# Patient Record
Sex: FEMALE | Race: WHITE | NOT HISPANIC OR LATINO
[De-identification: names, ages, dates, MRNs, and addresses within clinical notes are randomized per-mention and may not be internally consistent; named-entity substitution may affect disease eponyms.]

---

## 2017-03-13 ENCOUNTER — MEDICATION RENEWAL (OUTPATIENT)
Age: 72
End: 2017-03-13

## 2017-04-24 ENCOUNTER — APPOINTMENT (OUTPATIENT)
Dept: INTERNAL MEDICINE | Facility: CLINIC | Age: 72
End: 2017-04-24

## 2017-04-24 ENCOUNTER — MEDICATION RENEWAL (OUTPATIENT)
Age: 72
End: 2017-04-24

## 2017-04-24 VITALS
SYSTOLIC BLOOD PRESSURE: 188 MMHG | WEIGHT: 154 LBS | TEMPERATURE: 98 F | RESPIRATION RATE: 14 BRPM | DIASTOLIC BLOOD PRESSURE: 106 MMHG | OXYGEN SATURATION: 98 % | BODY MASS INDEX: 25.66 KG/M2 | HEIGHT: 65 IN | HEART RATE: 69 BPM

## 2017-04-24 DIAGNOSIS — R92.2 INCONCLUSIVE MAMMOGRAM: ICD-10-CM

## 2017-04-24 RX ORDER — CYANOCOBALAMIN 1000 UG/ML
1000 INJECTION INTRAMUSCULAR; SUBCUTANEOUS
Qty: 0 | Refills: 0 | Status: COMPLETED | OUTPATIENT
Start: 2017-04-24

## 2017-04-24 RX ADMIN — Medication 0 MCG/ML: at 00:00

## 2017-04-25 LAB
25(OH)D3 SERPL-MCNC: 55.3 NG/ML
ALBUMIN SERPL ELPH-MCNC: 4.1 G/DL
ALP BLD-CCNC: 71 U/L
ALT SERPL-CCNC: 22 U/L
ANION GAP SERPL CALC-SCNC: 15 MMOL/L
APPEARANCE: CLEAR
AST SERPL-CCNC: 28 U/L
BASOPHILS # BLD AUTO: 0.04 K/UL
BASOPHILS NFR BLD AUTO: 0.7 %
BILIRUB SERPL-MCNC: 0.5 MG/DL
BILIRUBIN URINE: NEGATIVE
BLOOD URINE: NEGATIVE
BUN SERPL-MCNC: 17 MG/DL
CALCIUM SERPL-MCNC: 9.7 MG/DL
CHLORIDE SERPL-SCNC: 104 MMOL/L
CHOLEST SERPL-MCNC: 153 MG/DL
CHOLEST/HDLC SERPL: 2.4 RATIO
CO2 SERPL-SCNC: 25 MMOL/L
COLOR: YELLOW
CREAT SERPL-MCNC: 0.92 MG/DL
EOSINOPHIL # BLD AUTO: 0.23 K/UL
EOSINOPHIL NFR BLD AUTO: 4.2 %
FOLATE SERPL-MCNC: 11.8 NG/ML
GLUCOSE QUALITATIVE U: NORMAL MG/DL
GLUCOSE SERPL-MCNC: 92 MG/DL
HBA1C MFR BLD HPLC: 5.5 %
HCT VFR BLD CALC: 41 %
HCV AB SER QL: NONREACTIVE
HCV S/CO RATIO: 0.1 S/CO
HDLC SERPL-MCNC: 64 MG/DL
HGB BLD-MCNC: 13.7 G/DL
IMM GRANULOCYTES NFR BLD AUTO: 0.2 %
KETONES URINE: NEGATIVE
LDLC SERPL CALC-MCNC: 71 MG/DL
LEUKOCYTE ESTERASE URINE: NEGATIVE
LYMPHOCYTES # BLD AUTO: 0.85 K/UL
LYMPHOCYTES NFR BLD AUTO: 15.5 %
MAN DIFF?: NORMAL
MCHC RBC-ENTMCNC: 30.4 PG
MCHC RBC-ENTMCNC: 33.4 GM/DL
MCV RBC AUTO: 91.1 FL
MONOCYTES # BLD AUTO: 0.36 K/UL
MONOCYTES NFR BLD AUTO: 6.5 %
NEUTROPHILS # BLD AUTO: 4.01 K/UL
NEUTROPHILS NFR BLD AUTO: 72.9 %
NITRITE URINE: NEGATIVE
PH URINE: 6.5
PLATELET # BLD AUTO: 252 K/UL
POTASSIUM SERPL-SCNC: 4.6 MMOL/L
PROT SERPL-MCNC: 6.6 G/DL
PROTEIN URINE: NEGATIVE MG/DL
RBC # BLD: 4.5 M/UL
RBC # FLD: 13.3 %
SODIUM SERPL-SCNC: 144 MMOL/L
SPECIFIC GRAVITY URINE: 1.02
TRIGL SERPL-MCNC: 90 MG/DL
TSH SERPL-ACNC: 1.15 UIU/ML
URATE SERPL-MCNC: 4.2 MG/DL
UROBILINOGEN URINE: NORMAL MG/DL
VIT B12 SERPL-MCNC: 438 PG/ML
WBC # FLD AUTO: 5.5 K/UL

## 2017-04-26 ENCOUNTER — MEDICATION RENEWAL (OUTPATIENT)
Age: 72
End: 2017-04-26

## 2017-05-07 LAB — HEMOCCULT STL QL IA: NEGATIVE

## 2017-06-22 ENCOUNTER — NON-APPOINTMENT (OUTPATIENT)
Age: 72
End: 2017-06-22

## 2017-06-22 ENCOUNTER — APPOINTMENT (OUTPATIENT)
Dept: CARDIOLOGY | Facility: CLINIC | Age: 72
End: 2017-06-22

## 2017-06-22 VITALS
HEART RATE: 59 BPM | DIASTOLIC BLOOD PRESSURE: 96 MMHG | OXYGEN SATURATION: 98 % | HEIGHT: 65 IN | SYSTOLIC BLOOD PRESSURE: 192 MMHG | RESPIRATION RATE: 14 BRPM | WEIGHT: 154 LBS | BODY MASS INDEX: 25.66 KG/M2

## 2017-06-22 VITALS — OXYGEN SATURATION: 98 % | DIASTOLIC BLOOD PRESSURE: 96 MMHG | HEART RATE: 58 BPM | SYSTOLIC BLOOD PRESSURE: 172 MMHG

## 2017-07-20 ENCOUNTER — APPOINTMENT (OUTPATIENT)
Dept: RADIOLOGY | Facility: CLINIC | Age: 72
End: 2017-07-20

## 2017-07-20 ENCOUNTER — APPOINTMENT (OUTPATIENT)
Dept: MAMMOGRAPHY | Facility: CLINIC | Age: 72
End: 2017-07-20

## 2017-07-20 ENCOUNTER — APPOINTMENT (OUTPATIENT)
Dept: ULTRASOUND IMAGING | Facility: CLINIC | Age: 72
End: 2017-07-20

## 2017-09-07 ENCOUNTER — TRANSCRIPTION ENCOUNTER (OUTPATIENT)
Age: 72
End: 2017-09-07

## 2017-09-14 ENCOUNTER — APPOINTMENT (OUTPATIENT)
Dept: INTERNAL MEDICINE | Facility: CLINIC | Age: 72
End: 2017-09-14
Payer: MEDICARE

## 2017-09-14 VITALS
WEIGHT: 158 LBS | DIASTOLIC BLOOD PRESSURE: 90 MMHG | HEART RATE: 64 BPM | HEIGHT: 65 IN | RESPIRATION RATE: 14 BRPM | BODY MASS INDEX: 26.33 KG/M2 | TEMPERATURE: 98.5 F | SYSTOLIC BLOOD PRESSURE: 170 MMHG | OXYGEN SATURATION: 96 %

## 2017-09-14 VITALS — SYSTOLIC BLOOD PRESSURE: 160 MMHG | DIASTOLIC BLOOD PRESSURE: 84 MMHG

## 2017-09-14 PROCEDURE — 96372 THER/PROPH/DIAG INJ SC/IM: CPT

## 2017-09-14 PROCEDURE — 99214 OFFICE O/P EST MOD 30 MIN: CPT | Mod: 25

## 2017-09-14 RX ORDER — CYANOCOBALAMIN 1000 UG/ML
1000 INJECTION INTRAMUSCULAR; SUBCUTANEOUS
Qty: 0 | Refills: 0 | Status: COMPLETED | OUTPATIENT
Start: 2017-09-14

## 2017-09-14 RX ADMIN — Medication 0 MCG/ML: at 00:00

## 2017-10-03 ENCOUNTER — APPOINTMENT (OUTPATIENT)
Dept: ORTHOPEDIC SURGERY | Facility: CLINIC | Age: 72
End: 2017-10-03
Payer: MEDICARE

## 2017-10-03 VITALS — BODY MASS INDEX: 26.33 KG/M2 | HEIGHT: 65 IN | WEIGHT: 158 LBS

## 2017-10-03 VITALS — SYSTOLIC BLOOD PRESSURE: 182 MMHG | HEART RATE: 71 BPM | DIASTOLIC BLOOD PRESSURE: 100 MMHG

## 2017-10-03 PROCEDURE — 73562 X-RAY EXAM OF KNEE 3: CPT | Mod: RT

## 2017-10-03 PROCEDURE — 99204 OFFICE O/P NEW MOD 45 MIN: CPT

## 2017-10-03 PROCEDURE — 73502 X-RAY EXAM HIP UNI 2-3 VIEWS: CPT

## 2017-12-14 ENCOUNTER — APPOINTMENT (OUTPATIENT)
Dept: INTERNAL MEDICINE | Facility: CLINIC | Age: 72
End: 2017-12-14
Payer: MEDICARE

## 2017-12-14 ENCOUNTER — NON-APPOINTMENT (OUTPATIENT)
Age: 72
End: 2017-12-14

## 2017-12-14 ENCOUNTER — APPOINTMENT (OUTPATIENT)
Dept: CARDIOLOGY | Facility: CLINIC | Age: 72
End: 2017-12-14
Payer: MEDICARE

## 2017-12-14 VITALS
TEMPERATURE: 98 F | SYSTOLIC BLOOD PRESSURE: 140 MMHG | OXYGEN SATURATION: 98 % | WEIGHT: 153 LBS | RESPIRATION RATE: 14 BRPM | HEART RATE: 59 BPM | DIASTOLIC BLOOD PRESSURE: 80 MMHG | HEIGHT: 65 IN | BODY MASS INDEX: 25.49 KG/M2

## 2017-12-14 VITALS
HEART RATE: 66 BPM | BODY MASS INDEX: 25.49 KG/M2 | OXYGEN SATURATION: 98 % | WEIGHT: 153 LBS | HEIGHT: 65 IN | SYSTOLIC BLOOD PRESSURE: 152 MMHG | DIASTOLIC BLOOD PRESSURE: 83 MMHG

## 2017-12-14 DIAGNOSIS — Z79.2 LONG TERM (CURRENT) USE OF ANTIBIOTICS: ICD-10-CM

## 2017-12-14 PROCEDURE — 93000 ELECTROCARDIOGRAM COMPLETE: CPT

## 2017-12-14 PROCEDURE — 96372 THER/PROPH/DIAG INJ SC/IM: CPT

## 2017-12-14 PROCEDURE — 99214 OFFICE O/P EST MOD 30 MIN: CPT

## 2017-12-14 PROCEDURE — 99214 OFFICE O/P EST MOD 30 MIN: CPT | Mod: 25

## 2017-12-14 RX ORDER — CYANOCOBALAMIN 1000 UG/ML
1000 INJECTION INTRAMUSCULAR; SUBCUTANEOUS
Qty: 0 | Refills: 0 | Status: COMPLETED | OUTPATIENT
Start: 2017-12-14

## 2017-12-14 RX ADMIN — Medication 0 MCG/ML: at 00:00

## 2017-12-19 PROBLEM — Z79.2 NEED FOR PROPHYLACTIC ANTIBIOTIC: Status: ACTIVE | Noted: 2017-12-19

## 2017-12-22 ENCOUNTER — OUTPATIENT (OUTPATIENT)
Dept: OUTPATIENT SERVICES | Facility: HOSPITAL | Age: 72
LOS: 1 days | End: 2017-12-22
Payer: MEDICARE

## 2017-12-22 VITALS
HEART RATE: 60 BPM | SYSTOLIC BLOOD PRESSURE: 150 MMHG | TEMPERATURE: 98 F | DIASTOLIC BLOOD PRESSURE: 80 MMHG | RESPIRATION RATE: 14 BRPM | HEIGHT: 64 IN | WEIGHT: 154.32 LBS

## 2017-12-22 DIAGNOSIS — Z95.2 PRESENCE OF PROSTHETIC HEART VALVE: Chronic | ICD-10-CM

## 2017-12-22 DIAGNOSIS — M16.11 UNILATERAL PRIMARY OSTEOARTHRITIS, RIGHT HIP: ICD-10-CM

## 2017-12-22 DIAGNOSIS — M19.90 UNSPECIFIED OSTEOARTHRITIS, UNSPECIFIED SITE: ICD-10-CM

## 2017-12-22 DIAGNOSIS — Z01.818 ENCOUNTER FOR OTHER PREPROCEDURAL EXAMINATION: ICD-10-CM

## 2017-12-22 DIAGNOSIS — Z96.7 PRESENCE OF OTHER BONE AND TENDON IMPLANTS: Chronic | ICD-10-CM

## 2017-12-22 DIAGNOSIS — Z95.4 PRESENCE OF OTHER HEART-VALVE REPLACEMENT: Chronic | ICD-10-CM

## 2017-12-22 DIAGNOSIS — Z96.659 PRESENCE OF UNSPECIFIED ARTIFICIAL KNEE JOINT: Chronic | ICD-10-CM

## 2017-12-22 DIAGNOSIS — Z98.89 OTHER SPECIFIED POSTPROCEDURAL STATES: Chronic | ICD-10-CM

## 2017-12-22 LAB
ALBUMIN SERPL ELPH-MCNC: 3.6 G/DL — SIGNIFICANT CHANGE UP (ref 3.3–5)
ALP SERPL-CCNC: 64 U/L — SIGNIFICANT CHANGE UP (ref 30–120)
ALT FLD-CCNC: 26 U/L DA — SIGNIFICANT CHANGE UP (ref 10–60)
ANION GAP SERPL CALC-SCNC: 6 MMOL/L — SIGNIFICANT CHANGE UP (ref 5–17)
APTT BLD: 30.9 SEC — SIGNIFICANT CHANGE UP (ref 27.5–37.4)
AST SERPL-CCNC: 25 U/L — SIGNIFICANT CHANGE UP (ref 10–40)
BILIRUB SERPL-MCNC: 0.5 MG/DL — SIGNIFICANT CHANGE UP (ref 0.2–1.2)
BLD GP AB SCN SERPL QL: SIGNIFICANT CHANGE UP
BUN SERPL-MCNC: 18 MG/DL — SIGNIFICANT CHANGE UP (ref 7–23)
CALCIUM SERPL-MCNC: 9.2 MG/DL — SIGNIFICANT CHANGE UP (ref 8.4–10.5)
CHLORIDE SERPL-SCNC: 108 MMOL/L — SIGNIFICANT CHANGE UP (ref 96–108)
CO2 SERPL-SCNC: 28 MMOL/L — SIGNIFICANT CHANGE UP (ref 22–31)
CREAT SERPL-MCNC: 1.02 MG/DL — SIGNIFICANT CHANGE UP (ref 0.5–1.3)
GLUCOSE SERPL-MCNC: 97 MG/DL — SIGNIFICANT CHANGE UP (ref 70–99)
HCT VFR BLD CALC: 44.2 % — SIGNIFICANT CHANGE UP (ref 34.5–45)
HGB BLD-MCNC: 13.8 G/DL — SIGNIFICANT CHANGE UP (ref 11.5–15.5)
INR BLD: 0.96 RATIO — SIGNIFICANT CHANGE UP (ref 0.88–1.16)
MCHC RBC-ENTMCNC: 29.1 PG — SIGNIFICANT CHANGE UP (ref 27–34)
MCHC RBC-ENTMCNC: 31.2 GM/DL — LOW (ref 32–36)
MCV RBC AUTO: 93.3 FL — SIGNIFICANT CHANGE UP (ref 80–100)
MRSA PCR RESULT.: SIGNIFICANT CHANGE UP
PLATELET # BLD AUTO: 266 K/UL — SIGNIFICANT CHANGE UP (ref 150–400)
POTASSIUM SERPL-MCNC: 4.2 MMOL/L — SIGNIFICANT CHANGE UP (ref 3.5–5.3)
POTASSIUM SERPL-SCNC: 4.2 MMOL/L — SIGNIFICANT CHANGE UP (ref 3.5–5.3)
PROT SERPL-MCNC: 6.8 G/DL — SIGNIFICANT CHANGE UP (ref 6–8.3)
PROTHROM AB SERPL-ACNC: 10.5 SEC — SIGNIFICANT CHANGE UP (ref 9.8–12.7)
RBC # BLD: 4.74 M/UL — SIGNIFICANT CHANGE UP (ref 3.8–5.2)
RBC # FLD: 12.3 % — SIGNIFICANT CHANGE UP (ref 10.3–14.5)
S AUREUS DNA NOSE QL NAA+PROBE: SIGNIFICANT CHANGE UP
SODIUM SERPL-SCNC: 142 MMOL/L — SIGNIFICANT CHANGE UP (ref 135–145)
WBC # BLD: 5.1 K/UL — SIGNIFICANT CHANGE UP (ref 3.8–10.5)
WBC # FLD AUTO: 5.1 K/UL — SIGNIFICANT CHANGE UP (ref 3.8–10.5)

## 2017-12-22 PROCEDURE — 86901 BLOOD TYPING SEROLOGIC RH(D): CPT

## 2017-12-22 PROCEDURE — 85610 PROTHROMBIN TIME: CPT

## 2017-12-22 PROCEDURE — 87641 MR-STAPH DNA AMP PROBE: CPT

## 2017-12-22 PROCEDURE — 80053 COMPREHEN METABOLIC PANEL: CPT

## 2017-12-22 PROCEDURE — G0463: CPT

## 2017-12-22 PROCEDURE — 36415 COLL VENOUS BLD VENIPUNCTURE: CPT

## 2017-12-22 PROCEDURE — 86850 RBC ANTIBODY SCREEN: CPT

## 2017-12-22 PROCEDURE — 86900 BLOOD TYPING SEROLOGIC ABO: CPT

## 2017-12-22 PROCEDURE — 87640 STAPH A DNA AMP PROBE: CPT

## 2017-12-22 PROCEDURE — 85730 THROMBOPLASTIN TIME PARTIAL: CPT

## 2017-12-22 PROCEDURE — 85027 COMPLETE CBC AUTOMATED: CPT

## 2017-12-22 NOTE — H&P PST ADULT - PMH
HLD (hyperlipidemia)    Hypertension    Hypothyroidism    Osteoarthrosis    Seasonal allergies    Vitamin B12 deficiency

## 2017-12-22 NOTE — H&P PST ADULT - NSANTHOSAYNRD_GEN_A_CORE
No. RANCHO screening performed.  STOP BANG Legend: 0-2 = LOW Risk; 3-4 = INTERMEDIATE Risk; 5-8 = HIGH Risk

## 2017-12-22 NOTE — H&P PST ADULT - FAMILY HISTORY
Father  Still living? No  Family history of cancer, Age at diagnosis: Age Unknown     Sibling  Still living? Yes, Estimated age: 71-80  Family history of stroke, Age at diagnosis: Age Unknown

## 2017-12-22 NOTE — H&P PST ADULT - HISTORY OF PRESENT ILLNESS
This is a 71 y/o female who presents with complaints of right hip pain for the past 2 years . She has been experiencing intermittent stabbing pain in the right groin and difficult walking . scheduled for right hip replacement
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
## 2017-12-22 NOTE — H&P PST ADULT - PROBLEM SELECTOR PLAN 1
right hip replacement   Medical clearance and cardiac clearance   Pre op instructions   anesthesia consult h/o difficult intubation in the past

## 2017-12-28 ENCOUNTER — APPOINTMENT (OUTPATIENT)
Dept: INTERNAL MEDICINE | Facility: CLINIC | Age: 72
End: 2017-12-28
Payer: MEDICARE

## 2017-12-28 VITALS
DIASTOLIC BLOOD PRESSURE: 80 MMHG | HEART RATE: 77 BPM | SYSTOLIC BLOOD PRESSURE: 140 MMHG | OXYGEN SATURATION: 96 % | HEIGHT: 64.5 IN | BODY MASS INDEX: 26.14 KG/M2 | WEIGHT: 155 LBS | TEMPERATURE: 98.7 F

## 2017-12-28 PROCEDURE — 99214 OFFICE O/P EST MOD 30 MIN: CPT

## 2018-01-02 RX ORDER — TRANEXAMIC ACID 100 MG/ML
1000 INJECTION, SOLUTION INTRAVENOUS ONCE
Qty: 0 | Refills: 0 | Status: DISCONTINUED | OUTPATIENT
Start: 2018-01-10 | End: 2018-01-10

## 2018-01-02 RX ORDER — APREPITANT 80 MG/1
40 CAPSULE ORAL ONCE
Qty: 0 | Refills: 0 | Status: COMPLETED | OUTPATIENT
Start: 2018-01-10 | End: 2018-01-10

## 2018-01-02 RX ORDER — ACETAMINOPHEN 500 MG
1000 TABLET ORAL ONCE
Qty: 0 | Refills: 0 | Status: COMPLETED | OUTPATIENT
Start: 2018-01-10 | End: 2018-01-10

## 2018-01-02 RX ORDER — SODIUM CHLORIDE 9 MG/ML
1000 INJECTION, SOLUTION INTRAVENOUS
Qty: 0 | Refills: 0 | Status: DISCONTINUED | OUTPATIENT
Start: 2018-01-10 | End: 2018-01-12

## 2018-01-02 RX ORDER — CEFAZOLIN SODIUM 1 G
2000 VIAL (EA) INJECTION ONCE
Qty: 0 | Refills: 0 | Status: COMPLETED | OUTPATIENT
Start: 2018-01-10 | End: 2018-01-10

## 2018-01-09 RX ORDER — PANTOPRAZOLE SODIUM 20 MG/1
40 TABLET, DELAYED RELEASE ORAL DAILY
Qty: 0 | Refills: 0 | Status: DISCONTINUED | OUTPATIENT
Start: 2018-01-10 | End: 2018-01-12

## 2018-01-09 RX ORDER — ONDANSETRON 8 MG/1
4 TABLET, FILM COATED ORAL EVERY 6 HOURS
Qty: 0 | Refills: 0 | Status: DISCONTINUED | OUTPATIENT
Start: 2018-01-10 | End: 2018-01-12

## 2018-01-09 RX ORDER — SENNA PLUS 8.6 MG/1
2 TABLET ORAL AT BEDTIME
Qty: 0 | Refills: 0 | Status: DISCONTINUED | OUTPATIENT
Start: 2018-01-10 | End: 2018-01-12

## 2018-01-09 RX ORDER — MAGNESIUM HYDROXIDE 400 MG/1
30 TABLET, CHEWABLE ORAL DAILY
Qty: 0 | Refills: 0 | Status: DISCONTINUED | OUTPATIENT
Start: 2018-01-10 | End: 2018-01-12

## 2018-01-09 RX ORDER — POLYETHYLENE GLYCOL 3350 17 G/17G
17 POWDER, FOR SOLUTION ORAL DAILY
Qty: 0 | Refills: 0 | Status: DISCONTINUED | OUTPATIENT
Start: 2018-01-10 | End: 2018-01-12

## 2018-01-09 RX ORDER — SODIUM CHLORIDE 9 MG/ML
1000 INJECTION, SOLUTION INTRAVENOUS
Qty: 0 | Refills: 0 | Status: DISCONTINUED | OUTPATIENT
Start: 2018-01-10 | End: 2018-01-12

## 2018-01-09 RX ORDER — DOCUSATE SODIUM 100 MG
100 CAPSULE ORAL THREE TIMES A DAY
Qty: 0 | Refills: 0 | Status: DISCONTINUED | OUTPATIENT
Start: 2018-01-10 | End: 2018-01-12
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
## 2018-01-10 ENCOUNTER — APPOINTMENT (OUTPATIENT)
Dept: ORTHOPEDIC SURGERY | Facility: HOSPITAL | Age: 73
End: 2018-01-10

## 2018-01-10 ENCOUNTER — INPATIENT (INPATIENT)
Facility: HOSPITAL | Age: 73
LOS: 1 days | Discharge: HOME | DRG: 470 | End: 2018-01-12
Attending: ORTHOPAEDIC SURGERY | Admitting: ORTHOPAEDIC SURGERY
Payer: MEDICARE

## 2018-01-10 ENCOUNTER — RESULT REVIEW (OUTPATIENT)
Age: 73
End: 2018-01-10

## 2018-01-10 VITALS
TEMPERATURE: 98 F | HEIGHT: 64.5 IN | RESPIRATION RATE: 17 BRPM | WEIGHT: 154.98 LBS | DIASTOLIC BLOOD PRESSURE: 84 MMHG | SYSTOLIC BLOOD PRESSURE: 171 MMHG | HEART RATE: 67 BPM | OXYGEN SATURATION: 98 %

## 2018-01-10 DIAGNOSIS — M16.11 UNILATERAL PRIMARY OSTEOARTHRITIS, RIGHT HIP: ICD-10-CM

## 2018-01-10 DIAGNOSIS — Z95.2 PRESENCE OF PROSTHETIC HEART VALVE: Chronic | ICD-10-CM

## 2018-01-10 DIAGNOSIS — Z96.7 PRESENCE OF OTHER BONE AND TENDON IMPLANTS: Chronic | ICD-10-CM

## 2018-01-10 DIAGNOSIS — Z96.659 PRESENCE OF UNSPECIFIED ARTIFICIAL KNEE JOINT: Chronic | ICD-10-CM

## 2018-01-10 DIAGNOSIS — Z98.89 OTHER SPECIFIED POSTPROCEDURAL STATES: Chronic | ICD-10-CM

## 2018-01-10 LAB
ANION GAP SERPL CALC-SCNC: 8 MMOL/L — SIGNIFICANT CHANGE UP (ref 5–17)
BUN SERPL-MCNC: 17 MG/DL — SIGNIFICANT CHANGE UP (ref 7–23)
CALCIUM SERPL-MCNC: 8.5 MG/DL — SIGNIFICANT CHANGE UP (ref 8.4–10.5)
CHLORIDE SERPL-SCNC: 104 MMOL/L — SIGNIFICANT CHANGE UP (ref 96–108)
CO2 SERPL-SCNC: 25 MMOL/L — SIGNIFICANT CHANGE UP (ref 22–31)
CREAT SERPL-MCNC: 0.95 MG/DL — SIGNIFICANT CHANGE UP (ref 0.5–1.3)
GLUCOSE SERPL-MCNC: 200 MG/DL — HIGH (ref 70–99)
HCT VFR BLD CALC: 34.4 % — LOW (ref 34.5–45)
HGB BLD-MCNC: 11.5 G/DL — SIGNIFICANT CHANGE UP (ref 11.5–15.5)
POTASSIUM SERPL-MCNC: 4.3 MMOL/L — SIGNIFICANT CHANGE UP (ref 3.5–5.3)
POTASSIUM SERPL-SCNC: 4.3 MMOL/L — SIGNIFICANT CHANGE UP (ref 3.5–5.3)
SODIUM SERPL-SCNC: 137 MMOL/L — SIGNIFICANT CHANGE UP (ref 135–145)

## 2018-01-10 PROCEDURE — 27130 TOTAL HIP ARTHROPLASTY: CPT | Mod: 82,RT

## 2018-01-10 PROCEDURE — 73501 X-RAY EXAM HIP UNI 1 VIEW: CPT | Mod: 26,RT

## 2018-01-10 PROCEDURE — 88305 TISSUE EXAM BY PATHOLOGIST: CPT | Mod: 26

## 2018-01-10 PROCEDURE — 27130 TOTAL HIP ARTHROPLASTY: CPT | Mod: RT

## 2018-01-10 PROCEDURE — 88311 DECALCIFY TISSUE: CPT | Mod: 26

## 2018-01-10 PROCEDURE — 99223 1ST HOSP IP/OBS HIGH 75: CPT

## 2018-01-10 RX ORDER — CHOLECALCIFEROL (VITAMIN D3) 125 MCG
1 CAPSULE ORAL
Qty: 0 | Refills: 0 | COMMUNITY

## 2018-01-10 RX ORDER — SODIUM CHLORIDE 9 MG/ML
1000 INJECTION, SOLUTION INTRAVENOUS
Qty: 0 | Refills: 0 | Status: DISCONTINUED | OUTPATIENT
Start: 2018-01-10 | End: 2018-01-10

## 2018-01-10 RX ORDER — CEFAZOLIN SODIUM 1 G
2000 VIAL (EA) INJECTION EVERY 8 HOURS
Qty: 0 | Refills: 0 | Status: COMPLETED | OUTPATIENT
Start: 2018-01-10 | End: 2018-01-11

## 2018-01-10 RX ORDER — PREGABALIN 225 MG/1
0 CAPSULE ORAL
Qty: 0 | Refills: 0 | COMMUNITY

## 2018-01-10 RX ORDER — ATORVASTATIN CALCIUM 80 MG/1
20 TABLET, FILM COATED ORAL AT BEDTIME
Qty: 0 | Refills: 0 | Status: DISCONTINUED | OUTPATIENT
Start: 2018-01-10 | End: 2018-01-12

## 2018-01-10 RX ORDER — ACETAMINOPHEN 500 MG
1000 TABLET ORAL EVERY 8 HOURS
Qty: 0 | Refills: 0 | Status: DISCONTINUED | OUTPATIENT
Start: 2018-01-11 | End: 2018-01-12

## 2018-01-10 RX ORDER — ASPIRIN/CALCIUM CARB/MAGNESIUM 324 MG
162 TABLET ORAL EVERY 12 HOURS
Qty: 0 | Refills: 0 | Status: DISCONTINUED | OUTPATIENT
Start: 2018-01-11 | End: 2018-01-12

## 2018-01-10 RX ORDER — METOPROLOL TARTRATE 50 MG
50 TABLET ORAL DAILY
Qty: 0 | Refills: 0 | Status: DISCONTINUED | OUTPATIENT
Start: 2018-01-10 | End: 2018-01-12

## 2018-01-10 RX ORDER — ONDANSETRON 8 MG/1
4 TABLET, FILM COATED ORAL ONCE
Qty: 0 | Refills: 0 | Status: DISCONTINUED | OUTPATIENT
Start: 2018-01-10 | End: 2018-01-10

## 2018-01-10 RX ORDER — ACETAMINOPHEN 500 MG
1000 TABLET ORAL EVERY 6 HOURS
Qty: 0 | Refills: 0 | Status: COMPLETED | OUTPATIENT
Start: 2018-01-10 | End: 2018-01-11

## 2018-01-10 RX ORDER — HYDROMORPHONE HYDROCHLORIDE 2 MG/ML
0.5 INJECTION INTRAMUSCULAR; INTRAVENOUS; SUBCUTANEOUS
Qty: 0 | Refills: 0 | Status: DISCONTINUED | OUTPATIENT
Start: 2018-01-10 | End: 2018-01-12

## 2018-01-10 RX ORDER — OXYCODONE HYDROCHLORIDE 5 MG/1
10 TABLET ORAL
Qty: 0 | Refills: 0 | Status: DISCONTINUED | OUTPATIENT
Start: 2018-01-10 | End: 2018-01-12

## 2018-01-10 RX ORDER — LEVOTHYROXINE SODIUM 125 MCG
50 TABLET ORAL DAILY
Qty: 0 | Refills: 0 | Status: DISCONTINUED | OUTPATIENT
Start: 2018-01-10 | End: 2018-01-12

## 2018-01-10 RX ORDER — OXYCODONE HYDROCHLORIDE 5 MG/1
5 TABLET ORAL
Qty: 0 | Refills: 0 | Status: DISCONTINUED | OUTPATIENT
Start: 2018-01-10 | End: 2018-01-12

## 2018-01-10 RX ORDER — HYDROMORPHONE HYDROCHLORIDE 2 MG/ML
0.5 INJECTION INTRAMUSCULAR; INTRAVENOUS; SUBCUTANEOUS
Qty: 0 | Refills: 0 | Status: DISCONTINUED | OUTPATIENT
Start: 2018-01-10 | End: 2018-01-10

## 2018-01-10 RX ADMIN — SENNA PLUS 2 TABLET(S): 8.6 TABLET ORAL at 21:42

## 2018-01-10 RX ADMIN — Medication 400 MILLIGRAM(S): at 22:31

## 2018-01-10 RX ADMIN — ATORVASTATIN CALCIUM 20 MILLIGRAM(S): 80 TABLET, FILM COATED ORAL at 21:42

## 2018-01-10 RX ADMIN — Medication 100 MILLIGRAM(S): at 21:42

## 2018-01-10 RX ADMIN — Medication 250 MILLIGRAM(S): at 22:32

## 2018-01-10 RX ADMIN — Medication 1000 MILLIGRAM(S): at 22:32

## 2018-01-10 RX ADMIN — SODIUM CHLORIDE 100 MILLILITER(S): 9 INJECTION, SOLUTION INTRAVENOUS at 16:54

## 2018-01-10 RX ADMIN — Medication 100 MILLIGRAM(S): at 17:59

## 2018-01-10 RX ADMIN — APREPITANT 40 MILLIGRAM(S): 80 CAPSULE ORAL at 08:44

## 2018-01-10 RX ADMIN — Medication 1000 MILLIGRAM(S): at 17:59

## 2018-01-10 RX ADMIN — Medication 400 MILLIGRAM(S): at 16:54

## 2018-01-10 RX ADMIN — Medication 250 MILLIGRAM(S): at 21:42

## 2018-01-10 NOTE — PROGRESS NOTE ADULT - SUBJECTIVE AND OBJECTIVE BOX
POST OPERATIVE NOTE    s/p  Right THR    Complains of  2  /10 pain at surgical site.   No headache, chest pain, shortness of breath or nausea.    Vital Signs Last 24 Hrs  T(C): 36.3 (10 Lisandro 2018 12:00), Max: 36.8 (10 Lisandro 2018 08:29)  T(F): 97.4 (10 Lisandro 2018 12:00), Max: 98.2 (10 Lisandro 2018 08:29)  HR: 64 (10 Lisandro 2018 15:30) (60 - 71)  BP: 131/65 (10 Lisandro 2018 15:30) (116/67 - 171/84)  BP(mean): --  RR: 21 (10 Lisandro 2018 15:30) (11 - 21)  SpO2: 100% (10 Lisandro 2018 15:30) (98% - 100%)    Labs: to be drawn at 1600      Physical: Surgical site dressing clean and dry.                   Foot sensate to light touch, +EHL, plantarflexion, dorsiflexion, +pedal pulse                   Calves soft, nontender   Allergies  sulfa drugs (Rash)      Orthopedically stable    Heterotopic bone prevention- Naprosyn (sulfa allergy)    Perioperative antibiotic- ancef 24 hours     VTE prophylaxis - ecotrin 162 twice daily    Physical and Occupational therapy- WBAT    Pain management- multimodal       Medical care per hospitalist service     Discharge plan to be determined  ([facility] total knee replacements)
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
## 2018-01-10 NOTE — PHYSICAL THERAPY INITIAL EVALUATION ADULT - RANGE OF MOTION EXAMINATION, REHAB EVAL
deficits as listed below/bilateral upper extremity ROM was WFL (within functional limits)/right knee ext 1/2 range due to numbness

## 2018-01-10 NOTE — BRIEF OPERATIVE NOTE - PROCEDURE
<<-----Click on this checkbox to enter Procedure Total hip arthroplasty  01/10/2018  right total hip replacement  Active  NINA

## 2018-01-10 NOTE — PHYSICAL THERAPY INITIAL EVALUATION ADULT - ADDITIONAL COMMENTS
Lives in house with spouse.  3 stairs to enter without railing.  6 stairs inside with railing.  Pt. owns cane and walker due to old TKR.  Pt. has tub with curtain.

## 2018-01-10 NOTE — PHYSICAL THERAPY INITIAL EVALUATION ADULT - BED MOBILITY TRAINING, PT EVAL
Goals (3-5 sessions): Sup<->sit independent        Stairs: Up/down 3 stairs with railing independently

## 2018-01-11 ENCOUNTER — TRANSCRIPTION ENCOUNTER (OUTPATIENT)
Age: 73
End: 2018-01-11

## 2018-01-11 LAB
ANION GAP SERPL CALC-SCNC: 7 MMOL/L — SIGNIFICANT CHANGE UP (ref 5–17)
BUN SERPL-MCNC: 15 MG/DL — SIGNIFICANT CHANGE UP (ref 7–23)
CALCIUM SERPL-MCNC: 8.5 MG/DL — SIGNIFICANT CHANGE UP (ref 8.4–10.5)
CHLORIDE SERPL-SCNC: 107 MMOL/L — SIGNIFICANT CHANGE UP (ref 96–108)
CO2 SERPL-SCNC: 26 MMOL/L — SIGNIFICANT CHANGE UP (ref 22–31)
CREAT SERPL-MCNC: 0.85 MG/DL — SIGNIFICANT CHANGE UP (ref 0.5–1.3)
GLUCOSE SERPL-MCNC: 112 MG/DL — HIGH (ref 70–99)
HCT VFR BLD CALC: 30.4 % — LOW (ref 34.5–45)
HGB BLD-MCNC: 10 G/DL — LOW (ref 11.5–15.5)
MCHC RBC-ENTMCNC: 29.9 PG — SIGNIFICANT CHANGE UP (ref 27–34)
MCHC RBC-ENTMCNC: 32.8 GM/DL — SIGNIFICANT CHANGE UP (ref 32–36)
MCV RBC AUTO: 91.3 FL — SIGNIFICANT CHANGE UP (ref 80–100)
PLATELET # BLD AUTO: 194 K/UL — SIGNIFICANT CHANGE UP (ref 150–400)
POTASSIUM SERPL-MCNC: 4.1 MMOL/L — SIGNIFICANT CHANGE UP (ref 3.5–5.3)
POTASSIUM SERPL-SCNC: 4.1 MMOL/L — SIGNIFICANT CHANGE UP (ref 3.5–5.3)
RBC # BLD: 3.33 M/UL — LOW (ref 3.8–5.2)
RBC # FLD: 12.2 % — SIGNIFICANT CHANGE UP (ref 10.3–14.5)
SODIUM SERPL-SCNC: 140 MMOL/L — SIGNIFICANT CHANGE UP (ref 135–145)
WBC # BLD: 11 K/UL — HIGH (ref 3.8–10.5)
WBC # FLD AUTO: 11 K/UL — HIGH (ref 3.8–10.5)

## 2018-01-11 PROCEDURE — 99233 SBSQ HOSP IP/OBS HIGH 50: CPT

## 2018-01-11 RX ORDER — ASPIRIN/CALCIUM CARB/MAGNESIUM 324 MG
2 TABLET ORAL
Qty: 160 | Refills: 0
Start: 2018-01-11 | End: 2018-02-19

## 2018-01-11 RX ORDER — ACETAMINOPHEN 500 MG
2 TABLET ORAL
Qty: 84 | Refills: 0
Start: 2018-01-11 | End: 2018-01-24

## 2018-01-11 RX ORDER — PANTOPRAZOLE SODIUM 20 MG/1
1 TABLET, DELAYED RELEASE ORAL
Qty: 40 | Refills: 0 | OUTPATIENT
Start: 2018-01-11 | End: 2018-02-19

## 2018-01-11 RX ORDER — OXYCODONE HYDROCHLORIDE 5 MG/1
1 TABLET ORAL
Qty: 80 | Refills: 0
Start: 2018-01-11

## 2018-01-11 RX ADMIN — Medication 250 MILLIGRAM(S): at 06:01

## 2018-01-11 RX ADMIN — ATORVASTATIN CALCIUM 20 MILLIGRAM(S): 80 TABLET, FILM COATED ORAL at 21:45

## 2018-01-11 RX ADMIN — Medication 100 MILLIGRAM(S): at 01:53

## 2018-01-11 RX ADMIN — OXYCODONE HYDROCHLORIDE 10 MILLIGRAM(S): 5 TABLET ORAL at 21:45

## 2018-01-11 RX ADMIN — OXYCODONE HYDROCHLORIDE 10 MILLIGRAM(S): 5 TABLET ORAL at 22:30

## 2018-01-11 RX ADMIN — Medication 50 MICROGRAM(S): at 06:02

## 2018-01-11 RX ADMIN — OXYCODONE HYDROCHLORIDE 10 MILLIGRAM(S): 5 TABLET ORAL at 16:35

## 2018-01-11 RX ADMIN — Medication 100 MILLIGRAM(S): at 14:35

## 2018-01-11 RX ADMIN — Medication 250 MILLIGRAM(S): at 06:03

## 2018-01-11 RX ADMIN — Medication 250 MILLIGRAM(S): at 14:35

## 2018-01-11 RX ADMIN — Medication 1000 MILLIGRAM(S): at 17:37

## 2018-01-11 RX ADMIN — Medication 162 MILLIGRAM(S): at 21:45

## 2018-01-11 RX ADMIN — SENNA PLUS 2 TABLET(S): 8.6 TABLET ORAL at 21:45

## 2018-01-11 RX ADMIN — PANTOPRAZOLE SODIUM 40 MILLIGRAM(S): 20 TABLET, DELAYED RELEASE ORAL at 13:12

## 2018-01-11 RX ADMIN — Medication 400 MILLIGRAM(S): at 05:00

## 2018-01-11 RX ADMIN — Medication 1000 MILLIGRAM(S): at 13:11

## 2018-01-11 RX ADMIN — Medication 100 MILLIGRAM(S): at 21:45

## 2018-01-11 RX ADMIN — Medication 1000 MILLIGRAM(S): at 05:09

## 2018-01-11 RX ADMIN — OXYCODONE HYDROCHLORIDE 10 MILLIGRAM(S): 5 TABLET ORAL at 17:05

## 2018-01-11 RX ADMIN — Medication 1000 MILLIGRAM(S): at 13:13

## 2018-01-11 RX ADMIN — Medication 162 MILLIGRAM(S): at 09:15

## 2018-01-11 RX ADMIN — Medication 250 MILLIGRAM(S): at 21:48

## 2018-01-11 RX ADMIN — Medication 1000 MILLIGRAM(S): at 17:38

## 2018-01-11 RX ADMIN — Medication 100 MILLIGRAM(S): at 06:02

## 2018-01-11 RX ADMIN — Medication 250 MILLIGRAM(S): at 21:45

## 2018-01-11 RX ADMIN — Medication 50 MILLIGRAM(S): at 06:02

## 2018-01-11 NOTE — DISCHARGE NOTE ADULT - PATIENT PORTAL LINK FT
“You can access the Follow[employer] Patient Portal, offered by [facility], by registering with the following website: [email or web address]”

## 2018-01-11 NOTE — OCCUPATIONAL THERAPY INITIAL EVALUATION ADULT - ORIENTATION, REHAB EVAL
Patient educated verbally regarding role of OT, LE weight bearing status & pt. provided with education folder including functional exercises, THR education/precautions & caregiver guide pamphlet./oriented to person, place, time and situation

## 2018-01-11 NOTE — DISCHARGE NOTE ADULT - PLAN OF CARE
to improve pain and quality of life Physical Therapy /Occupational Therapy for: Ambulation, Transfers , Stairs, ADLs (activities of daily living), isometrics.  TOTAL HIP PRECAUTIONS  *Remember to continue all of the precautions for total hip replacement. Your surgeon will tell you when and if you can move beyond these limitations.  • Do not bend your hip more than 90 degrees   • Do not cross your legs or ankles when laying sitting or standing.  • DO NOT bend over at your waist.  • Avoid sitting in low, soft chairs such as sofas and car seats. You should sit on a chair using firm pillows to raise the height of the seat.  • Make sure your bed level is high, so that you maintain proper leg positioning when sitting on the side, or getting in or out.  • When entering and traveling by car, sit in the front passenger seat. Make sure that the car seat is all the way back and semi-reclined before entering.  • Do not allow your knees to come together when sitting or lying in bed. Use abduction pillow.  • Do not take a tub bath yet.   • Do not resume driving until you have your surgeon’s permission. HO prophylaxis Naproxen 250mg three times a day for total of 21 days

## 2018-01-11 NOTE — DISCHARGE NOTE ADULT - HOME CARE AGENCY
[facility] - [phone]  or [phone] [facility] - [phone]  or [phone]: [facility] - [phone]  Nurse to visit the day after hospital discharge; physical therapist to follow. Please contact the home care agency at the above phone number if you have not heard from them by 12 noon on the day after your hospital discharge.

## 2018-01-11 NOTE — OCCUPATIONAL THERAPY INITIAL EVALUATION ADULT - ADDITIONAL COMMENTS
Lives in house with spouse.  3 stairs to enter without railing.  6 stairs inside with railing. + SAC, RW Pt. has tub with curtain. Lives in house with spouse.  3 stairs to enter without railing.  6 stairs inside with railing. + SAC, RW Pt. has tub with curtain & stall shower (pt prefers standing in  tub) + commode

## 2018-01-11 NOTE — DISCHARGE NOTE ADULT - CARE PROVIDER_API CALL
Manfred Latham), Orthopaedic Surgery  [address] [place] [address]  Phone: [phone]  Fax: [phone]

## 2018-01-11 NOTE — PROGRESS NOTE ADULT - ASSESSMENT
72 male    1. primary severe osteoarthritis of joint s/p right thr day 2  no acute complaints,   Physical Therapy evaluation.  bowel regimen  incentive spirometer    2. HTN: Blood pressure meds with hold parameters     3. HLD: statin    4. Hypothyroid: synthroid    5. dvt prophylaxis per orthopedic protocol     6. dispo: home in 2-3 days.  d/w RN plan of care.

## 2018-01-11 NOTE — DISCHARGE NOTE ADULT - HOSPITAL COURSE
This patient was admitted to [facility] with a history of severe degenerative joint disease of the right hip.  Patient went to Pre-Surgical Testing at [facility] and was medically cleared to undergo elective procedure.  The patient underwent a right total hip replacement by Dr. Latham on 1/10/2018. No operative or yesenia-operative complications arose during patients hospital course.  Patient received antibiotic according to SCIP guidelines for infection prevention. Aspirin was given for DVT prophylaxis.  Anesthesia, Medical Hospitalist, Physical Therapy and Occupational Therapy were consulted. Patient is stable for discharge with a good prognosis.  Appropriate discharge instructions and medications are provided in this document.

## 2018-01-11 NOTE — DISCHARGE NOTE ADULT - MEDICATION SUMMARY - MEDICATIONS TO TAKE
I will START or STAY ON the medications listed below when I get home from the hospital:    Hip Kit  -- Dx: s/p Right THR  -- Indication: For equipment    aspirin 81 mg oral delayed release tablet  -- 2 tab(s) by mouth every 12 hours  -- Indication: For DVT prophylaxis    oxyCODONE 5 mg oral tablet  -- 1-2 tab(s) by mouth every 4 hours, As Needed for moderate pain MDD:8  -- Indication: For Pain    acetaminophen 500 mg oral tablet  -- 2 tab(s) by mouth every 8 hours  -- Indication: For Pain    naproxen 250 mg oral tablet  -- 1 tab(s) by mouth 3 times a day MDD:3  -- Indication: For HO prophylaxis    atorvastatin 20 mg oral tablet  -- 1 tab(s) by mouth once a day  -- Indication: For HLD    metoprolol succinate 50 mg oral tablet, extended release  -- 1 tab(s) by mouth once a day  -- Indication: For HTN    docusate sodium 100 mg oral capsule  -- 1 cap(s) by mouth 3 times a day  -- Indication: For constipation    senna oral tablet  -- 2 tab(s) by mouth once a day (at bedtime)  -- Indication: For constipation    CoQ10  -- orally once a day  -- Indication: For supplement    pantoprazole 40 mg oral delayed release tablet  -- 1 tab(s) by mouth once a day  -- Indication: For acid reflux    levothyroxine 50 mcg (0.05 mg)/mL oral solution  -- orally once a day  -- Indication: For hypothyrodism    Biotin Forte oral tablet  -- 1 tab(s) by mouth once a day  -- Indication: For supplement    Vitamin B12 1000 mcg/mL injectable solution  -- injectable every 2 months  -- Indication: For vitamin    Vitamin D3 5000 intl units oral capsule  -- 1 cap(s) by mouth once a day  -- Indication: For vitamin

## 2018-01-11 NOTE — DISCHARGE NOTE ADULT - NS AS ACTIVITY OBS
Do not make important decisions/Do not drive or operate machinery/Stairs allowed/no soaking for 6 weeks/Showering allowed/No Heavy lifting/straining

## 2018-01-11 NOTE — OCCUPATIONAL THERAPY INITIAL EVALUATION ADULT - PLANNED THERAPY INTERVENTIONS, OT EVAL
ADL retraining/Patient will recall/adhere to  3/3 Total Hip Precautions 100% of the time within 3-5 sessions/transfer training/IADL retraining

## 2018-01-11 NOTE — DISCHARGE NOTE ADULT - DURABLE MEDICAL EQUIPMENT AGENCY
[facility] [phone]: Hip Kit- Patient to call [email or web address] [phone] for Hip Kit; has 2 rolling walkers, 2 canes, commode @ home.

## 2018-01-11 NOTE — DISCHARGE NOTE ADULT - CARE PLAN
Principal Discharge DX:	Primary osteoarthritis of right hip  Goal:	to improve pain and quality of life  Instructions for follow-up, activity and diet:	Physical Therapy /Occupational Therapy for: Ambulation, Transfers , Stairs, ADLs (activities of daily living), isometrics.  TOTAL HIP PRECAUTIONS  *Remember to continue all of the precautions for total hip replacement. Your surgeon will tell you when and if you can move beyond these limitations.  • Do not bend your hip more than 90 degrees   • Do not cross your legs or ankles when laying sitting or standing.  • DO NOT bend over at your waist.  • Avoid sitting in low, soft chairs such as sofas and car seats. You should sit on a chair using firm pillows to raise the height of the seat.  • Make sure your bed level is high, so that you maintain proper leg positioning when sitting on the side, or getting in or out.  • When entering and traveling by car, sit in the front passenger seat. Make sure that the car seat is all the way back and semi-reclined before entering.  • Do not allow your knees to come together when sitting or lying in bed. Use abduction pillow.  • Do not take a tub bath yet.   • Do not resume driving until you have your surgeon’s permission. Principal Discharge DX:	Primary osteoarthritis of right hip  Goal:	to improve pain and quality of life  Instructions for follow-up, activity and diet:	Physical Therapy /Occupational Therapy for: Ambulation, Transfers , Stairs, ADLs (activities of daily living), isometrics.  TOTAL HIP PRECAUTIONS  *Remember to continue all of the precautions for total hip replacement. Your surgeon will tell you when and if you can move beyond these limitations.  • Do not bend your hip more than 90 degrees   • Do not cross your legs or ankles when laying sitting or standing.  • DO NOT bend over at your waist.  • Avoid sitting in low, soft chairs such as sofas and car seats. You should sit on a chair using firm pillows to raise the height of the seat.  • Make sure your bed level is high, so that you maintain proper leg positioning when sitting on the side, or getting in or out.  • When entering and traveling by car, sit in the front passenger seat. Make sure that the car seat is all the way back and semi-reclined before entering.  • Do not allow your knees to come together when sitting or lying in bed. Use abduction pillow.  • Do not take a tub bath yet.   • Do not resume driving until you have your surgeon’s permission.  Goal:	HO prophylaxis  Instructions for follow-up, activity and diet:	Naproxen 250mg three times a day for total of 21 days

## 2018-01-11 NOTE — PROGRESS NOTE ADULT - SUBJECTIVE AND OBJECTIVE BOX
SYEDA LOPEZ [id number]    Pt is a 72y year old Female s/p right THR. pain is 2/10. Tolerating regular diet, (+) voids.  Denies chest pain/shortness of breath/nausea/vomitting.     Vital Signs Last 24 Hrs  T(C): 36.4 (11 Jan 2018 04:02), Max: 36.8 (10 Lisandro 2018 08:29)  T(F): 97.5 (11 Jan 2018 04:02), Max: 98.2 (10 Lisandro 2018 08:29)  HR: 67 (11 Jan 2018 06:00) (60 - 72)  BP: 124/68 (11 Jan 2018 06:00) (110/61 - 171/84)  BP(mean): --  RR: 18 (11 Jan 2018 04:02) (11 - 21)  SpO2: 100% (11 Jan 2018 04:02) (98% - 100%)    I&O's Detail    10 Lisandro 2018 07:01  -  11 Jan 2018 07:00  --------------------------------------------------------  IN:    IV PiggyBack: 150 mL    lactated ringers.: 2000 mL    lactated ringers.: 600 mL    Oral Fluid: 480 mL  Total IN: 3230 mL    OUT:    Estimated Blood Loss: 200 mL    Voided: 900 mL  Total OUT: 1100 mL    Total NET: 2130 mL                                11.5   x     )-----------( x        ( 10 Lisandro 2018 18:24 )             34.4     01-10    137  |  104  |  17  ----------------------------<  200<H>  4.3   |  25  |  0.95    Ca    8.5      10 Lisandro 2018 18:24          PE:   RLE: Dressing CDI, Sensation intact to light touch distally, (+2) DP/PT pulses, EHL/FHL/TA intact, Capillary refill < 2 seconds. negative calf tenderness. PAS on. abduction pillow in place    A: 72y year old Female s/p right THR POD#1    Plan:   -DVT ppx = PAS +  aspirin enteric coated 162 milliGRAM(s) Oral every 12 hours    -PT/OT = OOB  -Hip dislocation precautions  -Pain control   -Medicine to follow   -Continue to Follow Labs  - Dressing change POD#2  -Incentive spirometry  -dispo: home planning

## 2018-01-11 NOTE — PROGRESS NOTE ADULT - SUBJECTIVE AND OBJECTIVE BOX
Patient is a 72y old  Female who presents with a chief complaint of " Right hip pain" (09 Jan 2018 17:27)        HPI:      SUBJECTIVE & OBJECTIVE: Pt seen and examined at bedside.     PHYSICAL EXAM:  T(C): 36.6 (01-11-18 @ 07:30), Max: 36.6 (01-11-18 @ 07:30)  HR: 57 (01-11-18 @ 07:30) (57 - 72)  BP: 152/71 (01-11-18 @ 07:30) (110/61 - 152/71)  RR: 14 (01-11-18 @ 07:30) (11 - 21)  SpO2: 97% (01-11-18 @ 07:30) (97% - 100%)  Wt(kg): --   GENERAL: NAD, well-groomed, well-developed  HEAD:  Atraumatic, Normocephalic  EYES: EOMI, PERRLA, conjunctiva and sclera clear  ENMT: Moist mucous membranes  NECK: Supple, No JVD  NERVOUS SYSTEM:  Alert & Oriented X3, Motor Strength 5/5 B/L upper and lower extremities; DTRs 2+ intact and symmetric  CHEST/LUNG: Clear to auscultation bilaterally; No rales, rhonchi, wheezing, or rubs  HEART: Regular rate and rhythm; No murmurs, rubs, or gallops  ABDOMEN: Soft, Nontender, Nondistended; Bowel sounds present  EXTREMITIES:  2+ Peripheral Pulses, No clubbing, cyanosis, or edema        MEDICATIONS  (STANDING):  acetaminophen   Tablet. 1000 milliGRAM(s) Oral every 8 hours  aspirin enteric coated 162 milliGRAM(s) Oral every 12 hours  atorvastatin 20 milliGRAM(s) Oral at bedtime  docusate sodium 100 milliGRAM(s) Oral three times a day  lactated ringers. 1000 milliLiter(s) (75 mL/Hr) IV Continuous <Continuous>  lactated ringers. 1000 milliLiter(s) (100 mL/Hr) IV Continuous <Continuous>  levothyroxine 50 MICROGram(s) Oral daily  metoprolol succinate ER 50 milliGRAM(s) Oral daily  naproxen 250 milliGRAM(s) Oral three times a day  pantoprazole    Tablet 40 milliGRAM(s) Oral daily  senna 2 Tablet(s) Oral at bedtime    MEDICATIONS  (PRN):  aluminum hydroxide/magnesium hydroxide/simethicone Suspension 30 milliLiter(s) Oral four times a day PRN Indigestion  HYDROmorphone  Injectable 0.5 milliGRAM(s) IV Push every 3 hours PRN Severe Pain (7 - 10)  magnesium hydroxide Suspension 30 milliLiter(s) Oral daily PRN Constipation  ondansetron Injectable 4 milliGRAM(s) IV Push every 6 hours PRN Nausea and/or Vomiting  oxyCODONE    IR 5 milliGRAM(s) Oral every 3 hours PRN Mild Pain (1 - 3)  oxyCODONE    IR 10 milliGRAM(s) Oral every 3 hours PRN Moderate Pain (4 - 6)  polyethylene glycol 3350 17 Gram(s) Oral daily PRN Constipation      LABS:                        10.0   11.0  )-----------( 194      ( 11 Jan 2018 08:00 )             30.4     01-11    140  |  107  |  15  ----------------------------<  112<H>  4.1   |  26  |  0.85    Ca    8.5      11 Jan 2018 08:00            CAPILLARY BLOOD GLUCOSE          CAPILLARY BLOOD GLUCOSE        CAPILLARY BLOOD GLUCOSE                RECENT CULTURES:      RADIOLOGY & ADDITIONAL TESTS:                        DVT/GI ppx  Discussed with pt @ bedside

## 2018-01-12 VITALS
OXYGEN SATURATION: 100 % | TEMPERATURE: 98 F | DIASTOLIC BLOOD PRESSURE: 54 MMHG | SYSTOLIC BLOOD PRESSURE: 98 MMHG | RESPIRATION RATE: 14 BRPM | HEART RATE: 66 BPM

## 2018-01-12 LAB
ANION GAP SERPL CALC-SCNC: 6 MMOL/L — SIGNIFICANT CHANGE UP (ref 5–17)
BUN SERPL-MCNC: 19 MG/DL — SIGNIFICANT CHANGE UP (ref 7–23)
CALCIUM SERPL-MCNC: 8.6 MG/DL — SIGNIFICANT CHANGE UP (ref 8.4–10.5)
CHLORIDE SERPL-SCNC: 106 MMOL/L — SIGNIFICANT CHANGE UP (ref 96–108)
CO2 SERPL-SCNC: 28 MMOL/L — SIGNIFICANT CHANGE UP (ref 22–31)
CREAT SERPL-MCNC: 0.97 MG/DL — SIGNIFICANT CHANGE UP (ref 0.5–1.3)
GLUCOSE SERPL-MCNC: 89 MG/DL — SIGNIFICANT CHANGE UP (ref 70–99)
HCT VFR BLD CALC: 31.8 % — LOW (ref 34.5–45)
HGB BLD-MCNC: 10.3 G/DL — LOW (ref 11.5–15.5)
MCHC RBC-ENTMCNC: 29.8 PG — SIGNIFICANT CHANGE UP (ref 27–34)
MCHC RBC-ENTMCNC: 32.5 GM/DL — SIGNIFICANT CHANGE UP (ref 32–36)
MCV RBC AUTO: 91.8 FL — SIGNIFICANT CHANGE UP (ref 80–100)
PLATELET # BLD AUTO: 201 K/UL — SIGNIFICANT CHANGE UP (ref 150–400)
POTASSIUM SERPL-MCNC: 3.8 MMOL/L — SIGNIFICANT CHANGE UP (ref 3.5–5.3)
POTASSIUM SERPL-SCNC: 3.8 MMOL/L — SIGNIFICANT CHANGE UP (ref 3.5–5.3)
RBC # BLD: 3.46 M/UL — LOW (ref 3.8–5.2)
RBC # FLD: 12.7 % — SIGNIFICANT CHANGE UP (ref 10.3–14.5)
SODIUM SERPL-SCNC: 140 MMOL/L — SIGNIFICANT CHANGE UP (ref 135–145)
WBC # BLD: 10 K/UL — SIGNIFICANT CHANGE UP (ref 3.8–10.5)
WBC # FLD AUTO: 10 K/UL — SIGNIFICANT CHANGE UP (ref 3.8–10.5)

## 2018-01-12 PROCEDURE — 80048 BASIC METABOLIC PNL TOTAL CA: CPT

## 2018-01-12 PROCEDURE — 94664 DEMO&/EVAL PT USE INHALER: CPT

## 2018-01-12 PROCEDURE — 88311 DECALCIFY TISSUE: CPT

## 2018-01-12 PROCEDURE — 97535 SELF CARE MNGMENT TRAINING: CPT

## 2018-01-12 PROCEDURE — 73501 X-RAY EXAM HIP UNI 1 VIEW: CPT

## 2018-01-12 PROCEDURE — 99239 HOSP IP/OBS DSCHRG MGMT >30: CPT

## 2018-01-12 PROCEDURE — 85027 COMPLETE CBC AUTOMATED: CPT

## 2018-01-12 PROCEDURE — 97161 PT EVAL LOW COMPLEX 20 MIN: CPT

## 2018-01-12 PROCEDURE — 97116 GAIT TRAINING THERAPY: CPT

## 2018-01-12 PROCEDURE — 36415 COLL VENOUS BLD VENIPUNCTURE: CPT

## 2018-01-12 PROCEDURE — C1713: CPT

## 2018-01-12 PROCEDURE — C1776: CPT

## 2018-01-12 PROCEDURE — 85018 HEMOGLOBIN: CPT

## 2018-01-12 PROCEDURE — 88305 TISSUE EXAM BY PATHOLOGIST: CPT

## 2018-01-12 PROCEDURE — 97165 OT EVAL LOW COMPLEX 30 MIN: CPT

## 2018-01-12 PROCEDURE — 97530 THERAPEUTIC ACTIVITIES: CPT

## 2018-01-12 RX ORDER — SENNA PLUS 8.6 MG/1
2 TABLET ORAL
Qty: 0 | Refills: 0 | DISCHARGE
Start: 2018-01-12

## 2018-01-12 RX ORDER — DOCUSATE SODIUM 100 MG
1 CAPSULE ORAL
Qty: 0 | Refills: 0 | DISCHARGE
Start: 2018-01-12

## 2018-01-12 RX ADMIN — Medication 250 MILLIGRAM(S): at 05:46

## 2018-01-12 RX ADMIN — Medication 50 MILLIGRAM(S): at 05:46

## 2018-01-12 RX ADMIN — Medication 50 MICROGRAM(S): at 05:46

## 2018-01-12 RX ADMIN — Medication 250 MILLIGRAM(S): at 12:32

## 2018-01-12 RX ADMIN — Medication 162 MILLIGRAM(S): at 08:14

## 2018-01-12 RX ADMIN — Medication 100 MILLIGRAM(S): at 12:31

## 2018-01-12 RX ADMIN — OXYCODONE HYDROCHLORIDE 5 MILLIGRAM(S): 5 TABLET ORAL at 09:00

## 2018-01-12 RX ADMIN — Medication 250 MILLIGRAM(S): at 12:31

## 2018-01-12 RX ADMIN — Medication 100 MILLIGRAM(S): at 05:46

## 2018-01-12 RX ADMIN — OXYCODONE HYDROCHLORIDE 5 MILLIGRAM(S): 5 TABLET ORAL at 12:31

## 2018-01-12 RX ADMIN — OXYCODONE HYDROCHLORIDE 5 MILLIGRAM(S): 5 TABLET ORAL at 08:20

## 2018-01-12 RX ADMIN — Medication 250 MILLIGRAM(S): at 05:51

## 2018-01-12 RX ADMIN — OXYCODONE HYDROCHLORIDE 10 MILLIGRAM(S): 5 TABLET ORAL at 08:15

## 2018-01-12 RX ADMIN — Medication 1000 MILLIGRAM(S): at 08:15

## 2018-01-12 RX ADMIN — Medication 1000 MILLIGRAM(S): at 08:14

## 2018-01-12 RX ADMIN — PANTOPRAZOLE SODIUM 40 MILLIGRAM(S): 20 TABLET, DELAYED RELEASE ORAL at 08:14

## 2018-01-12 NOTE — PROGRESS NOTE ADULT - SUBJECTIVE AND OBJECTIVE BOX
Discharge medication calendar:  (ASA 81mg Qday preop)  ASA EC 162mg q12h x 6 weeks then resume ASA 81mg Qday   APAP 1000mg q8h x 2-3 weeks  Naproxen 250mg q8h x 21 days  Pantoprazole 40mg QAM x 6 weeks  Narcotic PRN  Docusate 100mg TID while taking narcotic  Senna, bisacodyl, or Miralax PRN for treatment of constipation

## 2018-01-12 NOTE — PROGRESS NOTE ADULT - SUBJECTIVE AND OBJECTIVE BOX
Patient is a 72y old  Female who presents with a chief complaint of " Right hip pain"  Right Total hip replacement (11 Jan 2018 09:42)      INTERVAL HPI/OVERNIGHT EVENTS:    Pain Location & Control:     MEDICATIONS  (STANDING):  acetaminophen   Tablet. 1000 milliGRAM(s) Oral every 8 hours  aspirin enteric coated 162 milliGRAM(s) Oral every 12 hours  atorvastatin 20 milliGRAM(s) Oral at bedtime  docusate sodium 100 milliGRAM(s) Oral three times a day  lactated ringers. 1000 milliLiter(s) (75 mL/Hr) IV Continuous <Continuous>  lactated ringers. 1000 milliLiter(s) (100 mL/Hr) IV Continuous <Continuous>  levothyroxine 50 MICROGram(s) Oral daily  metoprolol succinate ER 50 milliGRAM(s) Oral daily  naproxen 250 milliGRAM(s) Oral three times a day  pantoprazole    Tablet 40 milliGRAM(s) Oral daily  senna 2 Tablet(s) Oral at bedtime    MEDICATIONS  (PRN):  aluminum hydroxide/magnesium hydroxide/simethicone Suspension 30 milliLiter(s) Oral four times a day PRN Indigestion  bisacodyl Suppository 10 milliGRAM(s) Rectal daily PRN If no bowel movement by POD#2  HYDROmorphone  Injectable 0.5 milliGRAM(s) IV Push every 3 hours PRN Severe Pain (7 - 10)  magnesium hydroxide Suspension 30 milliLiter(s) Oral daily PRN Constipation  ondansetron Injectable 4 milliGRAM(s) IV Push every 6 hours PRN Nausea and/or Vomiting  oxyCODONE    IR 5 milliGRAM(s) Oral every 3 hours PRN Mild Pain (1 - 3)  oxyCODONE    IR 10 milliGRAM(s) Oral every 3 hours PRN Moderate Pain (4 - 6)  polyethylene glycol 3350 17 Gram(s) Oral daily PRN Constipation      Allergies    sulfa drugs (Rash)    Intolerances        REVIEW OF SYSTEMS:  CONSTITUTIONAL: No fever, weight loss, or fatigue  EYES: No eye pain, visual disturbances, or discharge  ENMT:  No difficulty hearing, tinnitus, vertigo; No sinus or throat pain  NECK: No pain or stiffness  BREASTS: No pain, masses, or nipple discharge  RESPIRATORY: No cough, wheezing, chills or hemoptysis; No shortness of breath  CARDIOVASCULAR: No chest pain, palpitations, or lightheadedness  GASTROINTESTINAL: No abdominal or epigastric pain. No nausea, vomiting, or hematemesis; No diarrhea or constipation. No melena or hematochezia.  GENITOURINARY: No dysuria, frequency, hematuria, or incontinence  NEUROLOGICAL: No headaches, vertigo, memory loss, loss of strength, numbness, or tremors  SKIN: No itching, burning, rashes, or lesions   LYMPH NODES: No enlarged glands  ENDOCRINE: No heat or cold intolerance; No hair loss; No polydipsia or polyuria  MUSCULOSKELETAL: No back pain  PSYCHIATRIC: No depression, anxiety, or mood swings  HEME/LYMPH: No easy bruising, or bleeding gums  ALLERGY AND IMMUNOLOGIC: No hives or eczema    Vital Signs Last 24 Hrs  T(C): 36.5 (12 Jan 2018 07:30), Max: 36.8 (11 Jan 2018 23:07)  T(F): 97.7 (12 Jan 2018 07:30), Max: 98.2 (11 Jan 2018 23:07)  HR: 56 (12 Jan 2018 07:30) (56 - 98)  BP: 148/72 (12 Jan 2018 07:30) (96/52 - 150/72)  BP(mean): --  RR: 16 (12 Jan 2018 07:30) (14 - 16)  SpO2: 99% (12 Jan 2018 07:30) (96% - 99%)    PHYSICAL EXAM:  GENERAL: NAD, well-groomed, well-developed  HEAD:  Atraumatic, Normocephalic  EYES: EOMI, PERRLA, conjunctiva and sclera clear  ENMT: Moist mucous membranes, Good dentition, No lesions; No tonsillar erythema, exudates, or enlargement   NECK: Supple, No JVD, Normal thyroid  NERVOUS SYSTEM:  Alert & Oriented X3, Good concentration; Bilateral LE mobile, sensation to light touch intact  CHEST/LUNG: Clear to auscultation bilaterally; No rales, rhonchi, wheezing, or rubs  HEART: Regular rate and rhythm; No murmurs, rubs, or gallops  ABDOMEN: Soft, Nontender, Nondistended; Bowel sounds present  EXTREMITIES:  2+ Peripheral Pulses, No clubbing or cyanosis  LYMPH: No lymphadenopathy noted  SKIN: No rashes or lesions  INCISION:  Dressing dry and intact    LABS:                        10.3   10.0  )-----------( 201      ( 12 Jan 2018 08:17 )             31.8     12 Jan 2018 08:17    140    |  106    |  19     ----------------------------<  89     3.8     |  28     |  0.97     Ca    8.6        12 Jan 2018 08:17          CAPILLARY BLOOD GLUCOSE          RADIOLOGY & ADDITIONAL TESTS:    Imaging Personally Reviewed:      [ ] Consultant(s) Notes Reviewed  [x] Care Discussed with Consultants/Other Providers:  Ortho PA- plan of care Patient is a 72y old  Female who presents with a chief complaint of " Right hip pain"  Right Total hip replacement (11 Jan 2018 09:42)      INTERVAL HPI/OVERNIGHT EVENTS: feeling well, no complaints. pain controlled.  +BM this morning.     MEDICATIONS  (STANDING):  acetaminophen   Tablet. 1000 milliGRAM(s) Oral every 8 hours  aspirin enteric coated 162 milliGRAM(s) Oral every 12 hours  atorvastatin 20 milliGRAM(s) Oral at bedtime  docusate sodium 100 milliGRAM(s) Oral three times a day  lactated ringers. 1000 milliLiter(s) (75 mL/Hr) IV Continuous <Continuous>  lactated ringers. 1000 milliLiter(s) (100 mL/Hr) IV Continuous <Continuous>  levothyroxine 50 MICROGram(s) Oral daily  metoprolol succinate ER 50 milliGRAM(s) Oral daily  naproxen 250 milliGRAM(s) Oral three times a day  pantoprazole    Tablet 40 milliGRAM(s) Oral daily  senna 2 Tablet(s) Oral at bedtime    MEDICATIONS  (PRN):  aluminum hydroxide/magnesium hydroxide/simethicone Suspension 30 milliLiter(s) Oral four times a day PRN Indigestion  bisacodyl Suppository 10 milliGRAM(s) Rectal daily PRN If no bowel movement by POD#2  HYDROmorphone  Injectable 0.5 milliGRAM(s) IV Push every 3 hours PRN Severe Pain (7 - 10)  magnesium hydroxide Suspension 30 milliLiter(s) Oral daily PRN Constipation  ondansetron Injectable 4 milliGRAM(s) IV Push every 6 hours PRN Nausea and/or Vomiting  oxyCODONE    IR 5 milliGRAM(s) Oral every 3 hours PRN Mild Pain (1 - 3)  oxyCODONE    IR 10 milliGRAM(s) Oral every 3 hours PRN Moderate Pain (4 - 6)  polyethylene glycol 3350 17 Gram(s) Oral daily PRN Constipation      Allergies  sulfa drugs (Rash)      REVIEW OF SYSTEMS:  CONSTITUTIONAL: No fever, weight loss, or fatigue  EYES: No eye pain, visual disturbances, or discharge  ENMT:  No difficulty hearing, tinnitus, vertigo; No sinus or throat pain  NECK: No pain or stiffness  BREASTS: No pain, masses, or nipple discharge  RESPIRATORY: No cough, wheezing, chills or hemoptysis; No shortness of breath  CARDIOVASCULAR: No chest pain, palpitations, or lightheadedness  GASTROINTESTINAL: No abdominal or epigastric pain. No nausea, vomiting, or hematemesis; No diarrhea or constipation. No melena or hematochezia.  GENITOURINARY: No dysuria, frequency, hematuria, or incontinence  NEUROLOGICAL: No headaches, vertigo, memory loss, loss of strength, numbness, or tremors  SKIN: No itching, burning, rashes, or lesions   LYMPH NODES: No enlarged glands  ENDOCRINE: No heat or cold intolerance; No hair loss; No polydipsia or polyuria  MUSCULOSKELETAL: No back pain  PSYCHIATRIC: No depression, anxiety, or mood swings  HEME/LYMPH: No easy bruising, or bleeding gums  ALLERGY AND IMMUNOLOGIC: No hives or eczema    Vital Signs Last 24 Hrs  T(C): 36.5 (12 Jan 2018 07:30), Max: 36.8 (11 Jan 2018 23:07)  T(F): 97.7 (12 Jan 2018 07:30), Max: 98.2 (11 Jan 2018 23:07)  HR: 56 (12 Jan 2018 07:30) (56 - 98)  BP: 148/72 (12 Jan 2018 07:30) (96/52 - 150/72)  BP(mean): --  RR: 16 (12 Jan 2018 07:30) (14 - 16)  SpO2: 99% (12 Jan 2018 07:30) (96% - 99%)    PHYSICAL EXAM:  GENERAL: NAD, well-groomed, well-developed  HEAD:  Atraumatic, Normocephalic  EYES: EOMI, PERRLA, conjunctiva and sclera clear  ENMT: Moist mucous membranes, Good dentition, No lesions; No tonsillar erythema, exudates, or enlargement   NECK: Supple, No JVD, Normal thyroid  NERVOUS SYSTEM:  Alert & Oriented X3, Good concentration; Bilateral LE mobile, sensation to light touch intact  CHEST/LUNG: Clear to auscultation bilaterally; No rales, rhonchi, wheezing, or rubs  HEART: Regular rate and rhythm; No murmurs, rubs, or gallops  ABDOMEN: Soft, Nontender, Nondistended; Bowel sounds present  EXTREMITIES:  2+ Peripheral Pulses, No clubbing or cyanosis; no calf tenderness  LYMPH: No lymphadenopathy noted  SKIN: No rashes or lesions  INCISION:  Dressing dry and intact    LABS:                        10.3   10.0  )-----------( 201      ( 12 Jan 2018 08:17 )             31.8     12 Jan 2018 08:17    140    |  106    |  19     ----------------------------<  89     3.8     |  28     |  0.97     Ca    8.6        12 Jan 2018 08:17      RADIOLOGY & ADDITIONAL TESTS:    Imaging Personally Reviewed:      [ ] Consultant(s) Notes Reviewed  [x] Care Discussed with Consultants/Other Providers:  Ortho PA- plan of care

## 2018-01-12 NOTE — PROGRESS NOTE ADULT - SUBJECTIVE AND OBJECTIVE BOX
Orthopedic P.A.- POD# * - s/p *THR    Patient alert and comfortable in * with * for pain control.  Denies hip pain or nausea.    Vital Signs Last 24 Hrs  T(C): 36.6 (12 Jan 2018 11:25), Max: 36.8 (11 Jan 2018 23:07)  T(F): 97.9 (12 Jan 2018 11:25), Max: 98.2 (11 Jan 2018 23:07)  HR: 56 (12 Jan 2018 11:25) (56 - 98)  BP: 107/55 (12 Jan 2018 11:25) (96/52 - 150/72)  BP(mean): --  RR: 16 (12 Jan 2018 11:25) (14 - 16)  SpO2: 100% (12 Jan 2018 11:25) (96% - 100%)         I&O's Detail         CAPILLARY BLOOD GLUCOSE      Labs:                          10.3<L>  10.0  )-----------( 201      ( 12 Jan 2018 08:17 )             31.8<L>  12 Jan 2018 08:17                 12 Jan 2018 08:17    140    |  106    |  19     ----------------------------<  89     3.8     |  28     |  0.97     Ca    8.6        12 Jan 2018 08:17                    MEDICATIONS:acetaminophen   Tablet. 1000 milliGRAM(s) Oral every 8 hours  aluminum hydroxide/magnesium hydroxide/simethicone Suspension 30 milliLiter(s) Oral four times a day PRN  aspirin enteric coated 162 milliGRAM(s) Oral every 12 hours  atorvastatin 20 milliGRAM(s) Oral at bedtime  bisacodyl Suppository 10 milliGRAM(s) Rectal daily PRN  docusate sodium 100 milliGRAM(s) Oral three times a day  HYDROmorphone  Injectable 0.5 milliGRAM(s) IV Push every 3 hours PRN  lactated ringers. 1000 milliLiter(s) IV Continuous <Continuous>  lactated ringers. 1000 milliLiter(s) IV Continuous <Continuous>  levothyroxine 50 MICROGram(s) Oral daily  magnesium hydroxide Suspension 30 milliLiter(s) Oral daily PRN  metoprolol succinate ER 50 milliGRAM(s) Oral daily  naproxen 250 milliGRAM(s) Oral three times a day  ondansetron Injectable 4 milliGRAM(s) IV Push every 6 hours PRN  oxyCODONE    IR 5 milliGRAM(s) Oral every 3 hours PRN  oxyCODONE    IR 10 milliGRAM(s) Oral every 3 hours PRN  pantoprazole    Tablet 40 milliGRAM(s) Oral daily  polyethylene glycol 3350 17 Gram(s) Oral daily PRN  senna 2 Tablet(s) Oral at bedtime    Anticoagulation:  aspirin enteric coated 162 milliGRAM(s) Oral every 12 hours        Pain medications:   acetaminophen   Tablet. 1000 milliGRAM(s) Oral every 8 hours  HYDROmorphone  Injectable 0.5 milliGRAM(s) IV Push every 3 hours PRN  naproxen 250 milliGRAM(s) Oral three times a day  ondansetron Injectable 4 milliGRAM(s) IV Push every 6 hours PRN  oxyCODONE    IR 5 milliGRAM(s) Oral every 3 hours PRN  oxyCODONE    IR 10 milliGRAM(s) Oral every 3 hours PRN                                       Physical Exam:  Righ hip- Abduction pillow in place.  Primary surgical bandage removed and sterile bandage placed over dry and intact sutured wound.  Neurovascular grossly intact LE's.  EHL/ant.tibs 5/5.  PAS on LE's.  Calves soft and non-tender.                                                                                                                                                        A/P:  Orthopedically stable.  -Continue pain management with above plan.  -DVT prophylaxis with 6 weeks of Ecotrin; Naprosyn for 21 days for H.O. prevention.  -PT/OT to increase ambulation and review posterior dislocation precautions  -Dr. Manley medically cleared for discharge to Rehab today.

## 2018-01-12 NOTE — PROGRESS NOTE ADULT - ASSESSMENT
72 male    1. primary severe osteoarthritis of joint s/p right thr day 2  no acute complaints,   Physical Therapy evaluation.  bowel regimen  incentive spirometer    2. HTN: Blood pressure meds with hold parameters     3. HLD: statin    4. Hypothyroid: synthroid    5. dvt prophylaxis per orthopedic protocol     6. dispo: home in 2-3 days.  d/w RN plan of care. 72 male    1. primary severe osteoarthritis of joint s/p right thr   Pain Management: acceptable- continue current care Tylenol ATC/Celebrex ATC/ Oxycodone PRN  Continue PT/OT  DVT proph: [ x ] low risk - Aspirin   DC plan:  [x  ] Home with HC  today      2. HTN:  continue Toprol with hold parameters     3. HLD: statin    4. Hypothyroid: synthroid 72 male    1. primary severe osteoarthritis of joint s/p right thr   Pain Management: acceptable- continue current care Tylenol ATC/Naproxen ATC/ Oxycodone PRN  Continue PT/OT  DVT proph: [ x ] low risk - Aspirin   DC plan:  [x  ] Home with HC  today      2. HTN:  continue Toprol with hold parameters     3. HLD: statin    4. Hypothyroid: synthroid 72 male    1. primary severe osteoarthritis of joint s/p right thr   Pain Management: acceptable- continue current care Tylenol ATC/Naproxen ATC/ Oxycodone PRN  Continue PT/OT  DVT proph: [ x ] low risk - Aspirin   DC plan:  [x  ] Home with HC  today      2. HTN:  continue Toprol with hold parameters     3. HLD: statin    4. Hypothyroid: synthroid    d/w PMD Dr Goodman

## 2018-01-29 ENCOUNTER — APPOINTMENT (OUTPATIENT)
Dept: ORTHOPEDIC SURGERY | Facility: CLINIC | Age: 73
End: 2018-01-29
Payer: MEDICARE

## 2018-01-29 VITALS
HEIGHT: 64 IN | HEART RATE: 71 BPM | BODY MASS INDEX: 26.46 KG/M2 | SYSTOLIC BLOOD PRESSURE: 171 MMHG | DIASTOLIC BLOOD PRESSURE: 82 MMHG | WEIGHT: 155 LBS

## 2018-01-29 PROCEDURE — 99024 POSTOP FOLLOW-UP VISIT: CPT

## 2018-01-29 PROCEDURE — 73502 X-RAY EXAM HIP UNI 2-3 VIEWS: CPT

## 2018-02-20 ENCOUNTER — APPOINTMENT (OUTPATIENT)
Dept: ORTHOPEDIC SURGERY | Facility: CLINIC | Age: 73
End: 2018-02-20
Payer: MEDICARE

## 2018-02-20 VITALS
WEIGHT: 154 LBS | HEIGHT: 64 IN | SYSTOLIC BLOOD PRESSURE: 164 MMHG | DIASTOLIC BLOOD PRESSURE: 83 MMHG | HEART RATE: 73 BPM | BODY MASS INDEX: 26.29 KG/M2

## 2018-02-20 PROCEDURE — 73502 X-RAY EXAM HIP UNI 2-3 VIEWS: CPT

## 2018-02-20 PROCEDURE — 99024 POSTOP FOLLOW-UP VISIT: CPT

## 2018-03-20 ENCOUNTER — RX RENEWAL (OUTPATIENT)
Age: 73
End: 2018-03-20

## 2018-04-02 ENCOUNTER — LABORATORY RESULT (OUTPATIENT)
Age: 73
End: 2018-04-02

## 2018-04-02 ENCOUNTER — APPOINTMENT (OUTPATIENT)
Dept: INTERNAL MEDICINE | Facility: CLINIC | Age: 73
End: 2018-04-02
Payer: MEDICARE

## 2018-04-02 VITALS
DIASTOLIC BLOOD PRESSURE: 80 MMHG | WEIGHT: 155 LBS | HEART RATE: 76 BPM | SYSTOLIC BLOOD PRESSURE: 130 MMHG | BODY MASS INDEX: 26.46 KG/M2 | RESPIRATION RATE: 14 BRPM | OXYGEN SATURATION: 97 % | TEMPERATURE: 98.2 F | HEIGHT: 64 IN

## 2018-04-02 PROCEDURE — 99213 OFFICE O/P EST LOW 20 MIN: CPT | Mod: 25

## 2018-04-02 PROCEDURE — 96372 THER/PROPH/DIAG INJ SC/IM: CPT

## 2018-04-02 RX ORDER — CYANOCOBALAMIN 1000 UG/ML
1000 INJECTION INTRAMUSCULAR; SUBCUTANEOUS
Qty: 0 | Refills: 0 | Status: COMPLETED | OUTPATIENT
Start: 2018-04-02

## 2018-04-02 RX ADMIN — Medication 0 MCG/ML: at 00:00

## 2018-04-03 LAB
APPEARANCE: ABNORMAL
BILIRUBIN URINE: NEGATIVE
BLOOD URINE: ABNORMAL
COLOR: YELLOW
GLUCOSE QUALITATIVE U: NEGATIVE MG/DL
KETONES URINE: NEGATIVE
LEUKOCYTE ESTERASE URINE: ABNORMAL
NITRITE URINE: POSITIVE
PH URINE: 5
PROTEIN URINE: NEGATIVE MG/DL
SPECIFIC GRAVITY URINE: 1.02
UROBILINOGEN URINE: NEGATIVE MG/DL

## 2018-04-04 LAB
C DIFF TOX GENS STL QL NAA+PROBE: NORMAL
CDIFF BY PCR: NOT DETECTED
G LAMBLIA AG STL QL: NORMAL

## 2018-04-05 ENCOUNTER — LABORATORY RESULT (OUTPATIENT)
Age: 73
End: 2018-04-05

## 2018-04-05 ENCOUNTER — MEDICATION RENEWAL (OUTPATIENT)
Age: 73
End: 2018-04-05

## 2018-04-05 LAB
APPEARANCE: ABNORMAL
BACTERIA UR CULT: ABNORMAL
BILIRUBIN URINE: NEGATIVE
BLOOD URINE: ABNORMAL
COLOR: YELLOW
GLUCOSE QUALITATIVE U: NEGATIVE MG/DL
KETONES URINE: NEGATIVE
LEUKOCYTE ESTERASE URINE: ABNORMAL
NITRITE URINE: POSITIVE
PH URINE: 5
PROTEIN URINE: NEGATIVE MG/DL
SPECIFIC GRAVITY URINE: 1.02
UROBILINOGEN URINE: NEGATIVE MG/DL

## 2018-04-06 LAB
ALBUMIN SERPL ELPH-MCNC: 3.9 G/DL
ALP BLD-CCNC: 65 U/L
ALT SERPL-CCNC: 19 U/L
ANION GAP SERPL CALC-SCNC: 14 MMOL/L
AST SERPL-CCNC: 27 U/L
BASOPHILS # BLD AUTO: 0.05 K/UL
BASOPHILS NFR BLD AUTO: 0.9 %
BILIRUB SERPL-MCNC: 0.3 MG/DL
BUN SERPL-MCNC: 10 MG/DL
CALCIUM SERPL-MCNC: 9.6 MG/DL
CHLORIDE SERPL-SCNC: 104 MMOL/L
CHOLEST SERPL-MCNC: 133 MG/DL
CHOLEST/HDLC SERPL: 2.3 RATIO
CO2 SERPL-SCNC: 27 MMOL/L
CREAT SERPL-MCNC: 0.89 MG/DL
EOSINOPHIL # BLD AUTO: 0.15 K/UL
EOSINOPHIL NFR BLD AUTO: 2.8 %
FOLATE SERPL-MCNC: 14.3 NG/ML
GLIADIN IGA SER QL: 6.2 UNITS
GLIADIN IGG SER QL: <5 UNITS
GLIADIN PEPTIDE IGA SER-ACNC: NEGATIVE
GLIADIN PEPTIDE IGG SER-ACNC: NEGATIVE
GLUCOSE SERPL-MCNC: 100 MG/DL
HBA1C MFR BLD HPLC: 5.2 %
HCT VFR BLD CALC: 42.2 %
HDLC SERPL-MCNC: 58 MG/DL
HGB BLD-MCNC: 13.1 G/DL
IGA SER QL IEP: 208 MG/DL
IMM GRANULOCYTES NFR BLD AUTO: 0.4 %
LDLC SERPL CALC-MCNC: 58 MG/DL
LYMPHOCYTES # BLD AUTO: 0.74 K/UL
LYMPHOCYTES NFR BLD AUTO: 13.9 %
MAN DIFF?: NORMAL
MCHC RBC-ENTMCNC: 29.2 PG
MCHC RBC-ENTMCNC: 31 GM/DL
MCV RBC AUTO: 94.2 FL
MONOCYTES # BLD AUTO: 0.31 K/UL
MONOCYTES NFR BLD AUTO: 5.8 %
NEUTROPHILS # BLD AUTO: 4.05 K/UL
NEUTROPHILS NFR BLD AUTO: 76.2 %
PLATELET # BLD AUTO: 273 K/UL
POTASSIUM SERPL-SCNC: 4.6 MMOL/L
PROT SERPL-MCNC: 6.9 G/DL
RBC # BLD: 4.48 M/UL
RBC # FLD: 12.9 %
SODIUM SERPL-SCNC: 145 MMOL/L
TRIGL SERPL-MCNC: 83 MG/DL
TSH SERPL-ACNC: 1.26 UIU/ML
TTG IGA SER IA-ACNC: <5 UNITS
TTG IGA SER-ACNC: NEGATIVE
TTG IGG SER IA-ACNC: <5 UNITS
TTG IGG SER IA-ACNC: NEGATIVE
VIT B12 SERPL-MCNC: 1378 PG/ML
WBC # FLD AUTO: 5.32 K/UL

## 2018-04-07 LAB
ENDOMYSIUM IGA SER QL: NEGATIVE
ENDOMYSIUM IGA TITR SER: NORMAL

## 2018-04-09 LAB — BACTERIA UR CULT: ABNORMAL

## 2018-04-11 LAB — BACTERIA STL CULT: ABNORMAL

## 2018-04-17 ENCOUNTER — LABORATORY RESULT (OUTPATIENT)
Age: 73
End: 2018-04-17

## 2018-04-18 LAB
APPEARANCE: ABNORMAL
BILIRUBIN URINE: NEGATIVE
BLOOD URINE: NEGATIVE
COLOR: YELLOW
GLUCOSE QUALITATIVE U: NEGATIVE MG/DL
KETONES URINE: NEGATIVE
LEUKOCYTE ESTERASE URINE: NEGATIVE
NITRITE URINE: NEGATIVE
PH URINE: 5
PROTEIN URINE: NEGATIVE MG/DL
SPECIFIC GRAVITY URINE: 1.02
UROBILINOGEN URINE: NEGATIVE MG/DL

## 2018-04-19 LAB
BACTERIA STL CULT: NORMAL
BACTERIA UR CULT: NORMAL

## 2018-05-24 ENCOUNTER — APPOINTMENT (OUTPATIENT)
Dept: CARDIOLOGY | Facility: CLINIC | Age: 73
End: 2018-05-24
Payer: MEDICARE

## 2018-05-24 ENCOUNTER — NON-APPOINTMENT (OUTPATIENT)
Age: 73
End: 2018-05-24

## 2018-05-24 VITALS
OXYGEN SATURATION: 98 % | SYSTOLIC BLOOD PRESSURE: 167 MMHG | RESPIRATION RATE: 14 BRPM | BODY MASS INDEX: 26.12 KG/M2 | HEART RATE: 65 BPM | HEIGHT: 64 IN | TEMPERATURE: 98.1 F | WEIGHT: 153 LBS | DIASTOLIC BLOOD PRESSURE: 84 MMHG

## 2018-05-24 PROCEDURE — 99214 OFFICE O/P EST MOD 30 MIN: CPT

## 2018-05-24 PROCEDURE — 93000 ELECTROCARDIOGRAM COMPLETE: CPT

## 2018-05-24 RX ORDER — AMOXICILLIN 500 MG/1
500 CAPSULE ORAL
Qty: 4 | Refills: 2 | Status: DISCONTINUED | COMMUNITY
Start: 2017-12-19 | End: 2018-05-24

## 2018-05-24 RX ORDER — CIPROFLOXACIN HYDROCHLORIDE 250 MG/1
250 TABLET, FILM COATED ORAL
Qty: 14 | Refills: 0 | Status: DISCONTINUED | COMMUNITY
Start: 2018-04-05 | End: 2018-05-24

## 2018-06-27 ENCOUNTER — APPOINTMENT (OUTPATIENT)
Dept: INTERNAL MEDICINE | Facility: CLINIC | Age: 73
End: 2018-06-27
Payer: MEDICARE

## 2018-06-27 VITALS
BODY MASS INDEX: 26.12 KG/M2 | WEIGHT: 153 LBS | HEART RATE: 72 BPM | TEMPERATURE: 98 F | RESPIRATION RATE: 14 BRPM | OXYGEN SATURATION: 98 % | HEIGHT: 64 IN | SYSTOLIC BLOOD PRESSURE: 136 MMHG | DIASTOLIC BLOOD PRESSURE: 82 MMHG

## 2018-06-27 PROCEDURE — 99214 OFFICE O/P EST MOD 30 MIN: CPT | Mod: 25

## 2018-06-27 PROCEDURE — 96372 THER/PROPH/DIAG INJ SC/IM: CPT

## 2018-06-27 RX ORDER — CYANOCOBALAMIN 1000 UG/ML
1000 INJECTION INTRAMUSCULAR; SUBCUTANEOUS
Qty: 0 | Refills: 0 | Status: COMPLETED | OUTPATIENT
Start: 2018-06-27

## 2018-06-27 RX ADMIN — Medication 0 MCG/ML: at 00:00

## 2018-06-27 NOTE — PHYSICAL EXAM
[No Acute Distress] : no acute distress [Well Nourished] : well nourished [Well Developed] : well developed [Well-Appearing] : well-appearing [No Respiratory Distress] : no respiratory distress  [Clear to Auscultation] : lungs were clear to auscultation bilaterally [Normal Rate] : normal rate  [Regular Rhythm] : with a regular rhythm [Soft] : abdomen soft [Non Tender] : non-tender [Normal Affect] : the affect was normal [Normal Insight/Judgement] : insight and judgment were intact [de-identified] : trace edema of feet.

## 2018-06-27 NOTE — HISTORY OF PRESENT ILLNESS
[FreeTextEntry1] : B12 shot [de-identified] : c/o feet swelling for the last month. Happens when she sits too long and also has been eating out. \par \par Her diarrhea has resolved.\par \par Did not see urologist for microhematuria.

## 2018-06-28 LAB
ALBUMIN SERPL ELPH-MCNC: 4 G/DL
ALP BLD-CCNC: 68 U/L
ALT SERPL-CCNC: 13 U/L
ANION GAP SERPL CALC-SCNC: 13 MMOL/L
APPEARANCE: CLEAR
AST SERPL-CCNC: 26 U/L
BACTERIA: NEGATIVE
BASOPHILS # BLD AUTO: 0.04 K/UL
BASOPHILS NFR BLD AUTO: 0.8 %
BILIRUB SERPL-MCNC: 0.9 MG/DL
BILIRUBIN URINE: NEGATIVE
BLOOD URINE: NEGATIVE
BUN SERPL-MCNC: 17 MG/DL
CALCIUM OXALATE CRYSTALS: ABNORMAL
CALCIUM SERPL-MCNC: 9.3 MG/DL
CHLORIDE SERPL-SCNC: 103 MMOL/L
CHOLEST SERPL-MCNC: 148 MG/DL
CHOLEST/HDLC SERPL: 2.1 RATIO
CO2 SERPL-SCNC: 25 MMOL/L
COLOR: YELLOW
CREAT SERPL-MCNC: 0.92 MG/DL
EOSINOPHIL # BLD AUTO: 0.17 K/UL
EOSINOPHIL NFR BLD AUTO: 3.3 %
GLUCOSE QUALITATIVE U: NEGATIVE MG/DL
GLUCOSE SERPL-MCNC: 107 MG/DL
HBA1C MFR BLD HPLC: 5.2 %
HCT VFR BLD CALC: 40.9 %
HDLC SERPL-MCNC: 72 MG/DL
HGB BLD-MCNC: 13.2 G/DL
HYALINE CASTS: 2 /LPF
IMM GRANULOCYTES NFR BLD AUTO: 0.2 %
KETONES URINE: NEGATIVE
LDLC SERPL CALC-MCNC: 56 MG/DL
LEUKOCYTE ESTERASE URINE: NEGATIVE
LYMPHOCYTES # BLD AUTO: 0.74 K/UL
LYMPHOCYTES NFR BLD AUTO: 14.5 %
MAN DIFF?: NORMAL
MCHC RBC-ENTMCNC: 29.1 PG
MCHC RBC-ENTMCNC: 32.3 GM/DL
MCV RBC AUTO: 90.3 FL
MICROSCOPIC-UA: NORMAL
MONOCYTES # BLD AUTO: 0.37 K/UL
MONOCYTES NFR BLD AUTO: 7.3 %
NEUTROPHILS # BLD AUTO: 3.76 K/UL
NEUTROPHILS NFR BLD AUTO: 73.9 %
NITRITE URINE: NEGATIVE
PH URINE: 5.5
PLATELET # BLD AUTO: 212 K/UL
POTASSIUM SERPL-SCNC: 4.3 MMOL/L
PROT SERPL-MCNC: 6.7 G/DL
PROTEIN URINE: NEGATIVE MG/DL
RBC # BLD: 4.53 M/UL
RBC # FLD: 14.6 %
RED BLOOD CELLS URINE: 8 /HPF
SODIUM SERPL-SCNC: 141 MMOL/L
SPECIFIC GRAVITY URINE: 1.02
SQUAMOUS EPITHELIAL CELLS: 1 /HPF
TRIGL SERPL-MCNC: 99 MG/DL
TSH SERPL-ACNC: 1.13 UIU/ML
UROBILINOGEN URINE: NEGATIVE MG/DL
WBC # FLD AUTO: 5.09 K/UL
WHITE BLOOD CELLS URINE: 3 /HPF

## 2018-07-16 ENCOUNTER — APPOINTMENT (OUTPATIENT)
Dept: UROLOGY | Facility: CLINIC | Age: 73
End: 2018-07-16
Payer: MEDICARE

## 2018-07-16 VITALS
TEMPERATURE: 98.6 F | BODY MASS INDEX: 26.12 KG/M2 | DIASTOLIC BLOOD PRESSURE: 95 MMHG | HEIGHT: 64 IN | HEART RATE: 73 BPM | SYSTOLIC BLOOD PRESSURE: 196 MMHG | WEIGHT: 153 LBS | RESPIRATION RATE: 17 BRPM

## 2018-07-16 PROBLEM — E78.5 HYPERLIPIDEMIA, UNSPECIFIED: Chronic | Status: ACTIVE | Noted: 2017-12-22

## 2018-07-16 PROCEDURE — 99204 OFFICE O/P NEW MOD 45 MIN: CPT

## 2018-07-17 ENCOUNTER — OUTPATIENT (OUTPATIENT)
Dept: OUTPATIENT SERVICES | Facility: HOSPITAL | Age: 73
LOS: 1 days | End: 2018-07-17

## 2018-07-17 ENCOUNTER — APPOINTMENT (OUTPATIENT)
Dept: CV DIAGNOSITCS | Facility: HOSPITAL | Age: 73
End: 2018-07-17
Payer: MEDICARE

## 2018-07-17 DIAGNOSIS — Z96.7 PRESENCE OF OTHER BONE AND TENDON IMPLANTS: Chronic | ICD-10-CM

## 2018-07-17 DIAGNOSIS — I10 ESSENTIAL (PRIMARY) HYPERTENSION: ICD-10-CM

## 2018-07-17 DIAGNOSIS — Z96.659 PRESENCE OF UNSPECIFIED ARTIFICIAL KNEE JOINT: Chronic | ICD-10-CM

## 2018-07-17 DIAGNOSIS — Z95.2 PRESENCE OF PROSTHETIC HEART VALVE: Chronic | ICD-10-CM

## 2018-07-17 DIAGNOSIS — Z98.89 OTHER SPECIFIED POSTPROCEDURAL STATES: Chronic | ICD-10-CM

## 2018-07-17 PROCEDURE — 93306 TTE W/DOPPLER COMPLETE: CPT | Mod: 26

## 2018-07-29 ENCOUNTER — FORM ENCOUNTER (OUTPATIENT)
Age: 73
End: 2018-07-29

## 2018-07-30 ENCOUNTER — APPOINTMENT (OUTPATIENT)
Dept: CT IMAGING | Facility: IMAGING CENTER | Age: 73
End: 2018-07-30
Payer: MEDICARE

## 2018-07-30 ENCOUNTER — OUTPATIENT (OUTPATIENT)
Dept: OUTPATIENT SERVICES | Facility: HOSPITAL | Age: 73
LOS: 1 days | End: 2018-07-30

## 2018-07-30 ENCOUNTER — APPOINTMENT (OUTPATIENT)
Dept: UROLOGY | Facility: CLINIC | Age: 73
End: 2018-07-30
Payer: MEDICARE

## 2018-07-30 ENCOUNTER — OUTPATIENT (OUTPATIENT)
Dept: OUTPATIENT SERVICES | Facility: HOSPITAL | Age: 73
LOS: 1 days | End: 2018-07-30
Payer: MEDICARE

## 2018-07-30 VITALS
SYSTOLIC BLOOD PRESSURE: 186 MMHG | DIASTOLIC BLOOD PRESSURE: 79 MMHG | BODY MASS INDEX: 26.12 KG/M2 | RESPIRATION RATE: 15 BRPM | HEIGHT: 64 IN | WEIGHT: 153 LBS | HEART RATE: 77 BPM

## 2018-07-30 DIAGNOSIS — Z96.7 PRESENCE OF OTHER BONE AND TENDON IMPLANTS: Chronic | ICD-10-CM

## 2018-07-30 DIAGNOSIS — Z96.659 PRESENCE OF UNSPECIFIED ARTIFICIAL KNEE JOINT: Chronic | ICD-10-CM

## 2018-07-30 DIAGNOSIS — R35.0 FREQUENCY OF MICTURITION: ICD-10-CM

## 2018-07-30 DIAGNOSIS — R31.29 OTHER MICROSCOPIC HEMATURIA: ICD-10-CM

## 2018-07-30 DIAGNOSIS — Z98.89 OTHER SPECIFIED POSTPROCEDURAL STATES: Chronic | ICD-10-CM

## 2018-07-30 DIAGNOSIS — Z95.2 PRESENCE OF PROSTHETIC HEART VALVE: Chronic | ICD-10-CM

## 2018-07-30 PROCEDURE — 52000 CYSTOURETHROSCOPY: CPT

## 2018-07-30 PROCEDURE — 99213 OFFICE O/P EST LOW 20 MIN: CPT | Mod: 25

## 2018-07-30 PROCEDURE — 74178 CT ABD&PLV WO CNTR FLWD CNTR: CPT

## 2018-07-30 PROCEDURE — 82565 ASSAY OF CREATININE: CPT

## 2018-07-30 PROCEDURE — 74178 CT ABD&PLV WO CNTR FLWD CNTR: CPT | Mod: 26

## 2018-07-31 DIAGNOSIS — R31.29 OTHER MICROSCOPIC HEMATURIA: ICD-10-CM

## 2018-07-31 DIAGNOSIS — N20.0 CALCULUS OF KIDNEY: ICD-10-CM

## 2018-09-07 ENCOUNTER — LABORATORY RESULT (OUTPATIENT)
Age: 73
End: 2018-09-07

## 2018-09-07 ENCOUNTER — APPOINTMENT (OUTPATIENT)
Dept: INTERNAL MEDICINE | Facility: CLINIC | Age: 73
End: 2018-09-07
Payer: MEDICARE

## 2018-09-07 VITALS
HEART RATE: 72 BPM | RESPIRATION RATE: 14 BRPM | SYSTOLIC BLOOD PRESSURE: 130 MMHG | WEIGHT: 153 LBS | DIASTOLIC BLOOD PRESSURE: 80 MMHG | BODY MASS INDEX: 26.12 KG/M2 | OXYGEN SATURATION: 98 % | HEIGHT: 64 IN | TEMPERATURE: 97.9 F

## 2018-09-07 DIAGNOSIS — R82.90 UNSPECIFIED ABNORMAL FINDINGS IN URINE: ICD-10-CM

## 2018-09-07 PROCEDURE — 99214 OFFICE O/P EST MOD 30 MIN: CPT | Mod: 25

## 2018-09-07 PROCEDURE — 96372 THER/PROPH/DIAG INJ SC/IM: CPT

## 2018-09-07 RX ORDER — CYANOCOBALAMIN 1000 UG/ML
1000 INJECTION INTRAMUSCULAR; SUBCUTANEOUS
Qty: 0 | Refills: 0 | Status: COMPLETED | OUTPATIENT
Start: 2018-09-07

## 2018-09-07 RX ADMIN — CYANOCOBALAMIN 0 MCG/ML: 1000 INJECTION INTRAMUSCULAR; SUBCUTANEOUS at 00:00

## 2018-09-07 NOTE — PHYSICAL EXAM
[No Acute Distress] : no acute distress [Well Nourished] : well nourished [Well Developed] : well developed [Well-Appearing] : well-appearing [No Respiratory Distress] : no respiratory distress  [Clear to Auscultation] : lungs were clear to auscultation bilaterally [Normal Rate] : normal rate  [Regular Rhythm] : with a regular rhythm [No Edema] : there was no peripheral edema [Soft] : abdomen soft [Non Tender] : non-tender [Normal Insight/Judgement] : insight and judgment were intact

## 2018-09-08 ENCOUNTER — APPOINTMENT (OUTPATIENT)
Dept: INTERNAL MEDICINE | Facility: CLINIC | Age: 73
End: 2018-09-08

## 2018-09-08 NOTE — HISTORY OF PRESENT ILLNESS
[FreeTextEntry8] : cc: uti \par \par c/o uti symptoms. She slight burning. Not having normal BM's and stool is soft since early August and had uti last time. Gets up every 2 hrs to urinate. \par \par She found info that said she was allergic to macrodantin.

## 2018-09-09 ENCOUNTER — FORM ENCOUNTER (OUTPATIENT)
Age: 73
End: 2018-09-09

## 2018-09-10 ENCOUNTER — MEDICATION RENEWAL (OUTPATIENT)
Age: 73
End: 2018-09-10

## 2018-09-10 ENCOUNTER — APPOINTMENT (OUTPATIENT)
Dept: ULTRASOUND IMAGING | Facility: CLINIC | Age: 73
End: 2018-09-10

## 2018-09-10 ENCOUNTER — OUTPATIENT (OUTPATIENT)
Dept: OUTPATIENT SERVICES | Facility: HOSPITAL | Age: 73
LOS: 1 days | End: 2018-09-10
Payer: MEDICARE

## 2018-09-10 ENCOUNTER — APPOINTMENT (OUTPATIENT)
Dept: RADIOLOGY | Facility: CLINIC | Age: 73
End: 2018-09-10

## 2018-09-10 ENCOUNTER — APPOINTMENT (OUTPATIENT)
Dept: MAMMOGRAPHY | Facility: CLINIC | Age: 73
End: 2018-09-10

## 2018-09-10 DIAGNOSIS — Z00.8 ENCOUNTER FOR OTHER GENERAL EXAMINATION: ICD-10-CM

## 2018-09-10 DIAGNOSIS — Z96.7 PRESENCE OF OTHER BONE AND TENDON IMPLANTS: Chronic | ICD-10-CM

## 2018-09-10 DIAGNOSIS — Z96.659 PRESENCE OF UNSPECIFIED ARTIFICIAL KNEE JOINT: Chronic | ICD-10-CM

## 2018-09-10 DIAGNOSIS — Z95.2 PRESENCE OF PROSTHETIC HEART VALVE: Chronic | ICD-10-CM

## 2018-09-10 DIAGNOSIS — R31.29 OTHER MICROSCOPIC HEMATURIA: ICD-10-CM

## 2018-09-10 DIAGNOSIS — Z98.89 OTHER SPECIFIED POSTPROCEDURAL STATES: Chronic | ICD-10-CM

## 2018-09-10 LAB
APPEARANCE: CLEAR
BACTERIA UR CULT: ABNORMAL
BILIRUBIN URINE: NEGATIVE
BLOOD URINE: ABNORMAL
COLOR: YELLOW
GLUCOSE QUALITATIVE U: NEGATIVE MG/DL
KETONES URINE: NEGATIVE
LEUKOCYTE ESTERASE URINE: ABNORMAL
NITRITE URINE: POSITIVE
PH URINE: 6
PROTEIN URINE: NEGATIVE MG/DL
SPECIFIC GRAVITY URINE: 1.01
UROBILINOGEN URINE: NEGATIVE MG/DL

## 2018-09-10 PROCEDURE — 77067 SCR MAMMO BI INCL CAD: CPT | Mod: 26

## 2018-09-10 PROCEDURE — 76641 ULTRASOUND BREAST COMPLETE: CPT

## 2018-09-10 PROCEDURE — 77063 BREAST TOMOSYNTHESIS BI: CPT | Mod: 26

## 2018-09-10 PROCEDURE — 77080 DXA BONE DENSITY AXIAL: CPT

## 2018-09-10 PROCEDURE — 77067 SCR MAMMO BI INCL CAD: CPT

## 2018-09-10 PROCEDURE — 77080 DXA BONE DENSITY AXIAL: CPT | Mod: 26

## 2018-09-10 PROCEDURE — 77063 BREAST TOMOSYNTHESIS BI: CPT

## 2018-09-10 PROCEDURE — 76641 ULTRASOUND BREAST COMPLETE: CPT | Mod: 26,50

## 2018-09-25 LAB
APPEARANCE: CLEAR
BILIRUBIN URINE: NEGATIVE
BLOOD URINE: NEGATIVE
COLOR: YELLOW
GLUCOSE QUALITATIVE U: NEGATIVE MG/DL
KETONES URINE: ABNORMAL
LEUKOCYTE ESTERASE URINE: NEGATIVE
NITRITE URINE: NEGATIVE
PH URINE: 5.5
PROTEIN URINE: NEGATIVE MG/DL
SPECIFIC GRAVITY URINE: 1.02
UROBILINOGEN URINE: NEGATIVE MG/DL

## 2018-09-28 LAB — BACTERIA UR CULT: NORMAL

## 2018-10-31 ENCOUNTER — OTHER (OUTPATIENT)
Age: 73
End: 2018-10-31

## 2018-10-31 ENCOUNTER — APPOINTMENT (OUTPATIENT)
Dept: GASTROENTEROLOGY | Facility: CLINIC | Age: 73
End: 2018-10-31
Payer: MEDICARE

## 2018-10-31 VITALS
HEART RATE: 72 BPM | OXYGEN SATURATION: 97 % | RESPIRATION RATE: 14 BRPM | WEIGHT: 151 LBS | HEIGHT: 64.5 IN | BODY MASS INDEX: 25.46 KG/M2 | SYSTOLIC BLOOD PRESSURE: 178 MMHG | DIASTOLIC BLOOD PRESSURE: 84 MMHG

## 2018-10-31 DIAGNOSIS — R13.10 DYSPHAGIA, UNSPECIFIED: ICD-10-CM

## 2018-10-31 DIAGNOSIS — Z83.79 FAMILY HISTORY OF OTHER DISEASES OF THE DIGESTIVE SYSTEM: ICD-10-CM

## 2018-10-31 DIAGNOSIS — Z12.11 ENCOUNTER FOR SCREENING FOR MALIGNANT NEOPLASM OF COLON: ICD-10-CM

## 2018-10-31 PROCEDURE — 99204 OFFICE O/P NEW MOD 45 MIN: CPT

## 2018-11-05 ENCOUNTER — OTHER (OUTPATIENT)
Age: 73
End: 2018-11-05

## 2018-11-29 ENCOUNTER — APPOINTMENT (OUTPATIENT)
Dept: CARDIOLOGY | Facility: CLINIC | Age: 73
End: 2018-11-29
Payer: MEDICARE

## 2018-11-29 ENCOUNTER — NON-APPOINTMENT (OUTPATIENT)
Age: 73
End: 2018-11-29

## 2018-11-29 VITALS
WEIGHT: 150 LBS | DIASTOLIC BLOOD PRESSURE: 89 MMHG | BODY MASS INDEX: 25.3 KG/M2 | HEIGHT: 64.5 IN | SYSTOLIC BLOOD PRESSURE: 168 MMHG | HEART RATE: 60 BPM

## 2018-11-29 VITALS — OXYGEN SATURATION: 99 %

## 2018-11-29 DIAGNOSIS — N39.0 URINARY TRACT INFECTION, SITE NOT SPECIFIED: ICD-10-CM

## 2018-11-29 DIAGNOSIS — Z87.898 PERSONAL HISTORY OF OTHER SPECIFIED CONDITIONS: ICD-10-CM

## 2018-11-29 PROCEDURE — 99213 OFFICE O/P EST LOW 20 MIN: CPT

## 2018-11-29 PROCEDURE — 93000 ELECTROCARDIOGRAM COMPLETE: CPT

## 2018-11-29 RX ORDER — CEFUROXIME AXETIL 250 MG/1
250 TABLET ORAL
Qty: 14 | Refills: 0 | Status: DISCONTINUED | COMMUNITY
Start: 2018-09-10 | End: 2018-11-29

## 2018-11-29 NOTE — DISCUSSION/SUMMARY
[Hypertension] : hypertension [FreeTextEntry1] : \par Currently stable from a cardiovascular standpoint. Hypertensive today (had coffee earlier). Euvolemic. History of bioprosthetic mitral valve replacement. No ischemic or CHF symptoms. Continue current medications. ECG completed today and reviewed (findings as noted above). Follow up in 6 months. At this time, patient is considered an acceptable risk from a cardiac standpoint for anticipated endoscopy procedures.

## 2018-11-29 NOTE — PHYSICAL EXAM
[General Appearance - Well Developed] : well developed [Normal Appearance] : normal appearance [Well Groomed] : well groomed [General Appearance - Well Nourished] : well nourished [No Deformities] : no deformities [General Appearance - In No Acute Distress] : no acute distress [Normal Conjunctiva] : the conjunctiva exhibited no abnormalities [Eyelids - No Xanthelasma] : the eyelids demonstrated no xanthelasmas [Normal Oral Mucosa] : normal oral mucosa [No Oral Pallor] : no oral pallor [No Oral Cyanosis] : no oral cyanosis [Respiration, Rhythm And Depth] : normal respiratory rhythm and effort [Exaggerated Use Of Accessory Muscles For Inspiration] : no accessory muscle use [Auscultation Breath Sounds / Voice Sounds] : lungs were clear to auscultation bilaterally [Heart Rate And Rhythm] : heart rate and rhythm were normal [Heart Sounds] : normal S1 and S2 [Murmurs] : no murmurs present [Edema] : no peripheral edema present [Abdomen Soft] : soft [Abdomen Tenderness] : non-tender [Abnormal Walk] : normal gait [Gait - Sufficient For Exercise Testing] : the gait was sufficient for exercise testing [Nail Clubbing] : no clubbing of the fingernails [Cyanosis, Localized] : no localized cyanosis [Petechial Hemorrhages (___cm)] : no petechial hemorrhages [Skin Color & Pigmentation] : normal skin color and pigmentation [] : no rash [No Venous Stasis] : no venous stasis [Skin Lesions] : no skin lesions [No Skin Ulcers] : no skin ulcer [No Xanthoma] : no  xanthoma was observed [Oriented To Time, Place, And Person] : oriented to person, place, and time [Affect] : the affect was normal [Mood] : the mood was normal [No Anxiety] : not feeling anxious [FreeTextEntry1] : no carotid bruits or JVD

## 2018-11-29 NOTE — HISTORY OF PRESENT ILLNESS
[FreeTextEntry1] : Doing okay. Denies chest pain, shortness of breath or palpitations. Has right hip pains. Anticipating endoscopy procedures in December.

## 2018-12-13 ENCOUNTER — APPOINTMENT (OUTPATIENT)
Dept: INTERNAL MEDICINE | Facility: CLINIC | Age: 73
End: 2018-12-13
Payer: MEDICARE

## 2018-12-13 VITALS
RESPIRATION RATE: 14 BRPM | OXYGEN SATURATION: 99 % | HEIGHT: 64.5 IN | TEMPERATURE: 98.3 F | DIASTOLIC BLOOD PRESSURE: 90 MMHG | BODY MASS INDEX: 25.3 KG/M2 | WEIGHT: 150 LBS | SYSTOLIC BLOOD PRESSURE: 150 MMHG | HEART RATE: 73 BPM

## 2018-12-13 PROCEDURE — 99214 OFFICE O/P EST MOD 30 MIN: CPT | Mod: 25

## 2018-12-13 PROCEDURE — 96372 THER/PROPH/DIAG INJ SC/IM: CPT

## 2018-12-13 RX ORDER — CYANOCOBALAMIN 1000 UG/ML
1000 INJECTION INTRAMUSCULAR; SUBCUTANEOUS
Qty: 0 | Refills: 0 | Status: COMPLETED | OUTPATIENT
Start: 2018-12-13

## 2018-12-13 RX ADMIN — CYANOCOBALAMIN 1 MCG/ML: 1000 INJECTION INTRAMUSCULAR; SUBCUTANEOUS at 00:00

## 2018-12-13 NOTE — HISTORY OF PRESENT ILLNESS
[FreeTextEntry1] : BP check [de-identified] : Diarrhea has resolved since she stopped tumeric. \par \par c/o right groin pain which is worse than before her hip replacement. Surgery done by Dr Latham. Pain is getting worse. \par \par Her BP at home has been high.

## 2018-12-20 ENCOUNTER — TRANSCRIPTION ENCOUNTER (OUTPATIENT)
Age: 73
End: 2018-12-20

## 2018-12-21 ENCOUNTER — RESULT REVIEW (OUTPATIENT)
Age: 73
End: 2018-12-21

## 2018-12-21 ENCOUNTER — OUTPATIENT (OUTPATIENT)
Dept: OUTPATIENT SERVICES | Facility: HOSPITAL | Age: 73
LOS: 1 days | End: 2018-12-21
Payer: MEDICARE

## 2018-12-21 ENCOUNTER — APPOINTMENT (OUTPATIENT)
Dept: GASTROENTEROLOGY | Facility: HOSPITAL | Age: 73
End: 2018-12-21
Payer: MEDICARE

## 2018-12-21 DIAGNOSIS — Z98.89 OTHER SPECIFIED POSTPROCEDURAL STATES: Chronic | ICD-10-CM

## 2018-12-21 DIAGNOSIS — Z12.11 ENCOUNTER FOR SCREENING FOR MALIGNANT NEOPLASM OF COLON: ICD-10-CM

## 2018-12-21 DIAGNOSIS — Z96.7 PRESENCE OF OTHER BONE AND TENDON IMPLANTS: Chronic | ICD-10-CM

## 2018-12-21 DIAGNOSIS — Z95.2 PRESENCE OF PROSTHETIC HEART VALVE: Chronic | ICD-10-CM

## 2018-12-21 DIAGNOSIS — Z96.659 PRESENCE OF UNSPECIFIED ARTIFICIAL KNEE JOINT: Chronic | ICD-10-CM

## 2018-12-21 PROCEDURE — 88305 TISSUE EXAM BY PATHOLOGIST: CPT | Mod: 26

## 2018-12-21 PROCEDURE — 43239 EGD BIOPSY SINGLE/MULTIPLE: CPT

## 2018-12-21 PROCEDURE — 88313 SPECIAL STAINS GROUP 2: CPT | Mod: 26

## 2018-12-21 PROCEDURE — 88313 SPECIAL STAINS GROUP 2: CPT

## 2018-12-21 PROCEDURE — 88312 SPECIAL STAINS GROUP 1: CPT | Mod: 26

## 2018-12-21 PROCEDURE — 45378 DIAGNOSTIC COLONOSCOPY: CPT

## 2018-12-21 PROCEDURE — G0121: CPT

## 2018-12-21 PROCEDURE — 88305 TISSUE EXAM BY PATHOLOGIST: CPT

## 2018-12-21 PROCEDURE — 88312 SPECIAL STAINS GROUP 1: CPT

## 2018-12-26 ENCOUNTER — OTHER (OUTPATIENT)
Age: 73
End: 2018-12-26

## 2018-12-26 LAB — SURGICAL PATHOLOGY STUDY: SIGNIFICANT CHANGE UP

## 2019-01-11 LAB
ALBUMIN SERPL ELPH-MCNC: 3.9 G/DL
ALP BLD-CCNC: 69 U/L
ALT SERPL-CCNC: 18 U/L
ANION GAP SERPL CALC-SCNC: 9 MMOL/L
AST SERPL-CCNC: 25 U/L
BASOPHILS # BLD AUTO: 0.05 K/UL
BASOPHILS NFR BLD AUTO: 1.1 %
BILIRUB SERPL-MCNC: 0.4 MG/DL
BUN SERPL-MCNC: 19 MG/DL
CALCIUM SERPL-MCNC: 9.7 MG/DL
CHLORIDE SERPL-SCNC: 107 MMOL/L
CHOLEST SERPL-MCNC: 154 MG/DL
CHOLEST/HDLC SERPL: 2.6 RATIO
CO2 SERPL-SCNC: 27 MMOL/L
CREAT SERPL-MCNC: 0.96 MG/DL
CRP SERPL-MCNC: 0.14 MG/DL
EOSINOPHIL # BLD AUTO: 0.25 K/UL
EOSINOPHIL NFR BLD AUTO: 5.6 %
ERYTHROCYTE [SEDIMENTATION RATE] IN BLOOD BY WESTERGREN METHOD: 8 MM/HR
FOLATE SERPL-MCNC: 12.1 NG/ML
GLUCOSE SERPL-MCNC: 85 MG/DL
HBA1C MFR BLD HPLC: 5.3 %
HCT VFR BLD CALC: 41.5 %
HDLC SERPL-MCNC: 59 MG/DL
HGB BLD-MCNC: 13.3 G/DL
IMM GRANULOCYTES NFR BLD AUTO: 0.2 %
LDLC SERPL CALC-MCNC: 82 MG/DL
LYMPHOCYTES # BLD AUTO: 0.92 K/UL
LYMPHOCYTES NFR BLD AUTO: 20.5 %
MAN DIFF?: NORMAL
MCHC RBC-ENTMCNC: 29.8 PG
MCHC RBC-ENTMCNC: 32 GM/DL
MCV RBC AUTO: 93 FL
MONOCYTES # BLD AUTO: 0.44 K/UL
MONOCYTES NFR BLD AUTO: 9.8 %
NEUTROPHILS # BLD AUTO: 2.82 K/UL
NEUTROPHILS NFR BLD AUTO: 62.8 %
PLATELET # BLD AUTO: 258 K/UL
POTASSIUM SERPL-SCNC: 4.3 MMOL/L
PROT SERPL-MCNC: 6.5 G/DL
RBC # BLD: 4.46 M/UL
RBC # FLD: 13.2 %
SODIUM SERPL-SCNC: 143 MMOL/L
TRIGL SERPL-MCNC: 67 MG/DL
TSH SERPL-ACNC: 2.73 UIU/ML
VIT B12 SERPL-MCNC: 430 PG/ML
WBC # FLD AUTO: 4.49 K/UL

## 2019-02-12 ENCOUNTER — APPOINTMENT (OUTPATIENT)
Dept: ORTHOPEDIC SURGERY | Facility: CLINIC | Age: 74
End: 2019-02-12
Payer: MEDICARE

## 2019-02-12 VITALS
HEIGHT: 64.5 IN | DIASTOLIC BLOOD PRESSURE: 80 MMHG | WEIGHT: 150 LBS | HEART RATE: 68 BPM | SYSTOLIC BLOOD PRESSURE: 190 MMHG | BODY MASS INDEX: 25.3 KG/M2

## 2019-02-12 DIAGNOSIS — M17.11 UNILATERAL PRIMARY OSTEOARTHRITIS, RIGHT KNEE: ICD-10-CM

## 2019-02-12 DIAGNOSIS — R29.898 OTHER SYMPTOMS AND SIGNS INVOLVING THE MUSCULOSKELETAL SYSTEM: ICD-10-CM

## 2019-02-12 PROCEDURE — 73502 X-RAY EXAM HIP UNI 2-3 VIEWS: CPT

## 2019-02-12 PROCEDURE — 99213 OFFICE O/P EST LOW 20 MIN: CPT

## 2019-02-12 NOTE — HISTORY OF PRESENT ILLNESS
[Stable] : stable [5] : an average pain level of 5/10 [4] : a minimum pain level of 4/10 [6] : a maximum pain level of 6/10 [Walking] : walking [Intermit.] : ~He/She~ states the symptoms seem to be intermittent [Exercise Regimen] : relieved by exercise regimen [de-identified] : One year followup of a right total hip replacement. Patient complaining of some intermittent groin pain when first getting up from a chair. Patient also has significant arthritis of the right knee. That has gone untreated because of the intermittent groin pain

## 2019-02-12 NOTE — DISCUSSION/SUMMARY
[de-identified] : Patient was seen and evaluated today with Dr. Latham the patient was advised th she must continue doing the stretching exercises that she was torn during physical therapy and that it is imperative that she continue doing them at least once or twice a day, so that the hip that was quite shortened, prior to  allows. The musculature to stretch out to its normal length and become used to being at at lengthtient is discomfort occurs sporadically when she gets up from a chair or out of  car and quickly diminishes.\par The patient was offered physical therapy, but she ys that she has any instructions and stretching exercises that were given. Her after her hip surgery.\par

## 2019-02-12 NOTE — REASON FOR VISIT
[Follow-Up Visit] : a follow-up visit for [Artificial Hip Joint] : artificial hip joint [Spouse] : spouse [Hip Pain] : hip pain

## 2019-02-12 NOTE — PHYSICAL EXAM
[LE] : Sensory: Intact in bilateral lower extremities [DP] : dorsalis pedis 2+ and symmetric bilaterally [PT] : posterior tibial 2+ and symmetric bilaterally [Normal RLE] : Right Lower Extremity: No scars, rashes, lesions, ulcers, skin intact [Normal LLE] : Left Lower Extremity: No scars, rashes, lesions, ulcers, skin intact [Normal Touch] : sensation intact for touch [Normal] : no peripheral adenopathy appreciated [de-identified] : Nonobese 73-year-old woman complaining of intermittent right groin pain. Approximately a year after a total hip replacement on examination, the patient has no difficulty ascending examining catch, however, she does go up a step with her left knee. The patient's left knee was done by another surgeon. During Dr. Latham is illness.\par Examination of both hips shows both hips, will flex at least 100°, 30°, external 10°, internal rotation, 30°, abduction the right hip. We will achieve greater than 70° of [place] external rotation as well. The left hip.\par The right knee will open slightly to valgus strain. It comes from full extension and flexes beyond 110° with a small Baker's cyst. Neurovascular status of both lower extremities is within normal limits [de-identified] : AP pelvis and lateral of the right hip shows well-positioned well fit. Right total hip replacement in excellent position and alignment with early equal leg lengths. He has full components appear well fixed and anatomically aligned

## 2019-02-27 ENCOUNTER — RX RENEWAL (OUTPATIENT)
Age: 74
End: 2019-02-27

## 2019-04-03 ENCOUNTER — TRANSCRIPTION ENCOUNTER (OUTPATIENT)
Age: 74
End: 2019-04-03

## 2019-04-04 ENCOUNTER — APPOINTMENT (OUTPATIENT)
Dept: INTERNAL MEDICINE | Facility: CLINIC | Age: 74
End: 2019-04-04
Payer: MEDICARE

## 2019-04-04 VITALS
TEMPERATURE: 97.9 F | OXYGEN SATURATION: 98 % | HEART RATE: 71 BPM | DIASTOLIC BLOOD PRESSURE: 100 MMHG | RESPIRATION RATE: 14 BRPM | SYSTOLIC BLOOD PRESSURE: 170 MMHG | HEIGHT: 64.5 IN

## 2019-04-04 VITALS — SYSTOLIC BLOOD PRESSURE: 170 MMHG | DIASTOLIC BLOOD PRESSURE: 100 MMHG

## 2019-04-04 PROCEDURE — 96372 THER/PROPH/DIAG INJ SC/IM: CPT

## 2019-04-04 PROCEDURE — 99213 OFFICE O/P EST LOW 20 MIN: CPT | Mod: 25

## 2019-04-04 RX ORDER — CYANOCOBALAMIN 1000 UG/ML
1000 INJECTION INTRAMUSCULAR; SUBCUTANEOUS
Qty: 0 | Refills: 0 | Status: COMPLETED | OUTPATIENT
Start: 2019-04-04

## 2019-04-04 RX ADMIN — CYANOCOBALAMIN 0 MCG/ML: 1000 INJECTION INTRAMUSCULAR; SUBCUTANEOUS at 00:00

## 2019-04-04 NOTE — PHYSICAL EXAM
[No Acute Distress] : no acute distress [Well Nourished] : well nourished [Well Developed] : well developed [Well-Appearing] : well-appearing [No Respiratory Distress] : no respiratory distress  [Clear to Auscultation] : lungs were clear to auscultation bilaterally [Normal Rate] : normal rate  [Regular Rhythm] : with a regular rhythm [Normal S1, S2] : normal S1 and S2 [No Edema] : there was no peripheral edema [Soft] : abdomen soft [Non Tender] : non-tender [Normal Affect] : the affect was normal [Normal Insight/Judgement] : insight and judgment were intact

## 2019-04-04 NOTE — HISTORY OF PRESENT ILLNESS
[FreeTextEntry1] : BP check [de-identified] : BP at home today was 140/80 which is a little high for her at home. Did not eat or drink today. BP is always high in the office and good at home.

## 2019-04-05 LAB
ALBUMIN SERPL ELPH-MCNC: 4.2 G/DL
ALP BLD-CCNC: 74 U/L
ALT SERPL-CCNC: 19 U/L
ANION GAP SERPL CALC-SCNC: 11 MMOL/L
AST SERPL-CCNC: 27 U/L
BASOPHILS # BLD AUTO: 0.07 K/UL
BASOPHILS NFR BLD AUTO: 1.1 %
BILIRUB SERPL-MCNC: 0.6 MG/DL
BUN SERPL-MCNC: 14 MG/DL
CALCIUM SERPL-MCNC: 9.6 MG/DL
CHLORIDE SERPL-SCNC: 105 MMOL/L
CHOLEST SERPL-MCNC: 144 MG/DL
CHOLEST/HDLC SERPL: 2.1 RATIO
CO2 SERPL-SCNC: 27 MMOL/L
CREAT SERPL-MCNC: 0.97 MG/DL
EOSINOPHIL # BLD AUTO: 0.26 K/UL
EOSINOPHIL NFR BLD AUTO: 4.2 %
ESTIMATED AVERAGE GLUCOSE: 108 MG/DL
FOLATE SERPL-MCNC: 9.4 NG/ML
GLUCOSE SERPL-MCNC: 89 MG/DL
HBA1C MFR BLD HPLC: 5.4 %
HCT VFR BLD CALC: 43.9 %
HDLC SERPL-MCNC: 68 MG/DL
HGB BLD-MCNC: 14 G/DL
IMM GRANULOCYTES NFR BLD AUTO: 0.2 %
LDLC SERPL CALC-MCNC: 63 MG/DL
LYMPHOCYTES # BLD AUTO: 0.89 K/UL
LYMPHOCYTES NFR BLD AUTO: 14.5 %
MAN DIFF?: NORMAL
MCHC RBC-ENTMCNC: 29.9 PG
MCHC RBC-ENTMCNC: 31.9 GM/DL
MCV RBC AUTO: 93.8 FL
MONOCYTES # BLD AUTO: 0.31 K/UL
MONOCYTES NFR BLD AUTO: 5.1 %
NEUTROPHILS # BLD AUTO: 4.58 K/UL
NEUTROPHILS NFR BLD AUTO: 74.9 %
PLATELET # BLD AUTO: 260 K/UL
POTASSIUM SERPL-SCNC: 4.4 MMOL/L
PROT SERPL-MCNC: 6.4 G/DL
RBC # BLD: 4.68 M/UL
RBC # FLD: 12.8 %
SODIUM SERPL-SCNC: 143 MMOL/L
TRIGL SERPL-MCNC: 64 MG/DL
TSH SERPL-ACNC: 1.06 UIU/ML
VIT B12 SERPL-MCNC: 357 PG/ML
WBC # FLD AUTO: 6.12 K/UL

## 2019-04-11 ENCOUNTER — TRANSCRIPTION ENCOUNTER (OUTPATIENT)
Age: 74
End: 2019-04-11

## 2019-05-21 ENCOUNTER — RX RENEWAL (OUTPATIENT)
Age: 74
End: 2019-05-21

## 2019-06-06 ENCOUNTER — NON-APPOINTMENT (OUTPATIENT)
Age: 74
End: 2019-06-06

## 2019-06-06 ENCOUNTER — APPOINTMENT (OUTPATIENT)
Dept: CARDIOLOGY | Facility: CLINIC | Age: 74
End: 2019-06-06
Payer: MEDICARE

## 2019-06-06 VITALS
SYSTOLIC BLOOD PRESSURE: 182 MMHG | HEART RATE: 55 BPM | OXYGEN SATURATION: 96 % | HEIGHT: 64.5 IN | DIASTOLIC BLOOD PRESSURE: 90 MMHG | BODY MASS INDEX: 25.8 KG/M2 | WEIGHT: 153 LBS

## 2019-06-06 PROCEDURE — 93000 ELECTROCARDIOGRAM COMPLETE: CPT

## 2019-06-06 PROCEDURE — 99213 OFFICE O/P EST LOW 20 MIN: CPT

## 2019-06-06 NOTE — REVIEW OF SYSTEMS
[see HPI] : see HPI [Cough] : cough [Wheezing] : no wheezing [Coughing Up Blood] : no hemoptysis [Negative] : Heme/Lymph

## 2019-06-06 NOTE — DISCUSSION/SUMMARY
[Hypertension] : hypertension [FreeTextEntry1] : \par Currently stable from a cardiovascular standpoint. Hypertensive this morning (BP at home 135/73 mmHg). Euvolemic. History of bio MVR. No ischemic or CHF symptoms. Continue current medications. ECG completed today and reviewed. Follow up in 6 months.

## 2019-06-06 NOTE — PHYSICAL EXAM
[General Appearance - Well Developed] : well developed [Normal Appearance] : normal appearance [Well Groomed] : well groomed [General Appearance - Well Nourished] : well nourished [No Deformities] : no deformities [General Appearance - In No Acute Distress] : no acute distress [Normal Conjunctiva] : the conjunctiva exhibited no abnormalities [Eyelids - No Xanthelasma] : the eyelids demonstrated no xanthelasmas [Normal Oral Mucosa] : normal oral mucosa [No Oral Pallor] : no oral pallor [No Oral Cyanosis] : no oral cyanosis [FreeTextEntry1] : no carotid bruits or JVD [Respiration, Rhythm And Depth] : normal respiratory rhythm and effort [Exaggerated Use Of Accessory Muscles For Inspiration] : no accessory muscle use [Auscultation Breath Sounds / Voice Sounds] : lungs were clear to auscultation bilaterally [Heart Rate And Rhythm] : heart rate and rhythm were normal [Heart Sounds] : normal S1 and S2 [Murmurs] : no murmurs present [Edema] : no peripheral edema present [Abdomen Tenderness] : non-tender [Abdomen Soft] : soft [Abnormal Walk] : normal gait [Gait - Sufficient For Exercise Testing] : the gait was sufficient for exercise testing [Cyanosis, Localized] : no localized cyanosis [Skin Color & Pigmentation] : normal skin color and pigmentation [] : no rash [No Venous Stasis] : no venous stasis [Oriented To Time, Place, And Person] : oriented to person, place, and time [Affect] : the affect was normal [Mood] : the mood was normal [No Anxiety] : not feeling anxious

## 2019-06-06 NOTE — HISTORY OF PRESENT ILLNESS
[FreeTextEntry1] : Doing okay. Has developed a cough since she returned from a trip to [place]. Denies shortness of breath or palpitations. Has had some chest pain from the coughing.

## 2019-06-07 ENCOUNTER — APPOINTMENT (OUTPATIENT)
Dept: INTERNAL MEDICINE | Facility: CLINIC | Age: 74
End: 2019-06-07
Payer: MEDICARE

## 2019-06-07 VITALS
DIASTOLIC BLOOD PRESSURE: 90 MMHG | TEMPERATURE: 98.1 F | HEIGHT: 64.5 IN | HEART RATE: 65 BPM | BODY MASS INDEX: 25.8 KG/M2 | SYSTOLIC BLOOD PRESSURE: 140 MMHG | RESPIRATION RATE: 14 BRPM | OXYGEN SATURATION: 97 % | WEIGHT: 153 LBS

## 2019-06-07 PROCEDURE — 99214 OFFICE O/P EST MOD 30 MIN: CPT | Mod: 25

## 2019-06-07 PROCEDURE — 96372 THER/PROPH/DIAG INJ SC/IM: CPT

## 2019-06-07 RX ORDER — CYANOCOBALAMIN 1000 UG/ML
1000 INJECTION INTRAMUSCULAR; SUBCUTANEOUS
Qty: 0 | Refills: 0 | Status: COMPLETED | OUTPATIENT
Start: 2019-06-07

## 2019-06-07 RX ADMIN — CYANOCOBALAMIN 0 MCG/ML: 1000 INJECTION, SOLUTION INTRAMUSCULAR; SUBCUTANEOUS at 00:00

## 2019-06-07 NOTE — REVIEW OF SYSTEMS
[Sore Throat] : sore throat [Postnasal Drip] : postnasal drip [Cough] : cough [Negative] : Heme/Lymph

## 2019-06-07 NOTE — PHYSICAL EXAM
[No Acute Distress] : no acute distress [Well Nourished] : well nourished [Well Developed] : well developed [Normal Oropharynx] : the oropharynx was normal [Normal TMs] : both tympanic membranes were normal [Supple] : supple [No Lymphadenopathy] : no lymphadenopathy [No Respiratory Distress] : no respiratory distress  [Clear to Auscultation] : lungs were clear to auscultation bilaterally [Normal Rate] : normal rate  [Regular Rhythm] : with a regular rhythm [No Edema] : there was no peripheral edema [Soft] : abdomen soft [Non Tender] : non-tender [Normal Affect] : the affect was normal [Normal Insight/Judgement] : insight and judgment were intact [de-identified] : coughing intermittently

## 2019-06-07 NOTE — HISTORY OF PRESENT ILLNESS
[FreeTextEntry8] : cc: cough\par \par Pt c/o cough which is at night. Last week was in [place] for a show. Had s/t while there. Now home and cough keeping her up. \par \par Her BP tends to be high in the office.

## 2019-06-10 ENCOUNTER — APPOINTMENT (OUTPATIENT)
Dept: INTERNAL MEDICINE | Facility: CLINIC | Age: 74
End: 2019-06-10

## 2019-08-02 ENCOUNTER — RX RENEWAL (OUTPATIENT)
Age: 74
End: 2019-08-02

## 2019-11-21 ENCOUNTER — APPOINTMENT (OUTPATIENT)
Dept: INTERNAL MEDICINE | Facility: CLINIC | Age: 74
End: 2019-11-21
Payer: MEDICARE

## 2019-11-21 VITALS
TEMPERATURE: 98.4 F | RESPIRATION RATE: 14 BRPM | HEIGHT: 64.5 IN | WEIGHT: 153 LBS | DIASTOLIC BLOOD PRESSURE: 90 MMHG | OXYGEN SATURATION: 94 % | BODY MASS INDEX: 25.8 KG/M2 | SYSTOLIC BLOOD PRESSURE: 150 MMHG | HEART RATE: 74 BPM

## 2019-11-21 PROCEDURE — 99214 OFFICE O/P EST MOD 30 MIN: CPT | Mod: 25

## 2019-11-21 PROCEDURE — 96372 THER/PROPH/DIAG INJ SC/IM: CPT

## 2019-11-21 RX ORDER — SODIUM SULFATE, POTASSIUM SULFATE, MAGNESIUM SULFATE 17.5; 3.13; 1.6 G/ML; G/ML; G/ML
17.5-3.13-1.6 SOLUTION, CONCENTRATE ORAL
Qty: 1 | Refills: 0 | Status: DISCONTINUED | COMMUNITY
Start: 2018-10-31 | End: 2019-11-21

## 2019-11-21 RX ORDER — CYANOCOBALAMIN 1000 UG/ML
1000 INJECTION INTRAMUSCULAR; SUBCUTANEOUS
Qty: 0 | Refills: 0 | Status: COMPLETED | OUTPATIENT
Start: 2019-11-21

## 2019-11-21 RX ORDER — HYDROCODONE BITARTRATE AND HOMATROPINE METHYLBROMIDE 5; 1.5 MG/5ML; MG/5ML
5-1.5 SOLUTION ORAL TWICE DAILY
Qty: 120 | Refills: 0 | Status: DISCONTINUED | COMMUNITY
Start: 2019-06-07 | End: 2019-11-21

## 2019-11-21 RX ADMIN — CYANOCOBALAMIN 0 MCG/ML: 1000 INJECTION INTRAMUSCULAR; SUBCUTANEOUS at 00:00

## 2019-11-21 NOTE — HISTORY OF PRESENT ILLNESS
[FreeTextEntry1] : cold symptoms [de-identified] : c/o cold symptoms for 2 days. Has cough, congestion. Not able to bring up mucus. Her BP at home is 130's over 70's.\par \par c/o not losing weight. She does eat junk food, pizza, salads with creamy Italian.

## 2019-11-23 LAB
ALBUMIN SERPL ELPH-MCNC: 4 G/DL
ALP BLD-CCNC: 78 U/L
ALT SERPL-CCNC: 15 U/L
ANION GAP SERPL CALC-SCNC: 14 MMOL/L
AST SERPL-CCNC: 23 U/L
BASOPHILS # BLD AUTO: 0.06 K/UL
BASOPHILS NFR BLD AUTO: 1.1 %
BILIRUB SERPL-MCNC: 0.8 MG/DL
BUN SERPL-MCNC: 14 MG/DL
CALCIUM SERPL-MCNC: 9.3 MG/DL
CHLORIDE SERPL-SCNC: 104 MMOL/L
CHOLEST SERPL-MCNC: 143 MG/DL
CHOLEST/HDLC SERPL: 2.6 RATIO
CO2 SERPL-SCNC: 24 MMOL/L
CREAT SERPL-MCNC: 0.95 MG/DL
EOSINOPHIL # BLD AUTO: 0.39 K/UL
EOSINOPHIL NFR BLD AUTO: 7.5 %
ESTIMATED AVERAGE GLUCOSE: 105 MG/DL
FOLATE SERPL-MCNC: 12.5 NG/ML
GLUCOSE SERPL-MCNC: 82 MG/DL
HBA1C MFR BLD HPLC: 5.3 %
HCT VFR BLD CALC: 40.1 %
HDLC SERPL-MCNC: 55 MG/DL
HGB BLD-MCNC: 12.8 G/DL
IMM GRANULOCYTES NFR BLD AUTO: 0 %
LDLC SERPL CALC-MCNC: 71 MG/DL
LYMPHOCYTES # BLD AUTO: 0.62 K/UL
LYMPHOCYTES NFR BLD AUTO: 11.9 %
MAN DIFF?: NORMAL
MCHC RBC-ENTMCNC: 30 PG
MCHC RBC-ENTMCNC: 31.9 GM/DL
MCV RBC AUTO: 93.9 FL
MONOCYTES # BLD AUTO: 0.51 K/UL
MONOCYTES NFR BLD AUTO: 9.8 %
NEUTROPHILS # BLD AUTO: 3.65 K/UL
NEUTROPHILS NFR BLD AUTO: 69.7 %
PLATELET # BLD AUTO: 236 K/UL
POTASSIUM SERPL-SCNC: 4.2 MMOL/L
PROT SERPL-MCNC: 6.1 G/DL
RBC # BLD: 4.27 M/UL
RBC # FLD: 13 %
SODIUM SERPL-SCNC: 142 MMOL/L
TRIGL SERPL-MCNC: 83 MG/DL
TSH SERPL-ACNC: 1.4 UIU/ML
VIT B12 SERPL-MCNC: 397 PG/ML
WBC # FLD AUTO: 5.23 K/UL

## 2019-12-05 ENCOUNTER — NON-APPOINTMENT (OUTPATIENT)
Age: 74
End: 2019-12-05

## 2019-12-05 ENCOUNTER — APPOINTMENT (OUTPATIENT)
Dept: CARDIOLOGY | Facility: CLINIC | Age: 74
End: 2019-12-05
Payer: MEDICARE

## 2019-12-05 VITALS
HEIGHT: 64.5 IN | SYSTOLIC BLOOD PRESSURE: 196 MMHG | BODY MASS INDEX: 25.8 KG/M2 | OXYGEN SATURATION: 96 % | HEART RATE: 62 BPM | DIASTOLIC BLOOD PRESSURE: 83 MMHG | WEIGHT: 153 LBS | RESPIRATION RATE: 14 BRPM

## 2019-12-05 VITALS — DIASTOLIC BLOOD PRESSURE: 84 MMHG | SYSTOLIC BLOOD PRESSURE: 152 MMHG

## 2019-12-05 PROCEDURE — 99213 OFFICE O/P EST LOW 20 MIN: CPT

## 2019-12-05 PROCEDURE — 93000 ELECTROCARDIOGRAM COMPLETE: CPT

## 2019-12-05 NOTE — DISCUSSION/SUMMARY
[Hypertension] : hypertension [FreeTextEntry1] : \par Currently stable from a cardiovascular standpoint. Hypertensive today. Euvolemic. History of bio MVR. No ischemic or CHF symptoms. Continue current medications. ECG completed today and reviewed. Advised patient to take metoprolol in the morning and follow BP readings at home. Low salt diet advised. Follow up in 6 months.

## 2019-12-05 NOTE — PHYSICAL EXAM
[General Appearance - Well Developed] : well developed [Normal Appearance] : normal appearance [Well Groomed] : well groomed [General Appearance - Well Nourished] : well nourished [No Deformities] : no deformities [General Appearance - In No Acute Distress] : no acute distress [Normal Conjunctiva] : the conjunctiva exhibited no abnormalities [Eyelids - No Xanthelasma] : the eyelids demonstrated no xanthelasmas [Normal Oral Mucosa] : normal oral mucosa [No Oral Pallor] : no oral pallor [No Oral Cyanosis] : no oral cyanosis [FreeTextEntry1] : no carotid bruits or JVD [Respiration, Rhythm And Depth] : normal respiratory rhythm and effort [Auscultation Breath Sounds / Voice Sounds] : lungs were clear to auscultation bilaterally [Exaggerated Use Of Accessory Muscles For Inspiration] : no accessory muscle use [Heart Sounds] : normal S1 and S2 [Murmurs] : no murmurs present [Heart Rate And Rhythm] : heart rate and rhythm were normal [Edema] : no peripheral edema present [Abdomen Soft] : soft [Abdomen Tenderness] : non-tender [Gait - Sufficient For Exercise Testing] : the gait was sufficient for exercise testing [Cyanosis, Localized] : no localized cyanosis [Abnormal Walk] : normal gait [Skin Color & Pigmentation] : normal skin color and pigmentation [No Venous Stasis] : no venous stasis [] : no rash [Oriented To Time, Place, And Person] : oriented to person, place, and time [Affect] : the affect was normal [No Anxiety] : not feeling anxious [Mood] : the mood was normal

## 2019-12-17 ENCOUNTER — OTHER (OUTPATIENT)
Age: 74
End: 2019-12-17

## 2019-12-17 ENCOUNTER — RX RENEWAL (OUTPATIENT)
Age: 74
End: 2019-12-17

## 2020-02-26 ENCOUNTER — RX RENEWAL (OUTPATIENT)
Age: 75
End: 2020-02-26

## 2020-03-09 ENCOUNTER — APPOINTMENT (OUTPATIENT)
Dept: INTERNAL MEDICINE | Facility: CLINIC | Age: 75
End: 2020-03-09
Payer: MEDICARE

## 2020-03-09 VITALS
TEMPERATURE: 98.2 F | HEIGHT: 64.5 IN | BODY MASS INDEX: 25.64 KG/M2 | RESPIRATION RATE: 14 BRPM | WEIGHT: 152 LBS | HEART RATE: 60 BPM | SYSTOLIC BLOOD PRESSURE: 140 MMHG | DIASTOLIC BLOOD PRESSURE: 82 MMHG | OXYGEN SATURATION: 98 %

## 2020-03-09 PROCEDURE — 99213 OFFICE O/P EST LOW 20 MIN: CPT | Mod: 25

## 2020-03-09 PROCEDURE — 96372 THER/PROPH/DIAG INJ SC/IM: CPT

## 2020-03-09 RX ORDER — CYANOCOBALAMIN 1000 UG/ML
1000 INJECTION INTRAMUSCULAR; SUBCUTANEOUS
Qty: 0 | Refills: 0 | Status: COMPLETED | OUTPATIENT
Start: 2020-03-09

## 2020-03-09 RX ADMIN — CYANOCOBALAMIN 0 MCG/ML: 1000 INJECTION, SOLUTION INTRAMUSCULAR; SUBCUTANEOUS at 00:00

## 2020-05-28 ENCOUNTER — NON-APPOINTMENT (OUTPATIENT)
Age: 75
End: 2020-05-28

## 2020-05-28 ENCOUNTER — APPOINTMENT (OUTPATIENT)
Dept: CARDIOLOGY | Facility: CLINIC | Age: 75
End: 2020-05-28
Payer: MEDICARE

## 2020-05-28 VITALS
HEART RATE: 62 BPM | WEIGHT: 157 LBS | SYSTOLIC BLOOD PRESSURE: 185 MMHG | TEMPERATURE: 96.6 F | OXYGEN SATURATION: 97 % | HEIGHT: 64.5 IN | DIASTOLIC BLOOD PRESSURE: 83 MMHG | BODY MASS INDEX: 26.48 KG/M2

## 2020-05-28 DIAGNOSIS — Z87.19 PERSONAL HISTORY OF OTHER DISEASES OF THE DIGESTIVE SYSTEM: ICD-10-CM

## 2020-05-28 PROCEDURE — 99213 OFFICE O/P EST LOW 20 MIN: CPT

## 2020-05-28 PROCEDURE — 93000 ELECTROCARDIOGRAM COMPLETE: CPT

## 2020-05-28 NOTE — DISCUSSION/SUMMARY
[Hypertension] : hypertension [FreeTextEntry1] : \par Currently stable from a cardiovascular standpoint. Hypertensive in the office today (normal BPs at home). Euvolemic. History of bioprosthetic MVR. Asymptomatic. Continue current medications. ECG completed today and reviewed (findings as noted above). Low salt diet advised (has been eating chips). Proper dieting and weight loss advised. Patient to continue to monitor BP at home. Follow up in 6 months.

## 2020-05-28 NOTE — PHYSICAL EXAM
[General Appearance - Well Developed] : well developed [Normal Appearance] : normal appearance [General Appearance - Well Nourished] : well nourished [Well Groomed] : well groomed [No Deformities] : no deformities [General Appearance - In No Acute Distress] : no acute distress [Normal Conjunctiva] : the conjunctiva exhibited no abnormalities [Eyelids - No Xanthelasma] : the eyelids demonstrated no xanthelasmas [Normal Oral Mucosa] : normal oral mucosa [No Oral Pallor] : no oral pallor [No Oral Cyanosis] : no oral cyanosis [Respiration, Rhythm And Depth] : normal respiratory rhythm and effort [Exaggerated Use Of Accessory Muscles For Inspiration] : no accessory muscle use [Auscultation Breath Sounds / Voice Sounds] : lungs were clear to auscultation bilaterally [Heart Rate And Rhythm] : heart rate and rhythm were normal [Heart Sounds] : normal S1 and S2 [Murmurs] : no murmurs present [Edema] : no peripheral edema present [Abdomen Soft] : soft [Abdomen Tenderness] : non-tender [Abnormal Walk] : normal gait [Gait - Sufficient For Exercise Testing] : the gait was sufficient for exercise testing [Cyanosis, Localized] : no localized cyanosis [Skin Color & Pigmentation] : normal skin color and pigmentation [] : no rash [No Venous Stasis] : no venous stasis [Oriented To Time, Place, And Person] : oriented to person, place, and time [Affect] : the affect was normal [Mood] : the mood was normal [No Anxiety] : not feeling anxious [FreeTextEntry1] : no carotid bruits or JVD

## 2020-05-28 NOTE — HISTORY OF PRESENT ILLNESS
[FreeTextEntry1] : Doing well. Denies chest pain, shortness of breath or palpitations. Has gained some weight. Does work around the garden for exercise. BP last night 123/62 mmHg and 132/66 mmHg this morning.

## 2020-08-31 ENCOUNTER — APPOINTMENT (OUTPATIENT)
Dept: INTERNAL MEDICINE | Facility: CLINIC | Age: 75
End: 2020-08-31
Payer: MEDICARE

## 2020-08-31 VITALS
OXYGEN SATURATION: 97 % | RESPIRATION RATE: 14 BRPM | TEMPERATURE: 97.9 F | DIASTOLIC BLOOD PRESSURE: 100 MMHG | HEART RATE: 71 BPM | SYSTOLIC BLOOD PRESSURE: 210 MMHG | BODY MASS INDEX: 26.19 KG/M2 | WEIGHT: 155 LBS

## 2020-08-31 VITALS — SYSTOLIC BLOOD PRESSURE: 178 MMHG | DIASTOLIC BLOOD PRESSURE: 90 MMHG

## 2020-08-31 DIAGNOSIS — Z23 ENCOUNTER FOR IMMUNIZATION: ICD-10-CM

## 2020-08-31 PROCEDURE — 90471 IMMUNIZATION ADMIN: CPT

## 2020-08-31 PROCEDURE — 90715 TDAP VACCINE 7 YRS/> IM: CPT | Mod: GY

## 2020-08-31 PROCEDURE — 99214 OFFICE O/P EST MOD 30 MIN: CPT | Mod: 25

## 2020-08-31 NOTE — HISTORY OF PRESENT ILLNESS
[de-identified] : c/o 6 months of worsening urinary urgency. Usually gets up every 2 hrs. Sometimes she can't make it to the BR. Doesn't really burn. Has frequency during the day. Occasionally she does sleep through the night. \par \par Has left knee replacement. Right knee needs replacement. Has right hip replacement. Since then she has continuous pain in groin. She has been back to Dr Lahtam who recommended P.T. which pt states did not help. She was last seen 1.5 yrs ago. \par \par BP at home is 120-130/60-70. She did have coffee with creamer right before she came here.  [FreeTextEntry1] : urinary frequency

## 2020-09-01 LAB
ALBUMIN SERPL ELPH-MCNC: 4.2 G/DL
ALP BLD-CCNC: 69 U/L
ALT SERPL-CCNC: 21 U/L
ANION GAP SERPL CALC-SCNC: 11 MMOL/L
APPEARANCE: CLEAR
AST SERPL-CCNC: 29 U/L
BACTERIA: ABNORMAL
BASOPHILS # BLD AUTO: 0.06 K/UL
BASOPHILS NFR BLD AUTO: 1 %
BILIRUB SERPL-MCNC: 0.7 MG/DL
BILIRUBIN URINE: NEGATIVE
BLOOD URINE: NEGATIVE
BUN SERPL-MCNC: 13 MG/DL
CALCIUM SERPL-MCNC: 9.6 MG/DL
CHLORIDE SERPL-SCNC: 103 MMOL/L
CHOLEST SERPL-MCNC: 153 MG/DL
CHOLEST/HDLC SERPL: 2.3 RATIO
CO2 SERPL-SCNC: 26 MMOL/L
COLOR: NORMAL
CREAT SERPL-MCNC: 0.9 MG/DL
EOSINOPHIL # BLD AUTO: 0.16 K/UL
EOSINOPHIL NFR BLD AUTO: 2.6 %
ESTIMATED AVERAGE GLUCOSE: 103 MG/DL
FOLATE SERPL-MCNC: 8.8 NG/ML
GLUCOSE QUALITATIVE U: NEGATIVE
GLUCOSE SERPL-MCNC: 82 MG/DL
HBA1C MFR BLD HPLC: 5.2 %
HCT VFR BLD CALC: 42.2 %
HCYS SERPL-MCNC: 12.8 UMOL/L
HDLC SERPL-MCNC: 67 MG/DL
HGB BLD-MCNC: 13.4 G/DL
HYALINE CASTS: 0 /LPF
IMM GRANULOCYTES NFR BLD AUTO: 0.3 %
KETONES URINE: NEGATIVE
LDLC SERPL CALC-MCNC: 70 MG/DL
LEUKOCYTE ESTERASE URINE: NEGATIVE
LYMPHOCYTES # BLD AUTO: 1.02 K/UL
LYMPHOCYTES NFR BLD AUTO: 16.3 %
MAN DIFF?: NORMAL
MCHC RBC-ENTMCNC: 29.6 PG
MCHC RBC-ENTMCNC: 31.8 GM/DL
MCV RBC AUTO: 93.4 FL
MICROSCOPIC-UA: NORMAL
MONOCYTES # BLD AUTO: 0.36 K/UL
MONOCYTES NFR BLD AUTO: 5.8 %
NEUTROPHILS # BLD AUTO: 4.63 K/UL
NEUTROPHILS NFR BLD AUTO: 74 %
NITRITE URINE: POSITIVE
PH URINE: 7
PLATELET # BLD AUTO: 249 K/UL
POTASSIUM SERPL-SCNC: 4.3 MMOL/L
PROT SERPL-MCNC: 6.3 G/DL
PROTEIN URINE: NEGATIVE
RBC # BLD: 4.52 M/UL
RBC # FLD: 13.2 %
RED BLOOD CELLS URINE: 1 /HPF
SODIUM SERPL-SCNC: 141 MMOL/L
SPECIFIC GRAVITY URINE: 1.01
SQUAMOUS EPITHELIAL CELLS: 0 /HPF
TRIGL SERPL-MCNC: 86 MG/DL
TSH SERPL-ACNC: 3.8 UIU/ML
UROBILINOGEN URINE: NORMAL
VIT B12 SERPL-MCNC: 369 PG/ML
WBC # FLD AUTO: 6.25 K/UL
WHITE BLOOD CELLS URINE: 3 /HPF

## 2020-09-04 LAB
BACTERIA UR CULT: ABNORMAL
METHYLMALONATE SERPL-SCNC: 278 NMOL/L

## 2020-11-05 ENCOUNTER — APPOINTMENT (OUTPATIENT)
Dept: UROLOGY | Facility: CLINIC | Age: 75
End: 2020-11-05
Payer: MEDICARE

## 2020-11-05 VITALS — TEMPERATURE: 98.1 F

## 2020-11-05 DIAGNOSIS — N32.81 OVERACTIVE BLADDER: ICD-10-CM

## 2020-11-05 PROCEDURE — 99214 OFFICE O/P EST MOD 30 MIN: CPT

## 2020-11-05 NOTE — ASSESSMENT
[FreeTextEntry1] : Urinary frequency and urgency. Has caffeine component that is contributing. \par --Decrease caffeine\par --Can consider anticholinergic if not enough benefit.\par \par Kidney stone. \par --Renal US in 1mo

## 2020-11-05 NOTE — PHYSICAL EXAM
[General Appearance - Well Developed] : well developed [General Appearance - Well Nourished] : well nourished [General Appearance - In No Acute Distress] : no acute distress [Abdomen Soft] : soft [Abdomen Tenderness] : non-tender [Costovertebral Angle Tenderness] : no ~M costovertebral angle tenderness [Urethral Meatus] : normal urethra [Urinary Bladder Findings] : the bladder was normal on palpation [External Female Genitalia] : normal external genitalia [Vagina] : normal vaginal exam [Skin Color & Pigmentation] : normal skin color and pigmentation [Edema] : no peripheral edema [Exaggerated Use Of Accessory Muscles For Inspiration] : no accessory muscle use [Oriented To Time, Place, And Person] : oriented to person, place, and time [Normal Station and Gait] : the gait and station were normal for the patient's age [No Focal Deficits] : no focal deficits [Cervical Lymph Nodes Enlarged Anterior Bilaterally] : anterior cervical [Supraclavicular Lymph Nodes Enlarged Bilaterally] : supraclavicular

## 2020-11-05 NOTE — HISTORY OF PRESENT ILLNESS
[FreeTextEntry1] : 74 year old F with cc of urinary urgency. Pt has been seen in the past for microscopic hematuria (8-12 RBC). Hx of stone in 2014. Was told at that time she had additional "dormant" stones in both kidneys. No issues with UTIs. No tobacco hx. No exposure hx. No family hx of  malignancy. At baseline, no urinary complaints. Has nocturia x1. Does not drink enough water per report. She underwent CTU and cysto in 2018 revealing small stone in L kidney that is non-obstructing. Cysto negative. \par \par She comes in today and reports bothersome urinary frequency and urgency. She reports she is going every 2h both day and night. She reports bothersome urgency with urge leakage. She is wearing dolly pad usually but will wear thicker when she goes out just in case. She has 1 coffee in the am and occasional decaf or green tea and [place] iced tea at dinner. No issues with constipation. Goes to bed 11:30, eats dinner 5-5:30. Occasional decaf tea after dinner. \par \par Most recent UCx showed bacteria but there was no pyuria on UA (or hematuria).

## 2020-11-05 NOTE — REVIEW OF SYSTEMS
[Shortness Of Breath] : shortness of breath [Diarrhea] : diarrhea [see HPI] : see HPI [Loss of interest] : loss of interest in sexual activity [Told you have blood in urine on a urine test] : told blood was present in a urine test [Wake up at night to urinate  How many times?  ___] : wakes up to urinate [unfilled] times during the night [Negative] : Heme/Lymph [FreeTextEntry2] : htn

## 2020-12-03 ENCOUNTER — APPOINTMENT (OUTPATIENT)
Dept: CARDIOLOGY | Facility: CLINIC | Age: 75
End: 2020-12-03
Payer: MEDICARE

## 2020-12-03 ENCOUNTER — NON-APPOINTMENT (OUTPATIENT)
Age: 75
End: 2020-12-03

## 2020-12-03 VITALS — BODY MASS INDEX: 26.31 KG/M2 | HEIGHT: 64.5 IN | WEIGHT: 156 LBS

## 2020-12-03 VITALS
TEMPERATURE: 97 F | HEART RATE: 59 BPM | DIASTOLIC BLOOD PRESSURE: 73 MMHG | SYSTOLIC BLOOD PRESSURE: 179 MMHG | OXYGEN SATURATION: 99 %

## 2020-12-03 PROCEDURE — 99213 OFFICE O/P EST LOW 20 MIN: CPT

## 2020-12-03 PROCEDURE — 93000 ELECTROCARDIOGRAM COMPLETE: CPT

## 2020-12-03 RX ORDER — CEFUROXIME AXETIL 250 MG/1
250 TABLET ORAL
Qty: 14 | Refills: 0 | Status: DISCONTINUED | COMMUNITY
Start: 2020-09-04 | End: 2020-12-03

## 2020-12-09 ENCOUNTER — APPOINTMENT (OUTPATIENT)
Dept: ULTRASOUND IMAGING | Facility: CLINIC | Age: 75
End: 2020-12-09
Payer: MEDICARE

## 2020-12-09 ENCOUNTER — OUTPATIENT (OUTPATIENT)
Dept: OUTPATIENT SERVICES | Facility: HOSPITAL | Age: 75
LOS: 1 days | End: 2020-12-09
Payer: MEDICARE

## 2020-12-09 DIAGNOSIS — N20.0 CALCULUS OF KIDNEY: ICD-10-CM

## 2020-12-09 DIAGNOSIS — Z96.659 PRESENCE OF UNSPECIFIED ARTIFICIAL KNEE JOINT: Chronic | ICD-10-CM

## 2020-12-09 DIAGNOSIS — Z96.7 PRESENCE OF OTHER BONE AND TENDON IMPLANTS: Chronic | ICD-10-CM

## 2020-12-09 DIAGNOSIS — Z95.2 PRESENCE OF PROSTHETIC HEART VALVE: Chronic | ICD-10-CM

## 2020-12-09 DIAGNOSIS — Z98.89 OTHER SPECIFIED POSTPROCEDURAL STATES: Chronic | ICD-10-CM

## 2020-12-09 PROCEDURE — 76770 US EXAM ABDO BACK WALL COMP: CPT | Mod: 26

## 2020-12-09 PROCEDURE — 76770 US EXAM ABDO BACK WALL COMP: CPT

## 2020-12-10 ENCOUNTER — APPOINTMENT (OUTPATIENT)
Dept: CV DIAGNOSITCS | Facility: HOSPITAL | Age: 75
End: 2020-12-10

## 2020-12-10 ENCOUNTER — OUTPATIENT (OUTPATIENT)
Dept: OUTPATIENT SERVICES | Facility: HOSPITAL | Age: 75
LOS: 1 days | End: 2020-12-10
Payer: MEDICARE

## 2020-12-10 DIAGNOSIS — R07.9 CHEST PAIN, UNSPECIFIED: ICD-10-CM

## 2020-12-10 DIAGNOSIS — Z96.7 PRESENCE OF OTHER BONE AND TENDON IMPLANTS: Chronic | ICD-10-CM

## 2020-12-10 DIAGNOSIS — Z98.89 OTHER SPECIFIED POSTPROCEDURAL STATES: Chronic | ICD-10-CM

## 2020-12-10 DIAGNOSIS — Z95.2 PRESENCE OF PROSTHETIC HEART VALVE: Chronic | ICD-10-CM

## 2020-12-10 DIAGNOSIS — Z96.659 PRESENCE OF UNSPECIFIED ARTIFICIAL KNEE JOINT: Chronic | ICD-10-CM

## 2020-12-10 PROCEDURE — 93306 TTE W/DOPPLER COMPLETE: CPT | Mod: 26

## 2020-12-14 NOTE — HISTORY OF PRESENT ILLNESS
[FreeTextEntry1] : Doing okay. Denies chest pain or palpitations. Occasional dyspnea on exertion especially when carrying heavy things. Has right groin/hip pains when going up stairs. Also has right knee pains.

## 2020-12-14 NOTE — ADDENDUM
[FreeTextEntry1] : CARDIAC TESTING:\par Discussed echo results with patient. Patient with bio MVR with mean MV gradient 7 mmHg. LVEF noted to be 42% (decreased from 59% from 2018).\par \par PLAN:\par Given cardiomyopathy, will start losartan 25 mg daily. Continue metoprolol succinate 50 mg daily. Follow up in 2-3 months. Will optimize medications for likely nonischemic cardiomyopathy. Will reassess LV systolic function once optimized on medical therapy.

## 2020-12-14 NOTE — PHYSICAL EXAM
[General Appearance - Well Developed] : well developed [Normal Appearance] : normal appearance [Well Groomed] : well groomed [General Appearance - Well Nourished] : well nourished [No Deformities] : no deformities [General Appearance - In No Acute Distress] : no acute distress [Normal Conjunctiva] : the conjunctiva exhibited no abnormalities [Eyelids - No Xanthelasma] : the eyelids demonstrated no xanthelasmas [Normal Oral Mucosa] : normal oral mucosa [No Oral Pallor] : no oral pallor [No Oral Cyanosis] : no oral cyanosis [Respiration, Rhythm And Depth] : normal respiratory rhythm and effort [Exaggerated Use Of Accessory Muscles For Inspiration] : no accessory muscle use [Auscultation Breath Sounds / Voice Sounds] : lungs were clear to auscultation bilaterally [Heart Rate And Rhythm] : heart rate and rhythm were normal [Heart Sounds] : normal S1 and S2 [Murmurs] : no murmurs present [Edema] : no peripheral edema present [Abdomen Soft] : soft [Abdomen Tenderness] : non-tender [Abnormal Walk] : normal gait [Gait - Sufficient For Exercise Testing] : the gait was sufficient for exercise testing [Cyanosis, Localized] : no localized cyanosis [Skin Color & Pigmentation] : normal skin color and pigmentation [] : no rash [No Venous Stasis] : no venous stasis [Oriented To Time, Place, And Person] : oriented to person, place, and time [Affect] : the affect was normal [Mood] : the mood was normal [No Anxiety] : not feeling anxious [FreeTextEntry1] : no carotid bruits or JVD

## 2020-12-14 NOTE — DISCUSSION/SUMMARY
[Hypertension] : hypertension [FreeTextEntry1] : \par Currently stable from a cardiovascular standpoint. Hypertensive today (patient states that her BP at home is usually 130's mmHg with an occasional 140's mmHg). Euvolemic. History of bioprosthetic mitral valve replacement. Occasional dyspnea on exertion with heavy lifting may be secondary to physical conditioning. Continue current medications. ECG completed today and reviewed (findings as noted above). Will schedule an echocardiogram to reassess her cardiac structures and function. Follow up in 6 months.

## 2021-01-06 ENCOUNTER — RX CHANGE (OUTPATIENT)
Age: 76
End: 2021-01-06

## 2021-02-08 ENCOUNTER — APPOINTMENT (OUTPATIENT)
Dept: INTERNAL MEDICINE | Facility: CLINIC | Age: 76
End: 2021-02-08
Payer: MEDICARE

## 2021-02-08 VITALS
WEIGHT: 153 LBS | RESPIRATION RATE: 14 BRPM | OXYGEN SATURATION: 96 % | SYSTOLIC BLOOD PRESSURE: 120 MMHG | BODY MASS INDEX: 25.8 KG/M2 | TEMPERATURE: 97.3 F | DIASTOLIC BLOOD PRESSURE: 72 MMHG | HEIGHT: 64.5 IN | HEART RATE: 73 BPM

## 2021-02-08 PROCEDURE — 99214 OFFICE O/P EST MOD 30 MIN: CPT | Mod: 25

## 2021-02-08 PROCEDURE — 96372 THER/PROPH/DIAG INJ SC/IM: CPT

## 2021-02-08 RX ORDER — LATANOPROST/PF 0.005 %
0.01 DROPS OPHTHALMIC (EYE)
Qty: 8 | Refills: 0 | Status: ACTIVE | COMMUNITY
Start: 2020-11-18

## 2021-02-08 RX ORDER — CYANOCOBALAMIN 1000 UG/ML
1000 INJECTION INTRAMUSCULAR; SUBCUTANEOUS
Qty: 0 | Refills: 0 | Status: COMPLETED | OUTPATIENT
Start: 2021-02-08

## 2021-02-08 RX ADMIN — CYANOCOBALAMIN 0 MCG/ML: 1000 INJECTION, SOLUTION INTRAMUSCULAR at 00:00

## 2021-02-08 NOTE — HISTORY OF PRESENT ILLNESS
[FreeTextEntry1] : BP check [de-identified] : Pt states that an echo showed that her heart muscle is a little weaker. \par

## 2021-02-09 LAB
25(OH)D3 SERPL-MCNC: 49 NG/ML
ALBUMIN SERPL ELPH-MCNC: 4.1 G/DL
ALP BLD-CCNC: 66 U/L
ALT SERPL-CCNC: 17 U/L
ANION GAP SERPL CALC-SCNC: 11 MMOL/L
AST SERPL-CCNC: 26 U/L
BASOPHILS # BLD AUTO: 0.07 K/UL
BASOPHILS NFR BLD AUTO: 1.1 %
BILIRUB SERPL-MCNC: 0.6 MG/DL
BUN SERPL-MCNC: 14 MG/DL
CALCIUM SERPL-MCNC: 9.5 MG/DL
CHLORIDE SERPL-SCNC: 105 MMOL/L
CHOLEST SERPL-MCNC: 147 MG/DL
CO2 SERPL-SCNC: 24 MMOL/L
CREAT SERPL-MCNC: 1.02 MG/DL
EOSINOPHIL # BLD AUTO: 0.26 K/UL
EOSINOPHIL NFR BLD AUTO: 4.2 %
ESTIMATED AVERAGE GLUCOSE: 108 MG/DL
FOLATE SERPL-MCNC: 8.9 NG/ML
GLUCOSE SERPL-MCNC: 96 MG/DL
HBA1C MFR BLD HPLC: 5.4 %
HCT VFR BLD CALC: 43 %
HDLC SERPL-MCNC: 65 MG/DL
HGB BLD-MCNC: 13.6 G/DL
IMM GRANULOCYTES NFR BLD AUTO: 0.3 %
LDLC SERPL CALC-MCNC: 67 MG/DL
LYMPHOCYTES # BLD AUTO: 1.08 K/UL
LYMPHOCYTES NFR BLD AUTO: 17.3 %
MAN DIFF?: NORMAL
MCHC RBC-ENTMCNC: 30.2 PG
MCHC RBC-ENTMCNC: 31.6 GM/DL
MCV RBC AUTO: 95.6 FL
MONOCYTES # BLD AUTO: 0.42 K/UL
MONOCYTES NFR BLD AUTO: 6.7 %
NEUTROPHILS # BLD AUTO: 4.38 K/UL
NEUTROPHILS NFR BLD AUTO: 70.4 %
NONHDLC SERPL-MCNC: 82 MG/DL
PLATELET # BLD AUTO: 242 K/UL
POTASSIUM SERPL-SCNC: 4.5 MMOL/L
PROT SERPL-MCNC: 6.4 G/DL
RBC # BLD: 4.5 M/UL
RBC # FLD: 13.2 %
SODIUM SERPL-SCNC: 141 MMOL/L
TRIGL SERPL-MCNC: 74 MG/DL
TSH SERPL-ACNC: 4.11 UIU/ML
VIT B12 SERPL-MCNC: 303 PG/ML
WBC # FLD AUTO: 6.23 K/UL

## 2021-03-11 ENCOUNTER — APPOINTMENT (OUTPATIENT)
Dept: CARDIOLOGY | Facility: CLINIC | Age: 76
End: 2021-03-11
Payer: MEDICARE

## 2021-03-11 ENCOUNTER — NON-APPOINTMENT (OUTPATIENT)
Age: 76
End: 2021-03-11

## 2021-03-11 VITALS
RESPIRATION RATE: 14 BRPM | DIASTOLIC BLOOD PRESSURE: 100 MMHG | TEMPERATURE: 93.6 F | SYSTOLIC BLOOD PRESSURE: 191 MMHG | WEIGHT: 156 LBS | HEIGHT: 64 IN | OXYGEN SATURATION: 100 % | BODY MASS INDEX: 26.63 KG/M2 | HEART RATE: 61 BPM

## 2021-03-11 VITALS — SYSTOLIC BLOOD PRESSURE: 173 MMHG | DIASTOLIC BLOOD PRESSURE: 87 MMHG

## 2021-03-11 PROCEDURE — 93000 ELECTROCARDIOGRAM COMPLETE: CPT

## 2021-03-11 PROCEDURE — 99214 OFFICE O/P EST MOD 30 MIN: CPT

## 2021-03-15 NOTE — REVIEW OF SYSTEMS
[Shortness Of Breath] : shortness of breath [Dyspnea on exertion] : dyspnea during exertion [Chest Pain] : no chest pain [Lower Ext Edema] : no extremity edema [Leg Claudication] : no intermittent leg claudication [Palpitations] : no palpitations [see HPI] : see HPI [Joint Pain] : joint pain [Negative] : Heme/Lymph

## 2021-03-15 NOTE — DISCUSSION/SUMMARY
[Cardiomyopathy] : cardiomyopathy [Systolic Heart Failure] : systolic heart failure [Hypertension] : hypertension [Medication Changes Per Orders] : as documented in orders [FreeTextEntry1] : \par Currently stable from a cardiovascular standpoint. Hypertensive today. Appears euvolemic. History of bioprosthetic mitral valve replacement. Dyspnea on exertion likely secondary to underlying cardiomyopathy and possibly suboptimal BP control. Will increase losartan to 50 mg daily. Continue all other current medications. ECG completed today and reviewed (findings as noted above). Labs from February reviewed (CBC, CMP, lipid profile). Lipids optimized. Follow up in 3 months. Will reassess LV function once optimized on medical therapy.

## 2021-05-12 ENCOUNTER — TRANSCRIPTION ENCOUNTER (OUTPATIENT)
Age: 76
End: 2021-05-12

## 2021-06-08 ENCOUNTER — APPOINTMENT (OUTPATIENT)
Dept: CV DIAGNOSITCS | Facility: HOSPITAL | Age: 76
End: 2021-06-08
Payer: MEDICARE

## 2021-06-08 ENCOUNTER — OUTPATIENT (OUTPATIENT)
Dept: OUTPATIENT SERVICES | Facility: HOSPITAL | Age: 76
LOS: 1 days | End: 2021-06-08

## 2021-06-08 DIAGNOSIS — I42.9 CARDIOMYOPATHY, UNSPECIFIED: ICD-10-CM

## 2021-06-08 DIAGNOSIS — Z95.2 PRESENCE OF PROSTHETIC HEART VALVE: Chronic | ICD-10-CM

## 2021-06-08 DIAGNOSIS — Z96.7 PRESENCE OF OTHER BONE AND TENDON IMPLANTS: Chronic | ICD-10-CM

## 2021-06-08 DIAGNOSIS — Z96.659 PRESENCE OF UNSPECIFIED ARTIFICIAL KNEE JOINT: Chronic | ICD-10-CM

## 2021-06-08 DIAGNOSIS — Z98.89 OTHER SPECIFIED POSTPROCEDURAL STATES: Chronic | ICD-10-CM

## 2021-06-08 PROCEDURE — 93306 TTE W/DOPPLER COMPLETE: CPT | Mod: 26

## 2021-06-10 ENCOUNTER — APPOINTMENT (OUTPATIENT)
Dept: CARDIOLOGY | Facility: CLINIC | Age: 76
End: 2021-06-10
Payer: MEDICARE

## 2021-06-10 ENCOUNTER — NON-APPOINTMENT (OUTPATIENT)
Age: 76
End: 2021-06-10

## 2021-06-10 VITALS — WEIGHT: 154 LBS | BODY MASS INDEX: 26.43 KG/M2

## 2021-06-10 VITALS
BODY MASS INDEX: 26.43 KG/M2 | SYSTOLIC BLOOD PRESSURE: 189 MMHG | OXYGEN SATURATION: 98 % | HEIGHT: 64 IN | HEART RATE: 66 BPM | DIASTOLIC BLOOD PRESSURE: 98 MMHG

## 2021-06-10 VITALS — DIASTOLIC BLOOD PRESSURE: 74 MMHG | SYSTOLIC BLOOD PRESSURE: 187 MMHG

## 2021-06-10 PROCEDURE — 93000 ELECTROCARDIOGRAM COMPLETE: CPT

## 2021-06-10 PROCEDURE — 99214 OFFICE O/P EST MOD 30 MIN: CPT

## 2021-06-14 NOTE — HISTORY OF PRESENT ILLNESS
[FreeTextEntry1] : Doing okay. Denies chest pain or palpitations. Experience some shortness of breath after walking up a hill or flight of stairs.

## 2021-06-14 NOTE — REVIEW OF SYSTEMS
[Dyspnea on exertion] : dyspnea during exertion [Negative] : Integumentary [Chest Discomfort] : no chest discomfort [Lower Ext Edema] : no extremity edema [Leg Claudication] : no intermittent leg claudication [Palpitations] : no palpitations [Orthopnea] : no orthopnea [PND] : no PND [Syncope] : no syncope

## 2021-06-14 NOTE — DISCUSSION/SUMMARY
[Cardiomyopathy] : cardiomyopathy [Improving] : improving [Hypertension] : hypertension [FreeTextEntry1] : \par Currently stable from a cardiovascular standpoint. Hypertensive today (187/74 mmHg, BP at home 120-130's mmHg systolic). Appears euvolemic. Exertional dyspnea likely secondary to excessive HTN with exertion. History of cardiomyopathy likely nonischemic in origin with interval improvement since increasing losartan. Patient with bioprosthetic MV. Continue current medications. ECG completed today and reviewed (findings as noted above). Follow up in 4 months.

## 2021-06-14 NOTE — CARDIOLOGY SUMMARY
[de-identified] : \par 06/10/21 - normal sinus rhythm, LAE\par  [de-identified] : \par 10/14/10, exercise thallium, 10 METS, diaphragmatic attenuation, LVEF 55%, 4 beats of NSVT, frequent PVCs, PACs\par  [de-identified] : \par 06/08/21 - bio MVR, MV gradient (mean 4 mmHg), mod LAE, mild global LV systolic dysfunction, normal RV size and function, LVEF 51%\par 12/10/20 - bio MVR, MV gradient (peak 7 mmHg, mean 3 mmHg), mild LAE, moderate global LV systolic dysfunction, mild LVE, normal RV size and function, LVEF 42%\par 07/17/18 - bio MVR, mod LAE, eccentric LVH, mild diastolic dysfunction, normal RV size and function, LVEF 59%\par  [de-identified] : \par 11/14/12 (Diag) - pLAD 20%, LVEF 50%, CI 3.51 L/min/m2\par  [de-identified] : \par 12/11/12 - MVR with #31 bovine pericardial valve by Ignacio Wheeler MD

## 2021-06-17 ENCOUNTER — TRANSCRIPTION ENCOUNTER (OUTPATIENT)
Age: 76
End: 2021-06-17

## 2021-10-22 ENCOUNTER — APPOINTMENT (OUTPATIENT)
Dept: INTERNAL MEDICINE | Facility: CLINIC | Age: 76
End: 2021-10-22
Payer: MEDICARE

## 2021-10-22 ENCOUNTER — LABORATORY RESULT (OUTPATIENT)
Age: 76
End: 2021-10-22

## 2021-10-22 VITALS
HEIGHT: 64 IN | RESPIRATION RATE: 14 BRPM | TEMPERATURE: 97.1 F | SYSTOLIC BLOOD PRESSURE: 160 MMHG | OXYGEN SATURATION: 98 % | WEIGHT: 153 LBS | DIASTOLIC BLOOD PRESSURE: 80 MMHG | HEART RATE: 63 BPM | BODY MASS INDEX: 26.12 KG/M2

## 2021-10-22 DIAGNOSIS — E53.8 DEFICIENCY OF OTHER SPECIFIED B GROUP VITAMINS: ICD-10-CM

## 2021-10-22 PROCEDURE — G0008: CPT

## 2021-10-22 PROCEDURE — 99214 OFFICE O/P EST MOD 30 MIN: CPT | Mod: 25

## 2021-10-22 PROCEDURE — 96372 THER/PROPH/DIAG INJ SC/IM: CPT

## 2021-10-22 PROCEDURE — 90662 IIV NO PRSV INCREASED AG IM: CPT

## 2021-10-22 RX ORDER — CYANOCOBALAMIN 1000 UG/ML
1000 INJECTION INTRAMUSCULAR; SUBCUTANEOUS
Qty: 0 | Refills: 0 | Status: COMPLETED | OUTPATIENT
Start: 2021-10-22

## 2021-10-22 RX ADMIN — CYANOCOBALAMIN 0 MCG/ML: 1000 INJECTION, SOLUTION INTRAMUSCULAR at 00:00

## 2021-10-22 NOTE — HISTORY OF PRESENT ILLNESS
[FreeTextEntry1] : BP check and labs [de-identified] : Pt is here for BP check and needs labs. \par She slipped on a rug and landed on her knees. Also twisted her left arm and still have trouble with upper arm pain. It has been a month.

## 2021-10-22 NOTE — PATIENT PROFILE ADULT. - FALL HARM RISK CONCLUSION
Rhombic Flap Text: The defect edges were debeveled with a #15 scalpel blade.  Given the location of the defect and the proximity to free margins a rhombic flap was deemed most appropriate.  Using a sterile surgical marker, an appropriate rhombic flap was drawn incorporating the defect.    The area thus outlined was incised deep to adipose tissue with a #15 scalpel blade.  The skin margins were undermined to an appropriate distance in all directions utilizing iris scissors. Universal Safety Interventions

## 2021-10-23 LAB
ALBUMIN SERPL ELPH-MCNC: 4.3 G/DL
ALP BLD-CCNC: 74 U/L
ALT SERPL-CCNC: 18 U/L
ANION GAP SERPL CALC-SCNC: 13 MMOL/L
AST SERPL-CCNC: 28 U/L
BASOPHILS # BLD AUTO: 0.07 K/UL
BASOPHILS NFR BLD AUTO: 1.1 %
BILIRUB SERPL-MCNC: 0.6 MG/DL
BUN SERPL-MCNC: 21 MG/DL
CALCIUM SERPL-MCNC: 9.8 MG/DL
CHLORIDE SERPL-SCNC: 104 MMOL/L
CHOLEST SERPL-MCNC: 172 MG/DL
CO2 SERPL-SCNC: 24 MMOL/L
CREAT SERPL-MCNC: 1 MG/DL
EOSINOPHIL # BLD AUTO: 0.14 K/UL
EOSINOPHIL NFR BLD AUTO: 2.3 %
ESTIMATED AVERAGE GLUCOSE: 105 MG/DL
FOLATE SERPL-MCNC: 9.3 NG/ML
GLUCOSE SERPL-MCNC: 96 MG/DL
HBA1C MFR BLD HPLC: 5.3 %
HCT VFR BLD CALC: 43.2 %
HDLC SERPL-MCNC: 63 MG/DL
HGB BLD-MCNC: 13.6 G/DL
IMM GRANULOCYTES NFR BLD AUTO: 0.2 %
LDLC SERPL CALC-MCNC: 92 MG/DL
LYMPHOCYTES # BLD AUTO: 0.81 K/UL
LYMPHOCYTES NFR BLD AUTO: 13.1 %
MAN DIFF?: NORMAL
MCHC RBC-ENTMCNC: 30.2 PG
MCHC RBC-ENTMCNC: 31.5 GM/DL
MCV RBC AUTO: 95.8 FL
MONOCYTES # BLD AUTO: 0.35 K/UL
MONOCYTES NFR BLD AUTO: 5.6 %
NEUTROPHILS # BLD AUTO: 4.82 K/UL
NEUTROPHILS NFR BLD AUTO: 77.7 %
NONHDLC SERPL-MCNC: 109 MG/DL
PLATELET # BLD AUTO: 246 K/UL
POTASSIUM SERPL-SCNC: 4.7 MMOL/L
PROT SERPL-MCNC: 6.6 G/DL
RBC # BLD: 4.51 M/UL
RBC # FLD: 13.2 %
SODIUM SERPL-SCNC: 140 MMOL/L
TRIGL SERPL-MCNC: 86 MG/DL
TSH SERPL-ACNC: 4.38 UIU/ML
VIT B12 SERPL-MCNC: 346 PG/ML
WBC # FLD AUTO: 6.2 K/UL

## 2021-11-11 ENCOUNTER — NON-APPOINTMENT (OUTPATIENT)
Age: 76
End: 2021-11-11

## 2021-11-11 ENCOUNTER — APPOINTMENT (OUTPATIENT)
Dept: CARDIOLOGY | Facility: CLINIC | Age: 76
End: 2021-11-11
Payer: MEDICARE

## 2021-11-11 VITALS
HEART RATE: 60 BPM | SYSTOLIC BLOOD PRESSURE: 193 MMHG | OXYGEN SATURATION: 97 % | BODY MASS INDEX: 26.12 KG/M2 | HEIGHT: 64 IN | WEIGHT: 153 LBS | DIASTOLIC BLOOD PRESSURE: 75 MMHG

## 2021-11-11 PROCEDURE — 93000 ELECTROCARDIOGRAM COMPLETE: CPT

## 2021-11-11 PROCEDURE — 99214 OFFICE O/P EST MOD 30 MIN: CPT

## 2021-11-11 NOTE — DISCUSSION/SUMMARY
[Cardiomyopathy] : cardiomyopathy [Stable] : stable [Hypertension] : hypertension [FreeTextEntry1] : \par Currently stable from a cardiovascular standpoint. Hypertensive today. Appears euvolemic. History of cardiomyopathy likely nonischemic in origin with interval improvement since increasing losartan. Patient with bioprosthetic MV. Continue current medications. ECG completed today and reviewed (findings as noted above). Encouraged her to exercise on a regular basis. Follow up in 4 months.

## 2021-11-11 NOTE — HISTORY OF PRESENT ILLNESS
[FreeTextEntry1] : Doing okay. Denies chest pain, shortness of breath or palpitations. BP at home usually 110-130's mmHg systolic. She gets nervous coming to appointments.

## 2021-11-11 NOTE — CARDIOLOGY SUMMARY
[de-identified] : \par 11/11/21 - normal sinus rhythm, LAE, nonspecific T-wave abnormality\par  [de-identified] : \par 10/14/10, exercise thallium, 10 METS, diaphragmatic attenuation, LVEF 55%, 4 beats of NSVT, frequent PVCs, PACs\par  [de-identified] : \par 06/08/21 - bio MVR, MV gradient (mean 4 mmHg), mod LAE, mild global LV systolic dysfunction, normal RV size and function, LVEF 51%\par 12/10/20 - bio MVR, MV gradient (peak 7 mmHg, mean 3 mmHg), mild LAE, moderate global LV systolic dysfunction, mild LVE, normal RV size and function, LVEF 42%\par 07/17/18 - bio MVR, mod LAE, eccentric LVH, mild diastolic dysfunction, normal RV size and function, LVEF 59%\par  [de-identified] : \par 11/14/12 (Diag) - pLAD 20%, LVEF 50%, CI 3.51 L/min/m2\par  [de-identified] : \par 12/11/12 - MVR with #31 bovine pericardial valve by Ignacio Wheeler MD

## 2022-03-23 NOTE — PRE-OP CHECKLIST - WAS PATIENT ON BETA BLOCKER?
Yes 5-Fu Pregnancy And Lactation Text: This medication is Pregnancy Category X and contraindicated in pregnancy and in women who may become pregnant. It is unknown if this medication is excreted in breast milk.

## 2022-05-05 ENCOUNTER — NON-APPOINTMENT (OUTPATIENT)
Age: 77
End: 2022-05-05

## 2022-05-05 ENCOUNTER — APPOINTMENT (OUTPATIENT)
Dept: CARDIOLOGY | Facility: CLINIC | Age: 77
End: 2022-05-05
Payer: MEDICARE

## 2022-05-05 VITALS
OXYGEN SATURATION: 97 % | DIASTOLIC BLOOD PRESSURE: 75 MMHG | WEIGHT: 150 LBS | SYSTOLIC BLOOD PRESSURE: 193 MMHG | BODY MASS INDEX: 25.61 KG/M2 | HEART RATE: 70 BPM | HEIGHT: 64 IN

## 2022-05-05 PROCEDURE — 93000 ELECTROCARDIOGRAM COMPLETE: CPT

## 2022-05-05 PROCEDURE — 99213 OFFICE O/P EST LOW 20 MIN: CPT

## 2022-05-10 NOTE — DISCUSSION/SUMMARY
[Hypertension] : hypertension [FreeTextEntry1] : \par Currently stable from a cardiovascular standpoint. Hypertensive today (BP at home this morning was 130's mmHg systolic). Patient with likely "white coat" hypertension. Appears euvolemic today. History of cardiomyopathy likely nonischemic in origin with interval improvement since increasing losartan. Patient with bioprosthetic MV. No ischemic or CHF symptoms. Continue current medications. ECG completed today and reviewed (findings as noted above). Will schedule an echocardiogram to reassess her cardiac structures and function. Encouraged her to exercise on a regular basis. Follow up in 4 months.

## 2022-05-10 NOTE — HISTORY OF PRESENT ILLNESS
[FreeTextEntry1] : Doing okay. Has right hip pain. Denies chest pain, shortness of breath or palpitations. Has abdominal hernia.

## 2022-06-09 ENCOUNTER — OUTPATIENT (OUTPATIENT)
Dept: OUTPATIENT SERVICES | Facility: HOSPITAL | Age: 77
LOS: 1 days | End: 2022-06-09

## 2022-06-09 ENCOUNTER — APPOINTMENT (OUTPATIENT)
Dept: CV DIAGNOSITCS | Facility: HOSPITAL | Age: 77
End: 2022-06-09
Payer: MEDICARE

## 2022-06-09 DIAGNOSIS — Z96.7 PRESENCE OF OTHER BONE AND TENDON IMPLANTS: Chronic | ICD-10-CM

## 2022-06-09 DIAGNOSIS — I10 ESSENTIAL (PRIMARY) HYPERTENSION: ICD-10-CM

## 2022-06-09 DIAGNOSIS — Z98.89 OTHER SPECIFIED POSTPROCEDURAL STATES: Chronic | ICD-10-CM

## 2022-06-09 DIAGNOSIS — Z95.2 PRESENCE OF PROSTHETIC HEART VALVE: Chronic | ICD-10-CM

## 2022-06-09 DIAGNOSIS — Z96.659 PRESENCE OF UNSPECIFIED ARTIFICIAL KNEE JOINT: Chronic | ICD-10-CM

## 2022-06-09 PROCEDURE — 93306 TTE W/DOPPLER COMPLETE: CPT | Mod: 26

## 2022-07-30 LAB
ALBUMIN SERPL ELPH-MCNC: 4 G/DL
ALP BLD-CCNC: 64 U/L
ALT SERPL-CCNC: 13 U/L
ANION GAP SERPL CALC-SCNC: 10 MMOL/L
AST SERPL-CCNC: 23 U/L
BASOPHILS # BLD AUTO: 0.05 K/UL
BASOPHILS NFR BLD AUTO: 0.9 %
BILIRUB SERPL-MCNC: 0.6 MG/DL
BUN SERPL-MCNC: 18 MG/DL
CALCIUM SERPL-MCNC: 9.3 MG/DL
CHLORIDE SERPL-SCNC: 107 MMOL/L
CHOLEST SERPL-MCNC: 152 MG/DL
CO2 SERPL-SCNC: 25 MMOL/L
CREAT SERPL-MCNC: 0.86 MG/DL
EGFR: 70 ML/MIN/1.73M2
EOSINOPHIL # BLD AUTO: 0.2 K/UL
EOSINOPHIL NFR BLD AUTO: 3.6 %
ESTIMATED AVERAGE GLUCOSE: 108 MG/DL
GLUCOSE SERPL-MCNC: 94 MG/DL
HBA1C MFR BLD HPLC: 5.4 %
HCT VFR BLD CALC: 41.1 %
HDLC SERPL-MCNC: 56 MG/DL
HGB BLD-MCNC: 13.3 G/DL
IMM GRANULOCYTES NFR BLD AUTO: 0.2 %
LDLC SERPL CALC-MCNC: 75 MG/DL
LYMPHOCYTES # BLD AUTO: 1.15 K/UL
LYMPHOCYTES NFR BLD AUTO: 20.6 %
MAN DIFF?: NORMAL
MCHC RBC-ENTMCNC: 30.1 PG
MCHC RBC-ENTMCNC: 32.4 GM/DL
MCV RBC AUTO: 93 FL
MONOCYTES # BLD AUTO: 0.33 K/UL
MONOCYTES NFR BLD AUTO: 5.9 %
NEUTROPHILS # BLD AUTO: 3.85 K/UL
NEUTROPHILS NFR BLD AUTO: 68.8 %
NONHDLC SERPL-MCNC: 96 MG/DL
PLATELET # BLD AUTO: 244 K/UL
POTASSIUM SERPL-SCNC: 4.3 MMOL/L
PROT SERPL-MCNC: 6.1 G/DL
RBC # BLD: 4.42 M/UL
RBC # FLD: 12.8 %
SODIUM SERPL-SCNC: 142 MMOL/L
TRIGL SERPL-MCNC: 105 MG/DL
TSH SERPL-ACNC: 3.4 UIU/ML
WBC # FLD AUTO: 5.59 K/UL

## 2022-08-11 ENCOUNTER — APPOINTMENT (OUTPATIENT)
Dept: INTERNAL MEDICINE | Facility: CLINIC | Age: 77
End: 2022-08-11

## 2022-08-11 VITALS
RESPIRATION RATE: 14 BRPM | BODY MASS INDEX: 24.83 KG/M2 | TEMPERATURE: 97.3 F | DIASTOLIC BLOOD PRESSURE: 90 MMHG | HEART RATE: 60 BPM | WEIGHT: 149 LBS | SYSTOLIC BLOOD PRESSURE: 180 MMHG | HEIGHT: 65 IN | OXYGEN SATURATION: 97 %

## 2022-08-11 DIAGNOSIS — M85.80 OTHER SPECIFIED DISORDERS OF BONE DENSITY AND STRUCTURE, UNSPECIFIED SITE: ICD-10-CM

## 2022-08-11 DIAGNOSIS — G96.191 PERINEURAL CYST: ICD-10-CM

## 2022-08-11 DIAGNOSIS — K43.9 VENTRAL HERNIA W/OUT OBSTRUCTION OR GANGRENE: ICD-10-CM

## 2022-08-11 PROCEDURE — 96372 THER/PROPH/DIAG INJ SC/IM: CPT

## 2022-08-11 PROCEDURE — 99214 OFFICE O/P EST MOD 30 MIN: CPT | Mod: 25

## 2022-08-11 RX ORDER — CYANOCOBALAMIN 1000 UG/ML
1000 INJECTION INTRAMUSCULAR; SUBCUTANEOUS
Qty: 0 | Refills: 0 | Status: COMPLETED | OUTPATIENT
Start: 2022-08-11

## 2022-08-11 RX ADMIN — CYANOCOBALAMIN 0 MCG/ML: 1000 INJECTION INTRAMUSCULAR; SUBCUTANEOUS at 00:00

## 2022-08-11 NOTE — HEALTH RISK ASSESSMENT
[0] : 2) Feeling down, depressed, or hopeless: Not at all (0) [PHQ-2 Negative - No further assessment needed] : PHQ-2 Negative - No further assessment needed [VQN6Cmloc] : 0

## 2022-08-11 NOTE — PHYSICAL EXAM
[Normal] : soft, non-tender, non-distended, no masses palpated, no HSM and normal bowel sounds [de-identified] : mid abdominal upper hernia [de-identified] : pain with movement right hip. points to mid and lower back where pain is.

## 2022-08-11 NOTE — HISTORY OF PRESENT ILLNESS
[FreeTextEntry1] : DUNCAN [de-identified] : Pt states that she is getting some sob with climbing stairs. Has an upper abdominal hernia. \par BP at home is 120/70 usually. Today it was 148/78. Always goes up in the office. \par Dr Butler wants her to have pulmonary function testing.\par c/o mid back pain when she is standing too long for quite a while.

## 2022-09-06 ENCOUNTER — APPOINTMENT (OUTPATIENT)
Dept: MAMMOGRAPHY | Facility: CLINIC | Age: 77
End: 2022-09-06

## 2022-09-06 ENCOUNTER — OUTPATIENT (OUTPATIENT)
Dept: OUTPATIENT SERVICES | Facility: HOSPITAL | Age: 77
LOS: 1 days | End: 2022-09-06
Payer: MEDICARE

## 2022-09-06 ENCOUNTER — RESULT REVIEW (OUTPATIENT)
Age: 77
End: 2022-09-06

## 2022-09-06 ENCOUNTER — APPOINTMENT (OUTPATIENT)
Dept: ULTRASOUND IMAGING | Facility: CLINIC | Age: 77
End: 2022-09-06

## 2022-09-06 ENCOUNTER — APPOINTMENT (OUTPATIENT)
Dept: RADIOLOGY | Facility: CLINIC | Age: 77
End: 2022-09-06

## 2022-09-06 DIAGNOSIS — N60.19 DIFFUSE CYSTIC MASTOPATHY OF UNSPECIFIED BREAST: ICD-10-CM

## 2022-09-06 DIAGNOSIS — M85.80 OTHER SPECIFIED DISORDERS OF BONE DENSITY AND STRUCTURE, UNSPECIFIED SITE: ICD-10-CM

## 2022-09-06 DIAGNOSIS — Z95.2 PRESENCE OF PROSTHETIC HEART VALVE: Chronic | ICD-10-CM

## 2022-09-06 DIAGNOSIS — Z98.89 OTHER SPECIFIED POSTPROCEDURAL STATES: Chronic | ICD-10-CM

## 2022-09-06 DIAGNOSIS — Z96.659 PRESENCE OF UNSPECIFIED ARTIFICIAL KNEE JOINT: Chronic | ICD-10-CM

## 2022-09-06 DIAGNOSIS — Z96.7 PRESENCE OF OTHER BONE AND TENDON IMPLANTS: Chronic | ICD-10-CM

## 2022-09-06 PROCEDURE — 77085 DXA BONE DENSITY AXL VRT FX: CPT | Mod: 26

## 2022-09-06 PROCEDURE — 76641 ULTRASOUND BREAST COMPLETE: CPT

## 2022-09-06 PROCEDURE — 73502 X-RAY EXAM HIP UNI 2-3 VIEWS: CPT | Mod: 26,RT

## 2022-09-06 PROCEDURE — 76641 ULTRASOUND BREAST COMPLETE: CPT | Mod: 26,50

## 2022-09-06 PROCEDURE — 77063 BREAST TOMOSYNTHESIS BI: CPT

## 2022-09-06 PROCEDURE — 77067 SCR MAMMO BI INCL CAD: CPT | Mod: 26

## 2022-09-06 PROCEDURE — 77063 BREAST TOMOSYNTHESIS BI: CPT | Mod: 26

## 2022-09-06 PROCEDURE — 77067 SCR MAMMO BI INCL CAD: CPT

## 2022-09-06 PROCEDURE — 72100 X-RAY EXAM L-S SPINE 2/3 VWS: CPT | Mod: 26

## 2022-09-06 PROCEDURE — 77085 DXA BONE DENSITY AXL VRT FX: CPT

## 2022-09-06 PROCEDURE — 72100 X-RAY EXAM L-S SPINE 2/3 VWS: CPT

## 2022-09-06 PROCEDURE — 73502 X-RAY EXAM HIP UNI 2-3 VIEWS: CPT

## 2022-10-20 ENCOUNTER — NON-APPOINTMENT (OUTPATIENT)
Age: 77
End: 2022-10-20

## 2022-10-20 ENCOUNTER — APPOINTMENT (OUTPATIENT)
Dept: CARDIOLOGY | Facility: CLINIC | Age: 77
End: 2022-10-20

## 2022-10-20 VITALS
HEART RATE: 64 BPM | BODY MASS INDEX: 24.49 KG/M2 | HEIGHT: 65 IN | SYSTOLIC BLOOD PRESSURE: 199 MMHG | OXYGEN SATURATION: 96 % | WEIGHT: 147 LBS | DIASTOLIC BLOOD PRESSURE: 82 MMHG

## 2022-10-20 DIAGNOSIS — E78.5 HYPERLIPIDEMIA, UNSPECIFIED: ICD-10-CM

## 2022-10-20 DIAGNOSIS — T82.857A STENOSIS OF OTHER CARDIAC PROSTHETIC, INITIAL ENCOUNTER: ICD-10-CM

## 2022-10-20 PROCEDURE — 99214 OFFICE O/P EST MOD 30 MIN: CPT

## 2022-10-20 PROCEDURE — 93000 ELECTROCARDIOGRAM COMPLETE: CPT

## 2022-10-21 ENCOUNTER — NON-APPOINTMENT (OUTPATIENT)
Age: 77
End: 2022-10-21

## 2022-10-25 PROBLEM — T82.857A PROSTHETIC MITRAL VALVE STENOSIS: Status: ACTIVE | Noted: 2022-10-25

## 2022-10-25 NOTE — CARDIOLOGY SUMMARY
[de-identified] : \par 10/20/22 - normal sinus rhythm, LAE, nonspecific ST abnormality\par  [de-identified] : \par 10/14/10, exercise thallium, 10 METS, diaphragmatic attenuation, LVEF 55%, 4 beats of NSVT, frequent PVCs, PACs\par  [de-identified] : \par 06/09/22 - bio MVR, MV gradient (mean 7 mmHg), severe LAE, grossly normal LV systolic function, normal RV size and function, normal pericardium, RVSP 28 mmHg, LVEF 55%\par 06/08/21 - bio MVR, MV gradient (mean 4 mmHg), mod LAE, mild global LV systolic dysfunction, normal RV size and function, LVEF 51%\par 12/10/20 - bio MVR, MV gradient (peak 7 mmHg, mean 3 mmHg), mild LAE, moderate global LV systolic dysfunction, mild LVE, normal RV size and function, LVEF 42%\par 07/17/18 - bio MVR, mod LAE, eccentric LVH, mild diastolic dysfunction, normal RV size and function, LVEF 59%\par  [de-identified] : \par 11/14/12 (CATH) - pLAD 20%, LVEF 50%, CI 3.51 L/min/m2\par  [de-identified] : \par 12/11/12 - MVR with #31 bovine pericardial valve by Ignacio Wheeler MD

## 2022-10-25 NOTE — REVIEW OF SYSTEMS
[Cough] : cough [Negative] : Integumentary [Wheezing] : no wheezing [FreeTextEntry6] : productive cough

## 2022-10-25 NOTE — PHYSICAL EXAM
[Well Developed] : well developed [Well Nourished] : well nourished [No Acute Distress] : no acute distress [Normal Conjunctiva] : normal conjunctiva [Normal Venous Pressure] : normal venous pressure [No Carotid Bruit] : no carotid bruit [Normal S1, S2] : normal S1, S2 [No Murmur] : no murmur [No Rub] : no rub [No Gallop] : no gallop [Good Air Entry] : good air entry [No Respiratory Distress] : no respiratory distress  [Soft] : abdomen soft [Non Tender] : non-tender [Normal Gait] : normal gait [No Edema] : no edema [No Cyanosis] : no cyanosis [No Rash] : no rash [No Skin Lesions] : no skin lesions [Moves all extremities] : moves all extremities [No Focal Deficits] : no focal deficits [Normal Speech] : normal speech [Alert and Oriented] : alert and oriented [de-identified] : expiratory rhonchi in right lung noted (upper and mid lung zone)

## 2022-10-25 NOTE — HISTORY OF PRESENT ILLNESS
[FreeTextEntry1] : Doing okay. Denies chest pain, shortness of breath or palpitations. Has been experiencing a cough with occasional whitish sputum.

## 2022-10-25 NOTE — DISCUSSION/SUMMARY
[Hyperlipidemia] : hyperlipidemia [Stable] : stable [Hypertension] : hypertension [FreeTextEntry1] : \par Currently stable from a cardiovascular standpoint. Hypertensive today (BP at home this morning was 130's mmHg systolic). Patient with likely "white coat" hypertension. Appears euvolemic today. History of cardiomyopathy likely nonischemic in origin with interval improvement (LVEF 42% -> 55%). Patient with bioprosthetic MV stenosis. No ischemic or CHF symptoms. Most recent labs reviewed. Lipid profile optimized (chol 152, LDL 75, HDL 56, trig 105). Continue current medications including aspirin and atorvastatin. ECG completed today and reviewed (findings as noted above). Last echocardiogram results reviewed. Encouraged her to exercise on a regular basis. Follow up in 4 months. [EKG obtained to assist in diagnosis and management of assessed problem(s)] : EKG obtained to assist in diagnosis and management of assessed problem(s)

## 2022-10-30 ENCOUNTER — EMERGENCY (EMERGENCY)
Facility: HOSPITAL | Age: 77
LOS: 1 days | Discharge: HOME | End: 2022-10-30
Attending: STUDENT IN AN ORGANIZED HEALTH CARE EDUCATION/TRAINING PROGRAM
Payer: MEDICARE

## 2022-10-30 VITALS
HEART RATE: 76 BPM | RESPIRATION RATE: 19 BRPM | OXYGEN SATURATION: 96 % | WEIGHT: 145.95 LBS | TEMPERATURE: 98 F | SYSTOLIC BLOOD PRESSURE: 163 MMHG | DIASTOLIC BLOOD PRESSURE: 82 MMHG | HEIGHT: 64.5 IN

## 2022-10-30 DIAGNOSIS — Z98.89 OTHER SPECIFIED POSTPROCEDURAL STATES: Chronic | ICD-10-CM

## 2022-10-30 DIAGNOSIS — Z96.659 PRESENCE OF UNSPECIFIED ARTIFICIAL KNEE JOINT: Chronic | ICD-10-CM

## 2022-10-30 DIAGNOSIS — Z96.7 PRESENCE OF OTHER BONE AND TENDON IMPLANTS: Chronic | ICD-10-CM

## 2022-10-30 DIAGNOSIS — Z95.2 PRESENCE OF PROSTHETIC HEART VALVE: Chronic | ICD-10-CM

## 2022-10-30 LAB
RAPID RVP RESULT: DETECTED
SARS-COV-2 RNA SPEC QL NAA+PROBE: DETECTED

## 2022-10-30 PROCEDURE — 71045 X-RAY EXAM CHEST 1 VIEW: CPT

## 2022-10-30 PROCEDURE — 99283 EMERGENCY DEPT VISIT LOW MDM: CPT | Mod: FS

## 2022-10-30 PROCEDURE — 99285 EMERGENCY DEPT VISIT HI MDM: CPT | Mod: 25

## 2022-10-30 PROCEDURE — 0225U NFCT DS DNA&RNA 21 SARSCOV2: CPT

## 2022-10-30 PROCEDURE — 71045 X-RAY EXAM CHEST 1 VIEW: CPT | Mod: 26

## 2022-10-30 RX ORDER — ACETAMINOPHEN 500 MG
975 TABLET ORAL ONCE
Refills: 0 | Status: COMPLETED | OUTPATIENT
Start: 2022-10-30 | End: 2022-10-30

## 2022-10-30 RX ADMIN — Medication 975 MILLIGRAM(S): at 18:54

## 2022-10-30 NOTE — ED ADULT NURSE REASSESSMENT NOTE - NS ED NURSE REASSESS COMMENT FT1
Report received from [facility] and [place]. Pt received A&Ox3, VSS, pt denies pain/discomfort, bed locked and in lowest position, call bell within reach and pt instructed on use, safety and comfort measures maintained, pt updated on POC, pending D/C at this time.

## 2022-10-30 NOTE — ED ADULT TRIAGE NOTE - CHIEF COMPLAINT QUOTE
coughing for more than a month and had an abnormal CXR few weeks ago and was placed on Prednisone and Doxxycycline but without relief after she finished the course. Still coughing productively with whitish phlegm. Afebrile.

## 2022-10-30 NOTE — ED PROVIDER NOTE - PHYSICAL EXAMINATION
GEN: Pt well appearing, non-toxic in NAD, A&Ox3.  PSYCH: Affect and mood appropriate.  EYES: Sclera white w/o injection, EOMI, PERRLA.   ENT: Neck supple FROM. Airway patent.  RESP: CTA b/l, no wheezes, rales, or rhonchi. SpO2 99 on Ra w/ ambulation.  CARDIAC: RRR, clear distinct S1, S2, no appreciable murmurs.  ABD: Abdomen soft, non-tender. No CVAT b/l.  MSK: Moving all extremities.  NEURO: No focal motor or sensory deficits.  VASC: Well perfused. No edema or calf tenderness.  SKIN: No rashes or lesions.

## 2022-10-30 NOTE — ED PROVIDER NOTE - OBJECTIVE STATEMENT
76y F pmhx HTN, HLD, hypothyroid, s/p mitral valve replacement, presents to ED 76y F pmhx HTN, HLD, hypothyroid, s/p mitral valve replacement, presents to ED c/o cough with productive white sputum x 1 month. No f/c. Cough worse at night. Feels short of breath after coughing fits. No exertional symptoms or LE edema. No hx of asthma. Denies cp, abd pain, nvd, urinary complaints. Non-smoker. COVID vax'd x2. Went to [facility] w/ same 2 weeks ago, had neg cxr but dx w/ likely pna and rx'd course of prednisone and doxy w/o improvement. Has pmd f/u tomorrow.

## 2022-10-30 NOTE — ED PROVIDER NOTE - NS ED ATTENDING STATEMENT MOD
This was a shared visit with the AMY. I reviewed and verified the documentation and independently performed the documented:

## 2022-10-30 NOTE — ED ADULT NURSE NOTE - ED STAT RN HANDOFF DETAILS
Report given to Fermín GAYTAN and Lissette GAYTAN. Aware of plan of care. No further questions at this time.
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
## 2022-10-30 NOTE — ED PROVIDER NOTE - NSFOLLOWUPINSTRUCTIONS_ED_ALL_ED_FT
1) Follow-up with your primary care provider tomorrow as scheduled.    2) Continue to take all medications as prescribed.    3) Rest and drink plenty of fluids. Take over the counter cough medicines as instructed for your cough. Pain, if any, can be managed with Acetaminophen (aka Tylenol) and Ibuprofen (aka Motrin or Advil) over the counter as directed. Take with food.    4) Return to the ER for any new or worsening symptoms.

## 2022-10-30 NOTE — ED PROVIDER NOTE - NSICDXFAMILYHX_GEN_ALL_CORE_FT
FAMILY HISTORY:  Father  Still living? No  Family history of cancer, Age at diagnosis: Age Unknown    Sibling  Still living? Yes, Estimated age: 71-80  Family history of stroke, Age at diagnosis: Age Unknown

## 2022-10-30 NOTE — ED PROVIDER NOTE - CLINICAL SUMMARY MEDICAL DECISION MAKING FREE TEXT BOX
Patient with HTN, HLD, hypothyroidism presents with cough and white sputum x 1 month which is getting worse at night. No cp, leg edema, abdominal pain, nausea, vomiting, abdominal pain. VSS. COVID vaccinated. Exam unremarkable - non-toxic appearing, lungs CTAB, no leg edema or abd distension/tenderness. Concern for viral illness, COVID, pneumonia, bronchitis. Plan: covid swab, cxr and dc

## 2022-10-30 NOTE — ED PROVIDER NOTE - PROGRESS NOTE DETAILS
Valdemar Soto MD (Attending Physician):    Discussed with patient results of the XR. VSS. Discussed following up with PMD tomorrow as scheduled with referral to pulmonology which she is amenable to.

## 2022-10-30 NOTE — ED PROVIDER NOTE - ATTENDING APP SHARED VISIT CONTRIBUTION OF CARE
I, Valdemar Soto, performed a history and physical exam of the patient and discussed their management with the advanced care provider. I reviewed the note and agree with the documented findings and plan of care. I was present and available for all procedures.    ---    Please see MDM

## 2022-10-30 NOTE — ED PROVIDER NOTE - PATIENT PORTAL LINK FT
You can access the FollowMyHealth Patient Portal offered by [facility] by registering at the following website: [email or web address]. By joining [employer]’s FollowMyHealth portal, you will also be able to view your health information using other applications (apps) compatible with our system.

## 2022-10-30 NOTE — ED ADULT NURSE NOTE - OBJECTIVE STATEMENT
77 yo female with a PMH of HTN, HLD, hypothyroidism presents to the ED ambulatory with steady gait complaining of a cough. Reports that she has been experiencing URI symptoms for about a month. Was trying to get a PCP appointment but cough was getting worse so went to UC about a week ago. Had a CXR that was unremarkable, was prescribed prednisone and doxycycline. Denies headache, dizziness, vision changes, chest pain, shortness of breath, abdominal pain, nausea, vomiting, diarrhea, fevers, chills, dysuria, hematuria, recent illness travel or fall. 77 yo female with a PMH of HTN, HLD, hypothyroidism presents to the ED ambulatory with steady gait complaining of a cough. Reports that she has been experiencing URI symptoms for about a month. Was trying to get a PCP appointment but cough was getting worse so went to  about a week ago. Had a CXR that was "abnormal" and was told that it was "probably something viral." Was prescribed prednisone and doxycycline, which she finished the full cycle. Was also prescribed an inhaler but has not been using it because she does not know how. Patient is otherwise sating well on RA, well appearing. Denies headache, dizziness, vision changes, chest pain, shortness of breath, abdominal pain, nausea, vomiting, diarrhea, fevers, chills, dysuria, hematuria, recent travel or fall. 75 yo female with a PMH of HTN, HLD, hypothyroidism presents to the ED ambulatory with steady gait complaining of a cough. Reports that she has been experiencing URI symptoms for about a month. Was trying to get a PCP appointment but cough was getting worse so went to  about a week ago. Had a CXR that was "abnormal" and was told that it was "probably something viral." Was prescribed prednisone and doxycycline, which she finished the full cycle. Was also prescribed an inhaler but has not been using it because she does not know how. RN gave spacer to patient and educated on use. Patient is otherwise sating well on RA, well appearing. Denies headache, dizziness, vision changes, chest pain, shortness of breath, abdominal pain, nausea, vomiting, diarrhea, fevers, chills, dysuria, hematuria, recent travel or fall.

## 2022-10-30 NOTE — ED ADULT NURSE NOTE - NSIMPLEMENTINTERV_GEN_ALL_ED
Implemented All Universal Safety Interventions:  [place] to call system. Call bell, personal items and telephone within reach. Instruct patient to call for assistance. Room bathroom lighting operational. Non-slip footwear when patient is off stretcher. Physically safe environment: no spills, clutter or unnecessary equipment. Stretcher in lowest position, wheels locked, appropriate side rails in place.

## 2022-10-31 VITALS
SYSTOLIC BLOOD PRESSURE: 158 MMHG | DIASTOLIC BLOOD PRESSURE: 85 MMHG | OXYGEN SATURATION: 98 % | RESPIRATION RATE: 17 BRPM | HEART RATE: 58 BPM

## 2022-11-09 ENCOUNTER — APPOINTMENT (OUTPATIENT)
Dept: INTERNAL MEDICINE | Facility: CLINIC | Age: 77
End: 2022-11-09

## 2022-11-09 VITALS
HEIGHT: 65 IN | HEART RATE: 112 BPM | SYSTOLIC BLOOD PRESSURE: 190 MMHG | WEIGHT: 143 LBS | RESPIRATION RATE: 16 BRPM | OXYGEN SATURATION: 92 % | BODY MASS INDEX: 23.82 KG/M2 | TEMPERATURE: 97.3 F | DIASTOLIC BLOOD PRESSURE: 100 MMHG

## 2022-11-09 VITALS — DIASTOLIC BLOOD PRESSURE: 86 MMHG | HEART RATE: 77 BPM | OXYGEN SATURATION: 98 % | SYSTOLIC BLOOD PRESSURE: 144 MMHG

## 2022-11-09 DIAGNOSIS — R06.00 DYSPNEA, UNSPECIFIED: ICD-10-CM

## 2022-11-09 PROCEDURE — 99214 OFFICE O/P EST MOD 30 MIN: CPT | Mod: CS

## 2022-11-09 NOTE — HISTORY OF PRESENT ILLNESS
[FreeTextEntry1] : cough and congestion [de-identified] : Pt has been congested and coughing for a month. Continues to have a cough and clear mucus. Went to urgent care and had CXR. \par Was diagnosed with covid on 10/30/22 at [facility]. \par \par c/o some sob. Having trouble with using the inhalers. Has clear mucus.

## 2022-11-18 ENCOUNTER — APPOINTMENT (OUTPATIENT)
Dept: PULMONOLOGY | Facility: CLINIC | Age: 77
End: 2022-11-18

## 2022-11-18 VITALS
HEIGHT: 65 IN | DIASTOLIC BLOOD PRESSURE: 84 MMHG | SYSTOLIC BLOOD PRESSURE: 173 MMHG | BODY MASS INDEX: 23.82 KG/M2 | OXYGEN SATURATION: 97 % | WEIGHT: 143 LBS | HEART RATE: 67 BPM

## 2022-11-18 DIAGNOSIS — R93.89 ABNORMAL FINDINGS ON DIAGNOSTIC IMAGING OF OTHER SPECIFIED BODY STRUCTURES: ICD-10-CM

## 2022-11-18 PROCEDURE — 94664 DEMO&/EVAL PT USE INHALER: CPT | Mod: 59

## 2022-11-18 PROCEDURE — ZZZZZ: CPT

## 2022-11-18 PROCEDURE — 94729 DIFFUSING CAPACITY: CPT

## 2022-11-18 PROCEDURE — 94060 EVALUATION OF WHEEZING: CPT

## 2022-11-18 PROCEDURE — 99204 OFFICE O/P NEW MOD 45 MIN: CPT | Mod: 25

## 2022-11-18 PROCEDURE — 94726 PLETHYSMOGRAPHY LUNG VOLUMES: CPT

## 2022-11-18 NOTE — CONSULT LETTER
[FreeTextEntry1] : Dear ,\par \par I had the pleasure of evaluating your patient, SYEDA LOPEZ today in pulmonary consultation.  Please refer to my attached note for my findings and recommendations.\par \par \par Thank you for allowing me to participate in the care of your patient, please feel free to call with any questions or concerns.\par \par \par Sincerely,\par \par Kiara Barnes MD\par [facility] \par [facility]\par \par

## 2022-11-18 NOTE — PROCEDURE
[FreeTextEntry1] : PFT: severe obstruction with some bd respone.  Lung volumes reduced with evidence of air trapping. DLCO severely reduced.\par \par \par \par Reviewed:\par ACC: [id number] EXAM: XR CHEST PORTABLE URGENT 1V\par \par PROCEDURE DATE: 10/30/2022\par \par \par \par INTERPRETATION: EXAMINATION: XR CHEST URGENT\par \par CLINICAL INDICATION: Cough x 1m r/o pna\par \par TECHNIQUE: Single frontal, portable view of the chest was obtained.\par \par COMPARISON: Chest x-ray 10/22/2022.\par \par FINDINGS:\par Mitral valve replacement is noted.\par The heart size appears enlarged, but cannot be accurately assessed in this projection.\par Bibasilar hazy opacities more prominent on the left, possibly small layering effusions with compressive atelectasis.\par There is no pneumothorax.\par No acute bony abnormality.\par \par IMPRESSION:\par Bibasilar hazy opacities more prominent on the left, possibly small layering effusions with compressive atelectasis.\par \par --- End of Report ---\par \par \par \par \par MARISOL WARNER MD; Resident Radiologist\par This document has been electronically signed.\par DUNIA CARMONA MD; Attending Radiologist\par This document has been electronically signed. Oct 31 2022 10:01AM

## 2022-11-18 NOTE — ASSESSMENT
[FreeTextEntry1] : trial of trelegy once daily, sample given\par lasix daily for a few days\par will fu monday to reassess and repeat cxr to see if effusions smaller\par \par Total encounter time 30 minutes.\par

## 2022-11-18 NOTE — HISTORY OF PRESENT ILLNESS
[Never] : never [TextBox_4] : 76F hx of mvr in 2012\par \par no copd/asthma hx\par reports cough and dyspnea for about 6 weeks\par diagnosed with covid in oct\par cxr in hospital showed small bilat effusions, enlarged heart\par

## 2022-11-21 ENCOUNTER — APPOINTMENT (OUTPATIENT)
Dept: PULMONOLOGY | Facility: CLINIC | Age: 77
End: 2022-11-21

## 2022-11-21 VITALS
DIASTOLIC BLOOD PRESSURE: 93 MMHG | HEART RATE: 58 BPM | HEIGHT: 65 IN | SYSTOLIC BLOOD PRESSURE: 184 MMHG | BODY MASS INDEX: 23.82 KG/M2 | WEIGHT: 143 LBS | OXYGEN SATURATION: 97 %

## 2022-11-21 PROCEDURE — 99213 OFFICE O/P EST LOW 20 MIN: CPT | Mod: 25

## 2022-11-21 PROCEDURE — 94010 BREATHING CAPACITY TEST: CPT

## 2022-11-21 PROCEDURE — 71046 X-RAY EXAM CHEST 2 VIEWS: CPT

## 2022-11-21 PROCEDURE — ZZZZZ: CPT

## 2022-11-21 NOTE — HISTORY OF PRESENT ILLNESS
[Never] : never [TextBox_4] : here to fu \par still has cough\par taking furosemide 20 daily and trelegy 100 daily\par doesn't feel huge difference yet\par \par Prior hx:\par \par 76F hx of mvr in 2012\par \par no copd/asthma hx\par reports cough and dyspnea for about 6 weeks\par diagnosed with covid in oct\par cxr in hospital showed small bilat effusions, enlarged heart\par

## 2022-11-21 NOTE — PROCEDURE
[FreeTextEntry1] : CXR: slight improvement of effusion on left, small possibly loculated effusion on right.  no focal consolidation cardiac silhouette appears enlarged.  No bony abnormality.\par \par leni: mod obstruction, improvement in FEV1 by ~200cc\par \par Prior exams Reviewed:\par \par PFT: severe obstruction with some bd respone.  Lung volumes reduced with evidence of air trapping. DLCO severely reduced.\par \par \par \par \par ACC: [id number] EXAM: XR CHEST PORTABLE URGENT 1V\par \par PROCEDURE DATE: 10/30/2022\par \par \par \par INTERPRETATION: EXAMINATION: XR CHEST URGENT\par \par CLINICAL INDICATION: Cough x 1m r/o pna\par \par TECHNIQUE: Single frontal, portable view of the chest was obtained.\par \par COMPARISON: Chest x-ray 10/22/2022.\par \par FINDINGS:\par Mitral valve replacement is noted.\par The heart size appears enlarged, but cannot be accurately assessed in this projection.\par Bibasilar hazy opacities more prominent on the left, possibly small layering effusions with compressive atelectasis.\par There is no pneumothorax.\par No acute bony abnormality.\par \par IMPRESSION:\par Bibasilar hazy opacities more prominent on the left, possibly small layering effusions with compressive atelectasis.\par \par --- End of Report ---\par \par \par \par \par MARISOL WARNER MD; Resident Radiologist\par This document has been electronically signed.\par DUNIA CARMONA MD; Attending Radiologist\par This document has been electronically signed. Oct 31 2022 10:01AM

## 2022-11-21 NOTE — CONSULT LETTER
[FreeTextEntry1] : Dear ,\par \par I had the pleasure of evaluating your patient, SYEDA LOPEZ today in pulmonary consultation.  Please refer to my attached note for my findings and recommendations.\par \par \par Thank you for allowing me to participate in the care of your patient, please feel free to call with any questions or concerns.\par \par \par Sincerely,\par \par Kiara Barnes MD\par [facility] \par [facility]\par \par

## 2022-12-05 ENCOUNTER — APPOINTMENT (OUTPATIENT)
Dept: PULMONOLOGY | Facility: CLINIC | Age: 77
End: 2022-12-05

## 2022-12-05 VITALS
HEIGHT: 65 IN | WEIGHT: 143 LBS | HEART RATE: 72 BPM | DIASTOLIC BLOOD PRESSURE: 102 MMHG | SYSTOLIC BLOOD PRESSURE: 174 MMHG | OXYGEN SATURATION: 95 % | BODY MASS INDEX: 23.82 KG/M2

## 2022-12-05 VITALS — SYSTOLIC BLOOD PRESSURE: 160 MMHG | HEART RATE: 66 BPM | DIASTOLIC BLOOD PRESSURE: 83 MMHG | OXYGEN SATURATION: 97 %

## 2022-12-05 PROCEDURE — 99213 OFFICE O/P EST LOW 20 MIN: CPT

## 2022-12-05 NOTE — ASSESSMENT
[FreeTextEntry1] : etiology unclear\par not sure if this is post covid, it started about 1-2 weeks before diagnosis\par abnormal cxr\par obtain ct chest now\par will stop trelegy since it hasn't made a difference.

## 2022-12-05 NOTE — HISTORY OF PRESENT ILLNESS
[TextBox_4] : cough is about the same since beginning of oct despite trelegy and lasix\par waking her up at night\par

## 2022-12-05 NOTE — PROCEDURE
[FreeTextEntry1] : Prior exams Reviewed:\par \par CXR: slight improvement of effusion on left, small possibly loculated effusion on right.  no focal consolidation cardiac silhouette appears enlarged.  No bony abnormality.\par \par leni: mod obstruction, improvement in FEV1 by ~200cc\par \par \par PFT: severe obstruction with some bd respone.  Lung volumes reduced with evidence of air trapping. DLCO severely reduced.\par \par \par \par \par ACC: [id number] EXAM: XR CHEST PORTABLE URGENT 1V\par \par PROCEDURE DATE: 10/30/2022\par \par \par \par INTERPRETATION: EXAMINATION: XR CHEST URGENT\par \par CLINICAL INDICATION: Cough x 1m r/o pna\par \par TECHNIQUE: Single frontal, portable view of the chest was obtained.\par \par COMPARISON: Chest x-ray 10/22/2022.\par \par FINDINGS:\par Mitral valve replacement is noted.\par The heart size appears enlarged, but cannot be accurately assessed in this projection.\par Bibasilar hazy opacities more prominent on the left, possibly small layering effusions with compressive atelectasis.\par There is no pneumothorax.\par No acute bony abnormality.\par \par IMPRESSION:\par Bibasilar hazy opacities more prominent on the left, possibly small layering effusions with compressive atelectasis.\par \par --- End of Report ---\par \par \par \par \par MARISOL WARNER MD; Resident Radiologist\par This document has been electronically signed.\par DUNIA CARMONA MD; Attending Radiologist\par This document has been electronically signed. Oct 31 2022 10:01AM

## 2022-12-06 ENCOUNTER — RESULT REVIEW (OUTPATIENT)
Age: 77
End: 2022-12-06

## 2022-12-06 ENCOUNTER — APPOINTMENT (OUTPATIENT)
Dept: CT IMAGING | Facility: CLINIC | Age: 77
End: 2022-12-06

## 2022-12-06 ENCOUNTER — OUTPATIENT (OUTPATIENT)
Dept: OUTPATIENT SERVICES | Facility: HOSPITAL | Age: 77
LOS: 1 days | End: 2022-12-06
Payer: MEDICARE

## 2022-12-06 DIAGNOSIS — Z96.7 PRESENCE OF OTHER BONE AND TENDON IMPLANTS: Chronic | ICD-10-CM

## 2022-12-06 DIAGNOSIS — R05.9 COUGH, UNSPECIFIED: ICD-10-CM

## 2022-12-06 DIAGNOSIS — Z98.89 OTHER SPECIFIED POSTPROCEDURAL STATES: Chronic | ICD-10-CM

## 2022-12-06 DIAGNOSIS — Z95.2 PRESENCE OF PROSTHETIC HEART VALVE: Chronic | ICD-10-CM

## 2022-12-06 PROCEDURE — 71250 CT THORAX DX C-: CPT

## 2022-12-06 PROCEDURE — 71250 CT THORAX DX C-: CPT | Mod: 26,MH

## 2022-12-15 ENCOUNTER — APPOINTMENT (OUTPATIENT)
Dept: PULMONOLOGY | Facility: CLINIC | Age: 77
End: 2022-12-15

## 2022-12-15 VITALS
HEIGHT: 65 IN | SYSTOLIC BLOOD PRESSURE: 178 MMHG | HEART RATE: 74 BPM | OXYGEN SATURATION: 96 % | WEIGHT: 140 LBS | DIASTOLIC BLOOD PRESSURE: 109 MMHG | BODY MASS INDEX: 23.32 KG/M2

## 2022-12-15 PROCEDURE — 99214 OFFICE O/P EST MOD 30 MIN: CPT

## 2022-12-15 NOTE — HISTORY OF PRESENT ILLNESS
[TextBox_4] : still coughing a lot\par had ct chest done\par impacted airways and small right effusion\par \par also has new right lower calf pain, concerned she may have a clot

## 2022-12-15 NOTE — PROCEDURE
[FreeTextEntry1] : \par \par EXAM: [id number] - CT CHEST - ORDERED BY: OG BRANDON GOTTIABIMAEL\par \par \par PROCEDURE DATE: 12/06/2022\par \par \par \par INTERPRETATION: HISTORY: Admitting Dxs: R05.9 COUGH, UNSPECIFIED\par \par EXAMINATION: CT CHEST was performed without IV contrast.\par \par COMPARISON: 9/15/2014.\par \par FINDINGS:\par \par AIRWAYS, LUNGS, PLEURA: Peribronchial thickening and mucus impacted left lower lobe airways. Small right pleural effusion. Subsegmental bibasilar atelectasis.\par \par MEDIASTINUM: Dilated left atrium and left ventricle. Mitral valve replacement. No pericardial effusion. Thoracic aorta normal caliber. No large mediastinal lymph nodes.\par \par IMAGED ABDOMEN: Cholelithiasis. Subcentimeter hepatic hypodensities, likely cysts and unchanged. Tiny nonobstructing left renal calyceal calculi.\par \par SOFT TISSUES: Unremarkable.\par \par BONES: Unremarkable.\par \par \par IMPRESSION:.\par \par Small right pleural effusion.\par \par Subsegmental bilateral lower lobe atelectasis.\par \par --- End of Report ---\par \par \par \par \par \par \par  DENNY MARTINEZ MD; Attending Radiologist\par This document has been electronically signed. Dec 12 2022 8:49AM\par \par \par \par Prior exams Reviewed:\par \par CXR: slight improvement of effusion on left, small possibly loculated effusion on right.  no focal consolidation cardiac silhouette appears enlarged.  No bony abnormality.\par \par leni: mod obstruction, improvement in FEV1 by ~200cc\par \par \par PFT: severe obstruction with some bd respone.  Lung volumes reduced with evidence of air trapping. DLCO severely reduced.\par \par \par \par \par ACC: [id number] EXAM: XR CHEST PORTABLE URGENT 1V\par \par PROCEDURE DATE: 10/30/2022\par \par \par \par INTERPRETATION: EXAMINATION: XR CHEST URGENT\par \par CLINICAL INDICATION: Cough x 1m r/o pna\par \par TECHNIQUE: Single frontal, portable view of the chest was obtained.\par \par COMPARISON: Chest x-ray 10/22/2022.\par \par FINDINGS:\par Mitral valve replacement is noted.\par The heart size appears enlarged, but cannot be accurately assessed in this projection.\par Bibasilar hazy opacities more prominent on the left, possibly small layering effusions with compressive atelectasis.\par There is no pneumothorax.\par No acute bony abnormality.\par \par IMPRESSION:\par Bibasilar hazy opacities more prominent on the left, possibly small layering effusions with compressive atelectasis.\par \par --- End of Report ---\par \par \par \par \par MARISOL WARNER MD; Resident Radiologist\par This document has been electronically signed.\par DUINA CARMONA MD; Attending Radiologist\par This document has been electronically signed. Oct 31 2022 10:01AM

## 2022-12-15 NOTE — ASSESSMENT
[FreeTextEntry1] : start antibiotic and airway clearance regimen with nebs now\par effusion too small to tap, will cont to monitor\par sent for doppler RLE\par fu 2 weeks

## 2022-12-17 ENCOUNTER — OUTPATIENT (OUTPATIENT)
Dept: OUTPATIENT SERVICES | Facility: HOSPITAL | Age: 77
LOS: 1 days | End: 2022-12-17
Payer: MEDICARE

## 2022-12-17 ENCOUNTER — APPOINTMENT (OUTPATIENT)
Dept: ULTRASOUND IMAGING | Facility: CLINIC | Age: 77
End: 2022-12-17

## 2022-12-17 ENCOUNTER — EMERGENCY (EMERGENCY)
Facility: HOSPITAL | Age: 77
LOS: 1 days | Discharge: HOME | End: 2022-12-17
Attending: EMERGENCY MEDICINE | Admitting: EMERGENCY MEDICINE

## 2022-12-17 VITALS
HEART RATE: 75 BPM | DIASTOLIC BLOOD PRESSURE: 87 MMHG | TEMPERATURE: 98 F | SYSTOLIC BLOOD PRESSURE: 182 MMHG | OXYGEN SATURATION: 98 % | HEIGHT: 64.5 IN | RESPIRATION RATE: 14 BRPM

## 2022-12-17 VITALS
SYSTOLIC BLOOD PRESSURE: 159 MMHG | HEART RATE: 60 BPM | DIASTOLIC BLOOD PRESSURE: 83 MMHG | TEMPERATURE: 98 F | RESPIRATION RATE: 17 BRPM | OXYGEN SATURATION: 97 %

## 2022-12-17 DIAGNOSIS — Z96.7 PRESENCE OF OTHER BONE AND TENDON IMPLANTS: Chronic | ICD-10-CM

## 2022-12-17 DIAGNOSIS — Z96.659 PRESENCE OF UNSPECIFIED ARTIFICIAL KNEE JOINT: Chronic | ICD-10-CM

## 2022-12-17 DIAGNOSIS — M79.606 PAIN IN LEG, UNSPECIFIED: ICD-10-CM

## 2022-12-17 DIAGNOSIS — Z95.2 PRESENCE OF PROSTHETIC HEART VALVE: Chronic | ICD-10-CM

## 2022-12-17 DIAGNOSIS — Z98.89 OTHER SPECIFIED POSTPROCEDURAL STATES: Chronic | ICD-10-CM

## 2022-12-17 LAB
ANION GAP SERPL CALC-SCNC: 7 MMOL/L — SIGNIFICANT CHANGE UP (ref 7–14)
BASOPHILS # BLD AUTO: 0.06 K/UL — SIGNIFICANT CHANGE UP (ref 0–0.2)
BASOPHILS NFR BLD AUTO: 0.9 % — SIGNIFICANT CHANGE UP (ref 0–2)
BUN SERPL-MCNC: 15 MG/DL — SIGNIFICANT CHANGE UP (ref 7–23)
CALCIUM SERPL-MCNC: 9.1 MG/DL — SIGNIFICANT CHANGE UP (ref 8.4–10.5)
CHLORIDE SERPL-SCNC: 105 MMOL/L — SIGNIFICANT CHANGE UP (ref 98–107)
CO2 SERPL-SCNC: 29 MMOL/L — SIGNIFICANT CHANGE UP (ref 22–31)
CREAT SERPL-MCNC: 0.77 MG/DL — SIGNIFICANT CHANGE UP (ref 0.5–1.3)
EGFR: 80 ML/MIN/1.73M2 — SIGNIFICANT CHANGE UP
EOSINOPHIL # BLD AUTO: 0.31 K/UL — SIGNIFICANT CHANGE UP (ref 0–0.5)
EOSINOPHIL NFR BLD AUTO: 4.9 % — SIGNIFICANT CHANGE UP (ref 0–6)
GLUCOSE SERPL-MCNC: 86 MG/DL — SIGNIFICANT CHANGE UP (ref 70–99)
HCT VFR BLD CALC: 38.8 % — SIGNIFICANT CHANGE UP (ref 34.5–45)
HGB BLD-MCNC: 12.3 G/DL — SIGNIFICANT CHANGE UP (ref 11.5–15.5)
IANC: 4.86 K/UL — SIGNIFICANT CHANGE UP (ref 1.8–7.4)
IMM GRANULOCYTES NFR BLD AUTO: 0.3 % — SIGNIFICANT CHANGE UP (ref 0–0.9)
LYMPHOCYTES # BLD AUTO: 0.76 K/UL — LOW (ref 1–3.3)
LYMPHOCYTES # BLD AUTO: 12 % — LOW (ref 13–44)
MCHC RBC-ENTMCNC: 29.9 PG — SIGNIFICANT CHANGE UP (ref 27–34)
MCHC RBC-ENTMCNC: 31.7 GM/DL — LOW (ref 32–36)
MCV RBC AUTO: 94.4 FL — SIGNIFICANT CHANGE UP (ref 80–100)
MONOCYTES # BLD AUTO: 0.31 K/UL — SIGNIFICANT CHANGE UP (ref 0–0.9)
MONOCYTES NFR BLD AUTO: 4.9 % — SIGNIFICANT CHANGE UP (ref 2–14)
NEUTROPHILS # BLD AUTO: 4.86 K/UL — SIGNIFICANT CHANGE UP (ref 1.8–7.4)
NEUTROPHILS NFR BLD AUTO: 77 % — SIGNIFICANT CHANGE UP (ref 43–77)
NRBC # BLD: 0 /100 WBCS — SIGNIFICANT CHANGE UP (ref 0–0)
NRBC # FLD: 0 K/UL — SIGNIFICANT CHANGE UP (ref 0–0)
PLATELET # BLD AUTO: 323 K/UL — SIGNIFICANT CHANGE UP (ref 150–400)
POTASSIUM SERPL-MCNC: 3.8 MMOL/L — SIGNIFICANT CHANGE UP (ref 3.5–5.3)
POTASSIUM SERPL-SCNC: 3.8 MMOL/L — SIGNIFICANT CHANGE UP (ref 3.5–5.3)
RBC # BLD: 4.11 M/UL — SIGNIFICANT CHANGE UP (ref 3.8–5.2)
RBC # FLD: 13.7 % — SIGNIFICANT CHANGE UP (ref 10.3–14.5)
SODIUM SERPL-SCNC: 141 MMOL/L — SIGNIFICANT CHANGE UP (ref 135–145)
WBC # BLD: 6.32 K/UL — SIGNIFICANT CHANGE UP (ref 3.8–10.5)
WBC # FLD AUTO: 6.32 K/UL — SIGNIFICANT CHANGE UP (ref 3.8–10.5)

## 2022-12-17 PROCEDURE — 93971 EXTREMITY STUDY: CPT

## 2022-12-17 PROCEDURE — 99284 EMERGENCY DEPT VISIT MOD MDM: CPT | Mod: GC

## 2022-12-17 PROCEDURE — 93971 EXTREMITY STUDY: CPT | Mod: 26,RT

## 2022-12-17 RX ORDER — APIXABAN 2.5 MG/1
2 TABLET, FILM COATED ORAL
Qty: 120 | Refills: 0
Start: 2022-12-17 | End: 2023-01-15

## 2022-12-17 RX ORDER — APIXABAN 2.5 MG/1
10 TABLET, FILM COATED ORAL ONCE
Refills: 0 | Status: COMPLETED | OUTPATIENT
Start: 2022-12-17 | End: 2022-12-17

## 2022-12-17 RX ADMIN — APIXABAN 10 MILLIGRAM(S): 2.5 TABLET, FILM COATED ORAL at 19:26

## 2022-12-17 NOTE — ED PROVIDER NOTE - NS ED ROS FT
Constitutional: (-) fever (-) vomiting  Eyes/ENT: (-) vision changes, (-) hearing changes  Cardiovascular: (-) chest pain, (-) wheezing  Respiratory: (-) cough, (-) shortness of breath  Gastrointestinal: (-) vomiting, (-) diarrhea, (-) abdominal pain  : (-) dysuria   Musculoskeletal: (-) back pain  Integumentary: (-) rash, (+) edema  Neurological: (-)loc  Allergic/Immunologic: (-) pruritus  Endocrine: No history of thyroid disease

## 2022-12-17 NOTE — ED ADULT TRIAGE NOTE - CHIEF COMPLAINT QUOTE
pt /o right leg pain intermittent pain in right groin 4 years and  right foot x 1 week, pt sent for US , + DVT noted, pt told to come to [place] or . Ceasar PMH: mitral valve replacement, HTN, HLD, hypothryoid

## 2022-12-17 NOTE — ED PROVIDER NOTE - NSICDXPASTMEDICALHX_GEN_ALL_CORE_FT
PAST MEDICAL HISTORY:  HLD (hyperlipidemia)     Hypertension     Hypothyroidism     Osteoarthrosis     Seasonal allergies     Vitamin B12 deficiency

## 2022-12-17 NOTE — ED PROVIDER NOTE - CLINICAL SUMMARY MEDICAL DECISION MAKING FREE TEXT BOX
Pt sent in for non occlusive calf dvt that is minimally symptomatic without tachycardia or CP. exam unremarkable besides mild tenderness and edema, well perfused, well appearing. given location does not need AC, only rpt sono next week, will confirm location with rads and d.w PCP to close loop

## 2022-12-17 NOTE — ED PROVIDER NOTE - ATTENDING CONTRIBUTION TO CARE
Attending Statement: I have personally seen and examined this patient. I have fully participated in the care of this patient. I have reviewed all pertinent clinical information, including history physical exam, plan and the Resident's note and agree except as noted   76y F pmhx HTN, HLD, hypothyroid, s/p mitral valve replacement  Sent in for a positive DVT study done today.  Patient was evaluated by her pulmonologist 2 days ago was endorsing pain of the medial aspect of the right ankle on and off x1 week, no trauma.  Ultrasound study was ordered on today and was called to come to the emergency room for positive.  Patient denies any chest pain.  Has been ongoing intermittent dyspnea on exertion for "many months" is followed by her primary and her pulmonologist.  No prior history of DVT or PE.  No malignancy history no recent travel.  Not on anticoagulation.  Well-appearing nontoxic female vitals within normal not hypoxic not tachycardic not tachypneic. No resp distress. able to speak in full and clear sentences. no wheeze, rales or stridor. no pedal edema. no calf tenderness. normal pulses bilateral feet.  Plan EKG, labs, unable to contact PMD if labs are normal we will start oral anticoagulation and discharge

## 2022-12-17 NOTE — ED PROVIDER NOTE - PROGRESS NOTE DETAILS
Mayito: I attempted to reach out to the patient's primary care doctor however she is in [facility] care physician and after speaking to their answering service I was unable to reach them on-call for the group because it is after hours and both the initial  as well as their supervisor Stefania stated there is no way to override and reach out to the doctors unless there is an admission. Mayito: will start eliquis given this is techincally a dvt and unable to reach pmd to confirm sono in 2 weeks. pt will f/u pmd next week. pending rpt vitals for dc.

## 2022-12-17 NOTE — ED PROVIDER NOTE - NSFOLLOWUPINSTRUCTIONS_ED_ALL_ED_FT
You were evaluated in the ER and you have a small blood clot in your calf.     Follow up with your PCP. You need a repeat ultrasound within the next 1-2 weeks    Continue home medications as prescribed    Return to the ER for new or worsening symptoms, severe chest pain or difficulty breathing, passing out, new fevers, uncontrollable nausea and vomiting, or for any concern you would like evaluated. You were evaluated in the ER and you have a small blood clot in your calf.     Take the ELIQUIS as prescribed     Follow up with your PCP. You need a repeat ultrasound within the next 1-2 weeks    Continue home medications as prescribed    Return to the ER for new or worsening symptoms, severe chest pain or difficulty breathing, passing out, new fevers, uncontrollable nausea and vomiting, or for any concern you would like evaluated.

## 2022-12-17 NOTE — ED ADULT NURSE REASSESSMENT NOTE - NS ED NURSE REASSESS COMMENT FT1
Pt sent by Radiologist after out pt US showed DVT to left lower extremity. No redness or swelling noted to area. Pt reports mild pain. Pt to be discharged home on Eliquis as per MD.

## 2022-12-17 NOTE — ED PROVIDER NOTE - PATIENT PORTAL LINK FT
You can access the FollowMyHealth Patient Portal offered by [facility] by registering at the following website: [email or web address]. By joining [employer]’s FollowMyHealth portal, you will also be able to view your health information using other applications (apps) compatible with our system.

## 2022-12-17 NOTE — ED PROVIDER NOTE - OBJECTIVE STATEMENT
77 y/o F hx mitral valve repair, htn, here now with outpatient US showing nonocclusive calf dvt. She has had 1 week of ankle and calf pain on the right side. Is ambulatory, no chest pain, has chronic SOB that she sees pulm for that is unchanged. Has hx of 1 similar previous in the past. No f/c, no trauma.

## 2022-12-17 NOTE — ED PROVIDER NOTE - PHYSICAL EXAMINATION
Vitals: I have reviewed the patients vital signs  General: Well dressed, well appearing, no acute distress  HEENT: Atraumatic, normocephalic, airway patent  Eyes: EOMI, tracking appropriately  Neck: no tracheal deviation, no JVD  Chest/Lungs: no trauma, symmetric chest rise, speaking in complete sentences, no WOB  Heart: skin and extremities well perfused, regular rate and rhythm  Neuro: A+Ox3, ambulating without difficulty, CN grossly intact  MSK: strength at baseline in all extremities, no muscle wasting or atrophy, minimal calf tenderness  Skin: no cyanosis, no jaundice, no new emergent lesions, trace RLE edema

## 2022-12-30 ENCOUNTER — APPOINTMENT (OUTPATIENT)
Dept: PULMONOLOGY | Facility: CLINIC | Age: 77
End: 2022-12-30
Payer: MEDICARE

## 2022-12-30 VITALS
DIASTOLIC BLOOD PRESSURE: 73 MMHG | SYSTOLIC BLOOD PRESSURE: 172 MMHG | HEIGHT: 65 IN | OXYGEN SATURATION: 97 % | WEIGHT: 140 LBS | HEART RATE: 68 BPM | BODY MASS INDEX: 23.32 KG/M2

## 2022-12-30 DIAGNOSIS — H93.8X9 OTHER SPECIFIED DISORDERS OF EAR, UNSPECIFIED EAR: ICD-10-CM

## 2022-12-30 PROCEDURE — 99214 OFFICE O/P EST MOD 30 MIN: CPT

## 2022-12-30 NOTE — ASSESSMENT
[FreeTextEntry1] : cont nebs\par prednisone taper now\par \par should see ENT\par \par fu with neuro for migraines\par \par cont ac for dvt\par \par reassess 2 weeks

## 2022-12-30 NOTE — PROCEDURE
[FreeTextEntry1] : Reviewed:\par \par EXAM: [id number] - US DPLX LWR EXT VEINS LTD RT - ORDERED BY: OG MCLEAN\par \par \par PROCEDURE DATE: 12/17/2022\par \par \par \par INTERPRETATION: CLINICAL INFORMATION: Right calf pain for one week.\par \par COMPARISON: None available.\par \par TECHNIQUE: Duplex sonography of the RIGHT LOWER extremity veins with color and spectral Doppler, with and without compression.\par \par FINDINGS:\par \par There is normal compressibility of the right common femoral, femoral and popliteal veins.\par The contralateral common femoral vein is patent.\par Doppler examination shows normal spontaneous and phasic flow.\par \par There is focal, noncompressible nonocclusive thrombus in a mid peroneal vein. Otherwise no evidence of calf DVT.\par \par IMPRESSION:\par Right focal calf vein DVT. Discussed the findings with Dr. Chavez, for Dr. Mclean on 12/17/2022 at 2:15 PM. I advised the patient to proceed to the NS or J ED for further evaluation, as per Dr. Chavez.\par \par \par \par \par --- End of Report ---\par \par \par \par EXAM: [id number] - CT CHEST - ORDERED BY: OG MCLEAN\par \par \par PROCEDURE DATE: 12/06/2022\par \par \par \par INTERPRETATION: HISTORY: Admitting Dxs: R05.9 COUGH, UNSPECIFIED\par \par EXAMINATION: CT CHEST was performed without IV contrast.\par \par COMPARISON: 9/15/2014.\par \par FINDINGS:\par \par AIRWAYS, LUNGS, PLEURA: Peribronchial thickening and mucus impacted left lower lobe airways. Small right pleural effusion. Subsegmental bibasilar atelectasis.\par \par MEDIASTINUM: Dilated left atrium and left ventricle. Mitral valve replacement. No pericardial effusion. Thoracic aorta normal caliber. No large mediastinal lymph nodes.\par \par IMAGED ABDOMEN: Cholelithiasis. Subcentimeter hepatic hypodensities, likely cysts and unchanged. Tiny nonobstructing left renal calyceal calculi.\par \par SOFT TISSUES: Unremarkable.\par \par BONES: Unremarkable.\par \par \par IMPRESSION:.\par \par Small right pleural effusion.\par \par Subsegmental bilateral lower lobe atelectasis.\par \par --- End of Report ---\par \par \par \par \par \par \par  DENNY MARTINEZ MD; Attending Radiologist\par This document has been electronically signed. Dec 12 2022 8:49AM\par \par \par \par Prior exams Reviewed:\par \par CXR: slight improvement of effusion on left, small possibly loculated effusion on right.  no focal consolidation cardiac silhouette appears enlarged.  No bony abnormality.\par \par leni: mod obstruction, improvement in FEV1 by ~200cc\par \par \par PFT: severe obstruction with some bd respone.  Lung volumes reduced with evidence of air trapping. DLCO severely reduced.\par \par \par \par \par ACC: [id number] EXAM: XR CHEST PORTABLE URGENT 1V\par \par PROCEDURE DATE: 10/30/2022\par \par \par \par INTERPRETATION: EXAMINATION: XR CHEST URGENT\par \par CLINICAL INDICATION: Cough x 1m r/o pna\par \par TECHNIQUE: Single frontal, portable view of the chest was obtained.\par \par COMPARISON: Chest x-ray 10/22/2022.\par \par FINDINGS:\par Mitral valve replacement is noted.\par The heart size appears enlarged, but cannot be accurately assessed in this projection.\par Bibasilar hazy opacities more prominent on the left, possibly small layering effusions with compressive atelectasis.\par There is no pneumothorax.\par No acute bony abnormality.\par \par IMPRESSION:\par Bibasilar hazy opacities more prominent on the left, possibly small layering effusions with compressive atelectasis.\par \par --- End of Report ---\par \par \par \par \par MARISOL WARNER MD; Resident Radiologist\par This document has been electronically signed.\par DUNIA CARMONA MD; Attending Radiologist\par This document has been electronically signed. Oct 31 2022 10:01AM

## 2022-12-30 NOTE — PHYSICAL EXAM
Medication:    Outpatient Medications Marked as Taking for the 2/4/19 encounter (Refill) with Laura Yahr Nelson, MD   Medication Sig Dispense Refill   • amphetamine-dextroamphetamine (ADDERALL XR) 30 MG 24 hr capsule Take one capsule each morning. ICD 10 code F90.0 30 capsule 0   • amphetamine-dextroamphetamine (ADDERALL) 30 MG tablet Take one tablet daily at lunchtime 30 tablet 0       Pharmacy has been verified.    [] Patient completely out of medication     Patient currently has follow up appointment scheduled    Message to Prescriber: 0   [No Acute Distress] : no acute distress [Normal S1, S2] : normal s1, s2 [No Resp Distress] : no resp distress [Clear to Auscultation Bilaterally] : clear to auscultation bilaterally

## 2022-12-30 NOTE — HISTORY OF PRESENT ILLNESS
[TextBox_4] : still has cough/sputum\par using nebs bid\par finished doxy\par \par ear is very clogged, getting occular migraines--has had them in the past\par \par on xarelto starter pack (gave sample from our office) for DVT

## 2023-01-07 ENCOUNTER — EMERGENCY (EMERGENCY)
Facility: HOSPITAL | Age: 78
LOS: 1 days | Discharge: HOME | End: 2023-01-07
Attending: EMERGENCY MEDICINE | Admitting: EMERGENCY MEDICINE
Payer: MEDICARE

## 2023-01-07 VITALS
HEART RATE: 69 BPM | HEIGHT: 64.5 IN | OXYGEN SATURATION: 95 % | WEIGHT: 143.96 LBS | RESPIRATION RATE: 15 BRPM | DIASTOLIC BLOOD PRESSURE: 89 MMHG | SYSTOLIC BLOOD PRESSURE: 165 MMHG | TEMPERATURE: 98 F

## 2023-01-07 DIAGNOSIS — Z98.89 OTHER SPECIFIED POSTPROCEDURAL STATES: Chronic | ICD-10-CM

## 2023-01-07 DIAGNOSIS — Z96.659 PRESENCE OF UNSPECIFIED ARTIFICIAL KNEE JOINT: Chronic | ICD-10-CM

## 2023-01-07 DIAGNOSIS — Z96.7 PRESENCE OF OTHER BONE AND TENDON IMPLANTS: Chronic | ICD-10-CM

## 2023-01-07 DIAGNOSIS — Z95.2 PRESENCE OF PROSTHETIC HEART VALVE: Chronic | ICD-10-CM

## 2023-01-07 LAB
ALBUMIN SERPL ELPH-MCNC: 3.3 G/DL — SIGNIFICANT CHANGE UP (ref 3.3–5)
ALP SERPL-CCNC: 53 U/L — SIGNIFICANT CHANGE UP (ref 30–120)
ALT FLD-CCNC: 20 U/L DA — SIGNIFICANT CHANGE UP (ref 10–60)
ANION GAP SERPL CALC-SCNC: 7 MMOL/L — SIGNIFICANT CHANGE UP (ref 5–17)
APTT BLD: 31.9 SEC — SIGNIFICANT CHANGE UP (ref 27.5–35.5)
AST SERPL-CCNC: 24 U/L — SIGNIFICANT CHANGE UP (ref 10–40)
BASOPHILS # BLD AUTO: 0.01 K/UL — SIGNIFICANT CHANGE UP (ref 0–0.2)
BASOPHILS NFR BLD AUTO: 0.1 % — SIGNIFICANT CHANGE UP (ref 0–2)
BILIRUB SERPL-MCNC: 0.6 MG/DL — SIGNIFICANT CHANGE UP (ref 0.2–1.2)
BUN SERPL-MCNC: 17 MG/DL — SIGNIFICANT CHANGE UP (ref 7–23)
CALCIUM SERPL-MCNC: 8.5 MG/DL — SIGNIFICANT CHANGE UP (ref 8.4–10.5)
CHLORIDE SERPL-SCNC: 102 MMOL/L — SIGNIFICANT CHANGE UP (ref 96–108)
CO2 SERPL-SCNC: 28 MMOL/L — SIGNIFICANT CHANGE UP (ref 22–31)
CREAT SERPL-MCNC: 0.86 MG/DL — SIGNIFICANT CHANGE UP (ref 0.5–1.3)
EGFR: 70 ML/MIN/1.73M2 — SIGNIFICANT CHANGE UP
EOSINOPHIL # BLD AUTO: 0.03 K/UL — SIGNIFICANT CHANGE UP (ref 0–0.5)
EOSINOPHIL NFR BLD AUTO: 0.2 % — SIGNIFICANT CHANGE UP (ref 0–6)
GLUCOSE SERPL-MCNC: 106 MG/DL — HIGH (ref 70–99)
HCT VFR BLD CALC: 39.6 % — SIGNIFICANT CHANGE UP (ref 34.5–45)
HGB BLD-MCNC: 13.1 G/DL — SIGNIFICANT CHANGE UP (ref 11.5–15.5)
IMM GRANULOCYTES NFR BLD AUTO: 0.3 % — SIGNIFICANT CHANGE UP (ref 0–0.9)
INR BLD: 2.33 RATIO — HIGH (ref 0.88–1.16)
LYMPHOCYTES # BLD AUTO: 0.54 K/UL — LOW (ref 1–3.3)
LYMPHOCYTES # BLD AUTO: 4.2 % — LOW (ref 13–44)
MCHC RBC-ENTMCNC: 31 PG — SIGNIFICANT CHANGE UP (ref 27–34)
MCHC RBC-ENTMCNC: 33.1 GM/DL — SIGNIFICANT CHANGE UP (ref 32–36)
MCV RBC AUTO: 93.8 FL — SIGNIFICANT CHANGE UP (ref 80–100)
MONOCYTES # BLD AUTO: 0.24 K/UL — SIGNIFICANT CHANGE UP (ref 0–0.9)
MONOCYTES NFR BLD AUTO: 1.9 % — LOW (ref 2–14)
NEUTROPHILS # BLD AUTO: 12.1 K/UL — HIGH (ref 1.8–7.4)
NEUTROPHILS NFR BLD AUTO: 93.3 % — HIGH (ref 43–77)
NRBC # BLD: 0 /100 WBCS — SIGNIFICANT CHANGE UP (ref 0–0)
PLATELET # BLD AUTO: 210 K/UL — SIGNIFICANT CHANGE UP (ref 150–400)
POTASSIUM SERPL-MCNC: 3.8 MMOL/L — SIGNIFICANT CHANGE UP (ref 3.5–5.3)
POTASSIUM SERPL-SCNC: 3.8 MMOL/L — SIGNIFICANT CHANGE UP (ref 3.5–5.3)
PROT SERPL-MCNC: 6.4 G/DL — SIGNIFICANT CHANGE UP (ref 6–8.3)
PROTHROM AB SERPL-ACNC: 27 SEC — HIGH (ref 10.5–13.4)
RBC # BLD: 4.22 M/UL — SIGNIFICANT CHANGE UP (ref 3.8–5.2)
RBC # FLD: 13.6 % — SIGNIFICANT CHANGE UP (ref 10.3–14.5)
SODIUM SERPL-SCNC: 137 MMOL/L — SIGNIFICANT CHANGE UP (ref 135–145)
WBC # BLD: 12.96 K/UL — HIGH (ref 3.8–10.5)
WBC # FLD AUTO: 12.96 K/UL — HIGH (ref 3.8–10.5)

## 2023-01-07 PROCEDURE — 73502 X-RAY EXAM HIP UNI 2-3 VIEWS: CPT | Mod: 26,RT

## 2023-01-07 PROCEDURE — 85025 COMPLETE CBC W/AUTO DIFF WBC: CPT

## 2023-01-07 PROCEDURE — 96374 THER/PROPH/DIAG INJ IV PUSH: CPT

## 2023-01-07 PROCEDURE — 99285 EMERGENCY DEPT VISIT HI MDM: CPT

## 2023-01-07 PROCEDURE — 72192 CT PELVIS W/O DYE: CPT | Mod: 26,MA

## 2023-01-07 PROCEDURE — 80053 COMPREHEN METABOLIC PANEL: CPT

## 2023-01-07 PROCEDURE — 85610 PROTHROMBIN TIME: CPT

## 2023-01-07 PROCEDURE — 85730 THROMBOPLASTIN TIME PARTIAL: CPT

## 2023-01-07 PROCEDURE — 72192 CT PELVIS W/O DYE: CPT | Mod: MA

## 2023-01-07 PROCEDURE — 36415 COLL VENOUS BLD VENIPUNCTURE: CPT

## 2023-01-07 PROCEDURE — 73502 X-RAY EXAM HIP UNI 2-3 VIEWS: CPT

## 2023-01-07 PROCEDURE — 99284 EMERGENCY DEPT VISIT MOD MDM: CPT | Mod: 25

## 2023-01-07 RX ORDER — ACETAMINOPHEN 500 MG
1000 TABLET ORAL ONCE
Refills: 0 | Status: COMPLETED | OUTPATIENT
Start: 2023-01-07 | End: 2023-01-07

## 2023-01-07 RX ADMIN — Medication 400 MILLIGRAM(S): at 14:28

## 2023-01-07 NOTE — ED PROVIDER NOTE - CARE PROVIDER_API CALL
Manfred Latham)  Orthopedic Surgery  [address], [address]  [place] [address]  Phone: [phone]  Fax: [phone]  Follow Up Time: 1-3 Days

## 2023-01-07 NOTE — ED PROVIDER NOTE - OBJECTIVE STATEMENT
Patient complaining of right hip pain since awoke this morning.  Patient relates no trauma fevers chills weakness numbness abdominal pain or any other complaints.  Patient relates hip pain worse with movement.  Patient had right hip replacement in the past.  PMD Rex orthopedist [place]

## 2023-01-07 NOTE — ED ADULT NURSE NOTE - NSIMPLEMENTINTERV_GEN_ALL_ED
Implemented All Fall with Harm Risk Interventions:  [place] to call system. Call bell, personal items and telephone within reach. Instruct patient to call for assistance. Room bathroom lighting operational. Non-slip footwear when patient is off stretcher. Physically safe environment: no spills, clutter or unnecessary equipment. Stretcher in lowest position, wheels locked, appropriate side rails in place. Provide visual cue, wrist band, yellow gown, etc. Monitor gait and stability. Monitor for mental status changes and reorient to person, place, and time. Review medications for side effects contributing to fall risk. Reinforce activity limits and safety measures with patient and family. Provide visual clues: red socks.

## 2023-01-07 NOTE — ED ADULT NURSE NOTE - OBJECTIVE STATEMENT
78 y/o female received aox4 ambulatory c/o sudden onset of right hip pain today. hx right hip replacement in 2018.

## 2023-01-07 NOTE — ED PROVIDER NOTE - NSFOLLOWUPINSTRUCTIONS_ED_ALL_ED_FT
HIP PAIN - AfterCare(R) Instructions(ER/ED)   Hip Pain    WHAT YOU NEED TO KNOW:    Hip pain can be caused by a number of conditions, such as bursitis, arthritis, or muscle or tendon strain. You may have swelling in the fluid-filled sacs that protect your muscles and tendons. Hip pain can also be caused by a lower back problem. Hip pain may be caused by trauma, playing sports, or running. Pain may start in your hip and go to your thigh, buttock, or groin.  Hip and Pelvis    DISCHARGE INSTRUCTIONS:    Medicines:   •NSAIDs, such as ibuprofen, help decrease swelling, pain, and fever. This medicine is available with or without a doctor's order. NSAIDs can cause stomach bleeding or kidney problems in certain people. If you take blood thinner medicine, always ask your healthcare provider if NSAIDs are safe for you. Always read the medicine label and follow directions.    •Take your medicine as directed. Contact your healthcare provider if you think your medicine is not helping or if you have side effects. Tell your provider if you are allergic to any medicine. Keep a list of the medicines, vitamins, and herbs you take. Include the amounts, and when and why you take them. Bring the list or the pill bottles to follow-up visits. Carry your medicine list with you in case of an emergency.    Return to the emergency department if:   •Your pain gets worse.    •You have numbness in your leg or toes.    •You cannot put any weight on or move your hip.    Contact your healthcare provider if:   •You have a fever.    •Your pain does not decrease, even after treatment.    •You have questions or concerns about your condition or care.    Follow up with your healthcare provider as directed: You may need physical therapy, an injection, or more testing. You may need to see an orthopedic specialist. Write down your questions so you remember to ask them during your visits.    Manage your hip pain:   •Rest your injured hip so that it can heal. You may need to avoid putting any weight on your hip for at least 48 hours. Return to normal activities as directed.    •Ice the injury for 20 minutes every 4 hours, or as directed. Use an ice pack, or put crushed ice in a plastic bag. Cover it with a towel to protect your skin. Ice helps prevent tissue damage and decreases swelling and pain.    •Elevate your injured hip above the level of your heart as often as you can. This will help decrease swelling and pain. If possible, prop your hip and leg on pillows or blankets to keep the area elevated comfortably.     •Maintain a healthy weight. Extra body weight can cause pressure and pain in your hip, knee, and ankle joints. Ask your healthcare provider how much you should weigh. Ask him or her to help you create a weight loss plan if you are overweight.    •Use assistive devices as directed. You may need to use a cane or crutches. Assistive devices help decrease pain and pressure on your hip when you walk. Ask your healthcare provider for more information about assistive devices and how to use them correctly.

## 2023-01-07 NOTE — ED PROVIDER NOTE - DIFFERENTIAL DIAGNOSIS
Differential including but not limited to fracture dislocation sprain hematoma Differential Diagnosis

## 2023-01-07 NOTE — ED PROVIDER NOTE - PROGRESS NOTE DETAILS
Reevaluated patient at bedside.  Patient feeling improved, ambulated with cane, wants to be d/c home and f/u as outpatient with her orthopedist.  Discussed the results of all diagnostic testing in ED and copies of all reports given.   An opportunity to ask questions was given.  Discussed the importance of prompt, close medical follow-up.  Patient will return with any changes, concerns or persistent / worsening symptoms.  Understanding of all instructions verbalized.

## 2023-01-07 NOTE — ED PROVIDER NOTE - CLINICAL SUMMARY MEDICAL DECISION MAKING FREE TEXT BOX
Patient complaining of right hip pain since awoke this morning.  Patient relates no trauma fevers chills weakness numbness abdominal pain or any other complaints.  Patient relates hip pain worse with movement.  Patient had right hip replacement in the past.  PMD Rex orthopedist [place]    Plan labs x-ray and CT if x-ray negative [place] for pain  Differential including but not limited to fracture dislocation sprain hematoma

## 2023-01-07 NOTE — ED PROVIDER NOTE - PATIENT PORTAL LINK FT
You can access the FollowMyHealth Patient Portal offered by [facility] by registering at the following website: [email or web address]. By joining [facility]’s FollowMyHealth portal, you will also be able to view your health information using other applications (apps) compatible with our system.

## 2023-01-13 ENCOUNTER — APPOINTMENT (OUTPATIENT)
Dept: PULMONOLOGY | Facility: CLINIC | Age: 78
End: 2023-01-13
Payer: MEDICARE

## 2023-01-13 DIAGNOSIS — I82.409 ACUTE EMBOLISM AND THROMBOSIS OF UNSPECIFIED DEEP VEINS OF UNSPECIFIED LOWER EXTREMITY: ICD-10-CM

## 2023-01-13 PROCEDURE — 99214 OFFICE O/P EST MOD 30 MIN: CPT

## 2023-01-13 NOTE — PROCEDURE
[FreeTextEntry1] : Reviewed:\par \par EXAM: [id number] - US DPLX LWR EXT VEINS LTD RT - ORDERED BY: OG MCLEAN\par \par \par PROCEDURE DATE: 12/17/2022\par \par \par \par INTERPRETATION: CLINICAL INFORMATION: Right calf pain for one week.\par \par COMPARISON: None available.\par \par TECHNIQUE: Duplex sonography of the RIGHT LOWER extremity veins with color and spectral Doppler, with and without compression.\par \par FINDINGS:\par \par There is normal compressibility of the right common femoral, femoral and popliteal veins.\par The contralateral common femoral vein is patent.\par Doppler examination shows normal spontaneous and phasic flow.\par \par There is focal, noncompressible nonocclusive thrombus in a mid peroneal vein. Otherwise no evidence of calf DVT.\par \par IMPRESSION:\par Right focal calf vein DVT. Discussed the findings with Dr. Chavez, for Dr. Mclean on 12/17/2022 at 2:15 PM. I advised the patient to proceed to the NS or J ED for further evaluation, as per Dr. Chavez.\par \par \par \par \par --- End of Report ---\par \par \par \par EXAM: [id number] - CT CHEST - ORDERED BY: OG MCLEAN\par \par \par PROCEDURE DATE: 12/06/2022\par \par \par \par INTERPRETATION: HISTORY: Admitting Dxs: R05.9 COUGH, UNSPECIFIED\par \par EXAMINATION: CT CHEST was performed without IV contrast.\par \par COMPARISON: 9/15/2014.\par \par FINDINGS:\par \par AIRWAYS, LUNGS, PLEURA: Peribronchial thickening and mucus impacted left lower lobe airways. Small right pleural effusion. Subsegmental bibasilar atelectasis.\par \par MEDIASTINUM: Dilated left atrium and left ventricle. Mitral valve replacement. No pericardial effusion. Thoracic aorta normal caliber. No large mediastinal lymph nodes.\par \par IMAGED ABDOMEN: Cholelithiasis. Subcentimeter hepatic hypodensities, likely cysts and unchanged. Tiny nonobstructing left renal calyceal calculi.\par \par SOFT TISSUES: Unremarkable.\par \par BONES: Unremarkable.\par \par \par IMPRESSION:.\par \par Small right pleural effusion.\par \par Subsegmental bilateral lower lobe atelectasis.\par \par --- End of Report ---\par \par \par \par \par \par \par  DENNY MARTINEZ MD; Attending Radiologist\par This document has been electronically signed. Dec 12 2022 8:49AM\par \par \par \par Prior exams Reviewed:\par \par CXR: slight improvement of effusion on left, small possibly loculated effusion on right.  no focal consolidation cardiac silhouette appears enlarged.  No bony abnormality.\par \par leni: mod obstruction, improvement in FEV1 by ~200cc\par \par \par PFT: severe obstruction with some bd respone.  Lung volumes reduced with evidence of air trapping. DLCO severely reduced.\par \par \par \par \par ACC: [id number] EXAM: XR CHEST PORTABLE URGENT 1V\par \par PROCEDURE DATE: 10/30/2022\par \par \par \par INTERPRETATION: EXAMINATION: XR CHEST URGENT\par \par CLINICAL INDICATION: Cough x 1m r/o pna\par \par TECHNIQUE: Single frontal, portable view of the chest was obtained.\par \par COMPARISON: Chest x-ray 10/22/2022.\par \par FINDINGS:\par Mitral valve replacement is noted.\par The heart size appears enlarged, but cannot be accurately assessed in this projection.\par Bibasilar hazy opacities more prominent on the left, possibly small layering effusions with compressive atelectasis.\par There is no pneumothorax.\par No acute bony abnormality.\par \par IMPRESSION:\par Bibasilar hazy opacities more prominent on the left, possibly small layering effusions with compressive atelectasis.\par \par --- End of Report ---\par \par \par \par \par MARISOL WARNER MD; Resident Radiologist\par This document has been electronically signed.\par DUNIA CARMONA MD; Attending Radiologist\par This document has been electronically signed. Oct 31 2022 10:01AM

## 2023-01-13 NOTE — REASON FOR VISIT
[Follow-Up] : a follow-up visit [Abnormal CXR/ Chest CT] : an abnormal CXR/ chest CT [Cough] : cough [TextBox_44] : dvt

## 2023-01-13 NOTE — ASSESSMENT
[FreeTextEntry1] : cont xarelto x 2 mo months then will repeat ultrasound\par \par cont nebs--increase to tid, airway clearance\par \par fu with ENT

## 2023-01-13 NOTE — HISTORY OF PRESENT ILLNESS
[TextBox_4] : cough is a little better after steroid\par using nebs bid\par \par on xarelto starter pack for dvt, almost finished\par \par seeing ENT in a week--ear is clogged, nasal congestion, pnd

## 2023-01-17 ENCOUNTER — APPOINTMENT (OUTPATIENT)
Dept: ORTHOPEDIC SURGERY | Facility: CLINIC | Age: 78
End: 2023-01-17
Payer: MEDICARE

## 2023-01-17 ENCOUNTER — NON-APPOINTMENT (OUTPATIENT)
Age: 78
End: 2023-01-17

## 2023-01-17 VITALS
DIASTOLIC BLOOD PRESSURE: 78 MMHG | SYSTOLIC BLOOD PRESSURE: 178 MMHG | HEIGHT: 64 IN | WEIGHT: 141 LBS | HEART RATE: 66 BPM | BODY MASS INDEX: 24.07 KG/M2

## 2023-01-17 DIAGNOSIS — M16.10 UNILATERAL PRIMARY OSTEOARTHRITIS, UNSPECIFIED HIP: ICD-10-CM

## 2023-01-17 PROCEDURE — 99203 OFFICE O/P NEW LOW 30 MIN: CPT

## 2023-01-19 ENCOUNTER — APPOINTMENT (OUTPATIENT)
Dept: OTOLARYNGOLOGY | Facility: CLINIC | Age: 78
End: 2023-01-19
Payer: MEDICARE

## 2023-01-19 ENCOUNTER — NON-APPOINTMENT (OUTPATIENT)
Age: 78
End: 2023-01-19

## 2023-01-19 VITALS
BODY MASS INDEX: 23.9 KG/M2 | DIASTOLIC BLOOD PRESSURE: 91 MMHG | WEIGHT: 140 LBS | SYSTOLIC BLOOD PRESSURE: 180 MMHG | HEIGHT: 64 IN | HEART RATE: 60 BPM

## 2023-01-19 VITALS — SYSTOLIC BLOOD PRESSURE: 155 MMHG | DIASTOLIC BLOOD PRESSURE: 80 MMHG

## 2023-01-19 DIAGNOSIS — Z86.39 PERSONAL HISTORY OF OTHER ENDOCRINE, NUTRITIONAL AND METABOLIC DISEASE: ICD-10-CM

## 2023-01-19 PROCEDURE — 31231 NASAL ENDOSCOPY DX: CPT

## 2023-01-19 PROCEDURE — 99203 OFFICE O/P NEW LOW 30 MIN: CPT | Mod: 25

## 2023-01-19 NOTE — PHYSICAL EXAM
[de-identified] : Right TM intact with no effusion. Left TM intact with yellow mucoid effusion. [Nasal Endoscopy Performed] : nasal endoscopy was performed, see procedure section for findings [Midline] : trachea located in midline position [Laryngoscopy Performed] : laryngoscopy was performed, see procedure section for findings [Normal] : no rashes

## 2023-01-19 NOTE — CONSULT LETTER
[Dear  ___] : Dear  [unfilled], [Consult Letter:] : I had the pleasure of evaluating your patient, [unfilled]. [Please see my note below.] : Please see my note below. [Consult Closing:] : Thank you very much for allowing me to participate in the care of this patient.  If you have any questions, please do not hesitate to contact me. [Sincerely,] : Sincerely, [FreeTextEntry3] : Chad Chatterjee M.D.

## 2023-01-19 NOTE — REASON FOR VISIT
[Initial Consultation] : an initial consultation for [FreeTextEntry2] : Persistent cough/clogged ears

## 2023-01-19 NOTE — REVIEW OF SYSTEMS
[Seasonal Allergies] : seasonal allergies [Hearing Loss] : hearing loss [Ear Noises] : ear noises [Shortness Of Breath] : shortness of breath [Wheezing] : wheezing [Cough] : cough [Negative] : Heme/Lymph [FreeTextEntry6] : noisy breathing

## 2023-01-19 NOTE — HISTORY OF PRESENT ILLNESS
[de-identified] : Diane Perry is a 78 yo female with hx mitral valve replacement, currently on xarelto, DVT who was referred by Dr. Barnes for evaluatoin of aural fullness. She notes left greater than right aural fullness starting end of Dec 2022. She denies otalgia, otorrhea. She notes intermittent nonpulsatile tinnitus, unsure of laterality. She denies vertigo. She feels that her hearing is diminished from the left ear. She denies history of recurrent ear infections. She denies fevers, chills. She denies family history of early onset hearing loss or significant noise exposure.\par \par She notes postnasal drainage. She denies nasal congestion or rhinorrhea. She denies history of recurrent sinus infections. She notes possible allergies. She does not use any nasal sprays.\par \par She is currently being treated for cough by Dr. Barnes. CT chest showed bilateral atelectasis and right pleural effusion. She is being treated with nebulizers and recently completed oral steroids.

## 2023-01-19 NOTE — ASSESSMENT
[FreeTextEntry1] : Diane Perry presents for evaluation of left aural fullness. She has left otitis media with effusion on exam. She is being treated for chronic cough by Dr. Barnes. Sinonasal endoscopy shows thin nasal secretions bilaterally. Flexible laryngoscopy shows thin postnasla secretions, no nasopharyngeal mass or lesion. We will treat her infection with augmentin and chronic rhinitis with nasla steroid spray.\par \par - Augmentin x 10 days. Side effects were discussed and include but are not limited to nausea, vomiting, diarrhea, and skin rash.\par - Fluticasone 2 sprays to each nostril BID x 3 weeks.\par - Follow up in 3 weeks.

## 2023-01-19 NOTE — DATA REVIEWED
[de-identified] : CT chest 12/6/22:\par FINDINGS:\par \par AIRWAYS, LUNGS, PLEURA: Peribronchial thickening and mucus impacted left lower lobe airways. Small right pleural effusion. Subsegmental bibasilar atelectasis.\par \par MEDIASTINUM: Dilated left atrium and left ventricle. Mitral valve replacement. No pericardial effusion. Thoracic aorta normal caliber.  No large mediastinal lymph nodes.\par \par IMAGED ABDOMEN: Cholelithiasis. Subcentimeter hepatic hypodensities, likely cysts and unchanged. Tiny nonobstructing left renal calyceal calculi.\par \par SOFT TISSUES: Unremarkable.\par \par BONES: Unremarkable.\par \par \par IMPRESSION:.\par \par Small right pleural effusion.\par \par Subsegmental bilateral lower lobe atelectasis.

## 2023-01-23 ENCOUNTER — INPATIENT (INPATIENT)
Facility: HOSPITAL | Age: 78
LOS: 12 days | Discharge: HOME HEALTH | DRG: 266 | End: 2023-02-05
Attending: INTERNAL MEDICINE | Admitting: HOSPITALIST
Payer: MEDICARE

## 2023-01-23 VITALS
DIASTOLIC BLOOD PRESSURE: 87 MMHG | HEIGHT: 64.5 IN | WEIGHT: 139.99 LBS | RESPIRATION RATE: 20 BRPM | TEMPERATURE: 98 F | HEART RATE: 141 BPM | SYSTOLIC BLOOD PRESSURE: 149 MMHG | OXYGEN SATURATION: 97 %

## 2023-01-23 DIAGNOSIS — Z98.89 OTHER SPECIFIED POSTPROCEDURAL STATES: Chronic | ICD-10-CM

## 2023-01-23 DIAGNOSIS — Z96.659 PRESENCE OF UNSPECIFIED ARTIFICIAL KNEE JOINT: Chronic | ICD-10-CM

## 2023-01-23 DIAGNOSIS — Z95.2 PRESENCE OF PROSTHETIC HEART VALVE: Chronic | ICD-10-CM

## 2023-01-23 DIAGNOSIS — Z96.7 PRESENCE OF OTHER BONE AND TENDON IMPLANTS: Chronic | ICD-10-CM

## 2023-01-23 LAB
ALBUMIN SERPL ELPH-MCNC: 3.7 G/DL — SIGNIFICANT CHANGE UP (ref 3.3–5)
ALP SERPL-CCNC: 67 U/L — SIGNIFICANT CHANGE UP (ref 40–120)
ALT FLD-CCNC: 17 U/L — SIGNIFICANT CHANGE UP (ref 10–45)
ANION GAP SERPL CALC-SCNC: 15 MMOL/L — SIGNIFICANT CHANGE UP (ref 5–17)
AST SERPL-CCNC: 30 U/L — SIGNIFICANT CHANGE UP (ref 10–40)
BASE EXCESS BLDV CALC-SCNC: -1.1 MMOL/L — SIGNIFICANT CHANGE UP (ref -2–3)
BASOPHILS # BLD AUTO: 0.08 K/UL — SIGNIFICANT CHANGE UP (ref 0–0.2)
BASOPHILS NFR BLD AUTO: 1 % — SIGNIFICANT CHANGE UP (ref 0–2)
BILIRUB SERPL-MCNC: 0.3 MG/DL — SIGNIFICANT CHANGE UP (ref 0.2–1.2)
BUN SERPL-MCNC: 17 MG/DL — SIGNIFICANT CHANGE UP (ref 7–23)
CA-I SERPL-SCNC: 1.22 MMOL/L — SIGNIFICANT CHANGE UP (ref 1.15–1.33)
CALCIUM SERPL-MCNC: 9.6 MG/DL — SIGNIFICANT CHANGE UP (ref 8.4–10.5)
CHLORIDE BLDV-SCNC: 106 MMOL/L — SIGNIFICANT CHANGE UP (ref 96–108)
CHLORIDE SERPL-SCNC: 108 MMOL/L — SIGNIFICANT CHANGE UP (ref 96–108)
CO2 BLDV-SCNC: 26 MMOL/L — SIGNIFICANT CHANGE UP (ref 22–26)
CO2 SERPL-SCNC: 19 MMOL/L — LOW (ref 22–31)
CREAT SERPL-MCNC: 1.04 MG/DL — SIGNIFICANT CHANGE UP (ref 0.5–1.3)
EGFR: 56 ML/MIN/1.73M2 — LOW
EOSINOPHIL # BLD AUTO: 0.2 K/UL — SIGNIFICANT CHANGE UP (ref 0–0.5)
EOSINOPHIL NFR BLD AUTO: 2.6 % — SIGNIFICANT CHANGE UP (ref 0–6)
GAS PNL BLDV: 140 MMOL/L — SIGNIFICANT CHANGE UP (ref 136–145)
GAS PNL BLDV: SIGNIFICANT CHANGE UP
GLUCOSE BLDV-MCNC: 99 MG/DL — SIGNIFICANT CHANGE UP (ref 70–99)
GLUCOSE SERPL-MCNC: 98 MG/DL — SIGNIFICANT CHANGE UP (ref 70–99)
HCO3 BLDV-SCNC: 24 MMOL/L — SIGNIFICANT CHANGE UP (ref 22–29)
HCT VFR BLD CALC: 42.5 % — SIGNIFICANT CHANGE UP (ref 34.5–45)
HCT VFR BLDA CALC: 42 % — SIGNIFICANT CHANGE UP (ref 34.5–46.5)
HGB BLD CALC-MCNC: 14 G/DL — SIGNIFICANT CHANGE UP (ref 11.7–16.1)
HGB BLD-MCNC: 13.5 G/DL — SIGNIFICANT CHANGE UP (ref 11.5–15.5)
IMM GRANULOCYTES NFR BLD AUTO: 0.4 % — SIGNIFICANT CHANGE UP (ref 0–0.9)
LACTATE BLDV-MCNC: 1.3 MMOL/L — SIGNIFICANT CHANGE UP (ref 0.5–2)
LYMPHOCYTES # BLD AUTO: 1.09 K/UL — SIGNIFICANT CHANGE UP (ref 1–3.3)
LYMPHOCYTES # BLD AUTO: 14.3 % — SIGNIFICANT CHANGE UP (ref 13–44)
MCHC RBC-ENTMCNC: 30.8 PG — SIGNIFICANT CHANGE UP (ref 27–34)
MCHC RBC-ENTMCNC: 31.8 GM/DL — LOW (ref 32–36)
MCV RBC AUTO: 96.8 FL — SIGNIFICANT CHANGE UP (ref 80–100)
MONOCYTES # BLD AUTO: 0.51 K/UL — SIGNIFICANT CHANGE UP (ref 0–0.9)
MONOCYTES NFR BLD AUTO: 6.7 % — SIGNIFICANT CHANGE UP (ref 2–14)
NEUTROPHILS # BLD AUTO: 5.72 K/UL — SIGNIFICANT CHANGE UP (ref 1.8–7.4)
NEUTROPHILS NFR BLD AUTO: 75 % — SIGNIFICANT CHANGE UP (ref 43–77)
NRBC # BLD: 0 /100 WBCS — SIGNIFICANT CHANGE UP (ref 0–0)
PCO2 BLDV: 42 MMHG — SIGNIFICANT CHANGE UP (ref 39–42)
PH BLDV: 7.37 — SIGNIFICANT CHANGE UP (ref 7.32–7.43)
PLATELET # BLD AUTO: 223 K/UL — SIGNIFICANT CHANGE UP (ref 150–400)
PO2 BLDV: 41 MMHG — SIGNIFICANT CHANGE UP (ref 25–45)
POTASSIUM BLDV-SCNC: 4.3 MMOL/L — SIGNIFICANT CHANGE UP (ref 3.5–5.1)
POTASSIUM SERPL-MCNC: 4.5 MMOL/L — SIGNIFICANT CHANGE UP (ref 3.5–5.3)
POTASSIUM SERPL-SCNC: 4.5 MMOL/L — SIGNIFICANT CHANGE UP (ref 3.5–5.3)
PROT SERPL-MCNC: 6.5 G/DL — SIGNIFICANT CHANGE UP (ref 6–8.3)
RBC # BLD: 4.39 M/UL — SIGNIFICANT CHANGE UP (ref 3.8–5.2)
RBC # FLD: 14 % — SIGNIFICANT CHANGE UP (ref 10.3–14.5)
SAO2 % BLDV: 65.1 % — LOW (ref 67–88)
SODIUM SERPL-SCNC: 142 MMOL/L — SIGNIFICANT CHANGE UP (ref 135–145)
WBC # BLD: 7.63 K/UL — SIGNIFICANT CHANGE UP (ref 3.8–10.5)
WBC # FLD AUTO: 7.63 K/UL — SIGNIFICANT CHANGE UP (ref 3.8–10.5)

## 2023-01-23 PROCEDURE — 99285 EMERGENCY DEPT VISIT HI MDM: CPT | Mod: GC

## 2023-01-23 PROCEDURE — 71046 X-RAY EXAM CHEST 2 VIEWS: CPT | Mod: 26

## 2023-01-23 RX ORDER — METOPROLOL TARTRATE 50 MG
5 TABLET ORAL ONCE
Refills: 0 | Status: COMPLETED | OUTPATIENT
Start: 2023-01-23 | End: 2023-01-23

## 2023-01-23 RX ORDER — SODIUM CHLORIDE 9 MG/ML
1000 INJECTION, SOLUTION INTRAVENOUS ONCE
Refills: 0 | Status: COMPLETED | OUTPATIENT
Start: 2023-01-23 | End: 2023-01-23

## 2023-01-23 RX ADMIN — SODIUM CHLORIDE 1000 MILLILITER(S): 9 INJECTION, SOLUTION INTRAVENOUS at 22:48

## 2023-01-23 RX ADMIN — Medication 5 MILLIGRAM(S): at 22:48

## 2023-01-23 NOTE — ED PROVIDER NOTE - ATTENDING CONTRIBUTION TO CARE
Attending MD Horne: I personally have seen and examined this patient.  Resident note reviewed and agree on plan of care and except where noted.  See below for details.     Seen in [facility]    77F with PMH/PSH including HTN, HLD, hypothyroidism, OA, s/p MVR, DVT RLE on Xarelto presents to the ED with palpitations and tachycardia to 140s.  Reports that since October 2022 she has had a nonproductive cough.  Reports previously diagnosed with Covid, then with pneumonia and has been on both antibiotics and steroids without improvement of cough.  Reports yesterday was going up ladder, hit head on cabinet and subsequently developed palpitations.  Denies LOC, severe headache, change in vision.  Reports here was found to be in AFib/AFlutter in the 140s.  Denies chest pain, shortness of breath.  Denies abdominal pain, nausea, vomiting, diarrhea, bloody or black stools, urinary complaints.  Denies fevers, chills.    Exam:   General: NAD  HENT: head NCAT, airway patent  Eyes: no conjunctival injection   Lungs: lungs CTAB with good inspiratory effort, no wheezing, no rhonchi, no rales  Cardiac: +S1S2, no obvious m/r/g, no LE edema  GI: abdomen soft with +BS, NT, ND  : no CVAT  MSK: ranging neck and extremities freely, no calf tenderness, swelling, erythema or warmth   Neuro: moving all extremities spontaneously, sensory grossly intact, no gross neuro deficits  Psych: normal mood and affect    A/P: 77F with palpitations, new arrhythmia, will check labs for metabolic derangement, will check TFTs (will not get results during Emergency Department stay), will keep on monitor, EKG, will obtain trop, will rate control, will obtain CTH given head trauma, will use CXR for cardiac silhouette, possible occult infection, will swab, given persistent cough will also obtain CT chest for possible occult pneumonia, will sign out to overnight team

## 2023-01-23 NOTE — ED ADULT TRIAGE NOTE - CHIEF COMPLAINT QUOTE
cough began in october; now has clogged ear; cxr and chest ct done; states bronchus "filled with mucous"; saw ENT and started on nasal spray and amoxicillin; then sob began last night with "heart racing"; continues today; haskins; ps MVR; on Xaralto for right leg dvt cough began in october; now has clogged ear; cxr and chest ct done; states bronchus "filled with mucous"; saw ENT and started on nasal spray and amoxicillin; then sob began last night with "heart racing"; continues today; haskins; psh MVR; on Xaralto for right leg dvt  During EKG, pt revealed a bruise to upper forehead and states she hit her head last night, pt on Xaralto

## 2023-01-23 NOTE — ED PROVIDER NOTE - CLINICAL SUMMARY MEDICAL DECISION MAKING FREE TEXT BOX
77F w/ a history of HTN, HLD, hypothyroid, OA, seasonal allergies, s/p mitral valve replacement bovine pericardial valve 2012, RLE DVT on Xarelto, presents with palpitations and tachycardia to the 140s with a regular rhythm.  Patient has had persistent cough since October, previously diagnosed with COVID, then presumed secondary pneumonia by PMD prescribed antibiotics and steroids with no improvement cough remains.  Yesterday patient was going up a ladder hit her head on a cabinet shortly afterward began experiencing palpitations, which persisted into today.  Found to be in A. fib/a flutter with a rate of 140s on triage EKG.  No history of previous A. fib or flutter.  Patient denies fever/chills, chest pain, shortness of breath, abdominal pain, nausea/vomiting, diarrhea/constipation, dysuria.  AVSS, lungs CTA B/L, irregularly irregular rhythm rate 140s, no peripheral edema, abdomen soft, ND NT.  New a flutter/fib on EKG, concern for possible infectious origin given persistent cough, but concern for head injury given head strike on Xarelto, possible cardiac ischemic disease, will get labs, EKG, chest x-ray, CT, give meds and reassess. 77F w/ a history of HTN, HLD, hypothyroid, OA, seasonal allergies, s/p mitral valve replacement bovine pericardial valve 2012, RLE DVT on Xarelto, presents with palpitations and tachycardia to the 140s with a regular rhythm.  Patient has had persistent cough since October, previously diagnosed with COVID, then presumed secondary pneumonia by PMD prescribed antibiotics and steroids with no improvement cough remains.  Yesterday patient was going up a ladder hit her head on a cabinet shortly afterward began experiencing palpitations, which persisted into today.  Found to be in A. fib/a flutter with a rate of 140s on triage EKG.  No history of previous A. fib or flutter.  Patient denies fever/chills, chest pain, shortness of breath, abdominal pain, nausea/vomiting, diarrhea/constipation, dysuria.  AVSS, lungs CTA B/L, irregularly irregular rhythm rate 140s, no peripheral edema, abdomen soft, ND NT.  New a flutter/fib on EKG, concern for possible infectious origin given persistent cough, but concern for head injury given head strike on Xarelto, possible cardiac ischemic disease, will get labs, EKG, chest x-ray, CT, give meds and reassess.    Attending MD Horne: See Attending Statement

## 2023-01-23 NOTE — ED PROVIDER NOTE - OBJECTIVE STATEMENT
With a history of HTN, HLD, hypothyroid, OA, seasonal allergies, s/p mitral valve replacement bovine pericardial valve 2012, RLE DVT on Xarelto, presents with palpitations and tachycardia to the 140s with a regular rhythm.  Patient has had persistent cough since October, previously diagnosed with COVID, then presumed secondary pneumonia by PMD prescribed antibiotics and steroids with no improvement cough remains.  Yesterday patient was going up a ladder hit her head on a cabinet shortly afterward began experiencing palpitations, which persisted into today.  Found to be in A. fib/a flutter with a rate of 140s on triage EKG.  No history of previous A. fib or flutter.  Patient denies fever/chills, chest pain, shortness of breath, abdominal pain, nausea/vomiting, diarrhea/constipation, dysuria.

## 2023-01-23 NOTE — ED PROVIDER NOTE - PHYSICAL EXAMINATION
GENERAL: well appearing in no acute distress, non-toxic appearing  CARDIAC: Rate 140, Irregularly Irregular rhythm, normal S1S2, no appreciable murmurs  PULM: normal breath sounds, clear to ascultation bilaterally, no rales, rhonchi, wheezing  GI: Abd soft, nondistended, nontender  NEURO:  normal speech AAOx3  MSK:  no peripheral edema, no calf tenderness b/l

## 2023-01-24 DIAGNOSIS — I82.409 ACUTE EMBOLISM AND THROMBOSIS OF UNSPECIFIED DEEP VEINS OF UNSPECIFIED LOWER EXTREMITY: ICD-10-CM

## 2023-01-24 DIAGNOSIS — I48.92 UNSPECIFIED ATRIAL FLUTTER: ICD-10-CM

## 2023-01-24 DIAGNOSIS — E03.9 HYPOTHYROIDISM, UNSPECIFIED: ICD-10-CM

## 2023-01-24 DIAGNOSIS — I80.229 PHLEBITIS AND THROMBOPHLEBITIS OF UNSPECIFIED POPLITEAL VEIN: ICD-10-CM

## 2023-01-24 DIAGNOSIS — I10 ESSENTIAL (PRIMARY) HYPERTENSION: ICD-10-CM

## 2023-01-24 DIAGNOSIS — Z29.9 ENCOUNTER FOR PROPHYLACTIC MEASURES, UNSPECIFIED: ICD-10-CM

## 2023-01-24 LAB
APPEARANCE UR: CLEAR — SIGNIFICANT CHANGE UP
APTT BLD: 38 SEC — HIGH (ref 27.5–35.5)
BACTERIA # UR AUTO: NEGATIVE — SIGNIFICANT CHANGE UP
BILIRUB UR-MCNC: NEGATIVE — SIGNIFICANT CHANGE UP
COLOR SPEC: YELLOW — SIGNIFICANT CHANGE UP
DIFF PNL FLD: NEGATIVE — SIGNIFICANT CHANGE UP
EPI CELLS # UR: 1 /HPF — SIGNIFICANT CHANGE UP
GLUCOSE UR QL: NEGATIVE — SIGNIFICANT CHANGE UP
HYALINE CASTS # UR AUTO: 2 /LPF — SIGNIFICANT CHANGE UP (ref 0–2)
INR BLD: 2.26 RATIO — HIGH (ref 0.88–1.16)
KETONES UR-MCNC: ABNORMAL
LEUKOCYTE ESTERASE UR-ACNC: NEGATIVE — SIGNIFICANT CHANGE UP
NITRITE UR-MCNC: NEGATIVE — SIGNIFICANT CHANGE UP
PH UR: 6 — SIGNIFICANT CHANGE UP (ref 5–8)
PROT UR-MCNC: SIGNIFICANT CHANGE UP
PROTHROM AB SERPL-ACNC: 26.4 SEC — HIGH (ref 10.5–13.4)
RAPID RVP RESULT: SIGNIFICANT CHANGE UP
RBC CASTS # UR COMP ASSIST: 1 /HPF — SIGNIFICANT CHANGE UP (ref 0–4)
SARS-COV-2 RNA SPEC QL NAA+PROBE: SIGNIFICANT CHANGE UP
SP GR SPEC: 1.02 — SIGNIFICANT CHANGE UP (ref 1.01–1.02)
UROBILINOGEN FLD QL: ABNORMAL
WBC UR QL: 3 /HPF — SIGNIFICANT CHANGE UP (ref 0–5)

## 2023-01-24 PROCEDURE — 99223 1ST HOSP IP/OBS HIGH 75: CPT

## 2023-01-24 PROCEDURE — 71250 CT THORAX DX C-: CPT | Mod: 26,MA

## 2023-01-24 PROCEDURE — 70450 CT HEAD/BRAIN W/O DYE: CPT | Mod: 26,MA

## 2023-01-24 RX ORDER — ACETAMINOPHEN 500 MG
650 TABLET ORAL EVERY 6 HOURS
Refills: 0 | Status: DISCONTINUED | OUTPATIENT
Start: 2023-01-24 | End: 2023-02-03

## 2023-01-24 RX ORDER — UBIDECARENONE 100 MG
0 CAPSULE ORAL
Qty: 0 | Refills: 0 | DISCHARGE

## 2023-01-24 RX ORDER — LANOLIN ALCOHOL/MO/W.PET/CERES
3 CREAM (GRAM) TOPICAL AT BEDTIME
Refills: 0 | Status: DISCONTINUED | OUTPATIENT
Start: 2023-01-24 | End: 2023-02-03

## 2023-01-24 RX ORDER — BENZOCAINE AND MENTHOL 5; 1 G/100ML; G/100ML
1 LIQUID ORAL ONCE
Refills: 0 | Status: COMPLETED | OUTPATIENT
Start: 2023-01-24 | End: 2023-01-25

## 2023-01-24 RX ORDER — ATORVASTATIN CALCIUM 80 MG/1
20 TABLET, FILM COATED ORAL AT BEDTIME
Refills: 0 | Status: DISCONTINUED | OUTPATIENT
Start: 2023-01-24 | End: 2023-02-03

## 2023-01-24 RX ORDER — LEVOTHYROXINE SODIUM 125 MCG
50 TABLET ORAL DAILY
Refills: 0 | Status: DISCONTINUED | OUTPATIENT
Start: 2023-01-24 | End: 2023-02-03

## 2023-01-24 RX ORDER — PANTOPRAZOLE SODIUM 20 MG/1
40 TABLET, DELAYED RELEASE ORAL
Refills: 0 | Status: DISCONTINUED | OUTPATIENT
Start: 2023-01-24 | End: 2023-02-03

## 2023-01-24 RX ORDER — ONDANSETRON 8 MG/1
4 TABLET, FILM COATED ORAL EVERY 8 HOURS
Refills: 0 | Status: DISCONTINUED | OUTPATIENT
Start: 2023-01-24 | End: 2023-02-03

## 2023-01-24 RX ORDER — METOPROLOL TARTRATE 50 MG
50 TABLET ORAL DAILY
Refills: 0 | Status: DISCONTINUED | OUTPATIENT
Start: 2023-01-24 | End: 2023-02-03

## 2023-01-24 RX ORDER — RIVAROXABAN 15 MG-20MG
20 KIT ORAL
Refills: 0 | Status: DISCONTINUED | OUTPATIENT
Start: 2023-01-24 | End: 2023-01-26

## 2023-01-24 RX ORDER — IPRATROPIUM/ALBUTEROL SULFATE 18-103MCG
3 AEROSOL WITH ADAPTER (GRAM) INHALATION ONCE
Refills: 0 | Status: COMPLETED | OUTPATIENT
Start: 2023-01-24 | End: 2023-01-24

## 2023-01-24 RX ORDER — ASPIRIN/CALCIUM CARB/MAGNESIUM 324 MG
81 TABLET ORAL DAILY
Refills: 0 | Status: DISCONTINUED | OUTPATIENT
Start: 2023-01-24 | End: 2023-02-03

## 2023-01-24 RX ADMIN — Medication 50 MILLIGRAM(S): at 17:11

## 2023-01-24 RX ADMIN — RIVAROXABAN 20 MILLIGRAM(S): KIT at 17:10

## 2023-01-24 RX ADMIN — Medication 5 MILLIGRAM(S): at 00:24

## 2023-01-24 RX ADMIN — Medication 3 MILLILITER(S): at 23:41

## 2023-01-24 RX ADMIN — Medication 81 MILLIGRAM(S): at 17:10

## 2023-01-24 RX ADMIN — ATORVASTATIN CALCIUM 20 MILLIGRAM(S): 80 TABLET, FILM COATED ORAL at 22:23

## 2023-01-24 NOTE — H&P ADULT - HISTORY OF PRESENT ILLNESS
77F PMH HTN, hypothyroidism, OA, DVT started on Xarelto 6 weeks ago, presents with palpitations. Patient reports that she began developing palpitations yesterday, which she believed would resolve alone, however symptoms got worse. She hit her head when climbing on a ladder, and then went to the hospital. Patient reports persistent cough, which has been ongoing since October. Reports weight loss of about 10 lbs, which has been intentional due to diet changes.     ED course: Tachycardia with stable BP. CBC within normal limits and without leukocytosis. CMP unremarkable. EKG with atrial flutter. CXR with small right effusion. CT chest with small effusion. Patient received metoprolol tartrate 5 mg IV 2x after which she returned to regular rate, and patient is now for admission to medicine service for further evaluation and treatment.
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
## 2023-01-24 NOTE — ED ADULT NURSE REASSESSMENT NOTE - NS ED NURSE REASSESS COMMENT FT1
received report from Vale GAYTAN. pt resting comfortably in stretcher. A&Ox4. VSS. NAD noted. pt admitted to tele, pending bed assignment, remains on tele monitoring, 81 NSR. plan of care discussed. safety and comfort measures maintained.

## 2023-01-24 NOTE — H&P ADULT - ASSESSMENT
77F PMH HTN, hypothyroidism, OA, DVT on Xarelto, presents with palpitations and found with rapid atrial flutter.

## 2023-01-24 NOTE — ED ADULT NURSE NOTE - CHIEF COMPLAINT QUOTE
cough began in october; now has clogged ear; cxr and chest ct done; states bronchus "filled with mucous"; saw ENT and started on nasal spray and amoxicillin; then sob began last night with "heart racing"; continues today; haskins; psh MVR; on Xaralto for right leg dvt  During EKG, pt revealed a bruise to upper forehead and states she hit her head last night, pt on Xaralto

## 2023-01-24 NOTE — PATIENT PROFILE ADULT - BILL PAYMENT
CHIEF COMPLAINT(S): No chief complaint on file.      HISTORY OF PRESENT ILLNESS:  Salena Staples is a 51 year old right-handed female who presents for a follow-up visit for their bilateral elbow pain secondary to lateral epicondylitis. She was previously seen on 07/07/22 where she received bilateral corticosteroid injections and instructed to take acetaminophen three times daily for pain, wear volar wrist braces at night, ice massage, and attend occupational therapy. Patient has attended 3 visits of occupational therapy. Today she rates the pain ***/10, describing it as *** and localizing it to ***. The symptoms are aggravated by ***. She *** neurological or mechanical symptoms. ***    Accompanied by ***  Location: bilateral elbow  Date of Onset: 05/2022  Severity:{#:21430}/10   Patient has been *** compliant with recommended treatment at previous visit.   Patient feels at ***/100.  Patient feels ***% improved since last office visit.   COVID Vaccine none   Works in accounting - part time    Review of Systems:   Skin: {ROS - SKIN:531680::\"No problems with hair or nails. No rash. No new skin lesions.\"}  Heent: {ROS HEENT:260291::\"Pt denies problems with head, eyes, ears, nose, mouth, throat and neck\"}  Respiratory: {ROS RESP:634362::\"No coughing, wheezing, changes in voice, nor shortness of breath\"}  Cardiovascular: {ROS CARDIAC:349394::\"No chest pain, palpitations or other cardiac complaints noted\"}  Gastrointestinal: {[facility] ROS GI:322669::\"No diarrhea, constipation, abdominal pain or other complaints noted\"}  Genitourinary: {[facility] OPHTH :971289::\"Pt denies urinary pain, bleeding and voiding problems\"}  Musculoskeletal: {ROS MUSCULOSKELETAL:342929::\"Pt denies pain, swelling, weakness or limitation of motion\"}  Neurologic: {ROS NEURO:444104::\"Pt denies syncope, seizures, paralysis, involuntary movements or gait problems\"}  Psychiatric: {ROS PSYCH:496022::\"Pt denies sleep, anxiety, depression, sexual and  other psychiatric problems\"}  Hematologic/Lymphatic/Immunologic:{SRPLYMPHATIC:979573::\"Patient denies fever, night sweats or weight loss \"}  Endocrine:{SRPENDO:542850::\"Patient denies polyphagia, polydipsia or polyuria \"}         Roomed By: ***    MEDICATIONS:  Current Outpatient Medications   Medication Sig Dispense Refill   • albuterol 108 (90 Base) MCG/ACT inhaler 2 puffs every 4 hours as needed.     • albuterol 108 (90 Base) MCG/ACT inhaler Inhale 2 puffs into the lungs every 4 hours as needed.     • guaiFENesin-codeine (GUAIFENESIN AC) 100-10 MG/5ML syrup Take 5 mLs by mouth 3 times daily as needed for Cough. 180 mL 0   • ketoconazole (NIZORAL) 2 % cream      • montelukast (SINGULAIR) 10 MG tablet Take 10 mg by mouth.       No current facility-administered medications for this visit.       ALLERGIES:     ALLERGIES:   Allergen Reactions   • Latex Other (See Comments) and RASH     Cerner Allergy Text Annotation: Latex, Cerner Reaction: itching, rash     • Penicillins HIVES and Other (See Comments)     Cerner Allergy Text Annotation: penicillins, Cerner Reaction: hives     • Sulfa Antibiotics HIVES and Other (See Comments)     Cerner Allergy Text Annotation: sulfa drugs, Cerner Reaction: hives         VITAL SIGNS:  Visit Vitals  LMP 09/10/2021     There is no height or weight on file to calculate BMI.    EXAM:  This is a 51 year old female, awake, alert, and cooperative. She is well nourished, well developed, and in no apparent distress.  Pulmonary - Respiratory rate within normal limits. No increased work of breathing.    Bilateral Elbow ROM  Elbow motion is limited and painful.     Right Wrist ROM  Wrist motion is full and painfree.     Left Wrist ROM  Wrist motion is full and painfree.     Bilateral elbow  Palpation Exam  Tenderness over the lateral epicondyle and common extensor tendon, otherwise no tenderness or swelling appreciated.     Bilateral  Elbow  Surgical scars are absent.  No effusion  noted.  Cubital tunnel bend test is negative.  No UCL ligamentous instability appreciated.  No LUCL ligamentous instability noted.  Cubital Tinel's (Percussion) test is negative.  Tennis Elbow test is negative.  Golfer's Elbow test is negative.  Radial capitellar grind test is negative.  Valgus extenstion overload testing is negative.  No resisted long finger extension pain on exam.     Neurologic Exam  Right Strength  Elbow flexion strength is  5/5.  Elbow extension strength is 5/5.  Wrist flexion strength is 5/5.  Wrist extension strength is 5/5.   strength is 5/5.  Intrinsics strength is 5/5.  EPL strength is 5/5.  Pinch mechanism is normal .     Left Strength  Elbow flexion strength is 5/5.  Elbow extension strength is 5/5.  Wrist flexion strength is 5/5.  Wrist extension strength is 4/5. Pain with exam   strength is 5/5.  Intrinsics strength is 5/5.  EPL strength is 5/5.  Pinch mechanism is normal.     Right Elbow Sensation  Medial ABC demonstates normal sensation.  Lateral ABC demonstates normal sensation.  Posterior ABC (radial) demonstrates normal sensation.  Medial brachial cutaneous demonstrates normal sensation.     Left Elbow Sensation  Medial ABC demonstates normal sensation.  Lateral ABC demonstates normal sensation.  Posterior ABC (radial) demonstrates normal sensation.  Medial brachial cutaneous demonstrates normal sensation.     Right Wrist Sensation  Median nerve demonstates normal sensation.  Ulnar nerve demonstates normal sensation.  Radial nerve demonstrates normal sensation.     Left Wrist Sensation  Median nerve demonstates normal sensation.  Ulnar nerve demonstates normal sensation.  Radial nerve demonstrates normal sensation.     Right Reflex Exam  Biceps reflex is2.  Brachioradialis reflex is 2.  Triceps reflex is 2.     Left Reflex Exam  Biceps reflex is 2.  Brachioradialis reflex is 2.  Triceps reflex is 2.     Right Vascular Exam  Radial pulse is 2+.  Edema is absent.  Capillary  fill is normal.  Giles's Test isnormal.     Left Vascular Exam  Radial pulse is 2+.  Edema is absent.  Capillary fill is normal.  Giles's Test is normal.   Lymphatics - There is no evidence of generalized adenopathy.    IMAGING &TEST:  Relevant imaging studies (x-rays, MRI), diagnostic tests, labs, therapy and medical notes were reviewed during the encounter and discussed with the patient.  These show:  ***    ASSESSMENT & PLAN:  Salena Staples is a 51 year old female ***  .   Plan as follows:  1. ***  2. ***  3. ***  4. ***     Restrictions: ***    The patient will follow up with us in {NUMBERS 1-31:367420} {days wks mos yr:073486}     Total time required for this encounter: {MINUTES:386252} minutes include evaluation of medical records, review of recent new imaging, face-to-face contact with the patient, medical decision making, and documentation    No diagnosis found.      Electronically Signed by:    Samson Marsh DO 08/19/22    ***   no

## 2023-01-24 NOTE — ED ADULT NURSE NOTE - OBJECTIVE STATEMENT
Pt is a 77y F PMH HLD, HTN, hypothyroidism p/w palpitations. Pt reports feeling heart racing mild midsternal chest pain and SOB. Pt placed on cardiac monitor, EKG completed showing a-fib RVR. A&Ox4, ABBOTT, coarse lung sounds, distal pulses intact, abdomen soft, skin intact. Side rails up for safety, call bell and personal items within reach, instructed to call for assistance, verbalizes understanding. Will continue to monitor. Pt is a 77y F PMH HLD, HTN, hypothyroidism p/w palpitations. Pt reports feeling heart racing mild midsternal chest pain and SOB. Pt placed on cardiac monitor, EKG completed showing a-fib RVR. A&Ox4, ABBOTT, coarse lung sounds, distal pulses intact, abdomen soft, skin intact. Side rails up for safety, call bell and personal items within reach, instructed to call for assistance, verbalizes understanding. Will continue to monitor.A&Ox4, ABBOTT, coarse lung sounds, distal pulses intact, abdomen soft, skin intact. Side rails up for safety, call bell and personal items within reach, instructed to call for assistance, verbalizes understanding. Will continue to monitor. Pt is a 77y F PMH HLD, HTN, hypothyroidism p/w palpitations. Pt reports feeling heart racing mild midsternal chest pain and SOB. Pt placed on cardiac monitor, EKG completed showing a-fib RVR. Endorses cough began in october, now has clogged ear. A&Ox4, ABBOTT, coarse lung sounds, distal pulses intact, abdomen soft, skin intact. Side rails up for safety, call bell and personal items within reach, instructed to call for assistance, verbalizes understanding. Will continue to monitor.A&Ox4, ABBOTT, coarse lung sounds, distal pulses intact, abdomen soft, skin intact. Side rails up for safety, call bell and personal items within reach, instructed to call for assistance, verbalizes understanding. Will continue to monitor.

## 2023-01-24 NOTE — H&P ADULT - PROBLEM SELECTOR PLAN 1
Patient presented with palpitations and found with atrial flutter with rate in 140s. Received metoprolol 5mg IV in ED 2x and rate now controlled and in 70s. Of note, patient is fully anticoagulated on Xarelto for DVT and is on BB with metoprolol succinate 50 mg as an outpatient. Unclear why patient is now having exacerbation. Low suspicion of infection given no fever or leukocytosis. Cough present, though seems chronic as it has been ongoing since October.   - Follow up with COVID PCR and RVP to assess for viral illness  - For now, continue with metoprolol succinate 50 mg PO Qd  - Cardiology consulted, will follow up recommendations  - Continue with full AC with Xarelto 20 mg PO Qd  - Will check TSH and A1c with AM labs

## 2023-01-24 NOTE — H&P ADULT - NSHPLABSRESULTS_GEN_ALL_CORE
I have personally reviewed the 12 lead EKG and have found atrial flutter  I have personally reviewed the CXR and found small effusions bilaterally

## 2023-01-24 NOTE — PATIENT PROFILE ADULT - FALL HARM RISK - HARM RISK INTERVENTIONS

## 2023-01-25 DIAGNOSIS — R05.9 COUGH, UNSPECIFIED: ICD-10-CM

## 2023-01-25 LAB
A1C WITH ESTIMATED AVERAGE GLUCOSE RESULT: 5.4 % — SIGNIFICANT CHANGE UP (ref 4–5.6)
ANION GAP SERPL CALC-SCNC: 10 MMOL/L — SIGNIFICANT CHANGE UP (ref 5–17)
BUN SERPL-MCNC: 16 MG/DL — SIGNIFICANT CHANGE UP (ref 7–23)
CALCIUM SERPL-MCNC: 9.1 MG/DL — SIGNIFICANT CHANGE UP (ref 8.4–10.5)
CHLORIDE SERPL-SCNC: 106 MMOL/L — SIGNIFICANT CHANGE UP (ref 96–108)
CO2 SERPL-SCNC: 24 MMOL/L — SIGNIFICANT CHANGE UP (ref 22–31)
CREAT SERPL-MCNC: 0.89 MG/DL — SIGNIFICANT CHANGE UP (ref 0.5–1.3)
EGFR: 67 ML/MIN/1.73M2 — SIGNIFICANT CHANGE UP
ESTIMATED AVERAGE GLUCOSE: 108 MG/DL — SIGNIFICANT CHANGE UP (ref 68–114)
GLUCOSE SERPL-MCNC: 89 MG/DL — SIGNIFICANT CHANGE UP (ref 70–99)
HCT VFR BLD CALC: 37.9 % — SIGNIFICANT CHANGE UP (ref 34.5–45)
HCV AB S/CO SERPL IA: 0.06 S/CO — SIGNIFICANT CHANGE UP (ref 0–0.99)
HCV AB SERPL-IMP: SIGNIFICANT CHANGE UP
HGB BLD-MCNC: 12 G/DL — SIGNIFICANT CHANGE UP (ref 11.5–15.5)
MCHC RBC-ENTMCNC: 30.6 PG — SIGNIFICANT CHANGE UP (ref 27–34)
MCHC RBC-ENTMCNC: 31.7 GM/DL — LOW (ref 32–36)
MCV RBC AUTO: 96.7 FL — SIGNIFICANT CHANGE UP (ref 80–100)
NRBC # BLD: 0 /100 WBCS — SIGNIFICANT CHANGE UP (ref 0–0)
PLATELET # BLD AUTO: 172 K/UL — SIGNIFICANT CHANGE UP (ref 150–400)
POTASSIUM SERPL-MCNC: 3.9 MMOL/L — SIGNIFICANT CHANGE UP (ref 3.5–5.3)
POTASSIUM SERPL-SCNC: 3.9 MMOL/L — SIGNIFICANT CHANGE UP (ref 3.5–5.3)
RBC # BLD: 3.92 M/UL — SIGNIFICANT CHANGE UP (ref 3.8–5.2)
RBC # FLD: 13.7 % — SIGNIFICANT CHANGE UP (ref 10.3–14.5)
SODIUM SERPL-SCNC: 140 MMOL/L — SIGNIFICANT CHANGE UP (ref 135–145)
TSH SERPL-MCNC: 2.33 UIU/ML — SIGNIFICANT CHANGE UP (ref 0.27–4.2)
WBC # BLD: 4.23 K/UL — SIGNIFICANT CHANGE UP (ref 3.8–10.5)
WBC # FLD AUTO: 4.23 K/UL — SIGNIFICANT CHANGE UP (ref 3.8–10.5)

## 2023-01-25 PROCEDURE — 99232 SBSQ HOSP IP/OBS MODERATE 35: CPT

## 2023-01-25 PROCEDURE — 93306 TTE W/DOPPLER COMPLETE: CPT | Mod: 26

## 2023-01-25 PROCEDURE — 99223 1ST HOSP IP/OBS HIGH 75: CPT

## 2023-01-25 RX ORDER — FUROSEMIDE 40 MG
20 TABLET ORAL ONCE
Refills: 0 | Status: COMPLETED | OUTPATIENT
Start: 2023-01-25 | End: 2023-01-25

## 2023-01-25 RX ORDER — BENZOCAINE AND MENTHOL 5; 1 G/100ML; G/100ML
1 LIQUID ORAL
Refills: 0 | Status: DISCONTINUED | OUTPATIENT
Start: 2023-01-25 | End: 2023-02-02

## 2023-01-25 RX ORDER — IPRATROPIUM/ALBUTEROL SULFATE 18-103MCG
3 AEROSOL WITH ADAPTER (GRAM) INHALATION EVERY 6 HOURS
Refills: 0 | Status: DISCONTINUED | OUTPATIENT
Start: 2023-01-25 | End: 2023-02-03

## 2023-01-25 RX ORDER — FUROSEMIDE 40 MG
20 TABLET ORAL DAILY
Refills: 0 | Status: DISCONTINUED | OUTPATIENT
Start: 2023-01-26 | End: 2023-02-03

## 2023-01-25 RX ORDER — DRONEDARONE 400 MG/1
400 TABLET, FILM COATED ORAL
Refills: 0 | Status: DISCONTINUED | OUTPATIENT
Start: 2023-01-25 | End: 2023-01-25

## 2023-01-25 RX ADMIN — RIVAROXABAN 20 MILLIGRAM(S): KIT at 17:47

## 2023-01-25 RX ADMIN — Medication 50 MICROGRAM(S): at 05:13

## 2023-01-25 RX ADMIN — BENZOCAINE AND MENTHOL 1 LOZENGE: 5; 1 LIQUID ORAL at 11:15

## 2023-01-25 RX ADMIN — BENZOCAINE AND MENTHOL 1 LOZENGE: 5; 1 LIQUID ORAL at 00:27

## 2023-01-25 RX ADMIN — Medication 20 MILLIGRAM(S): at 20:24

## 2023-01-25 RX ADMIN — ATORVASTATIN CALCIUM 20 MILLIGRAM(S): 80 TABLET, FILM COATED ORAL at 20:25

## 2023-01-25 RX ADMIN — Medication 600 MILLIGRAM(S): at 11:14

## 2023-01-25 RX ADMIN — Medication 50 MILLIGRAM(S): at 05:13

## 2023-01-25 RX ADMIN — Medication 600 MILLIGRAM(S): at 00:28

## 2023-01-25 RX ADMIN — Medication 81 MILLIGRAM(S): at 11:15

## 2023-01-25 RX ADMIN — PANTOPRAZOLE SODIUM 40 MILLIGRAM(S): 20 TABLET, DELAYED RELEASE ORAL at 05:13

## 2023-01-25 NOTE — CONSULT NOTE ADULT - ASSESSMENT
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 She was found to be in atrial flutter at a rate of 130. She converted back into sinus rhythm with metoprolol 5mg x2.     #Atrial flutter  She was found to be in atrial flutter at a rate of 130. She converted back into sinus rhythm with metoprolol 5mg x2. 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 She was found to be in atrial flutter at a rate of 130. She converted back into sinus rhythm with metoprolol 5mg x2.     #Atrial flutter  She was found to be in atrial flutter at a rate of 130. She converted back into sinus rhythm with metoprolol 5mg x2. She has an atrial volume index of 88.  -Continue metoprolol succinate 50mg daily  -Continue with rivaroxaban 20mg nightly  -Continue with ASA and atorvastatin   -Discussed atrial flutter ablation with the patient and she would like to think about it and talk with her family before making a decision.   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 She was found to be in atrial flutter at a rate of 130. She converted back into sinus rhythm with metoprolol 5mg x2.     #Atrial flutter  She was found to be in atrial flutter at a rate of 130. She converted back into sinus rhythm with metoprolol 5mg x2. She has an atrial volume index of 88.  -Can increase metoprolol metoprolol to 50mg BID with hold parameters HR<55  -Continue with rivaroxaban 20mg nightly  -Continue with ASA and atorvastatin   -Discussed atrial flutter ablation with the patient and she would like to think about it and talk with her family before making a decision. Would likely be outpatient  -Patient on levothyroxine 50mg daily, check TSH and T4    Hernan Wild MD  Cardiology fellow

## 2023-01-25 NOTE — PROGRESS NOTE ADULT - PROBLEM SELECTOR PLAN 2
Chronic cough ongoing since October.   - Per Pt abnormal PFTs outpt   - states was prescribed trilogy how has not been taking it as it didn't help with symptoms and didn't like how it made her feel  - CT chest with small pleural effuison R and trace L effsuion, satting well on RA, no s/s of infection and appears euvolemic   - f/u outpt with Dr. Quinonez (pulmonary) for cough and mild pleural effusion

## 2023-01-25 NOTE — PROGRESS NOTE ADULT - PROBLEM SELECTOR PLAN 1
presented with atrial flutter with rate in 140s improved with metoprolol 5mg IV in ED 2x   - currently in NSR 60-70s  - seen by EP, However pt does not want to proceed with ablation at this time, states will f/u with EP outpt  - COVID PCR & RVP neg  - CT chest with small pleural effusion R and trace in L lung  - c/w metoprolol succinate 50 mg PO Qd  - ECHO as above, pending final rec from cardiology   - Continue with full AC with Xarelto 20 mg PO Qd  - TSH and HbA1c : wnl

## 2023-01-25 NOTE — CONSULT NOTE ADULT - SUBJECTIVE AND OBJECTIVE BOX
Patient seen and evaluated at bedside    Chief Complaint:    HPI:  77F PMH HTN, hypothyroidism, OA, DVT started on Xarelto 6 weeks ago, presents with palpitations. Patient reports that she began developing palpitations yesterday, which she believed would resolve alone, however symptoms got worse. She hit her head when climbing on a ladder, and then went to the hospital. Patient reports persistent cough, which has been ongoing since October. Reports weight loss of about 10 lbs, which has been intentional due to diet changes.     ED course: Tachycardia with stable BP. CBC within normal limits and without leukocytosis. CMP unremarkable. EKG with atrial flutter. CXR with small right effusion. CT chest with small effusion. Patient received metoprolol tartrate 5 mg IV 2x after which she returned to regular rate, and patient is now for admission to medicine service for further evaluation and treatment.  (24 Jan 2023 11:02)      PMHx:   Arthritis of knee, left    History of mitral valve prolapse    Vitamin B12 deficiency    Hypertension    Hypothyroidism    Seasonal allergies    Osteoarthrosis    HLD (hyperlipidemia)        PSHx:   Broken Arm    History of skin graft    Mitral valve replaced    History of dilation and curettage    S/P mitral valve replacement    S/P knee replacement    S/P ORIF (open reduction internal fixation) fracture        Allergies:  Macrodantin (Unknown)  sulfa drugs (Rash)  sulfonylureas (Unknown)      Home Meds:    Current Medications:   acetaminophen     Tablet .. 650 milliGRAM(s) Oral every 6 hours PRN  aluminum hydroxide/magnesium hydroxide/simethicone Suspension 30 milliLiter(s) Oral every 4 hours PRN  aspirin enteric coated 81 milliGRAM(s) Oral daily  atorvastatin 20 milliGRAM(s) Oral at bedtime  levothyroxine 50 MICROGram(s) Oral daily  melatonin 3 milliGRAM(s) Oral at bedtime PRN  metoprolol succinate ER 50 milliGRAM(s) Oral daily  ondansetron Injectable 4 milliGRAM(s) IV Push every 8 hours PRN  pantoprazole    Tablet 40 milliGRAM(s) Oral before breakfast  rivaroxaban 20 milliGRAM(s) Oral with dinner      FAMILY HISTORY:  Family history of cancer (Father)  laryngeal cancer    Family history of stroke (Sibling)        Social History:  Smoking History:  Alcohol Use:  Drug Use:    REVIEW OF SYSTEMS:  Constitutional:     [ ] negative [ ] fevers [ ] chills [ ] weight loss [ ] weight gain  HEENT:                  [ ] negative [ ] dry eyes [ ] eye irritation [ ] postnasal drip [ ] nasal congestion  CV:                         [ ] negative  [ ] chest pain [ ] orthopnea [ ] palpitations [ ] murmur  Resp:                     [ ] negative [ ] cough [ ] shortness of breath [ ] dyspnea [ ] wheezing [ ] sputum [ ]hemoptysis  GI:                          [ ] negative [ ] nausea [ ] vomiting [ ] diarrhea [ ] constipation [ ] abd pain [ ] dysphagia   :                        [ ] negative [ ] dysuria [ ] nocturia [ ] hematuria [ ] increased urinary frequency  Musculoskeletal: [ ] negative [ ] back pain [ ] myalgias [ ] arthralgias [ ] fracture  Skin:                       [ ] negative [ ] rash [ ] itch  Neurological:        [ ] negative [ ] headache [ ] dizziness [ ] syncope [ ] weakness [ ] numbness  Psychiatric:           [ ] negative [ ] anxiety [ ] depression  Endocrine:            [ ] negative [ ] diabetes [ ] thyroid problem  Heme/Lymph:      [ ] negative [ ] anemia [ ] bleeding problem  Allergic/Immune: [ ] negative [ ] itchy eyes [ ] nasal discharge [ ] hives [ ] angioedema    [ ] All other systems negative  [ ] Unable to assess ROS due to      Physical Exam:  T(F): 98 (01-25), Max: 98.7 (01-24)  HR: 57 (01-25) (57 - 75)  BP: 148/66 (01-25) (147/76 - 165/79)  RR: 18 (01-25)  SpO2: 95% (01-25)  GENERAL: No acute distress, well-developed  HEAD:  Atraumatic, Normocephalic  ENT: EOMI, PERRLA, conjunctiva and sclera clear, Neck supple, No JVD, moist mucosa  CHEST/LUNG: Clear to auscultation bilaterally; No wheeze, equal breath sounds bilaterally   BACK: No spinal tenderness  HEART: Regular rate and rhythm; No murmurs, rubs, or gallops  ABDOMEN: Soft, Nontender, Nondistended; Bowel sounds present  EXTREMITIES:  No clubbing, cyanosis, or edema  PSYCH: Nl behavior, nl affect  NEUROLOGY: AAOx3, non-focal, cranial nerves intact  SKIN: Normal color, No rashes or lesions  LINES:    Cardiovascular Diagnostic Testing:    ECG: Personally reviewed:    Echo: Personally reviewed:    Stress Testing:    Cath:    Imaging:    CXR: Personally reviewed    Labs: Personally reviewed                        12.0   4.23  )-----------( 172      ( 25 Jan 2023 00:59 )             37.9     01-25    140  |  106  |  16  ----------------------------<  89  3.9   |  24  |  0.89    Ca    9.1      25 Jan 2023 00:59    TPro  6.5  /  Alb  3.7  /  TBili  0.3  /  DBili  x   /  AST  30  /  ALT  17  /  AlkPhos  67  01-23    PT/INR - ( 23 Jan 2023 22:45 )   PT: 26.4 sec;   INR: 2.26 ratio         PTT - ( 23 Jan 2023 22:45 )  PTT:38.0 sec  Serum Pro-Brain Natriuretic Peptide: 69122 pg/mL (01-23 @ 22:45)        Thyroid Stimulating Hormone, Serum: 2.33 uIU/mL (01-25 @ 00:57)          Macrodantin (Unknown)  sulfa drugs (Rash)  sulfonylureas (Unknown)    acetaminophen     Tablet .. 650 milliGRAM(s) Oral every 6 hours PRN  aluminum hydroxide/magnesium hydroxide/simethicone Suspension 30 milliLiter(s) Oral every 4 hours PRN  aspirin enteric coated 81 milliGRAM(s) Oral daily  atorvastatin 20 milliGRAM(s) Oral at bedtime  levothyroxine 50 MICROGram(s) Oral daily  melatonin 3 milliGRAM(s) Oral at bedtime PRN  metoprolol succinate ER 50 milliGRAM(s) Oral daily  ondansetron Injectable 4 milliGRAM(s) IV Push every 8 hours PRN  pantoprazole    Tablet 40 milliGRAM(s) Oral before breakfast  rivaroxaban 20 milliGRAM(s) Oral with dinner    T(F): 98 (01-25), Max: 98.7 (01-24)  HR: 57 (01-25) (57 - 75)  BP: 148/66 (01-25) (147/76 - 165/79)  RR: 18 (01-25)  SpO2: 95% (01-25) Patient seen and evaluated at bedside    Chief Complaint: Shortness of breath    HPI:  77F PMH HTN, bovine MVR in  with Dr. Wheeler, hypothyroidism, OA, DVT started on Xarelto 6 weeks ago, presents with shortness of breath. The patient was in the garage on the corner of a shelf, that night, she took her BP and HR and she noticed that her HR was in the 120-140s. She noticed that she was getting exceedingly short of breath with minimal exertion. She did not have any palpitations or chest pain. Has not had any orthopnea, PND or LE edema.  Patient reports persistent cough, which has been ongoing since October. Reports weight loss of about 10 lbs, which has been intentional due to diet changes.     ED course: Tachycardia with stable BP. CBC within normal limits and without leukocytosis. CMP unremarkable. EKG with atrial flutter. CXR with small right effusion. CT chest with small effusion. Patient received metoprolol tartrate 5 mg IV 2x after which she returned to sinus rhythm with regular rate, and patient is now for admission to medicine service for further evaluation and treatment.      PMHx:   Arthritis of knee, left    History of mitral valve prolapse    Vitamin B12 deficiency    Hypertension    Hypothyroidism    Seasonal allergies    Osteoarthrosis    HLD (hyperlipidemia)        PSHx:   Broken Arm    History of skin graft    Mitral valve replaced    History of dilation and curettage    S/P mitral valve replacement    S/P knee replacement    S/P ORIF (open reduction internal fixation) fracture        Allergies:  Macrodantin (Unknown)  sulfa drugs (Rash)  sulfonylureas (Unknown)      Home Meds:    Current Medications:   acetaminophen     Tablet .. 650 milliGRAM(s) Oral every 6 hours PRN  aluminum hydroxide/magnesium hydroxide/simethicone Suspension 30 milliLiter(s) Oral every 4 hours PRN  aspirin enteric coated 81 milliGRAM(s) Oral daily  atorvastatin 20 milliGRAM(s) Oral at bedtime  levothyroxine 50 MICROGram(s) Oral daily  melatonin 3 milliGRAM(s) Oral at bedtime PRN  metoprolol succinate ER 50 milliGRAM(s) Oral daily  ondansetron Injectable 4 milliGRAM(s) IV Push every 8 hours PRN  pantoprazole    Tablet 40 milliGRAM(s) Oral before breakfast  rivaroxaban 20 milliGRAM(s) Oral with dinner      FAMILY HISTORY:  Family history of cancer (Father)  laryngeal cancer    Family history of stroke (Sibling)        Social History:  Smoking History:  Alcohol Use:  Drug Use:    REVIEW OF SYSTEMS:  Constitutional:     [ ] negative [ ] fevers [ ] chills [ ] weight loss [ ] weight gain  HEENT:                  [ ] negative [ ] dry eyes [ ] eye irritation [ ] postnasal drip [ ] nasal congestion  CV:                         [ ] negative  [ ] chest pain [ ] orthopnea [ ] palpitations [ ] murmur  Resp:                     [ ] negative [ ] cough [ ] shortness of breath [ ] dyspnea [ ] wheezing [ ] sputum [ ]hemoptysis  GI:                          [ ] negative [ ] nausea [ ] vomiting [ ] diarrhea [ ] constipation [ ] abd pain [ ] dysphagia   :                        [ ] negative [ ] dysuria [ ] nocturia [ ] hematuria [ ] increased urinary frequency  Musculoskeletal: [ ] negative [ ] back pain [ ] myalgias [ ] arthralgias [ ] fracture  Skin:                       [ ] negative [ ] rash [ ] itch  Neurological:        [ ] negative [ ] headache [ ] dizziness [ ] syncope [ ] weakness [ ] numbness  Psychiatric:           [ ] negative [ ] anxiety [ ] depression  Endocrine:            [ ] negative [ ] diabetes [ ] thyroid problem  Heme/Lymph:      [ ] negative [ ] anemia [ ] bleeding problem  Allergic/Immune: [ ] negative [ ] itchy eyes [ ] nasal discharge [ ] hives [ ] angioedema    [ ] All other systems negative  [ ] Unable to assess ROS due to      Physical Exam:  T(F): 98 (), Max: 98.7 ()  HR: 57 () (57 - 75)  BP: 148/66 () (147/76 - 165/79)  RR: 18 ()  SpO2: 95% ()  GENERAL: No acute distress, well-developed  HEAD:  Atraumatic, Normocephalic  ENT: EOMI, PERRLA, conjunctiva and sclera clear, Neck supple, No JVD, moist mucosa  CHEST/LUNG: Clear to auscultation bilaterally; No wheeze, equal breath sounds bilaterally   BACK: No spinal tenderness  HEART: Regular rate and rhythm; No murmurs, rubs, or gallops  ABDOMEN: Soft, Nontender, Nondistended; Bowel sounds present  EXTREMITIES:  No clubbing, cyanosis, or edema      Cardiovascular Diagnostic Testing:    ECG: Personally reviewed: Atrial flutter with variable AV block at 130bpm    Echo: Personally reviewed:    TTE 22    Ejection Fraction (Visual Estimate): 55 %  ------------------------------------------------------------------------  OBSERVATIONS:  Mitral Valve: Bioprosthetic mitral valve replacement.  Minimal mitral regurgitation. Mean transmitral valve  gradient equals 7 mm Hg, which is elevated even in the  setting of a prosthetic valve.  Aortic Root: Normal aortic root.  Aortic Valve: Normal trileaflet aortic valve.  Left Atrium: Severely dilated left atrium.  LA volume index  = 84 cc/m2.  Left Ventricle: Endocardium not well visualized; grossly  normal left ventricular systolic function. Septal motion  consistent with cardiac surgery Normal left ventricular  internal dimensions and wall thicknesses.  Right Heart: Normal right atrium. Normal right ventricular  size and function. Normal tricuspid valve. Minimal  tricuspid regurgitation. Normal pulmonic valve.  Pericardium/PleuraNormal pericardium with no pericardial  effusion.  Hemodynamic: Estimated right ventricular systolic pressure  equals 28 mm Hg, assuming right atrial pressure equals 10  mm Hg, consistent with normal pulmonary pressures.  ------------------------------------------------------------------------  CONCLUSIONS:  1. Bioprosthetic mitral valve replacement. Minimal mitral  regurgitation. Mean transmitral valve gradient equals 7 mm  Hg, which is elevated even in the setting of a prosthetic  valve.  2. Normal trileaflet aortic valve.  3. Severely dilated left atrium.  LA volume index = 84  cc/m2.  4. Endocardium not well visualized; grossly normal left  ventricular systolic function. Septal motion consistent  with cardiac surgery  5. Normal right ventricular size and function.  ------------------------------------------------------------------------  Confirmed on  2022 - 18:16:25 by Sailma Reynoso M.D. RPVI  ------------------------------------------------------------------------      Stress Testing:    Cath:    Cardiac Cath/PCI:   12 (CATH) - pLAD 20%, LVEF 50%, CI 3.51 L/min/m2     Cardiac Sur12 - MVR with #31 bovine pericardial valve by Ignacio Wheeler MD     Imaging:    CXR: Personally reviewed    Labs: Personally reviewed                        12.0   4.23  )-----------( 172      ( 2023 00:59 )             37.9         140  |  106  |  16  ----------------------------<  89  3.9   |  24  |  0.89    Ca    9.1      2023 00:59    TPro  6.5  /  Alb  3.7  /  TBili  0.3  /  DBili  x   /  AST  30  /  ALT  17  /  AlkPhos  67      PT/INR - ( 2023 22:45 )   PT: 26.4 sec;   INR: 2.26 ratio         PTT - ( 2023 22:45 )  PTT:38.0 sec  Serum Pro-Brain Natriuretic Peptide: 99286 pg/mL ( @ 22:45)        Thyroid Stimulating Hormone, Serum: 2.33 uIU/mL ( @ 00:57)          Macrodantin (Unknown)  sulfa drugs (Rash)  sulfonylureas (Unknown)    acetaminophen     Tablet .. 650 milliGRAM(s) Oral every 6 hours PRN  aluminum hydroxide/magnesium hydroxide/simethicone Suspension 30 milliLiter(s) Oral every 4 hours PRN  aspirin enteric coated 81 milliGRAM(s) Oral daily  atorvastatin 20 milliGRAM(s) Oral at bedtime  levothyroxine 50 MICROGram(s) Oral daily  melatonin 3 milliGRAM(s) Oral at bedtime PRN  metoprolol succinate ER 50 milliGRAM(s) Oral daily  ondansetron Injectable 4 milliGRAM(s) IV Push every 8 hours PRN  pantoprazole    Tablet 40 milliGRAM(s) Oral before breakfast  rivaroxaban 20 milliGRAM(s) Oral with dinner    T(F): 98 (), Max: 98.7 ()  HR: 57 () (57 - 75)  BP: 148/66 () (147/76 - 165/79)  RR: 18 ()  SpO2: 95% ()

## 2023-01-25 NOTE — CONSULT NOTE ADULT - ATTENDING COMMENTS
Her atrial flutter is likely on the basis of severe prosthetic mitral stenosis. She is in heart failure with early pulmonary edema due to high LA pulmonary venous pressure from mitral stenosis. She need diuresis. Would not recommend more than metoprolol for now to maintain sinus until MV better evaluated. Discussed with Cali Mccullough and Liam.

## 2023-01-25 NOTE — PROGRESS NOTE ADULT - SUBJECTIVE AND OBJECTIVE BOX
DATE OF SERVICE: 01-25-23 @ 16:46    Patient is a 77y old  Female who presents with a chief complaint of Atrial flutter (25 Jan 2023 14:15)      INTERVAL HISTORY: Feels ok.     REVIEW OF SYSTEMS:  CONSTITUTIONAL: No weakness  EYES/ENT: No visual changes;  No throat pain   NECK: No pain or stiffness  RESPIRATORY: No cough, wheezing; No shortness of breath  CARDIOVASCULAR: No chest pain or palpitations  GASTROINTESTINAL: No abdominal  pain. No nausea, vomiting, or hematemesis  GENITOURINARY: No dysuria, frequency or hematuria  NEUROLOGICAL: No stroke like symptoms  SKIN: No rashes    TELEMETRY Personally reviewed: SB/SR 50-80  	  MEDICATIONS:  dronedarone 400 milliGRAM(s) Oral two times a day  metoprolol succinate ER 50 milliGRAM(s) Oral daily        PHYSICAL EXAM:  T(C): 36.6 (01-25-23 @ 16:11), Max: 36.9 (01-25-23 @ 11:20)  HR: 56 (01-25-23 @ 16:11) (56 - 70)  BP: 155/82 (01-25-23 @ 16:11) (147/76 - 165/79)  RR: 18 (01-25-23 @ 16:11) (17 - 18)  SpO2: 95% (01-25-23 @ 16:11) (95% - 100%)  Wt(kg): --  I&O's Summary    25 Jan 2023 07:01  -  25 Jan 2023 16:46  --------------------------------------------------------  IN: 480 mL / OUT: 0 mL / NET: 480 mL          Appearance: In no distress	  HEENT:    PERRL, EOMI	  Cardiovascular:  S1 S2, No JVD  Respiratory: Lungs clear to auscultation	  Gastrointestinal:  Soft, Non-tender, + BS	  Vascularature:  No edema of LE  Psychiatric: Appropriate affect   Neuro: no acute focal deficits                               12.0   4.23  )-----------( 172      ( 25 Jan 2023 00:59 )             37.9     01-25    140  |  106  |  16  ----------------------------<  89  3.9   |  24  |  0.89    Ca    9.1      25 Jan 2023 00:59    TPro  6.5  /  Alb  3.7  /  TBili  0.3  /  DBili  x   /  AST  30  /  ALT  17  /  AlkPhos  67  01-23        Labs personally reviewed    < from: Transthoracic Echocardiogram (01.25.23 @ 06:27) >  Conclusions:  1. Bioprosthetic mitral valve replacement. Valve leaflets  appear thickened and thickened with decreased opening.  Color flow turbulence seen across the mitral prosthesis  suggestive of reduced diastolic opening. No mitral valve  regurgitation seen. Peak mitral valve gradient equals 13 mm  Hg, mean transmitral valve gradient equals 8 mm Hg, which  is elevated even in the setting of a bioprosthetic mitral  valve replacement. (HR 60 bpm)  2. Mildly calcified trileaflet aortic valve with normal  opening. No aortic valve regurgitation seen.  3. Normal left ventricular systolic function. Septal motion  consistent with prior cardiac surgery.  4. Normal right ventricular size and function.  5. Tricuspid valve not well visualized, valve leaflets  appear to open normally. Minimal tricuspid regurgitation.  Measured peak transvalvular gradient [id number] mmHg, mean  gradient equals 6 mmHg. Consider additional imaging of the  tricuspid valve.  LISA is recommended for further evaluation of the mitral and  tricuspid valves if clinically indicated.  *** Compared with echocardiogram of 3/6/2013, transmitral  gradient is higher on today's study.    < end of copied text >      ASSESSMENT/PLAN: 	      77F PMH HTN, hypothyroidism, OA, DVT started on Xarelto 6 weeks ago, presents with palpitations. Patient reports that she began developing palpitations yesterday, which she believed would resolve alone, however symptoms got worse. She hit her head when climbing on a ladder, and then went to the hospital. Patient reports persistent cough, which has been ongoing since October. Reports weight loss of about 10 lbs, which has been intentional due to diet changes.     Problem/Plan -1  Problem: New Onset Atrial Flutter  - ECG reveals A-flutter  - Initially tachycardic on presentation but now rate controlled in SR  - Anticoagulated prior on Xarelto for DVT Dx ~ 6 weeks ago  - c/w Metoprolol and Xarelto (lifelong AC)  - Allscript records from Dr Cali Butler reviewed, past med history is notable for AF   - TSH wnl  - we discussed antiarrythmic strategies including ablation vs meds  - Consulted EP- likely plan for ablation as OP      Problem/Plan -2  Problem: Hypertension  - c/w Metoprolol 50mg PO daily    Problem/Plan -3  Problem: Hx of DVT  - recently dx'd with acute DVT 6 weeks ago  - c/w Xarelto    Problem/Plan -4  Problem: Hypothyroidism  - Check TSH  - c/w levothyroxine    Problem/Plan -5  Problem: Hx of Mitral Valve Replacement  - TTE suggestive of mitral stenosis  - Plan for LISA tomorrow  - Valvular dysfunction likely the culprit of presenting symptoms      JAILENE Justice-KEVIN Perez,  [facility]  Cardiovascular Medicine  [address] [place] [address], [address]  Available through call or text on [employer]  Office: [phone]

## 2023-01-25 NOTE — PROGRESS NOTE ADULT - SUBJECTIVE AND OBJECTIVE BOX
Patient is a 77y old  Female who presents with a chief complaint of Atrial flutter (2023 08:14)      SUBJECTIVE / OVERNIGHT EVENTS: Patient seen and examined at bedside. states that she feels well denies any CP, SOB, abd pain and n/v. NO acute events overnight. Patient is mostly concern for chronic cough sees pulmonary outpt had abnormal PFts outpt    ROS:  All other review of systems negative    Allergies    Macrodantin (Unknown)  sulfa drugs (Rash)  sulfonylureas (Unknown)    Intolerances        MEDICATIONS  (STANDING):  aspirin enteric coated 81 milliGRAM(s) Oral daily  atorvastatin 20 milliGRAM(s) Oral at bedtime  levothyroxine 50 MICROGram(s) Oral daily  metoprolol succinate ER 50 milliGRAM(s) Oral daily  pantoprazole    Tablet 40 milliGRAM(s) Oral before breakfast  rivaroxaban 20 milliGRAM(s) Oral with dinner    MEDICATIONS  (PRN):  acetaminophen     Tablet .. 650 milliGRAM(s) Oral every 6 hours PRN Temp greater or equal to 38C (100.4F), Mild Pain (1 - 3)  albuterol/ipratropium for Nebulization 3 milliLiter(s) Nebulizer every 6 hours PRN Bronchospasm  aluminum hydroxide/magnesium hydroxide/simethicone Suspension 30 milliLiter(s) Oral every 4 hours PRN Dyspepsia  benzocaine 15 mG/menthol 3.6 mG Lozenge 1 Lozenge Oral four times a day PRN Sore Throat  guaiFENesin  milliGRAM(s) Oral every 12 hours PRN Cough  melatonin 3 milliGRAM(s) Oral at bedtime PRN Insomnia  ondansetron Injectable 4 milliGRAM(s) IV Push every 8 hours PRN Nausea and/or Vomiting      Vital Signs Last 24 Hrs  T(C): 36.9 (2023 11:20), Max: 36.9 (2023 11:20)  T(F): 98.4 (2023 11:20), Max: 98.4 (2023 11:20)  HR: 58 (2023 11:20) (57 - 75)  BP: 159/73 (2023 11:20) (147/76 - 165/79)  BP(mean): 94 (2023 11:20) (85 - 95)  RR: 18 (2023 11:20) (17 - 18)  SpO2: 97% (2023 11:20) (95% - 100%)    Parameters below as of 2023 11:20  Patient On (Oxygen Delivery Method): room air      CAPILLARY BLOOD GLUCOSE        I&O's Summary    2023 07:01  -  2023 14:15  --------------------------------------------------------  IN: 480 mL / OUT: 0 mL / NET: 480 mL        PHYSICAL EXAM:  GENERAL: elderly  HEAD:  Atraumatic, Normocephalic  EYES: EOMI, PERRLA, conjunctiva and sclera clear  NECK: Supple, No JVD  CHEST/LUNG: Clear to auscultation bilaterally; No wheeze  HEART: Regular rate and rhythm; No murmurs, rubs, or gallops  ABDOMEN: Soft, Nontender, Nondistended; Bowel sounds present  EXTREMITIES:  2+ Peripheral Pulses, No clubbing, cyanosis, or edema  NEUROLOGY: AAOx3, non-focal      LABS:                        12.0   4.23  )-----------( 172      ( 2023 00:59 )             37.9         140  |  106  |  16  ----------------------------<  89  3.9   |  24  |  0.89    Ca    9.1      2023 00:59    TPro  6.5  /  Alb  3.7  /  TBili  0.3  /  DBili  x   /  AST  30  /  ALT  17  /  AlkPhos  67  01-23    PT/INR - ( 2023 22:45 )   PT: 26.4 sec;   INR: 2.26 ratio         PTT - ( 2023 22:45 )  PTT:38.0 sec      Urinalysis Basic - ( 2023 17:28 )    Color: Yellow / Appearance: Clear / S.023 / pH: x  Gluc: x / Ketone: Small  / Bili: Negative / Urobili: 2 mg/dL   Blood: x / Protein: Trace / Nitrite: Negative   Leuk Esterase: Negative / RBC: 1 /hpf / WBC 3 /HPF   Sq Epi: x / Non Sq Epi: 1 /hpf / Bacteria: Negative        RADIOLOGY & ADDITIONAL TESTS:    Imaging Personally Reviewed:  ECHO:   increased transmitral gradient     Consultant(s) Notes Reviewed:  EP rec noted     Care Discussed with Consultants/Other Providers: Medicine ACP and cardiology    Case Discussed with

## 2023-01-26 PROCEDURE — 99232 SBSQ HOSP IP/OBS MODERATE 35: CPT

## 2023-01-26 PROCEDURE — 93325 DOPPLER ECHO COLOR FLOW MAPG: CPT | Mod: 26

## 2023-01-26 PROCEDURE — 93312 ECHO TRANSESOPHAGEAL: CPT | Mod: 26

## 2023-01-26 PROCEDURE — 93320 DOPPLER ECHO COMPLETE: CPT | Mod: 26

## 2023-01-26 RX ORDER — ENOXAPARIN SODIUM 100 MG/ML
60 INJECTION SUBCUTANEOUS EVERY 12 HOURS
Refills: 0 | Status: DISCONTINUED | OUTPATIENT
Start: 2023-01-27 | End: 2023-01-29

## 2023-01-26 RX ORDER — HEPARIN SODIUM 5000 [USP'U]/ML
1200 INJECTION INTRAVENOUS; SUBCUTANEOUS
Qty: 25000 | Refills: 0 | Status: DISCONTINUED | OUTPATIENT
Start: 2023-01-26 | End: 2023-01-26

## 2023-01-26 RX ADMIN — Medication 50 MICROGRAM(S): at 06:01

## 2023-01-26 RX ADMIN — RIVAROXABAN 20 MILLIGRAM(S): KIT at 16:44

## 2023-01-26 RX ADMIN — ATORVASTATIN CALCIUM 20 MILLIGRAM(S): 80 TABLET, FILM COATED ORAL at 21:37

## 2023-01-26 RX ADMIN — Medication 50 MILLIGRAM(S): at 06:01

## 2023-01-26 RX ADMIN — Medication 3 MILLIGRAM(S): at 21:37

## 2023-01-26 RX ADMIN — Medication 81 MILLIGRAM(S): at 05:59

## 2023-01-26 RX ADMIN — Medication 20 MILLIGRAM(S): at 05:58

## 2023-01-26 RX ADMIN — PANTOPRAZOLE SODIUM 40 MILLIGRAM(S): 20 TABLET, DELAYED RELEASE ORAL at 06:36

## 2023-01-26 NOTE — PROGRESS NOTE ADULT - SUBJECTIVE AND OBJECTIVE BOX
Patient seen and examined at bedside.    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 She was found to be in atrial flutter at a rate of 130. She converted back into sinus rhythm with metoprolol 5mg x2.     Overnight Events:     Review Of Systems: No chest pain, shortness of breath, or palpitations            Current Meds:  acetaminophen     Tablet .. 650 milliGRAM(s) Oral every 6 hours PRN  albuterol/ipratropium for Nebulization 3 milliLiter(s) Nebulizer every 6 hours PRN  aluminum hydroxide/magnesium hydroxide/simethicone Suspension 30 milliLiter(s) Oral every 4 hours PRN  aspirin enteric coated 81 milliGRAM(s) Oral daily  atorvastatin 20 milliGRAM(s) Oral at bedtime  benzocaine 15 mG/menthol 3.6 mG Lozenge 1 Lozenge Oral four times a day PRN  furosemide   Injectable 20 milliGRAM(s) IV Push daily  guaiFENesin  milliGRAM(s) Oral every 12 hours PRN  levothyroxine 50 MICROGram(s) Oral daily  melatonin 3 milliGRAM(s) Oral at bedtime PRN  metoprolol succinate ER 50 milliGRAM(s) Oral daily  ondansetron Injectable 4 milliGRAM(s) IV Push every 8 hours PRN  pantoprazole    Tablet 40 milliGRAM(s) Oral before breakfast  rivaroxaban 20 milliGRAM(s) Oral with dinner      Vitals:  T(F): 98.3 (01-26), Max: 98.4 (01-25)  HR: 64 (01-26) (56 - 65)  BP: 154/76 (01-26) (148/66 - 175/70)  RR: 17 (01-26)  SpO2: 97% (01-26)  I&O's Summary    25 Jan 2023 07:01  -  26 Jan 2023 07:00  --------------------------------------------------------  IN: 480 mL / OUT: 0 mL / NET: 480 mL        Physical Exam:  Appearance: No acute distress; well appearing  Eyes: PERRL, EOMI, pink conjunctiva  HEENT: Normal oral mucosa  Cardiovascular: RRR, S1, S2, no murmurs, rubs, or gallops; no edema; no JVD  Respiratory: Clear to auscultation bilaterally  Gastrointestinal: soft, non-tender, non-distended with normal bowel sounds  Musculoskeletal: No clubbing; no joint deformity   Neurologic: Non-focal  Lymphatic: No lymphadenopathy  Psychiatry: AAOx3, mood & affect appropriate  Skin: No rashes, ecchymoses, or cyanosis                          12.0   4.23  )-----------( 172      ( 25 Jan 2023 00:59 )             37.9     01-25    140  |  106  |  16  ----------------------------<  89  3.9   |  24  |  0.89    Ca    9.1      25 Jan 2023 00:59            Serum Pro-Brain Natriuretic Peptide: 98412 pg/mL (01-23 @ 22:45)          New ECG(s): Personally reviewed    Echo:    EF (Gasca Rule): 55 %Doppler Peak Velocity (m/sec):  MV=1.8 AoV=1.1  ------------------------------------------------------------------------  Observations:  Mitral Valve: Bioprosthetic mitral valve replacement. Valve  leaflets appear thickened and thickened with decreased  opening. Color flow turbulence seen across the mitral  prosthesis suggestive of reduced diastolic opening. No  mitral valve regurgitation seen. Peak mitral valve gradient  equals 13 mm Hg, mean transmitral valve gradient equals 8  mm Hg, which is elevated even in the setting of a  bioprosthetic mitral valve replacement. (HR 60 bpm)  Aortic Valve/Aorta: Mildly calcified trileaflet aortic  valve with normal opening. Peak transaortic valve gradient  equals 5 mm Hg, mean transaortic valve gradient equals 3 mm  Hg, aortic valve velocity time integral equals 31 cm. No  aortic valve regurgitation seen.Peak left ventricular  outflow tract gradient equals 3 mm Hg, mean gradient is  equal to 1 mm Hg, LVOT velocity time integral equals 20 cm.  Aortic Root: 3.4 cm.  Ascending Aorta: 3 cm.  LVOT diameter: 2.1 cm.  Left Atrium: Severely dilated left atrium.  LA volume index  = 54 cc/m2.  Left Ventricle: Normal left ventricular systolic function.  Septal motion consistent with prior cardiac surgery. Mild  left ventricular enlargement. Indeterminate diastolic  function.  Right Heart: Normal right atrium. Normal right ventricular  size and function. Tricuspid valve not well visualized,  valve leaflets appear to open normally. Minimal tricuspid  regurgitation. Measured peak transvalvular gradient equals  11 mmHg, mean gradient equals 6 mmHg. Consideradditional  imaging of the tricuspid valve. Normal pulmonic valve. No  pulmonic regurgitation.  Pericardium/Pleura: Pericardial fat pad noted. No  pericardial effusion.  Hemodynamic: Estimated right atrial pressure equals 3 mmHg.  Inadequate tricuspid regurgitation Doppler envelope  precludes estimation of RVSP.  ------------------------------------------------------------------------  Conclusions:  1. Bioprosthetic mitral valve replacement. Valve leaflets  appear thickened and thickened with decreased opening.  Color flow turbulence seen across the mitral prosthesis  suggestive of reduced diastolic opening. No mitral valve  regurgitation seen. Peak mitral valve gradient equals 13 mm  Hg, mean transmitral valve gradient equals 8 mm Hg, which  is elevated even in the setting of a bioprosthetic mitral  valve replacement. (HR 60 bpm)  2. Mildly calcified trileaflet aortic valve with normal  opening. No aortic valve regurgitation seen.  3. Normal left ventricular systolic function. Septal motion  consistent with prior cardiac surgery.  4. Normal right ventricular size and function.  5. Tricuspid valve not well visualized, valve leaflets  appear to open normally. Minimal tricuspid regurgitation.  Measured peak transvalvular gradient [id number] mmHg, mean  gradient equals 6 mmHg. Consider additional imaging of the  tricuspid valve.  LISA is recommended for further evaluation of the mitral and  tricuspid valves if clinically indicated.  *** Compared with echocardiogram of 3/6/2013, transmitral  gradient is higher on today's study.  ------------------------------------------------------------------------  Confirmed on  1/25/2023 - 12:35:52 by Edinson Thomas M.D.  ------------------------------------------------------------------------      Stress Testing:     Cath:    Imaging:    Interpretation of Telemetry:   Patient seen and examined at bedside.    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 She was found to be in atrial flutter at a rate of 130. She converted back into sinus rhythm with metoprolol 5mg x2.     Overnight Events: No atrial flutter overnight    Review Of Systems: No chest pain, shortness of breath, or palpitations            Current Meds:  acetaminophen     Tablet .. 650 milliGRAM(s) Oral every 6 hours PRN  albuterol/ipratropium for Nebulization 3 milliLiter(s) Nebulizer every 6 hours PRN  aluminum hydroxide/magnesium hydroxide/simethicone Suspension 30 milliLiter(s) Oral every 4 hours PRN  aspirin enteric coated 81 milliGRAM(s) Oral daily  atorvastatin 20 milliGRAM(s) Oral at bedtime  benzocaine 15 mG/menthol 3.6 mG Lozenge 1 Lozenge Oral four times a day PRN  furosemide   Injectable 20 milliGRAM(s) IV Push daily  guaiFENesin  milliGRAM(s) Oral every 12 hours PRN  levothyroxine 50 MICROGram(s) Oral daily  melatonin 3 milliGRAM(s) Oral at bedtime PRN  metoprolol succinate ER 50 milliGRAM(s) Oral daily  ondansetron Injectable 4 milliGRAM(s) IV Push every 8 hours PRN  pantoprazole    Tablet 40 milliGRAM(s) Oral before breakfast  rivaroxaban 20 milliGRAM(s) Oral with dinner      Vitals:  T(F): 98.3 (01-26), Max: 98.4 (01-25)  HR: 64 (01-26) (56 - 65)  BP: 154/76 (01-26) (148/66 - 175/70)  RR: 17 (01-26)  SpO2: 97% (01-26)  I&O's Summary    25 Jan 2023 07:01  -  26 Jan 2023 07:00  --------------------------------------------------------  IN: 480 mL / OUT: 0 mL / NET: 480 mL        Physical Exam:  Appearance: No acute distress; well appearing  Eyes: PERRL, EOMI, pink conjunctiva  HEENT: Normal oral mucosa  Cardiovascular: RRR, S1, S2, no murmurs, rubs, or gallops; no edema; no JVD  Respiratory: Clear to auscultation bilaterally  Gastrointestinal: soft, non-tender, non-distended with normal bowel sounds  Musculoskeletal: No clubbing; no joint deformity   Neurologic: Non-focal  Lymphatic: No lymphadenopathy  Psychiatry: AAOx3, mood & affect appropriate  Skin: No rashes, ecchymoses, or cyanosis                          12.0   4.23  )-----------( 172      ( 25 Jan 2023 00:59 )             37.9     01-25    140  |  106  |  16  ----------------------------<  89  3.9   |  24  |  0.89    Ca    9.1      25 Jan 2023 00:59            Serum Pro-Brain Natriuretic Peptide: 71234 pg/mL (01-23 @ 22:45)          New ECG(s): Personally reviewed    Echo:    EF (Gasca Rule): 55 %Doppler Peak Velocity (m/sec):  MV=1.8 AoV=1.1  ------------------------------------------------------------------------  Observations:  Mitral Valve: Bioprosthetic mitral valve replacement. Valve  leaflets appear thickened and thickened with decreased  opening. Color flow turbulence seen across the mitral  prosthesis suggestive of reduced diastolic opening. No  mitral valve regurgitation seen. Peak mitral valve gradient  equals 13 mm Hg, mean transmitral valve gradient equals 8  mm Hg, which is elevated even in the setting of a  bioprosthetic mitral valve replacement. (HR 60 bpm)  Aortic Valve/Aorta: Mildly calcified trileaflet aortic  valve with normal opening. Peak transaortic valve gradient  equals 5 mm Hg, mean transaortic valve gradient equals 3 mm  Hg, aortic valve velocity time integral equals 31 cm. No  aortic valve regurgitation seen.Peak left ventricular  outflow tract gradient equals 3 mm Hg, mean gradient is  equal to 1 mm Hg, LVOT velocity time integral equals 20 cm.  Aortic Root: 3.4 cm.  Ascending Aorta: 3 cm.  LVOT diameter: 2.1 cm.  Left Atrium: Severely dilated left atrium.  LA volume index  = 54 cc/m2.  Left Ventricle: Normal left ventricular systolic function.  Septal motion consistent with prior cardiac surgery. Mild  left ventricular enlargement. Indeterminate diastolic  function.  Right Heart: Normal right atrium. Normal right ventricular  size and function. Tricuspid valve not well visualized,  valve leaflets appear to open normally. Minimal tricuspid  regurgitation. Measured peak transvalvular gradient equals  11 mmHg, mean gradient equals 6 mmHg. Consideradditional  imaging of the tricuspid valve. Normal pulmonic valve. No  pulmonic regurgitation.  Pericardium/Pleura: Pericardial fat pad noted. No  pericardial effusion.  Hemodynamic: Estimated right atrial pressure equals 3 mmHg.  Inadequate tricuspid regurgitation Doppler envelope  precludes estimation of RVSP.  ------------------------------------------------------------------------  Conclusions:  1. Bioprosthetic mitral valve replacement. Valve leaflets  appear thickened and thickened with decreased opening.  Color flow turbulence seen across the mitral prosthesis  suggestive of reduced diastolic opening. No mitral valve  regurgitation seen. Peak mitral valve gradient equals 13 mm  Hg, mean transmitral valve gradient equals 8 mm Hg, which  is elevated even in the setting of a bioprosthetic mitral  valve replacement. (HR 60 bpm)  2. Mildly calcified trileaflet aortic valve with normal  opening. No aortic valve regurgitation seen.  3. Normal left ventricular systolic function. Septal motion  consistent with prior cardiac surgery.  4. Normal right ventricular size and function.  5. Tricuspid valve not well visualized, valve leaflets  appear to open normally. Minimal tricuspid regurgitation.  Measured peak transvalvular gradient [id number] mmHg, mean  gradient equals 6 mmHg. Consider additional imaging of the  tricuspid valve.  LISA is recommended for further evaluation of the mitral and  tricuspid valves if clinically indicated.  *** Compared with echocardiogram of 3/6/2013, transmitral  gradient is higher on today's study.  ------------------------------------------------------------------------  Confirmed on  1/25/2023 - 12:35:52 by Edinson Thomas M.D.  ------------------------------------------------------------------------      Interpretation of Telemetry: sinus rhythm with no evidence of atrial flutter   Patient seen and examined at bedside.    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 She was found to be in atrial flutter at a rate of 130. She converted back into sinus rhythm with metoprolol 5mg x2.     Overnight Events: No atrial flutter overnight. She has evidence of mitral stenosis on TTE    Review Of Systems: No chest pain, shortness of breath, or palpitations            Current Meds:  acetaminophen     Tablet .. 650 milliGRAM(s) Oral every 6 hours PRN  albuterol/ipratropium for Nebulization 3 milliLiter(s) Nebulizer every 6 hours PRN  aluminum hydroxide/magnesium hydroxide/simethicone Suspension 30 milliLiter(s) Oral every 4 hours PRN  aspirin enteric coated 81 milliGRAM(s) Oral daily  atorvastatin 20 milliGRAM(s) Oral at bedtime  benzocaine 15 mG/menthol 3.6 mG Lozenge 1 Lozenge Oral four times a day PRN  furosemide   Injectable 20 milliGRAM(s) IV Push daily  guaiFENesin  milliGRAM(s) Oral every 12 hours PRN  levothyroxine 50 MICROGram(s) Oral daily  melatonin 3 milliGRAM(s) Oral at bedtime PRN  metoprolol succinate ER 50 milliGRAM(s) Oral daily  ondansetron Injectable 4 milliGRAM(s) IV Push every 8 hours PRN  pantoprazole    Tablet 40 milliGRAM(s) Oral before breakfast  rivaroxaban 20 milliGRAM(s) Oral with dinner      Vitals:  T(F): 98.3 (01-26), Max: 98.4 (01-25)  HR: 64 (01-26) (56 - 65)  BP: 154/76 (01-26) (148/66 - 175/70)  RR: 17 (01-26)  SpO2: 97% (01-26)  I&O's Summary    25 Jan 2023 07:01  -  26 Jan 2023 07:00  --------------------------------------------------------  IN: 480 mL / OUT: 0 mL / NET: 480 mL        Physical Exam:  Appearance: No acute distress; well appearing  Eyes: PERRL, EOMI, pink conjunctiva  HEENT: Normal oral mucosa  Cardiovascular: RRR, S1, S2, no murmurs, rubs, or gallops; no edema; no JVD  Respiratory: Clear to auscultation bilaterally  Gastrointestinal: soft, non-tender, non-distended with normal bowel sounds  Musculoskeletal: No clubbing; no joint deformity   Neurologic: Non-focal  Lymphatic: No lymphadenopathy  Psychiatry: AAOx3, mood & affect appropriate  Skin: No rashes, ecchymoses, or cyanosis                          12.0   4.23  )-----------( 172      ( 25 Jan 2023 00:59 )             37.9     01-25    140  |  106  |  16  ----------------------------<  89  3.9   |  24  |  0.89    Ca    9.1      25 Jan 2023 00:59            Serum Pro-Brain Natriuretic Peptide: 87061 pg/mL (01-23 @ 22:45)          New ECG(s): Personally reviewed    Echo:    EF (Gasac Rule): 55 %Doppler Peak Velocity (m/sec):  MV=1.8 AoV=1.1  ------------------------------------------------------------------------  Observations:  Mitral Valve: Bioprosthetic mitral valve replacement. Valve  leaflets appear thickened and thickened with decreased  opening. Color flow turbulence seen across the mitral  prosthesis suggestive of reduced diastolic opening. No  mitral valve regurgitation seen. Peak mitral valve gradient  equals 13 mm Hg, mean transmitral valve gradient equals 8  mm Hg, which is elevated even in the setting of a  bioprosthetic mitral valve replacement. (HR 60 bpm)  Aortic Valve/Aorta: Mildly calcified trileaflet aortic  valve with normal opening. Peak transaortic valve gradient  equals 5 mm Hg, mean transaortic valve gradient equals 3 mm  Hg, aortic valve velocity time integral equals 31 cm. No  aortic valve regurgitation seen.Peak left ventricular  outflow tract gradient equals 3 mm Hg, mean gradient is  equal to 1 mm Hg, LVOT velocity time integral equals 20 cm.  Aortic Root: 3.4 cm.  Ascending Aorta: 3 cm.  LVOT diameter: 2.1 cm.  Left Atrium: Severely dilated left atrium.  LA volume index  = 54 cc/m2.  Left Ventricle: Normal left ventricular systolic function.  Septal motion consistent with prior cardiac surgery. Mild  left ventricular enlargement. Indeterminate diastolic  function.  Right Heart: Normal right atrium. Normal right ventricular  size and function. Tricuspid valve not well visualized,  valve leaflets appear to open normally. Minimal tricuspid  regurgitation. Measured peak transvalvular gradient equals  11 mmHg, mean gradient equals 6 mmHg. Consideradditional  imaging of the tricuspid valve. Normal pulmonic valve. No  pulmonic regurgitation.  Pericardium/Pleura: Pericardial fat pad noted. No  pericardial effusion.  Hemodynamic: Estimated right atrial pressure equals 3 mmHg.  Inadequate tricuspid regurgitation Doppler envelope  precludes estimation of RVSP.  ------------------------------------------------------------------------  Conclusions:  1. Bioprosthetic mitral valve replacement. Valve leaflets  appear thickened and thickened with decreased opening.  Color flow turbulence seen across the mitral prosthesis  suggestive of reduced diastolic opening. No mitral valve  regurgitation seen. Peak mitral valve gradient equals 13 mm  Hg, mean transmitral valve gradient equals 8 mm Hg, which  is elevated even in the setting of a bioprosthetic mitral  valve replacement. (HR 60 bpm)  2. Mildly calcified trileaflet aortic valve with normal  opening. No aortic valve regurgitation seen.  3. Normal left ventricular systolic function. Septal motion  consistent with prior cardiac surgery.  4. Normal right ventricular size and function.  5. Tricuspid valve not well visualized, valve leaflets  appear to open normally. Minimal tricuspid regurgitation.  Measured peak transvalvular gradient [id number] mmHg, mean  gradient equals 6 mmHg. Consider additional imaging of the  tricuspid valve.  LISA is recommended for further evaluation of the mitral and  tricuspid valves if clinically indicated.  *** Compared with echocardiogram of 3/6/2013, transmitral  gradient is higher on today's study.  ------------------------------------------------------------------------  Confirmed on  1/25/2023 - 12:35:52 by Edinson Thomas M.D.  ------------------------------------------------------------------------      Interpretation of Telemetry: sinus rhythm with no evidence of atrial flutter

## 2023-01-26 NOTE — PROGRESS NOTE ADULT - SUBJECTIVE AND OBJECTIVE BOX
DATE OF SERVICE: 01-26-23 @ 15:51    Patient is a 77y old  Female who presents with a chief complaint of Atrial flutter (26 Jan 2023 10:56)      INTERVAL HISTORY: Feels ok.     REVIEW OF SYSTEMS:  CONSTITUTIONAL: No weakness  EYES/ENT: No visual changes;  No throat pain   NECK: No pain or stiffness  RESPIRATORY: No cough, wheezing; No shortness of breath  CARDIOVASCULAR: No chest pain or palpitations  GASTROINTESTINAL: No abdominal  pain. No nausea, vomiting, or hematemesis  GENITOURINARY: No dysuria, frequency or hematuria  NEUROLOGICAL: No stroke like symptoms  SKIN: No rashes    TELEMETRY Personally reviewed: SB/SR 50-60  	  MEDICATIONS:  furosemide   Injectable 20 milliGRAM(s) IV Push daily  metoprolol succinate ER 50 milliGRAM(s) Oral daily        PHYSICAL EXAM:  T(C): 36.6 (01-26-23 @ 10:25), Max: 36.8 (01-26-23 @ 04:54)  HR: 62 (01-26-23 @ 10:40) (55 - 69)  BP: 145/71 (01-26-23 @ 10:40) (61/- - 175/70)  RR: 17 (01-26-23 @ 10:40) (17 - 18)  SpO2: 91% (01-26-23 @ 10:40) (91% - 97%)  Wt(kg): --  I&O's Summary    25 Jan 2023 07:01  -  26 Jan 2023 07:00  --------------------------------------------------------  IN: 480 mL / OUT: 0 mL / NET: 480 mL      Height (cm): 163.8 (01-26 @ 08:59)  Weight (kg): 63.5 (01-26 @ 08:59)  BMI (kg/m2): 23.7 (01-26 @ 08:59)  BSA (m2): 1.69 (01-26 @ 08:59)    Appearance: In no distress	  HEENT:    PERRL, EOMI	  Cardiovascular:  S1 S2, No JVD  Respiratory: Lungs clear to auscultation	  Gastrointestinal:  Soft, Non-tender, + BS	  Vascularature:  No edema of LE  Psychiatric: Appropriate affect   Neuro: no acute focal deficits                               12.0   4.23  )-----------( 172      ( 25 Jan 2023 00:59 )             37.9     01-25    140  |  106  |  16  ----------------------------<  89  3.9   |  24  |  0.89    Ca    9.1      25 Jan 2023 00:59          Labs personally reviewed    < from: Transesophageal Echocardiogram w/o TTE (Transesophageal Echocardiogram) (01.26.23 @ 09:00) >  1. Bioprosthetic mitral valve replacement. The valve is  well seated.  The prosthetic valve leaflets are thickened.  From the surgeons view the leaflet at the leaflet from the  3 o'clock to the 6 o'clock and the leaftet from the 6  o'clock to the 9 o'clock position are fixed and immobile.  The remaining leaflet is thickend with reduced mobility.  There is no significant pannus.  3D plainemtry of the  mitral valve area is 0.9 sqcm.  Minimal mitral  regurgitation. Severe mitral stenosis.  2. Normal trileaflet aortic valve. No aortic valve  regurgitation seen.  3. Simple atheroma noted in aortic arch/descending aorta.  4. No left atrium or left atrial appendage thrombus.  Decreased left atrial appendage velocities noted.  5. Normal left ventricular systolic function. No segmental  wallmotion abnormalities.  6. Normal right ventricular size and function.  *** Compared with echocardiogram of 1/25/2023, no  significant changes noted.    < end of copied text >      ASSESSMENT/PLAN: 	      77F PMH HTN, hypothyroidism, OA, DVT started on Xarelto 6 weeks ago, presents with palpitations. Patient reports that she began developing palpitations yesterday, which she believed would resolve alone, however symptoms got worse. She hit her head when climbing on a ladder, and then went to the hospital. Patient reports persistent cough, which has been ongoing since October. Reports weight loss of about 10 lbs, which has been intentional due to diet changes.     Problem/Plan -1  Problem: New Onset Atrial Flutter  - ECG reveals A-flutter  - Initially tachycardic on presentation but now rate controlled in SR  - Anticoagulated prior on Xarelto for DVT Dx ~ 6 weeks ago  - c/w Metoprolol and Xarelto (lifelong AC)  - [facility]script records from Dr Cali Butler reviewed, past med history is notable for AF   - TSH wnl  - we discussed antiarrythmic strategies including ablation vs meds  - Consulted EP    - AF likely secondary to severe MS    Problem/Plan -2  Problem: Hypertension  - c/w Metoprolol 50mg PO daily    Problem/Plan -3  Problem: Hx of DVT  - recently dx'd with acute DVT 6 weeks ago  - c/w Xarelto    Problem/Plan -4  Problem: Hypothyroidism  - TSH wnl  - c/w levothyroxine    Problem/Plan -5  Problem: Mitral Stenosis  - Hx of Bioprosthetic MVR  - s/p LISA reveals severe MS  - CTS consulted        Astrid Poon, AG-NP   Jonathan Perez DO [facility]  Cardiovascular Medicine  [address] [place] [address], [place] [address]  Available through call or text on [employer]  Office: [phone]

## 2023-01-26 NOTE — PROGRESS NOTE ADULT - SUBJECTIVE AND OBJECTIVE BOX
[facility] Division of Hospital Medicine  Anselmo Patel MD  Available via MS Teams  Pager [phone]    SUBJECTIVE / OVERNIGHT EVENTS: Patient seen and examined at bedside in [facility]. Resting comfortably and in no acute distress following LISA. Patient reports mild irritation of the throat, however is with no other complaints. Denies chest pain or palpitations at time of exam. Denies shortness of breath or cough. Denies abdominal pain, nausea or vomiting. ROS otherwise negative.     MEDICATIONS  (STANDING):  aspirin enteric coated 81 milliGRAM(s) Oral daily  atorvastatin 20 milliGRAM(s) Oral at bedtime  furosemide   Injectable 20 milliGRAM(s) IV Push daily  levothyroxine 50 MICROGram(s) Oral daily  metoprolol succinate ER 50 milliGRAM(s) Oral daily  pantoprazole    Tablet 40 milliGRAM(s) Oral before breakfast  rivaroxaban 20 milliGRAM(s) Oral with dinner    MEDICATIONS  (PRN):  acetaminophen     Tablet .. 650 milliGRAM(s) Oral every 6 hours PRN Temp greater or equal to 38C (100.4F), Mild Pain (1 - 3)  albuterol/ipratropium for Nebulization 3 milliLiter(s) Nebulizer every 6 hours PRN Bronchospasm  aluminum hydroxide/magnesium hydroxide/simethicone Suspension 30 milliLiter(s) Oral every 4 hours PRN Dyspepsia  benzocaine 15 mG/menthol 3.6 mG Lozenge 1 Lozenge Oral four times a day PRN Sore Throat  guaiFENesin  milliGRAM(s) Oral every 12 hours PRN Cough  melatonin 3 milliGRAM(s) Oral at bedtime PRN Insomnia  ondansetron Injectable 4 milliGRAM(s) IV Push every 8 hours PRN Nausea and/or Vomiting      I&O's Summary    2023 07:01  -  2023 07:00  --------------------------------------------------------  IN: 480 mL / OUT: 0 mL / NET: 480 mL        PHYSICAL EXAM:  Vital Signs Last 24 Hrs  T(C): 36.6 (2023 10:25), Max: 36.9 (2023 11:20)  T(F): 97.9 (2023 10:25), Max: 98.4 (2023 11:20)  HR: 61 (2023 10:25) (55 - 69)  BP: 146/82 (2023 10:25) (61/- - 175/70)  BP(mean): 93 (2023 10:25) (93 - 99)  RR: 17 (2023 10:25) (17 - 18)  SpO2: 92% (2023 10:25) (91% - 97%)    Parameters below as of 2023 10:25  Patient On (Oxygen Delivery Method): room air      CONSTITUTIONAL: NAD, well-groomed  EYES: PERRLA; conjunctiva and sclera clear  ENMT: Moist oral mucosa, no pharyngeal injection or exudates; normal dentition  NECK: Supple, no palpable masses; no thyromegaly  RESPIRATORY: Normal respiratory effort; lungs are clear to auscultation bilaterally  CARDIOVASCULAR: normal S1 and S2, no murmur/rub/gallop; No lower extremity edema  ABDOMEN: Nontender to palpation, normoactive bowel sounds, no rebound/guarding; No hepatosplenomegaly  MUSCULOSKELETAL:  no clubbing or cyanosis of digits; no joint swelling or tenderness to palpation  PSYCH: A+O to person, place, and time; affect appropriate  NEUROLOGY: CN 2-12 are intact and symmetric; no gross sensory deficits   SKIN: No rashes; no palpable lesions    LABS:                        12.0   4.23  )-----------( 172      ( 2023 00:59 )             37.9     01-25    140  |  106  |  16  ----------------------------<  89  3.9   |  24  |  0.89    Ca    9.1      2023 00:59            Urinalysis Basic - ( 2023 17:28 )    Color: Yellow / Appearance: Clear / S.023 / pH: x  Gluc: x / Ketone: Small  / Bili: Negative / Urobili: 2 mg/dL   Blood: x / Protein: Trace / Nitrite: Negative   Leuk Esterase: Negative / RBC: 1 /hpf / WBC 3 /HPF   Sq Epi: x / Non Sq Epi: 1 /hpf / Bacteria: Negative        SARS-CoV-2: NotDetec (2023 22:45)  SARS-CoV-2: Detected (30 Oct 2022 18:53)    COMMUNICATION:  Care Discussed with Consultants/Other Providers and Details of Discussion: Case discussed with [facility] ACP  Discussions with Patient/Family: Case discussed with patient, will follow up with LISA results

## 2023-01-26 NOTE — PROGRESS NOTE ADULT - PROBLEM SELECTOR PLAN 1
presented with atrial flutter with rate in 140s improved with metoprolol 5mg IV in ED 2x. Patient seen by EP, However pt does not want to proceed with ablation at this time, and wants results of LISA prior to decision.  - c/w metoprolol succinate 50 mg PO Qd  - LISA performed today, pending results  - Continue with full AC with Xarelto 20 mg PO Qd  - TSH and HbA1c : wnl

## 2023-01-26 NOTE — PROGRESS NOTE ADULT - ASSESSMENT
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 She was found to be in atrial flutter at a rate of 130. She converted back into sinus rhythm with metoprolol 5mg x2.     #Atrial flutter  She was found to be in atrial flutter at a rate of 130. She converted back into sinus rhythm with metoprolol 5mg x2. She has an atrial volume index of 88.  -Can increase metoprolol metoprolol to 50mg BID with hold parameters HR<55  -Continue with rivaroxaban 20mg nightly  -Continue with ASA and atorvastatin   -Discussed atrial flutter ablation with the patient and she would like to think about it and talk with her family before making a decision. Would likely be outpatient  -Patient on levothyroxine 50mg daily, check TSH and T4    Hernan Wild MD  Cardiology fellow   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 She was found to be in atrial flutter at a rate of 130. She converted back into sinus rhythm with metoprolol 5mg x2.     #Atrial flutter  She was found to be in atrial flutter at a rate of 130. She converted back into sinus rhythm with metoprolol 5mg x2. She has an atrial volume index of 88.  -Metoprolol metoprolol 50mg BID with hold parameters HR<55  -Continue with rivaroxaban 20mg nightly  -Continue with ASA and atorvastatin   -Discussed atrial flutter ablation with the patient and she would like to think about it and talk with her family before making a decision. Would likely be outpatient  -Patient on levothyroxine 50mg daily, check TSH and T4    Hernan Wild MD  Cardiology fellow   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 She was found to be in atrial flutter at a rate of 130. She converted back into sinus rhythm with metoprolol 5mg x2.     #Atrial flutter  She was found to be in atrial flutter at a rate of 130. She converted back into sinus rhythm with metoprolol 5mg x2. She has an atrial volume index of 88.  -Metoprolol metoprolol 50mg BID with hold parameters HR<55  -Continue with rivaroxaban 20mg nightly  -Continue with ASA and atorvastatin   -Discussed atrial flutter ablation with the patient and she would like to think about it and talk with her family before making a decision. Would likely be outpatient  -Patient on levothyroxine 50mg daily, check TSH and T4  -Patient to undergo LISA to assess for degree of mitral stenosis in bioprosthetic valve    Hernan Wild MD  Cardiology fellow   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 She was found to be in atrial flutter at a rate of 130. She converted back into sinus rhythm with metoprolol 5mg x2.     #Atrial flutter  She was found to be in atrial flutter at a rate of 130. She converted back into sinus rhythm with metoprolol 5mg x2. She has an atrial volume index of 88.  -Metoprolol metoprolol 50mg BID with hold parameters HR<55  -Continue with rivaroxaban 20mg nightly  -Continue with ASA and atorvastatin   -LISA shows severe MS  -Flutter ablation currently deferred in the setting of severe mitral stenosis workup   -Patient on levothyroxine 50mg daily, check TSH and T4    EP to sign-off, please consult again with any questions    Hernan Wild MD  Cardiology fellow

## 2023-01-27 ENCOUNTER — RX RENEWAL (OUTPATIENT)
Age: 78
End: 2023-01-27

## 2023-01-27 DIAGNOSIS — I50.33 ACUTE ON CHRONIC DIASTOLIC (CONGESTIVE) HEART FAILURE: ICD-10-CM

## 2023-01-27 DIAGNOSIS — T82.857A STENOSIS OF OTHER CARDIAC PROSTHETIC DEVICES, IMPLANTS AND GRAFTS, INITIAL ENCOUNTER: ICD-10-CM

## 2023-01-27 LAB
ANION GAP SERPL CALC-SCNC: 11 MMOL/L — SIGNIFICANT CHANGE UP (ref 5–17)
BUN SERPL-MCNC: 14 MG/DL — SIGNIFICANT CHANGE UP (ref 7–23)
CALCIUM SERPL-MCNC: 9 MG/DL — SIGNIFICANT CHANGE UP (ref 8.4–10.5)
CHLORIDE SERPL-SCNC: 104 MMOL/L — SIGNIFICANT CHANGE UP (ref 96–108)
CO2 SERPL-SCNC: 24 MMOL/L — SIGNIFICANT CHANGE UP (ref 22–31)
CREAT SERPL-MCNC: 0.85 MG/DL — SIGNIFICANT CHANGE UP (ref 0.5–1.3)
EGFR: 71 ML/MIN/1.73M2 — SIGNIFICANT CHANGE UP
GLUCOSE SERPL-MCNC: 87 MG/DL — SIGNIFICANT CHANGE UP (ref 70–99)
HCT VFR BLD CALC: 38.7 % — SIGNIFICANT CHANGE UP (ref 34.5–45)
HGB BLD-MCNC: 12.3 G/DL — SIGNIFICANT CHANGE UP (ref 11.5–15.5)
MCHC RBC-ENTMCNC: 30.4 PG — SIGNIFICANT CHANGE UP (ref 27–34)
MCHC RBC-ENTMCNC: 31.8 GM/DL — LOW (ref 32–36)
MCV RBC AUTO: 95.8 FL — SIGNIFICANT CHANGE UP (ref 80–100)
NRBC # BLD: 0 /100 WBCS — SIGNIFICANT CHANGE UP (ref 0–0)
PLATELET # BLD AUTO: 201 K/UL — SIGNIFICANT CHANGE UP (ref 150–400)
POTASSIUM SERPL-MCNC: 3.5 MMOL/L — SIGNIFICANT CHANGE UP (ref 3.5–5.3)
POTASSIUM SERPL-SCNC: 3.5 MMOL/L — SIGNIFICANT CHANGE UP (ref 3.5–5.3)
RBC # BLD: 4.04 M/UL — SIGNIFICANT CHANGE UP (ref 3.8–5.2)
RBC # FLD: 13.3 % — SIGNIFICANT CHANGE UP (ref 10.3–14.5)
SODIUM SERPL-SCNC: 139 MMOL/L — SIGNIFICANT CHANGE UP (ref 135–145)
WBC # BLD: 6.48 K/UL — SIGNIFICANT CHANGE UP (ref 3.8–10.5)
WBC # FLD AUTO: 6.48 K/UL — SIGNIFICANT CHANGE UP (ref 3.8–10.5)

## 2023-01-27 PROCEDURE — 75572 CT HRT W/3D IMAGE: CPT | Mod: 26

## 2023-01-27 PROCEDURE — 99232 SBSQ HOSP IP/OBS MODERATE 35: CPT | Mod: FS

## 2023-01-27 RX ORDER — POTASSIUM CHLORIDE 20 MEQ
40 PACKET (EA) ORAL ONCE
Refills: 0 | Status: COMPLETED | OUTPATIENT
Start: 2023-01-27 | End: 2023-01-27

## 2023-01-27 RX ADMIN — Medication 50 MILLIGRAM(S): at 05:20

## 2023-01-27 RX ADMIN — Medication 20 MILLIGRAM(S): at 05:20

## 2023-01-27 RX ADMIN — Medication 40 MILLIEQUIVALENT(S): at 10:34

## 2023-01-27 RX ADMIN — BENZOCAINE AND MENTHOL 1 LOZENGE: 5; 1 LIQUID ORAL at 21:12

## 2023-01-27 RX ADMIN — ATORVASTATIN CALCIUM 20 MILLIGRAM(S): 80 TABLET, FILM COATED ORAL at 21:09

## 2023-01-27 RX ADMIN — Medication 50 MICROGRAM(S): at 05:20

## 2023-01-27 RX ADMIN — Medication 600 MILLIGRAM(S): at 10:34

## 2023-01-27 RX ADMIN — ENOXAPARIN SODIUM 60 MILLIGRAM(S): 100 INJECTION SUBCUTANEOUS at 05:19

## 2023-01-27 RX ADMIN — Medication 81 MILLIGRAM(S): at 10:36

## 2023-01-27 RX ADMIN — PANTOPRAZOLE SODIUM 40 MILLIGRAM(S): 20 TABLET, DELAYED RELEASE ORAL at 05:20

## 2023-01-27 RX ADMIN — ENOXAPARIN SODIUM 60 MILLIGRAM(S): 100 INJECTION SUBCUTANEOUS at 17:22

## 2023-01-27 RX ADMIN — BENZOCAINE AND MENTHOL 1 LOZENGE: 5; 1 LIQUID ORAL at 10:35

## 2023-01-27 NOTE — CONSULT NOTE ADULT - PROBLEM SELECTOR RECOMMENDATION 2
Monitor Daily weight. Continue with furosemide 20 mg IV daily. Consider transitioning to oral when able.

## 2023-01-27 NOTE — PROGRESS NOTE ADULT - ASSESSMENT
77F PMH HTN, hypothyroidism, OA, DVT started on Xarelto 6 weeks ago, Prosthetic Mitral Valve Stenosis, Acute on Chronic HFpEF, presents with palpitations  1/27 CT completed Images to be reviewed, plan to be determined

## 2023-01-27 NOTE — PROGRESS NOTE ADULT - SUBJECTIVE AND OBJECTIVE BOX
DATE OF SERVICE: 01-27-23 @ 19:17    Patient is a 77y old  Female who presents with a chief complaint of Severe MS (27 Jan 2023 12:41)      INTERVAL HISTORY: Feels ok.     REVIEW OF SYSTEMS:  CONSTITUTIONAL: No weakness  EYES/ENT: No visual changes;  No throat pain   NECK: No pain or stiffness  RESPIRATORY: No cough, wheezing; No shortness of breath  CARDIOVASCULAR: No chest pain or palpitations  GASTROINTESTINAL: No abdominal  pain. No nausea, vomiting, or hematemesis  GENITOURINARY: No dysuria, frequency or hematuria  NEUROLOGICAL: No stroke like symptoms  SKIN: No rashes    TELEMETRY Personally reviewed: SR 60-70s  	  MEDICATIONS:  furosemide   Injectable 20 milliGRAM(s) IV Push daily  metoprolol succinate ER 50 milliGRAM(s) Oral daily        PHYSICAL EXAM:  T(C): 37.1 (01-27-23 @ 13:06), Max: 37.1 (01-27-23 @ 13:06)  HR: 72 (01-27-23 @ 13:06) (69 - 78)  BP: 152/79 (01-27-23 @ 13:06) (135/75 - 162/72)  RR: 18 (01-27-23 @ 13:06) (18 - 18)  SpO2: 93% (01-27-23 @ 13:06) (92% - 95%)  Wt(kg): --  I&O's Summary    27 Jan 2023 07:01  -  27 Jan 2023 19:17  --------------------------------------------------------  IN: 240 mL / OUT: 0 mL / NET: 240 mL          Appearance: In no distress	  HEENT:    PERRL, EOMI	  Cardiovascular:  S1 S2, No JVD  Respiratory: Lungs clear to auscultation	  Gastrointestinal:  Soft, Non-tender, + BS	  Vascularature:  No edema of LE  Psychiatric: Appropriate affect   Neuro: no acute focal deficits                               12.3   6.48  )-----------( 201      ( 27 Jan 2023 05:34 )             38.7     01-27    139  |  104  |  14  ----------------------------<  87  3.5   |  24  |  0.85    Ca    9.0      27 Jan 2023 05:36          Labs personally reviewed    < from: CT Heart without Coronaries w/ IV Cont (01.27.23 @ 13:32) >  INTERPRETATION:  Indication: 77-year-old woman with a history of   bioprosthetic mitral valve replacement with reported mitral stenosis   referred for evaluation prior to possible transcatheter mitral valve   procedure.    Procedure:  Informed consent was obtained from the patient and there were   no complications during the procedure. ECG-gated 320-slice computed   tomography of the heart was performed with a contrast injection of 80   cc's of Omnipaque 350.  A delayed, non-contrast, ECG gated scan of the   heart was performed to evaluate the left atrial appendage.   The patient   was not pretreated with any medications.  Axial two- and   three-dimensional images were reconstructed using a [employer] Workstation.   The image quality is adequate.    FINDINGS:    Non-Coronary:  The heart qualitatively appears enlarged.  Status post bioprosthetic   mitral valve replacement.  The leftatrium is enlarged.  The interatrial   septum bulges toward the right atrium.  There is reduced contrast   opacification of the left atrial appendage which resolves on delay   imaging suggestive of likely mixing artifact.   Left atrial diverticula   are noted.    Aortic root calcifications are noted.  Calcified plaque is noted in the   visualized portion of the aortic arch and descending aorta.    Coronary:  Coronary arterial calcifications are noted; however, this study was not   optimized for evaluation of the coronary arteries.  Please refer to   cardiac catheterization performed as part of pre-procedural work-up.    Noncardiac: Thyroid gland is unremarkable. Aorta and pulmonary arteries   are normal in size. Small right pleural effusion with adjacent right   basilar atelectasis. Bandlike consolidation in the left lower lobe is   likely atelectasis as there are corresponding areas of endobronchial   secretions and mucoid impacted bronchi.    Multiple hepatic hypodensities are too smallto characterize.        IMPRESSION:  1. Status-post bioprosthetic mitral valve replacement.    < end of copied text >      ASSESSMENT/PLAN: 	        77F PMH HTN, hypothyroidism, OA, DVT started on Xarelto 6 weeks ago, presents with palpitations. Patient reports that she began developing palpitations yesterday, which she believed would resolve alone, however symptoms got worse. She hit her head when climbing on a ladder, and then went to the hospital. Patient reports persistent cough, which has been ongoing since October. Reports weight loss of about 10 lbs, which has been intentional due to diet changes.     Problem/Plan -1  Problem: New Onset Atrial Flutter  - ECG reveals A-flutter  - Initially tachycardic on presentation but now rate controlled in SR  - Anticoagulated prior on Xarelto for DVT Dx ~ 6 weeks ago  - c/w Metoprolol and Xarelto (lifelong AC)  - Allscript records from Dr Cali Butler reviewed, past med history is notable for AF   - TSH wnl  - we discussed antiarrythmic strategies including ablation vs meds  - Consulted EP    - AF likely secondary to severe MS    Problem/Plan -2  Problem: Hypertension  - c/w Metoprolol 50mg PO daily    Problem/Plan -3  Problem: Hx of DVT  - recently dx'd with acute DVT 6 weeks ago  - previously on Xarelto but now transitioned to Lovenox in anticipation of poss CTS    Problem/Plan -4  Problem: Hypothyroidism  - TSH wnl  - c/w levothyroxine    Problem/Plan -5  Problem: Mitral Stenosis  - Hx of Bioprosthetic MVR  - s/p LISA reveals severe MS  - CTS consulted  - s/p Cardiac CT, will likely need cardiac catheterization      Astrid Poon, JAILENE-NP   Jonathan Perez DO [facility]  Cardiovascular Medicine  [address], [address]  Available through call or text on [employer]  Office: [phone]   DATE OF SERVICE: 01-27-23 @ 19:17    Patient is a 77y old  Female who presents with a chief complaint of Severe MS (27 Jan 2023 12:41)      INTERVAL HISTORY: Feels ok.     REVIEW OF SYSTEMS:  CONSTITUTIONAL: No weakness  EYES/ENT: No visual changes;  No throat pain   NECK: No pain or stiffness  RESPIRATORY: No cough, wheezing; No shortness of breath  CARDIOVASCULAR: No chest pain or palpitations  GASTROINTESTINAL: No abdominal  pain. No nausea, vomiting, or hematemesis  GENITOURINARY: No dysuria, frequency or hematuria  NEUROLOGICAL: No stroke like symptoms  SKIN: No rashes    TELEMETRY Personally reviewed: SR 60-70s  	  MEDICATIONS:  furosemide   Injectable 20 milliGRAM(s) IV Push daily  metoprolol succinate ER 50 milliGRAM(s) Oral daily        PHYSICAL EXAM:  T(C): 37.1 (01-27-23 @ 13:06), Max: 37.1 (01-27-23 @ 13:06)  HR: 72 (01-27-23 @ 13:06) (69 - 78)  BP: 152/79 (01-27-23 @ 13:06) (135/75 - 162/72)  RR: 18 (01-27-23 @ 13:06) (18 - 18)  SpO2: 93% (01-27-23 @ 13:06) (92% - 95%)  Wt(kg): --  I&O's Summary    27 Jan 2023 07:01  -  27 Jan 2023 19:17  --------------------------------------------------------  IN: 240 mL / OUT: 0 mL / NET: 240 mL          Appearance: In no distress	  HEENT:    PERRL, EOMI	  Cardiovascular:  S1 S2, No JVD  Respiratory: Lungs clear to auscultation	  Gastrointestinal:  Soft, Non-tender, + BS	  Vascularature:  No edema of LE  Psychiatric: Appropriate affect   Neuro: no acute focal deficits                               12.3   6.48  )-----------( 201      ( 27 Jan 2023 05:34 )             38.7     01-27    139  |  104  |  14  ----------------------------<  87  3.5   |  24  |  0.85    Ca    9.0      27 Jan 2023 05:36          Labs personally reviewed    < from: CT Heart without Coronaries w/ IV Cont (01.27.23 @ 13:32) >  INTERPRETATION:  Indication: 77-year-old woman with a history of   bioprosthetic mitral valve replacement with reported mitral stenosis   referred for evaluation prior to possible transcatheter mitral valve   procedure.    Procedure:  Informed consent was obtained from the patient and there were   no complications during the procedure. ECG-gated 320-slice computed   tomography of the heart was performed with a contrast injection of 80   cc's of Omnipaque 350.  A delayed, non-contrast, ECG gated scan of the   heart was performed to evaluate the left atrial appendage.   The patient   was not pretreated with any medications.  Axial two- and   three-dimensional images were reconstructed using a [employer] Workstation.   The image quality is adequate.    FINDINGS:    Non-Coronary:  The heart qualitatively appears enlarged.  Status post bioprosthetic   mitral valve replacement.  The leftatrium is enlarged.  The interatrial   septum bulges toward the right atrium.  There is reduced contrast   opacification of the left atrial appendage which resolves on delay   imaging suggestive of likely mixing artifact.   Left atrial diverticula   are noted.    Aortic root calcifications are noted.  Calcified plaque is noted in the   visualized portion of the aortic arch and descending aorta.    Coronary:  Coronary arterial calcifications are noted; however, this study was not   optimized for evaluation of the coronary arteries.  Please refer to   cardiac catheterization performed as part of pre-procedural work-up.    Noncardiac: Thyroid gland is unremarkable. Aorta and pulmonary arteries   are normal in size. Small right pleural effusion with adjacent right   basilar atelectasis. Bandlike consolidation in the left lower lobe is   likely atelectasis as there are corresponding areas of endobronchial   secretions and mucoid impacted bronchi.    Multiple hepatic hypodensities are too smallto characterize.        IMPRESSION:  1. Status-post bioprosthetic mitral valve replacement.    < end of copied text >      ASSESSMENT/PLAN: 	        77F PMH HTN, hypothyroidism, OA, DVT started on Xarelto 6 weeks ago, presents with palpitations. Patient reports that she began developing palpitations yesterday, which she believed would resolve alone, however symptoms got worse. She hit her head when climbing on a ladder, and then went to the hospital. Patient reports persistent cough, which has been ongoing since October. Reports weight loss of about 10 lbs, which has been intentional due to diet changes.     Problem/Plan -1  Problem: New Onset Atrial Flutter  - ECG reveals A-flutter  - Initially tachycardic on presentation but now rate controlled in SR  - Anticoagulated prior on Xarelto for DVT Dx ~ 6 weeks ago  - c/w Metoprolol and Xarelto (lifelong AC)  - Allscript records from Dr Cali Butler reviewed, past med history is notable for AF   - TSH wnl  - we discussed antiarrythmic strategies including ablation vs meds  - Consulted EP    - AF likely secondary to severe MS    Problem/Plan -2  Problem: Hypertension  - c/w Metoprolol 50mg PO daily    Problem/Plan -3  Problem: Hx of DVT  - recently dx'd with acute DVT 6 weeks ago  - previously on Xarelto but now transitioned to Lovenox in anticipation of poss CTS    Problem/Plan -4  Problem: Hypothyroidism  - TSH wnl  - c/w levothyroxine    Problem/Plan -5  Problem: Mitral Stenosis  - Hx of Bioprosthetic MVR  - s/p LISA reveals severe MS  - plan for mitral Parth  - CTS consulted  - s/p Cardiac CT, will likely need cardiac catheterization      Astrid Poon, JAILENE-NP   Jonathan Perez DO [facility]  Cardiovascular Medicine  [address] [place] [address], [address]  Available through call or text on [employer]  Office: [phone]

## 2023-01-27 NOTE — PROGRESS NOTE ADULT - PROBLEM SELECTOR PLAN 2
Cardiac CT today to asses if her anatomy is conducive for Mitral Parth.    She will also need a Cardiac Catheterization.

## 2023-01-27 NOTE — CONSULT NOTE ADULT - PROBLEM SELECTOR RECOMMENDATION 9
We were asked to evaluate the patient for possible Transcatheter Mitral Valve in Valve for prosthetic Mitral Valve Stenosis. She was felt to be high risk for the surgical team. Her TTE and LISA were reviewed with Layo Isaac and Dr. Wheeler. We will obtain a Cardiac CT today to asses if her anatomy is conducive for Mitral Parth.  She will also need a Cardiac Catheterization. Her CT will be uploaded to [employer] who makes the THV we would be using to see if MViV is an option. We will discuss timing of implant when all imaging is complete.

## 2023-01-27 NOTE — CONSULT NOTE ADULT - PROBLEM SELECTOR PROBLEM 2
----- Message from Nataliia Edwards sent at 7/16/2019  8:34 AM CDT -----  Regarding: pulmonary function test  Patient has no idea why the PFT was ordered. He does request a call to clarify.     If he would like to schedule, please transfer him to our scheduling #, [phone]    If not, please remove the order.    We will not be contacting the patient again as this order will be deferred off our list.    Thank you!  
LOV:  7/11/19  As mentioned in the HPI, to evaluate the shortness of breath further PFTs will be ordered.  If these are unrevealing his obesity will be considered as contributing to the SOB and his right heart may need to be evaluated as well      Patient informed of above, verbalized understanding, and will call to schedule PFTs    
Acute on chronic heart failure with preserved ejection fraction (HFpEF)
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
## 2023-01-28 LAB
ANION GAP SERPL CALC-SCNC: 10 MMOL/L — SIGNIFICANT CHANGE UP (ref 5–17)
BUN SERPL-MCNC: 13 MG/DL — SIGNIFICANT CHANGE UP (ref 7–23)
CALCIUM SERPL-MCNC: 9 MG/DL — SIGNIFICANT CHANGE UP (ref 8.4–10.5)
CHLORIDE SERPL-SCNC: 102 MMOL/L — SIGNIFICANT CHANGE UP (ref 96–108)
CO2 SERPL-SCNC: 26 MMOL/L — SIGNIFICANT CHANGE UP (ref 22–31)
CREAT SERPL-MCNC: 0.83 MG/DL — SIGNIFICANT CHANGE UP (ref 0.5–1.3)
EGFR: 73 ML/MIN/1.73M2 — SIGNIFICANT CHANGE UP
GLUCOSE SERPL-MCNC: 89 MG/DL — SIGNIFICANT CHANGE UP (ref 70–99)
HCT VFR BLD CALC: 37.2 % — SIGNIFICANT CHANGE UP (ref 34.5–45)
HGB BLD-MCNC: 12 G/DL — SIGNIFICANT CHANGE UP (ref 11.5–15.5)
MCHC RBC-ENTMCNC: 30.8 PG — SIGNIFICANT CHANGE UP (ref 27–34)
MCHC RBC-ENTMCNC: 32.3 GM/DL — SIGNIFICANT CHANGE UP (ref 32–36)
MCV RBC AUTO: 95.6 FL — SIGNIFICANT CHANGE UP (ref 80–100)
NRBC # BLD: 0 /100 WBCS — SIGNIFICANT CHANGE UP (ref 0–0)
PLATELET # BLD AUTO: 211 K/UL — SIGNIFICANT CHANGE UP (ref 150–400)
POTASSIUM SERPL-MCNC: 3.7 MMOL/L — SIGNIFICANT CHANGE UP (ref 3.5–5.3)
POTASSIUM SERPL-SCNC: 3.7 MMOL/L — SIGNIFICANT CHANGE UP (ref 3.5–5.3)
RBC # BLD: 3.89 M/UL — SIGNIFICANT CHANGE UP (ref 3.8–5.2)
RBC # FLD: 13.4 % — SIGNIFICANT CHANGE UP (ref 10.3–14.5)
SODIUM SERPL-SCNC: 138 MMOL/L — SIGNIFICANT CHANGE UP (ref 135–145)
WBC # BLD: 6.88 K/UL — SIGNIFICANT CHANGE UP (ref 3.8–10.5)
WBC # FLD AUTO: 6.88 K/UL — SIGNIFICANT CHANGE UP (ref 3.8–10.5)

## 2023-01-28 PROCEDURE — 99232 SBSQ HOSP IP/OBS MODERATE 35: CPT | Mod: FS

## 2023-01-28 RX ORDER — MAGNESIUM SULFATE 500 MG/ML
2 VIAL (ML) INJECTION ONCE
Refills: 0 | Status: COMPLETED | OUTPATIENT
Start: 2023-01-28 | End: 2023-01-28

## 2023-01-28 RX ORDER — POTASSIUM CHLORIDE 20 MEQ
40 PACKET (EA) ORAL ONCE
Refills: 0 | Status: COMPLETED | OUTPATIENT
Start: 2023-01-28 | End: 2023-01-28

## 2023-01-28 RX ADMIN — Medication 81 MILLIGRAM(S): at 11:38

## 2023-01-28 RX ADMIN — ENOXAPARIN SODIUM 60 MILLIGRAM(S): 100 INJECTION SUBCUTANEOUS at 05:28

## 2023-01-28 RX ADMIN — Medication 25 GRAM(S): at 03:49

## 2023-01-28 RX ADMIN — ATORVASTATIN CALCIUM 20 MILLIGRAM(S): 80 TABLET, FILM COATED ORAL at 21:22

## 2023-01-28 RX ADMIN — Medication 20 MILLIGRAM(S): at 05:27

## 2023-01-28 RX ADMIN — Medication 50 MILLIGRAM(S): at 03:48

## 2023-01-28 RX ADMIN — BENZOCAINE AND MENTHOL 1 LOZENGE: 5; 1 LIQUID ORAL at 21:25

## 2023-01-28 RX ADMIN — Medication 40 MILLIEQUIVALENT(S): at 11:38

## 2023-01-28 RX ADMIN — Medication 50 MICROGRAM(S): at 05:27

## 2023-01-28 RX ADMIN — PANTOPRAZOLE SODIUM 40 MILLIGRAM(S): 20 TABLET, DELAYED RELEASE ORAL at 05:27

## 2023-01-28 RX ADMIN — ENOXAPARIN SODIUM 60 MILLIGRAM(S): 100 INJECTION SUBCUTANEOUS at 17:09

## 2023-01-28 NOTE — PROGRESS NOTE ADULT - SUBJECTIVE AND OBJECTIVE BOX
DATE OF SERVICE: 01-28-23 @ 10:42    Patient is a 77y old  Female who presents with a chief complaint of Atrial flutter (28 Jan 2023 10:15)      INTERVAL HISTORY: no complaints     REVIEW OF SYSTEMS:  CONSTITUTIONAL: No weakness  EYES/ENT: No visual changes;  No throat pain   NECK: No pain or stiffness  RESPIRATORY: No cough, wheezing; No shortness of breath  CARDIOVASCULAR: No chest pain or palpitations  GASTROINTESTINAL: No abdominal  pain. No nausea, vomiting, or hematemesis  GENITOURINARY: No dysuria, frequency or hematuria  NEUROLOGICAL: No stroke like symptoms  SKIN: No rashes    	  MEDICATIONS:  furosemide   Injectable 20 milliGRAM(s) IV Push daily  metoprolol succinate ER 50 milliGRAM(s) Oral daily        PHYSICAL EXAM:  T(C): 36.8 (01-28-23 @ 05:10), Max: 37.4 (01-27-23 @ 19:29)  HR: 77 (01-28-23 @ 05:10) (72 - 84)  BP: 128/72 (01-28-23 @ 05:10) (123/67 - 152/79)  RR: 18 (01-28-23 @ 05:10) (18 - 18)  SpO2: 91% (01-28-23 @ 05:10) (90% - 94%)  Wt(kg): --  I&O's Summary    27 Jan 2023 07:01  -  28 Jan 2023 07:00  --------------------------------------------------------  IN: 1070 mL / OUT: 800 mL / NET: 270 mL    28 Jan 2023 07:01  -  28 Jan 2023 10:42  --------------------------------------------------------  IN: 0 mL / OUT: 300 mL / NET: -300 mL          Appearance: In no distress	  HEENT:    PERRL, EOMI	  Cardiovascular:  S1 S2, No JVD  Respiratory: Lungs clear to auscultation	  Gastrointestinal:  Soft, Non-tender, + BS	  Vascularature:  No edema of LE  Psychiatric: Appropriate affect   Neuro: no acute focal deficits                               12.0   6.88  )-----------( 211      ( 28 Jan 2023 06:13 )             37.2     01-28    138  |  102  |  13  ----------------------------<  89  3.7   |  26  |  0.83    Ca    9.0      28 Jan 2023 06:16          Labs personally reviewed      ASSESSMENT/PLAN: 	  77F PMH HTN, hypothyroidism, OA, DVT started on Xarelto 6 weeks ago, presents with palpitations. Patient reports that she began developing palpitations yesterday, which she believed would resolve alone, however symptoms got worse. She hit her head when climbing on a ladder, and then went to the hospital. Patient reports persistent cough, which has been ongoing since October. Reports weight loss of about 10 lbs, which has been intentional due to diet changes.     Problem/Plan -1  Problem: New Onset Atrial Flutter  - ECG reveals A-flutter  - Initially tachycardic on presentation but now rate controlled in SR  - Anticoagulated prior on Xarelto for DVT Dx ~ 6 weeks ago  - c/w Metoprolol and Xarelto (lifelong AC)  - Allscript records from Dr Cali Butler reviewed, past med history is notable for AF   - TSH wnl  - we discussed antiarrythmic strategies including ablation vs meds  - Consulted EP    - AF likely secondary to severe MS    Problem/Plan -2  Problem: Hypertension  - c/w Metoprolol 50mg PO daily    Problem/Plan -3  Problem: Hx of DVT  - recently dx'd with acute DVT 6 weeks ago  - previously on Xarelto but now transitioned to Lovenox in anticipation of poss CTS    Problem/Plan -4  Problem: Hypothyroidism  - TSH wnl  - c/w levothyroxine    Problem/Plan -5  Problem: Mitral Stenosis  - Hx of Bioprosthetic MVR  - s/p LISA reveals severe MS  - plan for mitral Parth  - CTS consulted  - s/p Cardiac CT, will likely need cardiac catheterization          CHANO Koenig DO [facility]  Cardiovascular Medicine  [address], [address]  Office: [phone]  Available via call/text on [employer] Teams

## 2023-01-28 NOTE — PROGRESS NOTE ADULT - PROBLEM SELECTOR PLAN 2
Structural Heart following  1/27 Cardiac CT completed   Pending Cardiac Catheterization  Continue diuresis on IV lasix 20 daily  Strict I & Os, daily weight  Plan for mitral valve in valve next week Continue Weight based Lovenox 60 BID  Xarelto on hold  Check B/L LE Duplex r/o DVT

## 2023-01-28 NOTE — PROGRESS NOTE ADULT - ASSESSMENT
77F PMH HTN, hypothyroidism, OA, DVT started on Xarelto 6 weeks ago, Prosthetic Mitral Valve Stenosis, Acute on Chronic HFpEF, presents with palpitations  1/27 Cardiac CT completed Images to be reviewed, plan to be determined  1/28 VSS, 6b WCT, IV mag supplemented, K3.7 supplemented 40meq. Plan for mitral valve in valve next week per Dr. Vasques. 77F PMH HTN, hypothyroidism, OA, DVT started on Xarelto 6 weeks ago, Prosthetic Mitral Valve Stenosis, Acute on Chronic HFpEF, presents with palpitations  1/27 Cardiac CT completed Images to be reviewed, plan to be determined  1/28 VSS, 6b WCT, IV mag supplemented, K3.7 supplemented 40meq. Plan for mitral valve in valve next week per Dr. Vasques. Pending cardiac cath Mon. Check B/L LE duplex for hx of DVT.

## 2023-01-28 NOTE — PROGRESS NOTE ADULT - PROBLEM SELECTOR PLAN 1
Continue Weight based Lovenox 60 BID  Continue Toprol 50 XL daily for rate control  Xarelto on hold  Daily BMP, Replete potassium prn Structural Heart following  1/27 Cardiac CT completed   Pending Cardiac Catheterization Monday 1/30  Continue diuresis on IV lasix 20 daily  Strict I & Os, daily weight  Plan for mitral valve in valve next week

## 2023-01-28 NOTE — PROGRESS NOTE ADULT - SUBJECTIVE AND OBJECTIVE BOX
Pt ambulated to bathroom, tolerated activity well.   Subjective: Pt states "Hello" denies any CP or SOB. No acute events overnight.     Telemetry: SR 65 - 80    Vital Signs Last 24 Hrs  T(C): 36.8 (01-28-23 @ 05:10), Max: 37.4 (01-27-23 @ 19:29)  T(F): 98.2 (01-28-23 @ 05:10), Max: 99.3 (01-27-23 @ 19:29)  HR: 77 (01-28-23 @ 05:10) (72 - 84)  BP: 128/72 (01-28-23 @ 05:10) (123/67 - 152/79)  RR: 18 (01-28-23 @ 05:10) (18 - 18)  SpO2: 91% (01-28-23 @ 05:10) (90% - 94%)             01-27 @ 07:01  -  01-28 @ 07:00  --------------------------------------------------------  IN: 1070 mL / OUT: 800 mL / NET: 270 mL                            12.0   6.88  )-----------( 211      ( 28 Jan 2023 06:13 )             37.2     138  |  102  |  13  ----------------------------<  89  3.7   |  26  |  0.83              PHYSICAL EXAM  Neurology: A&Ox3, NAD  CV : RRR+S1S2 +systolic murmur   Lungs: Respirations non-labored, B/L BS CTA  Abdomen: Soft, NT/ND, +BSx4Q, last BM 1/28  (-)N/V/D  : Voiding without difficulty  Extremities: B/L LE no edema, negative calf tenderness, +PP              MEDICATIONS  acetaminophen     Tablet .. 650 milliGRAM(s) Oral every 6 hours PRN  albuterol/ipratropium for Nebulization 3 milliLiter(s) Nebulizer every 6 hours PRN  aluminum hydroxide/magnesium hydroxide/simethicone Suspension 30 milliLiter(s) Oral every 4 hours PRN  aspirin enteric coated 81 milliGRAM(s) Oral daily  atorvastatin 20 milliGRAM(s) Oral at bedtime  benzocaine 15 mG/menthol 3.6 mG Lozenge 1 Lozenge Oral four times a day PRN  enoxaparin Injectable 60 milliGRAM(s) SubCutaneous every 12 hours  furosemide   Injectable 20 milliGRAM(s) IV Push daily  guaiFENesin  milliGRAM(s) Oral every 12 hours PRN  hydrocodone/homatropine Syrup 5 milliLiter(s) Oral every 8 hours  levothyroxine 50 MICROGram(s) Oral daily  melatonin 3 milliGRAM(s) Oral at bedtime PRN  metoprolol succinate ER 50 milliGRAM(s) Oral daily  ondansetron Injectable 4 milliGRAM(s) IV Push every 8 hours PRN  pantoprazole    Tablet 40 milliGRAM(s) Oral before breakfast  potassium chloride    Tablet ER 40 milliEquivalent(s) Oral once        Discussed with Cardiothoracic Team at AM rounds.

## 2023-01-29 LAB
ANION GAP SERPL CALC-SCNC: 8 MMOL/L — SIGNIFICANT CHANGE UP (ref 5–17)
BUN SERPL-MCNC: 17 MG/DL — SIGNIFICANT CHANGE UP (ref 7–23)
CALCIUM SERPL-MCNC: 9 MG/DL — SIGNIFICANT CHANGE UP (ref 8.4–10.5)
CHLORIDE SERPL-SCNC: 103 MMOL/L — SIGNIFICANT CHANGE UP (ref 96–108)
CO2 SERPL-SCNC: 28 MMOL/L — SIGNIFICANT CHANGE UP (ref 22–31)
CREAT SERPL-MCNC: 0.87 MG/DL — SIGNIFICANT CHANGE UP (ref 0.5–1.3)
EGFR: 69 ML/MIN/1.73M2 — SIGNIFICANT CHANGE UP
GLUCOSE SERPL-MCNC: 93 MG/DL — SIGNIFICANT CHANGE UP (ref 70–99)
HCT VFR BLD CALC: 37.7 % — SIGNIFICANT CHANGE UP (ref 34.5–45)
HGB BLD-MCNC: 12.2 G/DL — SIGNIFICANT CHANGE UP (ref 11.5–15.5)
MCHC RBC-ENTMCNC: 30.7 PG — SIGNIFICANT CHANGE UP (ref 27–34)
MCHC RBC-ENTMCNC: 32.4 GM/DL — SIGNIFICANT CHANGE UP (ref 32–36)
MCV RBC AUTO: 94.7 FL — SIGNIFICANT CHANGE UP (ref 80–100)
MRSA PCR RESULT.: SIGNIFICANT CHANGE UP
NRBC # BLD: 0 /100 WBCS — SIGNIFICANT CHANGE UP (ref 0–0)
PLATELET # BLD AUTO: 214 K/UL — SIGNIFICANT CHANGE UP (ref 150–400)
POTASSIUM SERPL-MCNC: 4.3 MMOL/L — SIGNIFICANT CHANGE UP (ref 3.5–5.3)
POTASSIUM SERPL-SCNC: 4.3 MMOL/L — SIGNIFICANT CHANGE UP (ref 3.5–5.3)
RBC # BLD: 3.98 M/UL — SIGNIFICANT CHANGE UP (ref 3.8–5.2)
RBC # FLD: 13.4 % — SIGNIFICANT CHANGE UP (ref 10.3–14.5)
S AUREUS DNA NOSE QL NAA+PROBE: SIGNIFICANT CHANGE UP
SODIUM SERPL-SCNC: 139 MMOL/L — SIGNIFICANT CHANGE UP (ref 135–145)
WBC # BLD: 5.93 K/UL — SIGNIFICANT CHANGE UP (ref 3.8–10.5)
WBC # FLD AUTO: 5.93 K/UL — SIGNIFICANT CHANGE UP (ref 3.8–10.5)

## 2023-01-29 PROCEDURE — 93970 EXTREMITY STUDY: CPT | Mod: 26

## 2023-01-29 PROCEDURE — 99232 SBSQ HOSP IP/OBS MODERATE 35: CPT | Mod: FS

## 2023-01-29 RX ADMIN — Medication 20 MILLIGRAM(S): at 05:16

## 2023-01-29 RX ADMIN — PANTOPRAZOLE SODIUM 40 MILLIGRAM(S): 20 TABLET, DELAYED RELEASE ORAL at 05:16

## 2023-01-29 RX ADMIN — ENOXAPARIN SODIUM 60 MILLIGRAM(S): 100 INJECTION SUBCUTANEOUS at 16:30

## 2023-01-29 RX ADMIN — Medication 50 MILLIGRAM(S): at 05:16

## 2023-01-29 RX ADMIN — Medication 81 MILLIGRAM(S): at 12:59

## 2023-01-29 RX ADMIN — Medication 50 MICROGRAM(S): at 05:15

## 2023-01-29 RX ADMIN — ENOXAPARIN SODIUM 60 MILLIGRAM(S): 100 INJECTION SUBCUTANEOUS at 05:17

## 2023-01-29 RX ADMIN — ATORVASTATIN CALCIUM 20 MILLIGRAM(S): 80 TABLET, FILM COATED ORAL at 21:32

## 2023-01-29 NOTE — PROGRESS NOTE ADULT - PROBLEM SELECTOR PLAN 1
Structural Heart following  1/27 Cardiac CT completed   Pending Cardiac Catheterization Monday 1/30  Continue diuresis on IV lasix 20 daily  Strict I & Os, daily weight  Plan for mitral valve in valve next week

## 2023-01-29 NOTE — PROGRESS NOTE ADULT - SUBJECTIVE AND OBJECTIVE BOX
Subjective: Pt states "Hello" denies any CP or SOB. No acute events overnight.     Telemetry: SR 65 - 80      Vital Signs Last 24 Hrs  T(C): 37.1 (29 Jan 2023 04:19), Max: 37.1 (29 Jan 2023 04:19)  T(F): 98.8 (29 Jan 2023 04:19), Max: 98.8 (29 Jan 2023 04:19)  HR: 78 (29 Jan 2023 04:19) (76 - 78)  BP: 138/75 (29 Jan 2023 04:19) (114/60 - 138/75)  BP(mean): 96 (29 Jan 2023 04:19) (96 - 96)  RR: 18 (29 Jan 2023 04:19) (18 - 18)  SpO2: 92% (29 Jan 2023 04:19) (92% - 92%)    Parameters below as of 29 Jan 2023 04:19  Patient On (Oxygen Delivery Method): room air                PHYSICAL EXAM  Neurology: A&Ox3, NAD  CV : RRR+S1S2 +systolic murmur   Lungs: Respirations non-labored, B/L BS CTA  I&O's Detail    28 Jan 2023 07:01  -  29 Jan 2023 07:00  --------------------------------------------------------  IN:    Oral Fluid: 1200 mL  Total IN: 1200 mL    OUT:    Voided (mL): 1450 mL  Total OUT: 1450 mL    Total NET: -250 mL      29 Jan 2023 07:01  -  29 Jan 2023 16:54  --------------------------------------------------------  IN:    Oral Fluid: 540 mL  Total IN: 540 mL    OUT:    Voided (mL): 650 mL  Total OUT: 650 mL    Total NET: -110 mL    .  LABS:                         12.2   5.93  )-----------( 214      ( 29 Jan 2023 05:03 )             37.7     01-29    139  |  103  |  17  ----------------------------<  93  4.3   |  28  |  0.87    Ca    9.0      29 Jan 2023 05:02      RADIOLOGY, EKG & ADDITIONAL TESTS: Reviewed.     Abdomen: Soft, NT/ND, +BSx4Q, last BM 1/28  (-)N/V/D  : Voiding without difficulty  Extremities: B/L LE no edema, negative calf tenderness, +PP              MEDICATIONS  acetaminophen     Tablet .. 650 milliGRAM(s) Oral every 6 hours PRN  albuterol/ipratropium for Nebulization 3 milliLiter(s) Nebulizer every 6 hours PRN  aluminum hydroxide/magnesium hydroxide/simethicone Suspension 30 milliLiter(s) Oral every 4 hours PRN  aspirin enteric coated 81 milliGRAM(s) Oral daily  atorvastatin 20 milliGRAM(s) Oral at bedtime  benzocaine 15 mG/menthol 3.6 mG Lozenge 1 Lozenge Oral four times a day PRN  enoxaparin Injectable 60 milliGRAM(s) SubCutaneous every 12 hours  furosemide   Injectable 20 milliGRAM(s) IV Push daily  guaiFENesin  milliGRAM(s) Oral every 12 hours PRN  hydrocodone/homatropine Syrup 5 milliLiter(s) Oral every 8 hours  levothyroxine 50 MICROGram(s) Oral daily  melatonin 3 milliGRAM(s) Oral at bedtime PRN  metoprolol succinate ER 50 milliGRAM(s) Oral daily  ondansetron Injectable 4 milliGRAM(s) IV Push every 8 hours PRN  pantoprazole    Tablet 40 milliGRAM(s) Oral before breakfast  potassium chloride    Tablet ER 40 milliEquivalent(s) Oral once        Discussed with Cardiothoracic Team at AM rounds.

## 2023-01-29 NOTE — PROGRESS NOTE ADULT - ASSESSMENT
77F PMH HTN, hypothyroidism, OA, DVT started on Xarelto 6 weeks ago, Prosthetic Mitral Valve Stenosis, Acute on Chronic HFpEF, presents with palpitations  1/27 Cardiac CT completed Images to be reviewed, plan to be determined  1/28 VSS, 6b WCT, IV mag supplemented, K3.7 supplemented 40meq. Plan for mitral valve in valve next week per Dr. Vasques. Pending cardiac cath Mon. Check B/L LE duplex for hx of DVT.  1/29 LE duplex (negative), plan for cath tomorrow

## 2023-01-29 NOTE — PROGRESS NOTE ADULT - SUBJECTIVE AND OBJECTIVE BOX
DATE OF SERVICE: 01-29-23 @ 10:06    Patient is a 77y old  Female who presents with a chief complaint of Atrial flutter (28 Jan 2023 10:42)      INTERVAL HISTORY: no complaints     REVIEW OF SYSTEMS:  CONSTITUTIONAL: No weakness  EYES/ENT: No visual changes;  No throat pain   NECK: No pain or stiffness  RESPIRATORY: No cough, wheezing; No shortness of breath  CARDIOVASCULAR: No chest pain or palpitations  GASTROINTESTINAL: No abdominal  pain. No nausea, vomiting, or hematemesis  GENITOURINARY: No dysuria, frequency or hematuria  NEUROLOGICAL: No stroke like symptoms  SKIN: No rashes    MEDICATIONS:  furosemide   Injectable 20 milliGRAM(s) IV Push daily  metoprolol succinate ER 50 milliGRAM(s) Oral daily        PHYSICAL EXAM:  T(C): 37.1 (01-29-23 @ 04:19), Max: 37.1 (01-29-23 @ 04:19)  HR: 78 (01-29-23 @ 04:19) (73 - 78)  BP: 138/75 (01-29-23 @ 04:19) (102/67 - 138/75)  RR: 18 (01-29-23 @ 04:19) (18 - 18)  SpO2: 92% (01-29-23 @ 04:19) (92% - 93%)  Wt(kg): --  I&O's Summary    28 Jan 2023 07:01  -  29 Jan 2023 07:00  --------------------------------------------------------  IN: 1200 mL / OUT: 1450 mL / NET: -250 mL          Appearance: In no distress	  HEENT:    PERRL, EOMI	  Cardiovascular:  S1 S2, No JVD  Respiratory: Lungs clear to auscultation	  Gastrointestinal:  Soft, Non-tender, + BS	  Vascularature:  No edema of LE  Psychiatric: Appropriate affect   Neuro: no acute focal deficits                               12.2   5.93  )-----------( 214      ( 29 Jan 2023 05:03 )             37.7     01-29    139  |  103  |  17  ----------------------------<  93  4.3   |  28  |  0.87    Ca    9.0      29 Jan 2023 05:02          Labs personally reviewed      ASSESSMENT/PLAN: 	  77F PMH HTN, hypothyroidism, OA, DVT started on Xarelto 6 weeks ago, presents with palpitations. Patient reports that she began developing palpitations yesterday, which she believed would resolve alone, however symptoms got worse. She hit her head when climbing on a ladder, and then went to the hospital. Patient reports persistent cough, which has been ongoing since October. Reports weight loss of about 10 lbs, which has been intentional due to diet changes.     Problem/Plan -1  Problem: New Onset Atrial Flutter  - ECG reveals A-flutter  - Initially tachycardic on presentation but now rate controlled in SR  - Anticoagulated prior on Xarelto for DVT Dx ~ 6 weeks ago  - c/w Metoprolol and Xarelto (lifelong AC)  - Allscript records from Dr Cali Butler reviewed, past med history is notable for AF   - TSH wnl  - we discussed antiarrythmic strategies including ablation vs meds  - Consulted EP    - AF likely secondary to severe MS    Problem/Plan -2  Problem: Hypertension  - c/w Metoprolol 50mg PO daily    Problem/Plan -3  Problem: Hx of DVT  - recently dx'd with acute DVT 6 weeks ago  - previously on Xarelto but now transitioned to Lovenox in anticipation of poss CTS    Problem/Plan -4  Problem: Hypothyroidism  - TSH wnl  - c/w levothyroxine    Problem/Plan -5  Problem: Mitral Stenosis  - Hx of Bioprosthetic MVR  - s/p LISA reveals severe MS  - plan for mitral Parth  - CTS consulted  - s/p Cardiac CT, will likely need cardiac catheterization  -CTS planning cath on 1/30               CHANO Koenig DO [facility]  Cardiovascular Medicine  [address] [place] [address], [address]  Office: [phone]  Available via call/text on Microsoft Teams  DATE OF SERVICE: 01-29-23 @ 10:06    Patient is a 77y old  Female who presents with a chief complaint of Atrial flutter (28 Jan 2023 10:42)      INTERVAL HISTORY: no complaints     REVIEW OF SYSTEMS:  CONSTITUTIONAL: No weakness  EYES/ENT: No visual changes;  No throat pain   NECK: No pain or stiffness  RESPIRATORY: No cough, wheezing; No shortness of breath  CARDIOVASCULAR: No chest pain or palpitations  GASTROINTESTINAL: No abdominal  pain. No nausea, vomiting, or hematemesis  GENITOURINARY: No dysuria, frequency or hematuria  NEUROLOGICAL: No stroke like symptoms  SKIN: No rashes    MEDICATIONS:  furosemide   Injectable 20 milliGRAM(s) IV Push daily  metoprolol succinate ER 50 milliGRAM(s) Oral daily        PHYSICAL EXAM:  T(C): 37.1 (01-29-23 @ 04:19), Max: 37.1 (01-29-23 @ 04:19)  HR: 78 (01-29-23 @ 04:19) (73 - 78)  BP: 138/75 (01-29-23 @ 04:19) (102/67 - 138/75)  RR: 18 (01-29-23 @ 04:19) (18 - 18)  SpO2: 92% (01-29-23 @ 04:19) (92% - 93%)  Wt(kg): --  I&O's Summary    28 Jan 2023 07:01  -  29 Jan 2023 07:00  --------------------------------------------------------  IN: 1200 mL / OUT: 1450 mL / NET: -250 mL          Appearance: In no distress	  HEENT:    PERRL, EOMI	  Cardiovascular:  S1 S2, No JVD  Respiratory: Lungs clear to auscultation	  Gastrointestinal:  Soft, Non-tender, + BS	  Vascularature:  No edema of LE  Psychiatric: Appropriate affect   Neuro: no acute focal deficits                               12.2   5.93  )-----------( 214      ( 29 Jan 2023 05:03 )             37.7     01-29    139  |  103  |  17  ----------------------------<  93  4.3   |  28  |  0.87    Ca    9.0      29 Jan 2023 05:02          Labs personally reviewed      ASSESSMENT/PLAN: 	  77F PMH HTN, hypothyroidism, OA, DVT started on Xarelto 6 weeks ago, presents with palpitations. Patient reports that she began developing palpitations yesterday, which she believed would resolve alone, however symptoms got worse. She hit her head when climbing on a ladder, and then went to the hospital. Patient reports persistent cough, which has been ongoing since October. Reports weight loss of about 10 lbs, which has been intentional due to diet changes.     Problem/Plan -1  Problem: New Onset Atrial Flutter  - ECG reveals A-flutter  - Initially tachycardic on presentation but now rate controlled in SR  - Anticoagulated prior on Xarelto for DVT Dx ~ 6 weeks ago  - c/w Metoprolol and Xarelto (lifelong AC)  - Allscript records from Dr Cali Butler reviewed, past med history is notable for AF   - TSH wnl  - we discussed antiarrythmic strategies including ablation vs meds  - Consulted EP    - AF likely secondary to severe MS    Problem/Plan -2  Problem: Hypertension  - c/w Metoprolol 50mg PO daily    Problem/Plan -3  Problem: Hx of DVT  - recently dx'd with acute DVT 6 weeks ago  - previously on Xarelto but now transitioned to Lovenox in anticipation of poss CTS    Problem/Plan -4  Problem: Hypothyroidism  - TSH wnl  - c/w levothyroxine    Problem/Plan -5  Problem: Mitral Stenosis  - Hx of Bioprosthetic MVR  - s/p LISA reveals severe MS  - plan for mitral Parth  - CTS consulted  - s/p Cardiac CT, will likely need cardiac catheterization  -CTS planning cath on 1/30               CHANO Koenig DO [facility]  Cardiovascular Medicine  [address] [place] [address], [address]  Office: [phone]  Available via call/text on Microsoft Teams

## 2023-01-30 LAB
ALBUMIN SERPL ELPH-MCNC: 3.1 G/DL — LOW (ref 3.3–5)
ALP SERPL-CCNC: 59 U/L — SIGNIFICANT CHANGE UP (ref 40–120)
ALT FLD-CCNC: 11 U/L — SIGNIFICANT CHANGE UP (ref 10–45)
ANION GAP SERPL CALC-SCNC: 7 MMOL/L — SIGNIFICANT CHANGE UP (ref 5–17)
AST SERPL-CCNC: 19 U/L — SIGNIFICANT CHANGE UP (ref 10–40)
BASOPHILS # BLD AUTO: 0.05 K/UL — SIGNIFICANT CHANGE UP (ref 0–0.2)
BASOPHILS NFR BLD AUTO: 0.9 % — SIGNIFICANT CHANGE UP (ref 0–2)
BILIRUB SERPL-MCNC: 0.5 MG/DL — SIGNIFICANT CHANGE UP (ref 0.2–1.2)
BLD GP AB SCN SERPL QL: NEGATIVE — SIGNIFICANT CHANGE UP
BUN SERPL-MCNC: 19 MG/DL — SIGNIFICANT CHANGE UP (ref 7–23)
CALCIUM SERPL-MCNC: 8.9 MG/DL — SIGNIFICANT CHANGE UP (ref 8.4–10.5)
CHLORIDE SERPL-SCNC: 101 MMOL/L — SIGNIFICANT CHANGE UP (ref 96–108)
CO2 SERPL-SCNC: 29 MMOL/L — SIGNIFICANT CHANGE UP (ref 22–31)
CREAT SERPL-MCNC: 0.9 MG/DL — SIGNIFICANT CHANGE UP (ref 0.5–1.3)
EGFR: 66 ML/MIN/1.73M2 — SIGNIFICANT CHANGE UP
EOSINOPHIL # BLD AUTO: 0.4 K/UL — SIGNIFICANT CHANGE UP (ref 0–0.5)
EOSINOPHIL NFR BLD AUTO: 7.3 % — HIGH (ref 0–6)
GLUCOSE SERPL-MCNC: 102 MG/DL — HIGH (ref 70–99)
HCT VFR BLD CALC: 37.2 % — SIGNIFICANT CHANGE UP (ref 34.5–45)
HGB BLD-MCNC: 12.1 G/DL — SIGNIFICANT CHANGE UP (ref 11.5–15.5)
IMM GRANULOCYTES NFR BLD AUTO: 0.2 % — SIGNIFICANT CHANGE UP (ref 0–0.9)
LYMPHOCYTES # BLD AUTO: 1.02 K/UL — SIGNIFICANT CHANGE UP (ref 1–3.3)
LYMPHOCYTES # BLD AUTO: 18.6 % — SIGNIFICANT CHANGE UP (ref 13–44)
MAGNESIUM SERPL-MCNC: 2 MG/DL — SIGNIFICANT CHANGE UP (ref 1.6–2.6)
MCHC RBC-ENTMCNC: 30.9 PG — SIGNIFICANT CHANGE UP (ref 27–34)
MCHC RBC-ENTMCNC: 32.5 GM/DL — SIGNIFICANT CHANGE UP (ref 32–36)
MCV RBC AUTO: 94.9 FL — SIGNIFICANT CHANGE UP (ref 80–100)
MONOCYTES # BLD AUTO: 0.46 K/UL — SIGNIFICANT CHANGE UP (ref 0–0.9)
MONOCYTES NFR BLD AUTO: 8.4 % — SIGNIFICANT CHANGE UP (ref 2–14)
NEUTROPHILS # BLD AUTO: 3.54 K/UL — SIGNIFICANT CHANGE UP (ref 1.8–7.4)
NEUTROPHILS NFR BLD AUTO: 64.6 % — SIGNIFICANT CHANGE UP (ref 43–77)
NRBC # BLD: 0 /100 WBCS — SIGNIFICANT CHANGE UP (ref 0–0)
PLATELET # BLD AUTO: 233 K/UL — SIGNIFICANT CHANGE UP (ref 150–400)
POTASSIUM SERPL-MCNC: 4.2 MMOL/L — SIGNIFICANT CHANGE UP (ref 3.5–5.3)
POTASSIUM SERPL-SCNC: 4.2 MMOL/L — SIGNIFICANT CHANGE UP (ref 3.5–5.3)
PROT SERPL-MCNC: 5.6 G/DL — LOW (ref 6–8.3)
RBC # BLD: 3.92 M/UL — SIGNIFICANT CHANGE UP (ref 3.8–5.2)
RBC # FLD: 13.3 % — SIGNIFICANT CHANGE UP (ref 10.3–14.5)
RH IG SCN BLD-IMP: POSITIVE — SIGNIFICANT CHANGE UP
SARS-COV-2 RNA SPEC QL NAA+PROBE: SIGNIFICANT CHANGE UP
SODIUM SERPL-SCNC: 137 MMOL/L — SIGNIFICANT CHANGE UP (ref 135–145)
WBC # BLD: 5.48 K/UL — SIGNIFICANT CHANGE UP (ref 3.8–10.5)
WBC # FLD AUTO: 5.48 K/UL — SIGNIFICANT CHANGE UP (ref 3.8–10.5)

## 2023-01-30 PROCEDURE — 71045 X-RAY EXAM CHEST 1 VIEW: CPT | Mod: 26

## 2023-01-30 PROCEDURE — 99152 MOD SED SAME PHYS/QHP 5/>YRS: CPT

## 2023-01-30 PROCEDURE — 93571 IV DOP VEL&/PRESS C FLO 1ST: CPT | Mod: 26,LC

## 2023-01-30 PROCEDURE — 93454 CORONARY ARTERY ANGIO S&I: CPT | Mod: 26

## 2023-01-30 PROCEDURE — 99231 SBSQ HOSP IP/OBS SF/LOW 25: CPT | Mod: FS

## 2023-01-30 RX ORDER — ENOXAPARIN SODIUM 100 MG/ML
60 INJECTION SUBCUTANEOUS EVERY 12 HOURS
Refills: 0 | Status: DISCONTINUED | OUTPATIENT
Start: 2023-01-31 | End: 2023-02-02

## 2023-01-30 RX ADMIN — Medication 50 MILLIGRAM(S): at 06:05

## 2023-01-30 RX ADMIN — ATORVASTATIN CALCIUM 20 MILLIGRAM(S): 80 TABLET, FILM COATED ORAL at 21:59

## 2023-01-30 RX ADMIN — Medication 20 MILLIGRAM(S): at 05:57

## 2023-01-30 RX ADMIN — Medication 50 MICROGRAM(S): at 05:57

## 2023-01-30 RX ADMIN — PANTOPRAZOLE SODIUM 40 MILLIGRAM(S): 20 TABLET, DELAYED RELEASE ORAL at 05:57

## 2023-01-30 RX ADMIN — Medication 81 MILLIGRAM(S): at 12:32
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
## 2023-01-30 NOTE — ASU PATIENT PROFILE, ADULT - DOES PATIENT HAVE ADVANCE DIRECTIVE
The patient was discharged home in stable condition. The patient is alert and oriented, in no respiratory distress. The patient's diagnosis, condition and treatment were explained. The patient expressed understanding. No prescriptions given/e-scribed to pharmacy. No work/school note given. A discharge plan has been developed. A  was not involved in the process. Aftercare instructions were given. Pt ambulatory out of the ED. No

## 2023-01-30 NOTE — PROGRESS NOTE ADULT - SUBJECTIVE AND OBJECTIVE BOX
VITAL SIGNS    Telemetry:      Vital Signs Last 24 Hrs  T(C): 36.8 (23 @ 04:23), Max: 36.8 (23 @ 19:45)  T(F): 98.2 (23 @ 04:23), Max: 98.2 (23 @ 19:45)  HR: 88 (23 @ 04:23) (69 - 88)  BP: 129/78 (23 @ 04:23) (109/65 - 129/78)  RR: 18 (23 @ 04:23) (18 - 18)  SpO2: 94% (23 @ 04:23) (93% - 94%)                   Daily     Daily Weight in k.4 (2023 04:23)      Bilirubin Total, Serum: 0.5 mg/dL ( @ 02:57)    CAPILLARY BLOOD GLUCO                  PHYSICAL EXAM  s"  Neurology: alert and oriented x 3, moves all extremities with no defecits  CV :  RRR    Lungs:   CTA B/L  Abdomen: soft, nontender, nondistended, positive bowel sounds, last bowel movement   Extremities:                                            VITAL SIGNS    Telemetry:    SR  70    Vital Signs Last 24 Hrs  T(C): 36.8 (23 @ 04:23), Max: 36.8 (23 @ 19:45)  T(F): 98.2 (23 @ 04:23), Max: 98.2 (23 @ 19:45)  HR: 88 (23 @ 04:23) (69 - 88)  BP: 129/78 (23 @ 04:23) (109/65 - 129/78)  RR: 18 (23 @ 04:23) (18 - 18)  SpO2: 94% (23 @ 04:23) (93% - 94%)                   Daily     Daily Weight in k.4 (2023 04:23)      Bilirubin Total, Serum: 0.5 mg/dL ( @ 02:57)    CAPILLARY BLOOD GLUCO                  PHYSICAL EXAM  s"  nO  sob  no cp"  Neurology: alert and oriented x 3, moves all extremities with no defecits  CV :  RRR    Lungs:   CTA B/L  Abdomen: soft, nontender, nondistended, positive bowel sounds,   ABD HERNIA  Extremities:       no edema

## 2023-01-30 NOTE — PROGRESS NOTE ADULT - SUBJECTIVE AND OBJECTIVE BOX
DATE OF SERVICE: 01-30-23 @ 21:35    Patient is a 77y old  Female who presents with a chief complaint of Atrial flutter (30 Jan 2023 10:50)      INTERVAL HISTORY: no events     	  MEDICATIONS:  furosemide   Injectable 20 milliGRAM(s) IV Push daily  metoprolol succinate ER 50 milliGRAM(s) Oral daily        PHYSICAL EXAM:  T(C): 36.6 (01-30-23 @ 20:05), Max: 37.1 (01-30-23 @ 19:32)  HR: 85 (01-30-23 @ 20:05) (73 - 92)  BP: 118/71 (01-30-23 @ 20:05) (111/54 - 183/95)  RR: 18 (01-30-23 @ 20:05) (18 - 20)  SpO2: 94% (01-30-23 @ 20:05) (91% - 96%)  Wt(kg): --  I&O's Summary    29 Jan 2023 07:01  -  30 Jan 2023 07:00  --------------------------------------------------------  IN: 1180 mL / OUT: 1020 mL / NET: 160 mL    30 Jan 2023 07:01  -  30 Jan 2023 21:35  --------------------------------------------------------  IN: 480 mL / OUT: 650 mL / NET: -170 mL          Appearance: In no distress	  HEENT:    PERRL, EOMI	  Cardiovascular:  S1 S2, No JVD  Respiratory: Lungs clear to auscultation	  Gastrointestinal:  Soft, Non-tender, + BS	  Vascularature:  No edema of LE  Psychiatric: Appropriate affect   Neuro: no acute focal deficits                               12.1   5.48  )-----------( 233      ( 30 Jan 2023 02:57 )             37.2     01-30    137  |  101  |  19  ----------------------------<  102<H>  4.2   |  29  |  0.90    Ca    8.9      30 Jan 2023 02:57  Mg     2.0     01-30    TPro  5.6<L>  /  Alb  3.1<L>  /  TBili  0.5  /  DBili  x   /  AST  19  /  ALT  11  /  AlkPhos  59  01-30        Labs personally reviewed      ASSESSMENT/PLAN: 	  77F PMH HTN, hypothyroidism, OA, DVT started on Xarelto 6 weeks ago, presents with palpitations. Patient reports that she began developing palpitations yesterday, which she believed would resolve alone, however symptoms got worse. She hit her head when climbing on a ladder, and then went to the hospital. Patient reports persistent cough, which has been ongoing since October. Reports weight loss of about 10 lbs, which has been intentional due to diet changes.     Problem/Plan -1  Problem: New Onset Atrial Flutter  - ECG reveals A-flutter  - Initially tachycardic on presentation but now rate controlled in SR  - Anticoagulated prior on Xarelto for DVT Dx ~ 6 weeks ago  - c/w Metoprolol and Xarelto (lifelong AC)  - Allscript records from Dr Cali Butler reviewed, past med history is notable for AF   - TSH wnl  - we discussed antiarrythmic strategies including ablation vs meds  - Consulted EP    - AF likely secondary to severe MS    Problem/Plan -2  Problem: Hypertension  - c/w Metoprolol 50mg PO daily    Problem/Plan -3  Problem: Hx of DVT  - recently dx'd with acute DVT 6 weeks ago  - previously on Xarelto but now transitioned to Lovenox in anticipation of poss CTS    Problem/Plan -4  Problem: Hypothyroidism  - TSH wnl  - c/w levothyroxine    Problem/Plan -5  Problem: Mitral Stenosis  - Hx of Bioprosthetic MVR  - s/p LISA reveals severe MS  - plan for mitral Parth  - CTS consulted  - s/p Cardiac CT, will likely need cardiac catheterization  -CTS planning cath on 1/30           Jonathan Perez DO [facility]  Cardiovascular Medicine  [address], [address]  Office: [phone]  Available via Text/call on [employer] Teams

## 2023-01-30 NOTE — PROGRESS NOTE ADULT - PROBLEM SELECTOR PLAN 1
Structural Heart following  1/27 Cardiac CT completed   Pending Cardiac Catheterization Monday 1/30  Continue diuresis on IV lasix 20 daily  Strict I & Os, daily weight  Plan for mitral valve in valve this wk Structural Heart following  1/27 Cardiac CT completed   Cardiac Catheterization Monday 1/30  set up by struct heart team  Continue diuresis on IV lasix 20 daily  Strict I & Os, daily weight  Plan for mitral valve in valve this wk  ? friday

## 2023-01-30 NOTE — PROGRESS NOTE ADULT - PROBLEM SELECTOR PLAN 2
Continue Weight based Lovenox 60 BID  Xarelto on hold  1/29 B/L LE Duplex negative Continue Weight based Lovenox 60 BID  on hold  for cath  Xarelto on hold  1/29 B/L LE Duplex negative

## 2023-01-30 NOTE — PROGRESS NOTE ADULT - ASSESSMENT
77F PMH HTN, hypothyroidism, OA, DVT started on Xarelto 6 weeks ago, Prosthetic Mitral Valve Stenosis, Acute on Chronic HFpEF, presents with palpitations  1/27 Cardiac CT completed Images to be reviewed, plan to be determined  1/28 VSS, 6b WCT, IV mag supplemented, K3.7 supplemented 40meq. Plan for mitral valve in valve next week per Dr. Vasques. Pending cardiac cath Mon. Check B/L LE duplex for hx of DVT.  1/29 LE duplex (negative), plan for cath tomorrow      77F PMH HTN, hypothyroidism, OA, DVT started on Xarelto 6 weeks ago, Prosthetic Mitral Valve Stenosis, Acute on Chronic HFpEF, presents with palpitations  1/27 Cardiac CT completed Images to be reviewed, plan to be determined  1/28 VSS, 6b WCT, IV mag supplemented, K3.7 supplemented 40meq. Plan for mitral valve in valve next week per Dr. Vasques. Pending cardiac cath Mon. Check B/L LE duplex for hx of DVT.  1/29 LE duplex (negative), plan for cath tomorrow  1/30     vss      xarelto on hold

## 2023-01-31 DIAGNOSIS — H92.02 OTALGIA, LEFT EAR: ICD-10-CM

## 2023-01-31 DIAGNOSIS — H65.92 UNSPECIFIED NONSUPPURATIVE OTITIS MEDIA, LEFT EAR: ICD-10-CM

## 2023-01-31 LAB
ANION GAP SERPL CALC-SCNC: 9 MMOL/L — SIGNIFICANT CHANGE UP (ref 5–17)
BUN SERPL-MCNC: 19 MG/DL — SIGNIFICANT CHANGE UP (ref 7–23)
CALCIUM SERPL-MCNC: 9.1 MG/DL — SIGNIFICANT CHANGE UP (ref 8.4–10.5)
CHLORIDE SERPL-SCNC: 102 MMOL/L — SIGNIFICANT CHANGE UP (ref 96–108)
CO2 SERPL-SCNC: 28 MMOL/L — SIGNIFICANT CHANGE UP (ref 22–31)
CREAT SERPL-MCNC: 0.83 MG/DL — SIGNIFICANT CHANGE UP (ref 0.5–1.3)
EGFR: 73 ML/MIN/1.73M2 — SIGNIFICANT CHANGE UP
GLUCOSE SERPL-MCNC: 85 MG/DL — SIGNIFICANT CHANGE UP (ref 70–99)
HCT VFR BLD CALC: 38.2 % — SIGNIFICANT CHANGE UP (ref 34.5–45)
HGB BLD-MCNC: 12.2 G/DL — SIGNIFICANT CHANGE UP (ref 11.5–15.5)
MCHC RBC-ENTMCNC: 30.4 PG — SIGNIFICANT CHANGE UP (ref 27–34)
MCHC RBC-ENTMCNC: 31.9 GM/DL — LOW (ref 32–36)
MCV RBC AUTO: 95.3 FL — SIGNIFICANT CHANGE UP (ref 80–100)
NRBC # BLD: 0 /100 WBCS — SIGNIFICANT CHANGE UP (ref 0–0)
PLATELET # BLD AUTO: 255 K/UL — SIGNIFICANT CHANGE UP (ref 150–400)
POTASSIUM SERPL-MCNC: 3.8 MMOL/L — SIGNIFICANT CHANGE UP (ref 3.5–5.3)
POTASSIUM SERPL-SCNC: 3.8 MMOL/L — SIGNIFICANT CHANGE UP (ref 3.5–5.3)
RBC # BLD: 4.01 M/UL — SIGNIFICANT CHANGE UP (ref 3.8–5.2)
RBC # FLD: 13.2 % — SIGNIFICANT CHANGE UP (ref 10.3–14.5)
SODIUM SERPL-SCNC: 139 MMOL/L — SIGNIFICANT CHANGE UP (ref 135–145)
WBC # BLD: 4.88 K/UL — SIGNIFICANT CHANGE UP (ref 3.8–10.5)
WBC # FLD AUTO: 4.88 K/UL — SIGNIFICANT CHANGE UP (ref 3.8–10.5)

## 2023-01-31 PROCEDURE — ZZZZZ: CPT

## 2023-01-31 PROCEDURE — 99231 SBSQ HOSP IP/OBS SF/LOW 25: CPT | Mod: FS

## 2023-01-31 PROCEDURE — 99222 1ST HOSP IP/OBS MODERATE 55: CPT | Mod: FS

## 2023-01-31 RX ORDER — FLUTICASONE PROPIONATE 50 MCG
1 SPRAY, SUSPENSION NASAL
Refills: 0 | Status: DISCONTINUED | OUTPATIENT
Start: 2023-01-31 | End: 2023-02-03

## 2023-01-31 RX ORDER — POTASSIUM CHLORIDE 20 MEQ
20 PACKET (EA) ORAL ONCE
Refills: 0 | Status: COMPLETED | OUTPATIENT
Start: 2023-01-31 | End: 2023-01-31

## 2023-01-31 RX ADMIN — ATORVASTATIN CALCIUM 20 MILLIGRAM(S): 80 TABLET, FILM COATED ORAL at 21:11

## 2023-01-31 RX ADMIN — Medication 81 MILLIGRAM(S): at 13:35

## 2023-01-31 RX ADMIN — Medication 1 SPRAY(S): at 18:26

## 2023-01-31 RX ADMIN — Medication 20 MILLIGRAM(S): at 06:57

## 2023-01-31 RX ADMIN — ENOXAPARIN SODIUM 60 MILLIGRAM(S): 100 INJECTION SUBCUTANEOUS at 10:50

## 2023-01-31 RX ADMIN — Medication 50 MICROGRAM(S): at 06:56

## 2023-01-31 RX ADMIN — Medication 50 MILLIGRAM(S): at 06:57

## 2023-01-31 RX ADMIN — Medication 20 MILLIEQUIVALENT(S): at 08:10

## 2023-01-31 RX ADMIN — PANTOPRAZOLE SODIUM 40 MILLIGRAM(S): 20 TABLET, DELAYED RELEASE ORAL at 06:57

## 2023-01-31 RX ADMIN — ENOXAPARIN SODIUM 60 MILLIGRAM(S): 100 INJECTION SUBCUTANEOUS at 21:14
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
## 2023-01-31 NOTE — CONSULT NOTE ADULT - PROBLEM SELECTOR RECOMMENDATION 9
Flonase 1 spray to B/L nares BID for 1 month  F/U with ENT Dr Butler as outpt [phone] Flonase 1 spray to B/L nares BID for 1 month    Patient can follow up with Dr. Chatterjee outpatient or with me at [address] [place] [address] Rd [phone].

## 2023-01-31 NOTE — CONSULT NOTE ADULT - CONSULT REASON
Left ear fullness
Atrial flutter
Restenosis of Mitral Valve
Atrial Flutter
Prosthetic Mitral Valve Stenosis
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
## 2023-01-31 NOTE — PROGRESS NOTE ADULT - PROBLEM SELECTOR PLAN 1
Structural Heart following  Cardiac Catheterization Monday 1/30  set up by struct heart team  Continue diuresis on IV lasix 20 daily  Strict I & Os, daily weight  Plan for mitral valve in valve this wk   friday

## 2023-01-31 NOTE — CONSULT NOTE ADULT - ASSESSMENT
77F PMH HTN, hypothyroidism, OA, DVT started on Xarelto 6 weeks ago, admitted for palpitations, C/O left ear fullness for the past 6 weeks. Exam revealed a left middle ear effusion likely the etiology of left ear fullness. Recommend flonase.

## 2023-01-31 NOTE — PROGRESS NOTE ADULT - PROBLEM SELECTOR PLAN 2
Continue Weight based Lovenox 60 BID  on hold  for cath  Xarelto on hold  1/29 B/L LE Duplex negative

## 2023-01-31 NOTE — PROGRESS NOTE ADULT - SUBJECTIVE AND OBJECTIVE BOX
DATE OF SERVICE: 01-31-23 @ 18:18    Patient is a 77y old  Female who presents with a chief complaint of Atrial flutter (31 Jan 2023 16:37)      INTERVAL HISTORY:  feels ok    TELEMETRY Personally reviewed: no events  	  MEDICATIONS:  furosemide   Injectable 20 milliGRAM(s) IV Push daily  metoprolol succinate ER 50 milliGRAM(s) Oral daily        PHYSICAL EXAM:  T(C): 36.4 (01-31-23 @ 11:55), Max: 37.1 (01-30-23 @ 19:32)  HR: 87 (01-31-23 @ 16:30) (73 - 87)  BP: 115/73 (01-31-23 @ 16:30) (103/63 - 146/85)  RR: 18 (01-31-23 @ 16:30) (18 - 18)  SpO2: 94% (01-31-23 @ 16:30) (91% - 94%)  Wt(kg): --  I&O's Summary    30 Jan 2023 07:01  -  31 Jan 2023 07:00  --------------------------------------------------------  IN: 680 mL / OUT: 650 mL / NET: 30 mL    31 Jan 2023 07:01  -  31 Jan 2023 18:18  --------------------------------------------------------  IN: 720 mL / OUT: 675 mL / NET: 45 mL          Appearance: In no distress	  HEENT:    PERRL, EOMI	  Cardiovascular:  S1 S2, No JVD  Respiratory: Lungs clear to auscultation	  Gastrointestinal:  Soft, Non-tender, + BS	  Vascularature:  No edema of LE  Psychiatric: Appropriate affect   Neuro: no acute focal deficits                               12.2   4.88  )-----------( 255      ( 31 Jan 2023 05:40 )             38.2     01-31    139  |  102  |  19  ----------------------------<  85  3.8   |  28  |  0.83    Ca    9.1      31 Jan 2023 05:40  Mg     2.0     01-30    TPro  5.6<L>  /  Alb  3.1<L>  /  TBili  0.5  /  DBili  x   /  AST  19  /  ALT  11  /  AlkPhos  59  01-30        Labs personally reviewed      ASSESSMENT/PLAN: 	  77F PMH HTN, hypothyroidism, OA, DVT started on Xarelto 6 weeks ago, presents with palpitations. Patient reports that she began developing palpitations yesterday, which she believed would resolve alone, however symptoms got worse. She hit her head when climbing on a ladder, and then went to the hospital. Patient reports persistent cough, which has been ongoing since October. Reports weight loss of about 10 lbs, which has been intentional due to diet changes.     Problem/Plan -1  Problem: New Onset Atrial Flutter  - ECG reveals A-flutter  - Initially tachycardic on presentation but now rate controlled in SR  - Anticoagulated prior on Xarelto for DVT Dx ~ 6 weeks ago  - c/w Metoprolol and Xarelto (lifelong AC)  - Allscript records from Dr Cali Butler reviewed, past med history is notable for AF   - TSH wnl  - we discussed antiarrythmic strategies including ablation vs meds  - Consulted EP    - AF likely secondary to severe MS    Problem/Plan -2  Problem: Hypertension  - c/w Metoprolol 50mg PO daily    Problem/Plan -3  Problem: Hx of DVT  - recently dx'd with acute DVT 6 weeks ago  - previously on Xarelto but now transitioned to Lovenox in anticipation of poss CTS    Problem/Plan -4  Problem: Mitral Stenosis  - Hx of Bioprosthetic MVR  - s/p LISA reveals severe MS  - plan for mitral Parth  - CTS consulted  - s/p Cardiac CT  - cath on 1/30   - Parth plan for Friday          Jonathan Perez DO [facility]  Cardiovascular Medicine  [address], [address]  Office: [phone]  Available via Text/call on [employer] Teams

## 2023-01-31 NOTE — CONSULT NOTE ADULT - NS ATTEND AMEND GEN_ALL_CORE FT
Patient care and plan discussed and reviewed with Advanced Care Provider. Plan as outlined above edited by me to reflect our discussion.
ENT consulted for left ear fullness for about 6 weeks. Patient was seen 1/19/22 by Dr. Chad Chatterjee outpatient who nasal endoscopy her showing bilateral patent Eustachian tube orifices and was prescribed Augmentin and Flonase. Patient denies any fevers, chills, otorrhea, or pain    Physical exam shows left  middle ear effusion. Right ear Normal EAC/TM    Recommend:  -Flonase 1 spray to B/L nares BID for 1 month    Patient can follow up with Dr. Chatterjee outpatient or with me at [address] [place] [address] Rd [phone].
Mrs Perry has a degenerated bioprosthetic MVR (31mm [facility] bovine 6900) with severe stenosis (from 2012 with Dr Wheeler). She has a recent DVT (now on Xarelto), recent onset A Flutter, and several months of progressive dyspnea on exertion and functional decline. Imaging was reviewed and the LISA confirms thickened MV leaflets with reduced mobility and an estimated MVOA of 0.9 sqcm. Dr Wheeler and I have reviewed the case in detail and are in agreement that she is an appropriate candidate to consider for MVIV (Mitral Valve in Valve). As such, she will have a cardiac structural CT to determine if her anatomy is feasible for the MVIV procedure--we will know early next week after reviewing the CT with the [facility] Team in [place], [place]. I have explained our impressions and plan to her and her  in detail and answered all of their questions.  Arsen Vasques MD

## 2023-01-31 NOTE — PROGRESS NOTE ADULT - ASSESSMENT
77F PMH HTN, hypothyroidism, OA, DVT started on Xarelto 6 weeks ago, Prosthetic Mitral Valve Stenosis, Acute on Chronic HFpEF, presents with palpitations  1/27 Cardiac CT completed Images to be reviewed, plan to be determined  1/28 VSS, 6b WCT, IV mag supplemented, K3.7 supplemented 40meq. Plan for mitral valve in valve next week per Dr. Vasques. Pending cardiac cath Mon. Check B/L LE duplex for hx of DVT.  1/29 LE duplex (negative), plan for cath tomorrow  1/30     vss      xarelto on hold   1/31   lt ear fullness ENT cslt called   vss   rt radial cath site   CDI

## 2023-01-31 NOTE — PROGRESS NOTE ADULT - ASSESSMENT
Assesment/Plan:  76 y/o female with Severe Prosthetic Mitral Valve Stenosis, Acute on Chronic HFpEF, pAFlutter     1.) Prosthetic MV Stenosis: CT was reviewed by Yogi, and there is Low risk of LVOT obstruction with Parth procedure. She is scheduled for transcatheter mitral Valve in Valve on Friday 2/3  2.) LE duplex negative for DVT  3.) AFlutter with pAFib documented in outpt cardiology record. On NOAC as outpt, now on Lovenox in preparation for mViV  4.) She is complaining of some left ear "fluid" with fullness and decreased hearing. Requesting ENT evaluation     JONAS Bernstein NP  [phone]

## 2023-01-31 NOTE — PROGRESS NOTE ADULT - NS ATTEND OPT1 GEN_ALL_CORE

## 2023-01-31 NOTE — PROGRESS NOTE ADULT - SUBJECTIVE AND OBJECTIVE BOX
VITAL SIGNS    sr 60  Telemetry:      Vital Signs Last 24 Hrs  T(C): 36.4 (23 @ 11:55), Max: 37.1 (23 @ 19:32)  T(F): 97.5 (23 @ 11:55), Max: 98.8 (23 @ 19:32)  HR: 73 (23 @ 11:55) (73 - 92)  BP: 103/63 (23 @ 11:55) (103/63 - 183/95)  RR: 18 (23 @ 11:55) (18 - 20)  SpO2: 94% (23 @ 11:55) (91% - 96%)                   Daily     Daily Weight in k.3 (2023 08:21)        CAPILLARY BLOOD GLU                     PHYSICAL EXAM  "  Lt ear fullness No CP"  Neurology: alert and oriented x 3, moves all extremities with no defecits  CV :  RRR  Lungs:   CTA B/L  Abdomen: soft, nontender, nondistended, positive bowel sounds, last bowel movement   Extremities:        no edema   no calf tenderness

## 2023-02-01 LAB
ANION GAP SERPL CALC-SCNC: 9 MMOL/L — SIGNIFICANT CHANGE UP (ref 5–17)
BUN SERPL-MCNC: 22 MG/DL — SIGNIFICANT CHANGE UP (ref 7–23)
CALCIUM SERPL-MCNC: 9.2 MG/DL — SIGNIFICANT CHANGE UP (ref 8.4–10.5)
CHLORIDE SERPL-SCNC: 99 MMOL/L — SIGNIFICANT CHANGE UP (ref 96–108)
CO2 SERPL-SCNC: 27 MMOL/L — SIGNIFICANT CHANGE UP (ref 22–31)
CREAT SERPL-MCNC: 0.99 MG/DL — SIGNIFICANT CHANGE UP (ref 0.5–1.3)
EGFR: 59 ML/MIN/1.73M2 — LOW
GLUCOSE SERPL-MCNC: 96 MG/DL — SIGNIFICANT CHANGE UP (ref 70–99)
HCT VFR BLD CALC: 37.8 % — SIGNIFICANT CHANGE UP (ref 34.5–45)
HGB BLD-MCNC: 12.3 G/DL — SIGNIFICANT CHANGE UP (ref 11.5–15.5)
MCHC RBC-ENTMCNC: 30.8 PG — SIGNIFICANT CHANGE UP (ref 27–34)
MCHC RBC-ENTMCNC: 32.5 GM/DL — SIGNIFICANT CHANGE UP (ref 32–36)
MCV RBC AUTO: 94.5 FL — SIGNIFICANT CHANGE UP (ref 80–100)
NRBC # BLD: 0 /100 WBCS — SIGNIFICANT CHANGE UP (ref 0–0)
PLATELET # BLD AUTO: 263 K/UL — SIGNIFICANT CHANGE UP (ref 150–400)
POTASSIUM SERPL-MCNC: 4 MMOL/L — SIGNIFICANT CHANGE UP (ref 3.5–5.3)
POTASSIUM SERPL-SCNC: 4 MMOL/L — SIGNIFICANT CHANGE UP (ref 3.5–5.3)
RBC # BLD: 4 M/UL — SIGNIFICANT CHANGE UP (ref 3.8–5.2)
RBC # FLD: 13.1 % — SIGNIFICANT CHANGE UP (ref 10.3–14.5)
SODIUM SERPL-SCNC: 135 MMOL/L — SIGNIFICANT CHANGE UP (ref 135–145)
WBC # BLD: 5.5 K/UL — SIGNIFICANT CHANGE UP (ref 3.8–10.5)
WBC # FLD AUTO: 5.5 K/UL — SIGNIFICANT CHANGE UP (ref 3.8–10.5)

## 2023-02-01 PROCEDURE — 99232 SBSQ HOSP IP/OBS MODERATE 35: CPT | Mod: FS

## 2023-02-01 RX ADMIN — Medication 50 MILLIGRAM(S): at 05:06

## 2023-02-01 RX ADMIN — Medication 1 SPRAY(S): at 05:07

## 2023-02-01 RX ADMIN — ENOXAPARIN SODIUM 60 MILLIGRAM(S): 100 INJECTION SUBCUTANEOUS at 08:44

## 2023-02-01 RX ADMIN — Medication 20 MILLIGRAM(S): at 05:07

## 2023-02-01 RX ADMIN — Medication 81 MILLIGRAM(S): at 08:44

## 2023-02-01 RX ADMIN — ENOXAPARIN SODIUM 60 MILLIGRAM(S): 100 INJECTION SUBCUTANEOUS at 22:37

## 2023-02-01 RX ADMIN — ATORVASTATIN CALCIUM 20 MILLIGRAM(S): 80 TABLET, FILM COATED ORAL at 22:41

## 2023-02-01 RX ADMIN — Medication 50 MICROGRAM(S): at 05:06

## 2023-02-01 RX ADMIN — PANTOPRAZOLE SODIUM 40 MILLIGRAM(S): 20 TABLET, DELAYED RELEASE ORAL at 05:06

## 2023-02-01 NOTE — DIETITIAN INITIAL EVALUATION ADULT - NS FNS WEIGHT CHANGE REASON
pt endorses intentional weight loss x 4 months.  pounds. Current dosing weight 139.7 pounds (1/26).   This indicates a 13.3 pound / 8.6% loss.   RD will continue to monitor trends./intentional

## 2023-02-01 NOTE — DIETITIAN INITIAL EVALUATION ADULT - PERSON TAUGHT/METHOD
Discussed Na restriction, foods high in Na to avoid, tips for limiting Na in your diet. Reviewed daily weights, Wt gain parameters to contact MD. Pt states to check her weight every day. Discussed Na intake in relation to fluid retention, edema and Wt gain. Also encouraged adequate meal consumption including protein. Pt verbalized understanding. RD remains available for diet education review./verbal instruction/patient instructed

## 2023-02-01 NOTE — DIETITIAN INITIAL EVALUATION ADULT - ORAL INTAKE PTA/DIET HISTORY
visited pt at bedside this morning. reports good appetite PTA. usually is not a big eater, 2 small meals/day. first meal is around 11am, english muffin. dinner is 6pm, salad/potatoes/protein (chicken, turkey, beans). tries to not snacks and has reduced her sodium intake. does not eat sweets or potato chips. NKFA per chart.

## 2023-02-01 NOTE — DIETITIAN INITIAL EVALUATION ADULT - PERTINENT MEDS FT
MEDICATIONS  (STANDING):  aspirin enteric coated 81 milliGRAM(s) Oral daily  atorvastatin 20 milliGRAM(s) Oral at bedtime  enoxaparin Injectable 60 milliGRAM(s) SubCutaneous every 12 hours  fluticasone propionate 50 MICROgram(s)/spray Nasal Spray 1 Spray(s) Both Nostrils two times a day  furosemide   Injectable 20 milliGRAM(s) IV Push daily  hydrocodone/homatropine Syrup 5 milliLiter(s) Oral every 8 hours  levothyroxine 50 MICROGram(s) Oral daily  metoprolol succinate ER 50 milliGRAM(s) Oral daily  pantoprazole    Tablet 40 milliGRAM(s) Oral before breakfast    MEDICATIONS  (PRN):  acetaminophen     Tablet .. 650 milliGRAM(s) Oral every 6 hours PRN Temp greater or equal to 38C (100.4F), Mild Pain (1 - 3)  albuterol/ipratropium for Nebulization 3 milliLiter(s) Nebulizer every 6 hours PRN Bronchospasm  aluminum hydroxide/magnesium hydroxide/simethicone Suspension 30 milliLiter(s) Oral every 4 hours PRN Dyspepsia  benzocaine 15 mG/menthol 3.6 mG Lozenge 1 Lozenge Oral four times a day PRN Sore Throat  guaiFENesin  milliGRAM(s) Oral every 12 hours PRN Cough  melatonin 3 milliGRAM(s) Oral at bedtime PRN Insomnia  ondansetron Injectable 4 milliGRAM(s) IV Push every 8 hours PRN Nausea and/or Vomiting

## 2023-02-01 NOTE — DIETITIAN INITIAL EVALUATION ADULT - PERTINENT LABORATORY DATA
02-01    135  |  99  |  22  ----------------------------<  96  4.0   |  27  |  0.99    Ca    9.2      01 Feb 2023 06:07    A1C with Estimated Average Glucose Result: 5.4 % (01-25-23 @ 00:59)

## 2023-02-01 NOTE — PROGRESS NOTE ADULT - SUBJECTIVE AND OBJECTIVE BOX
VITAL SIGNS    Telemetry:    Vital Signs Last 24 Hrs  T(C): 36.6 (23 @ 05:18), Max: 36.7 (23 @ 19:22)  T(F): 97.9 (23 @ 05:18), Max: 98.1 (23 @ 19:22)  HR: 70 (23 @ 05:18) (70 - 87)  BP: 134/77 (23 @ 05:18) (103/63 - 134/77)  RR: 18 (23 @ 05:18) (18 - 18)  SpO2: 92% (23 @ 05:18) (92% - 94%)             @ 07:  -   @ 07:00  --------------------------------------------------------  IN: 920 mL / OUT: 1675 mL / NET: -755 mL     @ 07:01  -   @ 10:38  --------------------------------------------------------  IN: 120 mL / OUT: 0 mL / NET: 120 mL       Daily     Daily Weight in k.8 (2023 05:18)  Admit Wt: Drug Dosing Weight  Height (cm): 163.8 (2023 08:59)  Weight (kg): 63.5 (2023 08:59)  BMI (kg/m2): 23.7 (2023 08:59)  BSA (m2): 1.69 (2023 08:59)      CAPILLARY BLOOD GLUCOSE              acetaminophen     Tablet .. 650 milliGRAM(s) Oral every 6 hours PRN  albuterol/ipratropium for Nebulization 3 milliLiter(s) Nebulizer every 6 hours PRN  aluminum hydroxide/magnesium hydroxide/simethicone Suspension 30 milliLiter(s) Oral every 4 hours PRN  aspirin enteric coated 81 milliGRAM(s) Oral daily  atorvastatin 20 milliGRAM(s) Oral at bedtime  benzocaine 15 mG/menthol 3.6 mG Lozenge 1 Lozenge Oral four times a day PRN  enoxaparin Injectable 60 milliGRAM(s) SubCutaneous every 12 hours  fluticasone propionate 50 MICROgram(s)/spray Nasal Spray 1 Spray(s) Both Nostrils two times a day  furosemide   Injectable 20 milliGRAM(s) IV Push daily  guaiFENesin  milliGRAM(s) Oral every 12 hours PRN  hydrocodone/homatropine Syrup 5 milliLiter(s) Oral every 8 hours  levothyroxine 50 MICROGram(s) Oral daily  melatonin 3 milliGRAM(s) Oral at bedtime PRN  metoprolol succinate ER 50 milliGRAM(s) Oral daily  ondansetron Injectable 4 milliGRAM(s) IV Push every 8 hours PRN  pantoprazole    Tablet 40 milliGRAM(s) Oral before breakfast      PHYSICAL EXAM    Subjective: "Hi.   Neurology: alert and oriented x 3, nonfocal, no gross deficits  CV : tele:  RSR  Sternal Wound :  CDI with dressing , Stable  Lungs: clear. RR easy, unlabored   Abdomen: soft, nontender, nondistended, positive bowel sounds, bowel movement   Neg N/V/D   :  pt voiding without difficulty   Extremities:   ABBOTT; edema, neg calf tenderness.   PPP bilaterally      PW:  Chest tubes:                 VITAL SIGNS    Telemetry:  rsr   Vital Signs Last 24 Hrs  T(C): 36.6 (23 @ 05:18), Max: 36.7 (23 @ 19:22)  T(F): 97.9 (23 @ 05:18), Max: 98.1 (23 @ 19:22)  HR: 70 (23 @ 05:18) (70 - 87)  BP: 134/77 (23 @ 05:18) (103/63 - 134/77)  RR: 18 (23 @ 05:18) (18 - 18)  SpO2: 92% (23 @ 05:18) (92% - 94%)             @ 07:01  -   @ 07:00  --------------------------------------------------------  IN: 920 mL / OUT: 1675 mL / NET: -755 mL     @ 07:01  -   @ 10:38  --------------------------------------------------------  IN: 120 mL / OUT: 0 mL / NET: 120 mL       Daily     Daily Weight in k.8 (2023 05:18)  Admit Wt: Drug Dosing Weight  Height (cm): 163.8 (2023 08:59)  Weight (kg): 63.5 (2023 08:59)  BMI (kg/m2): 23.7 (2023 08:59)  BSA (m2): 1.69 (2023 08:59)        acetaminophen     Tablet .. 650 milliGRAM(s) Oral every 6 hours PRN  albuterol/ipratropium for Nebulization 3 milliLiter(s) Nebulizer every 6 hours PRN  aluminum hydroxide/magnesium hydroxide/simethicone Suspension 30 milliLiter(s) Oral every 4 hours PRN  aspirin enteric coated 81 milliGRAM(s) Oral daily  atorvastatin 20 milliGRAM(s) Oral at bedtime  benzocaine 15 mG/menthol 3.6 mG Lozenge 1 Lozenge Oral four times a day PRN  enoxaparin Injectable 60 milliGRAM(s) SubCutaneous every 12 hours  fluticasone propionate 50 MICROgram(s)/spray Nasal Spray 1 Spray(s) Both Nostrils two times a day  furosemide   Injectable 20 milliGRAM(s) IV Push daily  guaiFENesin  milliGRAM(s) Oral every 12 hours PRN  hydrocodone/homatropine Syrup 5 milliLiter(s) Oral every 8 hours  levothyroxine 50 MICROGram(s) Oral daily  melatonin 3 milliGRAM(s) Oral at bedtime PRN  metoprolol succinate ER 50 milliGRAM(s) Oral daily  ondansetron Injectable 4 milliGRAM(s) IV Push every 8 hours PRN  pantoprazole    Tablet 40 milliGRAM(s) Oral before breakfast      PHYSICAL EXAM    Subjective: "My surgery is Friday."   Neurology: alert and oriented x 3, nonfocal, no gross deficits  CV : tele:  RSR ; + murmur  Lungs: clear. RR easy, unlabored   Abdomen: soft, nontender, nondistended, positive bowel sounds, + bowel movement   Neg N/V/D   :  pt voiding without difficulty   Extremities:   ABBOTT; neg LE edema, neg calf tenderness.   PPP bilaterally; cath site cdi marti

## 2023-02-01 NOTE — DIETITIAN INITIAL EVALUATION ADULT - ADD RECOMMEND
1) Will continue to monitor PO intake, weight, labs, skin, GI status and diet 2) nutrition risk placed in chart 3) Pt diet providing enough protein and calories to meet patient needs. Should pt intake decline, consider addition of oral supplement.  1) Will continue to monitor PO intake, weight, labs, skin, GI status and diet 2) nutrition risk placed in chart 3) Pt diet providing enough protein and calories to meet patient needs. Should pt intake decline, consider addition of oral supplement. 4) preferences obtained to optimize PO intake

## 2023-02-01 NOTE — PROGRESS NOTE ADULT - PROBLEM SELECTOR PLAN 1
Structural Heart following  Cardiac Catheterization Monday 1/30  set up by struct heart team  Continue diuresis on IV lasix 20 daily  Strict I & Os, daily weight  Plan for mitral valve in valve this wk   friday Structural Heart following  s/p cath 1/30  Continue diuresis on IV lasix 20 daily  Strict I & Os, daily weight  mitral JUNIOR friday with Dr. Vasques

## 2023-02-01 NOTE — DIETITIAN INITIAL EVALUATION ADULT - REASON FOR ADMISSION
Atrial flutter  .  Per chart: "77F PMH HTN, hypothyroidism, OA, DVT started on Xarelto 6 weeks ago, Prosthetic Mitral Valve Stenosis, Acute on Chronic HFpEF, presents with palpitations."

## 2023-02-01 NOTE — PROGRESS NOTE ADULT - PROBLEM SELECTOR PLAN 2
Continue Weight based Lovenox 60 BID  on hold  for cath  Xarelto on hold  1/29 B/L LE Duplex negative Continue Weight based Lovenox 60 BID   Xarelto on hold for OR   1/29 B/L LE Duplex negative

## 2023-02-01 NOTE — DIETITIAN NUTRITION RISK NOTIFICATION - TREATMENT: THE FOLLOWING DIET HAS BEEN RECOMMENDED
Diet, Regular (01-24-23 @ 11:24) [Active]    See dietitian initial evaluation for further recommendations.

## 2023-02-01 NOTE — PROGRESS NOTE ADULT - ASSESSMENT
77F PMH HTN, hypothyroidism, OA, DVT started on Xarelto 6 weeks ago, Prosthetic Mitral Valve Stenosis, Acute on Chronic HFpEF, presents with palpitations  1/27 Cardiac CT completed Images to be reviewed, plan to be determined  1/28 VSS, 6b WCT, IV mag supplemented, K3.7 supplemented 40meq. Plan for mitral valve in valve next week per Dr. Vasques. Pending cardiac cath Mon. Check B/L LE duplex for hx of DVT.  1/29 LE duplex (negative), plan for cath tomorrow  1/30     vss      xarelto on hold   1/31   lt ear fullness ENT cslt called   vss   rt radial cath site   CDI   77F PMH HTN, hypothyroidism, OA, DVT started on Xarelto 6 weeks ago, Prosthetic Mitral Valve Stenosis, Acute on Chronic HFpEF, presents with palpitations  1/27 Cardiac CT completed Images to be reviewed, plan to be determined  1/28 VSS, 6b WCT, IV mag supplemented, K3.7 supplemented 40meq. Plan for mitral valve in valve next week per Dr. Vasques. Pending cardiac cath Mon  1/29 LE duplex (negative), plan for cath tomorrow  1/30 vss xarelto on hold; cath done: 50% prox circ lesion  1/31 lt ear fullness ENT cslt called--> flonase ordered;    2/1 VSS: RSR ; Mitral JUNIOR friday with Dr. Vasques

## 2023-02-01 NOTE — DIETITIAN INITIAL EVALUATION ADULT - ENERGY INTAKE
pt admits to not eating entire tray but at least half which is usual for her. Pt likely not meeting EER although this is her baseline./Fair (50-75%)

## 2023-02-02 LAB
ANION GAP SERPL CALC-SCNC: 8 MMOL/L — SIGNIFICANT CHANGE UP (ref 5–17)
BUN SERPL-MCNC: 20 MG/DL — SIGNIFICANT CHANGE UP (ref 7–23)
CALCIUM SERPL-MCNC: 9.1 MG/DL — SIGNIFICANT CHANGE UP (ref 8.4–10.5)
CHLORIDE SERPL-SCNC: 101 MMOL/L — SIGNIFICANT CHANGE UP (ref 96–108)
CO2 SERPL-SCNC: 28 MMOL/L — SIGNIFICANT CHANGE UP (ref 22–31)
CREAT SERPL-MCNC: 0.91 MG/DL — SIGNIFICANT CHANGE UP (ref 0.5–1.3)
EGFR: 65 ML/MIN/1.73M2 — SIGNIFICANT CHANGE UP
GLUCOSE SERPL-MCNC: 93 MG/DL — SIGNIFICANT CHANGE UP (ref 70–99)
HCT VFR BLD CALC: 37.2 % — SIGNIFICANT CHANGE UP (ref 34.5–45)
HGB BLD-MCNC: 12.1 G/DL — SIGNIFICANT CHANGE UP (ref 11.5–15.5)
MCHC RBC-ENTMCNC: 30.8 PG — SIGNIFICANT CHANGE UP (ref 27–34)
MCHC RBC-ENTMCNC: 32.5 GM/DL — SIGNIFICANT CHANGE UP (ref 32–36)
MCV RBC AUTO: 94.7 FL — SIGNIFICANT CHANGE UP (ref 80–100)
NRBC # BLD: 0 /100 WBCS — SIGNIFICANT CHANGE UP (ref 0–0)
PLATELET # BLD AUTO: 292 K/UL — SIGNIFICANT CHANGE UP (ref 150–400)
POTASSIUM SERPL-MCNC: 3.8 MMOL/L — SIGNIFICANT CHANGE UP (ref 3.5–5.3)
POTASSIUM SERPL-SCNC: 3.8 MMOL/L — SIGNIFICANT CHANGE UP (ref 3.5–5.3)
RBC # BLD: 3.93 M/UL — SIGNIFICANT CHANGE UP (ref 3.8–5.2)
RBC # FLD: 13 % — SIGNIFICANT CHANGE UP (ref 10.3–14.5)
SARS-COV-2 RNA SPEC QL NAA+PROBE: SIGNIFICANT CHANGE UP
SODIUM SERPL-SCNC: 137 MMOL/L — SIGNIFICANT CHANGE UP (ref 135–145)
WBC # BLD: 5.46 K/UL — SIGNIFICANT CHANGE UP (ref 3.8–10.5)
WBC # FLD AUTO: 5.46 K/UL — SIGNIFICANT CHANGE UP (ref 3.8–10.5)

## 2023-02-02 PROCEDURE — 99232 SBSQ HOSP IP/OBS MODERATE 35: CPT

## 2023-02-02 PROCEDURE — 93010 ELECTROCARDIOGRAM REPORT: CPT

## 2023-02-02 PROCEDURE — 93880 EXTRACRANIAL BILAT STUDY: CPT | Mod: 26

## 2023-02-02 RX ORDER — CEFUROXIME AXETIL 250 MG
1500 TABLET ORAL ONCE
Refills: 0 | Status: DISCONTINUED | OUTPATIENT
Start: 2023-02-02 | End: 2023-02-03

## 2023-02-02 RX ORDER — CHLORHEXIDINE GLUCONATE 213 G/1000ML
30 SOLUTION TOPICAL ONCE
Refills: 0 | Status: DISCONTINUED | OUTPATIENT
Start: 2023-02-02 | End: 2023-02-03

## 2023-02-02 RX ORDER — CHLORHEXIDINE GLUCONATE 213 G/1000ML
1 SOLUTION TOPICAL ONCE
Refills: 0 | Status: COMPLETED | OUTPATIENT
Start: 2023-02-02 | End: 2023-02-02

## 2023-02-02 RX ORDER — POTASSIUM CHLORIDE 20 MEQ
40 PACKET (EA) ORAL ONCE
Refills: 0 | Status: COMPLETED | OUTPATIENT
Start: 2023-02-02 | End: 2023-02-02

## 2023-02-02 RX ADMIN — Medication 50 MILLIGRAM(S): at 06:02

## 2023-02-02 RX ADMIN — CHLORHEXIDINE GLUCONATE 1 APPLICATION(S): 213 SOLUTION TOPICAL at 20:28

## 2023-02-02 RX ADMIN — Medication 81 MILLIGRAM(S): at 09:36

## 2023-02-02 RX ADMIN — Medication 50 MICROGRAM(S): at 06:03

## 2023-02-02 RX ADMIN — Medication 1 SPRAY(S): at 16:35

## 2023-02-02 RX ADMIN — ATORVASTATIN CALCIUM 20 MILLIGRAM(S): 80 TABLET, FILM COATED ORAL at 21:04

## 2023-02-02 RX ADMIN — Medication 20 MILLIGRAM(S): at 06:03

## 2023-02-02 RX ADMIN — Medication 1 SPRAY(S): at 06:04

## 2023-02-02 RX ADMIN — PANTOPRAZOLE SODIUM 40 MILLIGRAM(S): 20 TABLET, DELAYED RELEASE ORAL at 06:18

## 2023-02-02 RX ADMIN — Medication 40 MILLIEQUIVALENT(S): at 09:30
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
## 2023-02-02 NOTE — PROGRESS NOTE ADULT - SUBJECTIVE AND OBJECTIVE BOX
DATE OF SERVICE: 02-02-23 @ 20:19    Patient is a 77y old  Female who presents with a chief complaint of Atrial flutter (02 Feb 2023 10:50)      INTERVAL HISTORY: feels ok    MEDICATIONS:  furosemide   Injectable 20 milliGRAM(s) IV Push daily  metoprolol succinate ER 50 milliGRAM(s) Oral daily        PHYSICAL EXAM:  T(C): 36.9 (02-02-23 @ 19:46), Max: 36.9 (02-02-23 @ 19:46)  HR: 74 (02-02-23 @ 19:46) (74 - 89)  BP: 134/74 (02-02-23 @ 19:46) (122/64 - 135/81)  RR: 18 (02-02-23 @ 19:46) (18 - 18)  SpO2: 92% (02-02-23 @ 19:46) (92% - 95%)  Wt(kg): --  I&O's Summary    01 Feb 2023 07:01  -  02 Feb 2023 07:00  --------------------------------------------------------  IN: 720 mL / OUT: 900 mL / NET: -180 mL    02 Feb 2023 07:01  -  02 Feb 2023 20:19  --------------------------------------------------------  IN: 680 mL / OUT: 650 mL / NET: 30 mL          Appearance: In no distress	  HEENT:    PERRL, EOMI	  Cardiovascular:  S1 S2, No JVD  Respiratory: Lungs clear to auscultation	  Gastrointestinal:  Soft, Non-tender, + BS	  Vascularature:  No edema of LE  Psychiatric: Appropriate affect   Neuro: no acute focal deficits                               12.1   5.46  )-----------( 292      ( 02 Feb 2023 05:56 )             37.2     02-02    137  |  101  |  20  ----------------------------<  93  3.8   |  28  |  0.91    Ca    9.1      02 Feb 2023 05:56          Labs personally reviewed      ASSESSMENT/PLAN: 	  77F PMH HTN, hypothyroidism, OA, DVT started on Xarelto 6 weeks ago, presents with palpitations. Patient reports that she began developing palpitations yesterday, which she believed would resolve alone, however symptoms got worse. She hit her head when climbing on a ladder, and then went to the hospital. Patient reports persistent cough, which has been ongoing since October. Reports weight loss of about 10 lbs, which has been intentional due to diet changes.     Problem/Plan -1  Problem: New Onset Atrial Flutter  - ECG reveals A-flutter  - Initially tachycardic on presentation but now rate controlled in SR  - Anticoagulated prior on Xarelto for DVT Dx ~ 6 weeks ago  - c/w Metoprolol and Xarelto (lifelong AC)  - Allscript records from Dr Cali Butler reviewed, past med history is notable for AF   - TSH wnl  - we discussed antiarrythmic strategies including ablation vs meds  - Consulted EP    - AF likely secondary to severe MS    Problem/Plan -2  Problem: Hypertension  - c/w Metoprolol 50mg PO daily    Problem/Plan -3  Problem: Hx of DVT  - recently dx'd with acute DVT 6 weeks ago  - previously on Xarelto but now transitioned to Lovenox in anticipation of poss CTS    Problem/Plan -4  Problem: Mitral Stenosis  - Hx of Bioprosthetic MVR  - s/p LISA reveals severe MS  - plan for mitral Parth  - CTS consulted  - s/p Cardiac CT  - cath on 1/30   - Parth plan for Friday          Jonathan Perez DO [facility]  Cardiovascular Medicine  [address] [place] [address], [address]  Office: [phone]  Available via Text/call on Microsoft Teams

## 2023-02-03 ENCOUNTER — TRANSCRIPTION ENCOUNTER (OUTPATIENT)
Age: 78
End: 2023-02-03

## 2023-02-03 ENCOUNTER — APPOINTMENT (OUTPATIENT)
Dept: CARDIOTHORACIC SURGERY | Facility: HOSPITAL | Age: 78
End: 2023-02-03

## 2023-02-03 LAB
ALBUMIN SERPL ELPH-MCNC: 3.5 G/DL — SIGNIFICANT CHANGE UP (ref 3.3–5)
ALP SERPL-CCNC: 72 U/L — SIGNIFICANT CHANGE UP (ref 40–120)
ALT FLD-CCNC: 33 U/L — SIGNIFICANT CHANGE UP (ref 10–45)
ANION GAP SERPL CALC-SCNC: 15 MMOL/L — SIGNIFICANT CHANGE UP (ref 5–17)
APTT BLD: 30.7 SEC — SIGNIFICANT CHANGE UP (ref 27.5–35.5)
AST SERPL-CCNC: 54 U/L — HIGH (ref 10–40)
BASE EXCESS BLDA CALC-SCNC: 1.2 MMOL/L — SIGNIFICANT CHANGE UP (ref -2–3)
BASE EXCESS BLDA CALC-SCNC: 1.3 MMOL/L — SIGNIFICANT CHANGE UP (ref -2–3)
BASE EXCESS BLDA CALC-SCNC: 1.7 MMOL/L — SIGNIFICANT CHANGE UP (ref -2–3)
BILIRUB SERPL-MCNC: 0.6 MG/DL — SIGNIFICANT CHANGE UP (ref 0.2–1.2)
BUN SERPL-MCNC: 22 MG/DL — SIGNIFICANT CHANGE UP (ref 7–23)
CALCIUM SERPL-MCNC: 9.4 MG/DL — SIGNIFICANT CHANGE UP (ref 8.4–10.5)
CHLORIDE SERPL-SCNC: 100 MMOL/L — SIGNIFICANT CHANGE UP (ref 96–108)
CO2 BLDA-SCNC: 27 MMOL/L — HIGH (ref 19–24)
CO2 BLDA-SCNC: 27 MMOL/L — HIGH (ref 19–24)
CO2 BLDA-SCNC: 29 MMOL/L — HIGH (ref 19–24)
CO2 SERPL-SCNC: 23 MMOL/L — SIGNIFICANT CHANGE UP (ref 22–31)
CREAT SERPL-MCNC: 1.13 MG/DL — SIGNIFICANT CHANGE UP (ref 0.5–1.3)
EGFR: 50 ML/MIN/1.73M2 — LOW
GAS PNL BLDA: SIGNIFICANT CHANGE UP
GAS PNL BLDA: SIGNIFICANT CHANGE UP
GLUCOSE SERPL-MCNC: 112 MG/DL — HIGH (ref 70–99)
HCO3 BLDA-SCNC: 26 MMOL/L — SIGNIFICANT CHANGE UP (ref 21–28)
HCO3 BLDA-SCNC: 26 MMOL/L — SIGNIFICANT CHANGE UP (ref 21–28)
HCO3 BLDA-SCNC: 27 MMOL/L — SIGNIFICANT CHANGE UP (ref 21–28)
HCT VFR BLD CALC: 39.9 % — SIGNIFICANT CHANGE UP (ref 34.5–45)
HGB BLD-MCNC: 13 G/DL — SIGNIFICANT CHANGE UP (ref 11.5–15.5)
HOROWITZ INDEX BLDA+IHG-RTO: 1650 — SIGNIFICANT CHANGE UP
HOROWITZ INDEX BLDA+IHG-RTO: 30 — SIGNIFICANT CHANGE UP
HOROWITZ INDEX BLDA+IHG-RTO: 44 — SIGNIFICANT CHANGE UP
INR BLD: 1.15 RATIO — SIGNIFICANT CHANGE UP (ref 0.88–1.16)
MAGNESIUM SERPL-MCNC: 2.2 MG/DL — SIGNIFICANT CHANGE UP (ref 1.6–2.6)
MCHC RBC-ENTMCNC: 30.7 PG — SIGNIFICANT CHANGE UP (ref 27–34)
MCHC RBC-ENTMCNC: 32.6 GM/DL — SIGNIFICANT CHANGE UP (ref 32–36)
MCV RBC AUTO: 94.1 FL — SIGNIFICANT CHANGE UP (ref 80–100)
NRBC # BLD: 0 /100 WBCS — SIGNIFICANT CHANGE UP (ref 0–0)
PCO2 BLDA: 40 MMHG — SIGNIFICANT CHANGE UP (ref 32–45)
PCO2 BLDA: 41 MMHG — SIGNIFICANT CHANGE UP (ref 32–45)
PCO2 BLDA: 45 MMHG — SIGNIFICANT CHANGE UP (ref 32–45)
PH BLDA: 7.39 — SIGNIFICANT CHANGE UP (ref 7.35–7.45)
PH BLDA: 7.41 — SIGNIFICANT CHANGE UP (ref 7.35–7.45)
PH BLDA: 7.42 — SIGNIFICANT CHANGE UP (ref 7.35–7.45)
PHOSPHATE SERPL-MCNC: 3.9 MG/DL — SIGNIFICANT CHANGE UP (ref 2.5–4.5)
PLATELET # BLD AUTO: 297 K/UL — SIGNIFICANT CHANGE UP (ref 150–400)
PO2 BLDA: 141 MMHG — HIGH (ref 83–108)
PO2 BLDA: 165 MMHG — HIGH (ref 83–108)
PO2 BLDA: 166 MMHG — HIGH (ref 83–108)
POTASSIUM SERPL-MCNC: 4.1 MMOL/L — SIGNIFICANT CHANGE UP (ref 3.5–5.3)
POTASSIUM SERPL-SCNC: 4.1 MMOL/L — SIGNIFICANT CHANGE UP (ref 3.5–5.3)
PROT SERPL-MCNC: 6.3 G/DL — SIGNIFICANT CHANGE UP (ref 6–8.3)
PROTHROM AB SERPL-ACNC: 13.4 SEC — SIGNIFICANT CHANGE UP (ref 10.5–13.4)
RBC # BLD: 4.24 M/UL — SIGNIFICANT CHANGE UP (ref 3.8–5.2)
RBC # FLD: 13 % — SIGNIFICANT CHANGE UP (ref 10.3–14.5)
SAO2 % BLDA: 98.5 % — HIGH (ref 94–98)
SAO2 % BLDA: 99.1 % — HIGH (ref 94–98)
SAO2 % BLDA: 99.2 % — HIGH (ref 94–98)
SODIUM SERPL-SCNC: 138 MMOL/L — SIGNIFICANT CHANGE UP (ref 135–145)
WBC # BLD: 11.46 K/UL — HIGH (ref 3.8–10.5)
WBC # FLD AUTO: 11.46 K/UL — HIGH (ref 3.8–10.5)

## 2023-02-03 PROCEDURE — 0483T TMVI PERCUTANEOUS APPROACH: CPT | Mod: 62,Q0

## 2023-02-03 PROCEDURE — 93355 ECHO TRANSESOPHAGEAL (TEE): CPT

## 2023-02-03 PROCEDURE — 93308 TTE F-UP OR LMTD: CPT | Mod: 26

## 2023-02-03 PROCEDURE — 99291 CRITICAL CARE FIRST HOUR: CPT | Mod: FS,25

## 2023-02-03 PROCEDURE — 76604 US EXAM CHEST: CPT | Mod: 26

## 2023-02-03 PROCEDURE — 99292 CRITICAL CARE ADDL 30 MIN: CPT | Mod: FS,25

## 2023-02-03 PROCEDURE — 99292 CRITICAL CARE ADDL 30 MIN: CPT | Mod: FS

## 2023-02-03 PROCEDURE — 94010 BREATHING CAPACITY TEST: CPT | Mod: 26

## 2023-02-03 PROCEDURE — 71045 X-RAY EXAM CHEST 1 VIEW: CPT | Mod: 26

## 2023-02-03 DEVICE — INTRO AGLIS NXT MED CURL 8.5FR X  71: Type: IMPLANTABLE DEVICE | Status: FUNCTIONAL

## 2023-02-03 DEVICE — SHEATH INTRODUCER TERUMO PINNACLE CORONARY 8FR X 10CM X 0.038" MINI WIRE: Type: IMPLANTABLE DEVICE | Status: FUNCTIONAL

## 2023-02-03 DEVICE — GUIDEWIRE AMPLATZ EXTRA-STIFF CURVED .035" X 260CM: Type: IMPLANTABLE DEVICE | Status: FUNCTIONAL

## 2023-02-03 DEVICE — GWIRE GUID  0.035INX150CM: Type: IMPLANTABLE DEVICE | Status: FUNCTIONAL

## 2023-02-03 DEVICE — SHEATH INTRODUCER TERUMO PINNACLE CORONARY 6FR X 10CM X 0.038" MINI WIRE: Type: IMPLANTABLE DEVICE | Status: FUNCTIONAL

## 2023-02-03 DEVICE — KIT VERSACROSS PIGTAIL 45 DEG 8.5F 63CM: Type: IMPLANTABLE DEVICE | Status: FUNCTIONAL

## 2023-02-03 DEVICE — BLLN ATLAS 14X4: Type: IMPLANTABLE DEVICE | Status: FUNCTIONAL

## 2023-02-03 DEVICE — VLV HEART SAPIEN 3 W COMMANDER SYS 29MM: Type: IMPLANTABLE DEVICE | Status: FUNCTIONAL

## 2023-02-03 DEVICE — WIRE GUIDE EXCHANGE J TIP 3MM X 260CM: Type: IMPLANTABLE DEVICE | Status: FUNCTIONAL

## 2023-02-03 DEVICE — GWIRE STR .038X180 STIFF LONG TAPR: Type: IMPLANTABLE DEVICE | Status: FUNCTIONAL

## 2023-02-03 RX ORDER — CHLORHEXIDINE GLUCONATE 213 G/1000ML
1 SOLUTION TOPICAL
Refills: 0 | Status: DISCONTINUED | OUTPATIENT
Start: 2023-02-03 | End: 2023-02-05

## 2023-02-03 RX ORDER — METOPROLOL TARTRATE 50 MG
12.5 TABLET ORAL EVERY 12 HOURS
Refills: 0 | Status: DISCONTINUED | OUTPATIENT
Start: 2023-02-03 | End: 2023-02-04

## 2023-02-03 RX ORDER — ATORVASTATIN CALCIUM 80 MG/1
40 TABLET, FILM COATED ORAL AT BEDTIME
Refills: 0 | Status: DISCONTINUED | OUTPATIENT
Start: 2023-02-03 | End: 2023-02-05

## 2023-02-03 RX ORDER — PROPOFOL 10 MG/ML
10 INJECTION, EMULSION INTRAVENOUS
Qty: 1000 | Refills: 0 | Status: DISCONTINUED | OUTPATIENT
Start: 2023-02-03 | End: 2023-02-03

## 2023-02-03 RX ORDER — CHLORHEXIDINE GLUCONATE 213 G/1000ML
15 SOLUTION TOPICAL EVERY 12 HOURS
Refills: 0 | Status: DISCONTINUED | OUTPATIENT
Start: 2023-02-03 | End: 2023-02-03

## 2023-02-03 RX ORDER — LEVOTHYROXINE SODIUM 125 MCG
50 TABLET ORAL DAILY
Refills: 0 | Status: DISCONTINUED | OUTPATIENT
Start: 2023-02-03 | End: 2023-02-05

## 2023-02-03 RX ORDER — PROPOFOL 10 MG/ML
26.25 INJECTION, EMULSION INTRAVENOUS
Qty: 1000 | Refills: 0 | Status: DISCONTINUED | OUTPATIENT
Start: 2023-02-03 | End: 2023-02-03

## 2023-02-03 RX ORDER — METOPROLOL TARTRATE 50 MG
12.5 TABLET ORAL EVERY 6 HOURS
Refills: 0 | Status: DISCONTINUED | OUTPATIENT
Start: 2023-02-03 | End: 2023-02-03

## 2023-02-03 RX ORDER — ASPIRIN/CALCIUM CARB/MAGNESIUM 324 MG
81 TABLET ORAL DAILY
Refills: 0 | Status: DISCONTINUED | OUTPATIENT
Start: 2023-02-03 | End: 2023-02-05

## 2023-02-03 RX ORDER — FLUTICASONE PROPIONATE 50 MCG
1 SPRAY, SUSPENSION NASAL
Refills: 0 | Status: DISCONTINUED | OUTPATIENT
Start: 2023-02-03 | End: 2023-02-05

## 2023-02-03 RX ADMIN — Medication 1 SPRAY(S): at 06:47

## 2023-02-03 RX ADMIN — Medication 81 MILLIGRAM(S): at 18:28

## 2023-02-03 RX ADMIN — ATORVASTATIN CALCIUM 40 MILLIGRAM(S): 80 TABLET, FILM COATED ORAL at 21:19

## 2023-02-03 RX ADMIN — Medication 50 MICROGRAM(S): at 06:46

## 2023-02-03 RX ADMIN — Medication 1 SPRAY(S): at 18:28

## 2023-02-03 RX ADMIN — Medication 20 MILLIGRAM(S): at 06:46

## 2023-02-03 RX ADMIN — PANTOPRAZOLE SODIUM 40 MILLIGRAM(S): 20 TABLET, DELAYED RELEASE ORAL at 06:46

## 2023-02-03 RX ADMIN — Medication 50 MILLIGRAM(S): at 06:45

## 2023-02-03 NOTE — AIRWAY REMOVAL NOTE  ADULT & PEDS - ARTIFICAL AIRWAY REMOVAL COMMENTS
Written order for extubation verified. The patient was identified by full name and birth date compared to the identification band. Present during the procedure was Rod Segura RN

## 2023-02-03 NOTE — PROGRESS NOTE ADULT - ASSESSMENT
77F PMH HTN, hypothyroidism, OA, DVT started on Xarelto 6 weeks prior to admission, MV stenosis s/p prosthetic MV (2012) and acute on chronic HF, who presented with palpitations. Pt was found to have prosthetic MV stenosis and scheduled for TAVR and prosthetic MV placement. Additionally s/p cardiac cath on 1/30 that revealed 50% prox  circ lesion.     77F PMHx HTN, hypothyroidism, OA, DVT started on Xarelto 6 weeks prior to admission, MV stenosis s/p prosthetic MV (2012) and acute on chronic HF, who presented with palpitations. Pt was found to have prosthetic MV stenosis and scheduled for TAVR and prosthetic MV placement. Additionally s/p cardiac cath on 1/30 that revealed 50% prox circ lesion.      PLAN      ====================CARDIOVASCULAR==================    ==Hemodynamics==    Pt currently not requiring pressor support    ==Pump==    TTE Date: 1/26  LVEF:  55-60%                            Regional Wall Motion Abnormalities  [ ]Yes   [x ] No   If Yes, Details  Diastolic function: normal  RV function: normal  Volume status:   [ ] Hypovolemia    [ ] Euvolemic    [ ] Hypervolemic    ==Coronaries==    Last ischemic workup:   Antiplatelet regimen:   Anticoagulant:   Statin:   Beta blocker:    ==Rhythm==    Current rhythm:  AM EKG Interpretation:   Anti-arrhythmic therapies:   TVP with settings:         ====================== NEUROLOGY=====================  Currently sedated on propofol will wean for SBT trials.    propofol Infusion 10 MICROgram(s)/kG/Min (3.81 mL/Hr) IV Continuous <Continuous>  propofol Infusion. 26.247 MICROgram(s)/kG/Min (10 mL/Hr) IV Continuous <Continuous>    ==================== RESPIRATORY======================  Acute respiratory distress requiring reintubation post mitral TAVR.  Currently on Volume SIMV     [ ] Invasive Mechanical Ventilation   [ ] BIPAP    [ ] HFNC    [ ] NC   Non-invasive Mechanical Ventilation/ HFNC Settings:   Mode: AC/ CMV (Assist Control/ Continuous Mandatory Ventilation)  RR (machine): 12  TV (machine): 450  FiO2: 50  PEEP: 5  ITime: 1  MAP: 9  PIP: 28    ABG - ( 03 Feb 2023 14:41 )  pH, Arterial: 7.43  pH, Blood: x     /  pCO2: 37    /  pO2: 139   / HCO3: 25    / Base Excess: 0.5   /  SaO2: 99.3                      ===================== RENAL =========================    02-02-23 @ 07:01  -  02-03-23 @ 07:00  --------------------------------------------------------  IN: 680 mL / OUT: 650 mL / NET: 30 mL    02-03-23 @ 07:01  -  02-03-23 @ 15:17  --------------------------------------------------------  IN: 0 mL / OUT: 400 mL / NET: -400 mL      Renal Replacement Therapy:  [ ] CRRT      [ ] IHD   Last Session:    Fluid removal:     [ ] Diuretic therapy, Regimen:       ==================== GASTROINTESTINAL===================    Diet:  Last BM:   Indication for Stress Ulcer Prophylaxis, [ ] Yes    [ ] No   If Yes, Medication:       ========================INFECTIOUS DISEASE================  T(C): 36.5 (02-03-23 @ 12:15), Max: 36.9 (02-02-23 @ 19:46)  WBC Count: 11.46 K/uL (02-03-23 @ 15:00)  WBC Count: 5.46 K/uL (02-02-23 @ 05:56)  WBC Count: 5.50 K/uL (02-01-23 @ 06:07)        Current Antibiotics/Antifungals/ Antivirals with start date:       ===================HEMATOLOGIC/ONC ===================  Hemoglobin: 13.0 g/dL (02-03-23 @ 15:00)  Hemoglobin: 12.1 g/dL (02-02-23 @ 05:56)  Hemoglobin: 12.3 g/dL (02-01-23 @ 06:07)    Platelet Count - Automated: 297 K/uL (02-03-23 @ 15:00)  Platelet Count - Automated: 292 K/uL (02-02-23 @ 05:56)  Platelet Count - Automated: 263 K/uL (02-01-23 @ 06:07)    Chemical VTE Prophylaxis:  [ ] Lovenox    [ ] SQH   [ ]NA  Systemic Anticogaulation:  [ ] Yes    [ ] No,  If Yes, Medication and Indication:       =======================    ENDOCRINE  =====================  Insulin drip  [ ] Yes    [ ] No  Basal Insulin [ ] Yes    [ ] No, Details:   Bolus insulin [ ] Yes    [ ] No  Sliding Scale  [ ] Yes    [ ] No            ======================= LINES/TUBES  =====================  Gray: (Date placed)  Central Line: (Date placed)  HD Catheter: (Date placed)  Arterial Line: (Date placed)  Endotracheal Tube: (Date placed)  Rankin: (Date placed)  ======================= DISPOSITION  =====================  [ ] Critical   [ ] Guarded    [ ] Stable    [ ] Maintain in CICU  [ ] Downgrade to Telemtry  [ ] Discharge Home    Patient requires continuous monitoring with bedside rhythm monitoring, pulse ox monitoring, and intermittent blood gas analysis. Care plan discussed with ICU care team. Patient remained critical and at risk for life threatening decompensation.  Patient seen, examined and plan discussed with CCU team during rounds.     I have personally provided ____ minutes of critical care time excluding time spent on separate procedures, in addition to initial critical care time provided by the CICU Attending, Dr. Field/ El/ Thony/ Amelia/ Martha/ Layo .       77F PMHx HTN, hypothyroidism, OA, DVT started on Xarelto 6 weeks prior to admission, MV stenosis s/p prosthetic MV (2012) and acute on chronic HF, who presented with palpitations. Pt was found to have prosthetic MV stenosis and scheduled for TAVR and prosthetic MV placement. Additionally s/p cardiac cath on 1/30 that revealed 50% prox circ lesion.      PLAN    ====================== NEUROLOGY=====================  Currently sedated on propofol will wean for SBT trials.    propofol Infusion 10 MICROgram(s)/kG/Min (3.81 mL/Hr) IV Continuous <Continuous>  propofol Infusion. 26.247 MICROgram(s)/kG/Min (10 mL/Hr) IV Continuous <Continuous>    ==================== RESPIRATORY======================  Acute respiratory distress requiring reintubation post mitral TAVR.  Currently on Volume SIMV RR 14  30% peep 5  Oxygenating well on current settings; will atttempt PSV to extubate.   ABG - ( 03 Feb 2023 14:41 )  pH, Arterial: 7.43  pH, Blood: x     /  pCO2: 37    /  pO2: 139   / HCO3: 25    / Base Excess: 0.5   /  SaO2: 99.3    - Bedside POCUS revealed RLL B lines , no effusions B/L     ====================CARDIOVASCULAR==================  s/p Valve in Valve replacement  - Post MVR echo showed a well seated valve in valve with no effusipn  - Repeat TTE in AM  - R groin suture to be removed at 11 AM tomorrow   - Continue ASA    CAD  w 50% RCA  - Continue ASA, lipitor, BB     AF w RVR  - Currently in SR  - Hold AC will structural heart when safe to resume.    ===================== RENAL =========================    02-02-23 @ 07:01  -  02-03-23 @ 07:00  --------------------------------------------------------  IN: 680 mL / OUT: 650 mL / NET: 30 mL    02-03-23 @ 07:01  -  02-03-23 @ 15:17  --------------------------------------------------------  IN: 0 mL / OUT: 400 mL / NET: -400 mL    Normal CR and electrolytes      ==================== GASTROINTESTINAL===================    Currently NPO for possible extubation  - If remains intubated will start PPI      ========================INFECTIOUS DISEASE================  T(C): 36.5 (02-03-23 @ 12:15), Max: 36.9 (02-02-23 @ 19:46)  WBC Count: 11.46 K/uL (02-03-23 @ 15:00)  WBC Count: 5.46 K/uL (02-02-23 @ 05:56)  WBC Count: 5.50 K/uL (02-01-23 @ 06:07)    Normal WBC, no fevers      ===================HEMATOLOGIC/ONC ===================  Hemoglobin: 13.0 g/dL (02-03-23 @ 15:00)  Hemoglobin: 12.1 g/dL (02-02-23 @ 05:56)  Hemoglobin: 12.3 g/dL (02-01-23 @ 06:07)    Platelet Count - Automated: 297 K/uL (02-03-23 @ 15:00)  Platelet Count - Automated: 292 K/uL (02-02-23 @ 05:56)  Platelet Count - Automated: 263 K/uL (02-01-23 @ 06:07)    - Will check with structural when safe to resume AC  - HGb stable check CBC    =======================    ENDOCRINE  =====================  - Glucose ranging 100s no insulin coverage needed          ======================= LINES/TUBES  =====================  Arterial Line:2/3  Endotracheal Tube: 2/3  Rankin: 2/3  ======================= DISPOSITION  =====================  [X ] Critical   [ ] Guarded    [ ] Stable    [X ] Maintain in CICU  [ ] Downgrade to Telemtry  [ ] Discharge Home    Patient requires continuous monitoring with bedside rhythm monitoring, pulse ox monitoring, and intermittent blood gas analysis. Care plan discussed with ICU care team. Patient remained critical and at risk for life threatening decompensation.  Patient seen, examined and plan discussed with CCU team during rounds.     I have personally provided ____ minutes of critical care time excluding time spent on separate procedures, in addition to initial critical care time provided by the CICU Attending, Dr. Field/ El/ Thony/ Amelia/ Martha/ Layo .       77F PMHx HTN, Afib with RVR, hypothyroidism, OA, DVT started on Xarelto 6 weeks prior to admission, MV stenosis s/p prosthetic MV (2012) and , who presented with palpitations. Pt was found to have prosthetic MV stenosis and scheduled for TAVR and prosthetic MV placement. Additionally s/p cardiac cath on 1/30 that revealed 50% prox circ lesion.      PLAN    ====================== NEUROLOGY=====================  Currently sedated on propofol will wean for SBT trials.    propofol Infusion 10 MICROgram(s)/kG/Min (3.81 mL/Hr) IV Continuous <Continuous>  propofol Infusion. 26.247 MICROgram(s)/kG/Min (10 mL/Hr) IV Continuous <Continuous>    ==================== RESPIRATORY======================  Acute respiratory distress requiring reintubation post mitral TAVR.  Currently on Volume SIMV RR 14  30% peep 5  Oxygenating well on current settings; will atttempt PSV to extubate.   ABG - ( 03 Feb 2023 14:41 )  pH, Arterial: 7.43  pH, Blood: x     /  pCO2: 37    /  pO2: 139   / HCO3: 25    / Base Excess: 0.5   /  SaO2: 99.3    - Bedside POCUS revealed RLL B lines , no effusions B/L     ====================CARDIOVASCULAR==================  s/p Valve in Valve replacement  - Post MVR echo showed a well seated valve in valve with no effusipn  - Repeat TTE in AM  - R groin suture to be removed at 11 AM tomorrow   - Continue ASA    CAD  w 50% RCA  - Continue ASA, lipitor, BB     AF w RVR  - Currently in SR  - Hold AC will structural heart when safe to resume.    ===================== RENAL =========================    02-02-23 @ 07:01  -  02-03-23 @ 07:00  --------------------------------------------------------  IN: 680 mL / OUT: 650 mL / NET: 30 mL    02-03-23 @ 07:01  -  02-03-23 @ 15:17  --------------------------------------------------------  IN: 0 mL / OUT: 400 mL / NET: -400 mL    Normal CR and electrolytes      ==================== GASTROINTESTINAL===================    Currently NPO for possible extubation  - If remains intubated will start PPI      ========================INFECTIOUS DISEASE================  T(C): 36.5 (02-03-23 @ 12:15), Max: 36.9 (02-02-23 @ 19:46)  WBC Count: 11.46 K/uL (02-03-23 @ 15:00)  WBC Count: 5.46 K/uL (02-02-23 @ 05:56)  WBC Count: 5.50 K/uL (02-01-23 @ 06:07)    Normal WBC, no fevers      ===================HEMATOLOGIC/ONC ===================  Hemoglobin: 13.0 g/dL (02-03-23 @ 15:00)  Hemoglobin: 12.1 g/dL (02-02-23 @ 05:56)  Hemoglobin: 12.3 g/dL (02-01-23 @ 06:07)    Platelet Count - Automated: 297 K/uL (02-03-23 @ 15:00)  Platelet Count - Automated: 292 K/uL (02-02-23 @ 05:56)  Platelet Count - Automated: 263 K/uL (02-01-23 @ 06:07)    - Will check with structural when safe to resume AC  - HGb stable check CBC    =======================    ENDOCRINE  =====================  - Glucose ranging 100s no insulin coverage needed          ======================= LINES/TUBES  =====================  Arterial Line:2/3  Endotracheal Tube: 2/3  Rankin: 2/3  ======================= DISPOSITION  =====================  [X ] Critical   [ ] Guarded    [ ] Stable    [X ] Maintain in CICU  [ ] Downgrade to Telemetry  [ ] Discharge Home    Patient requires continuous monitoring with bedside rhythm monitoring, pulse ox monitoring, and intermittent blood gas analysis. Care plan discussed with ICU care team. Patient remained critical and at risk for life threatening decompensation.  Patient seen, examined and plan discussed with CCU team during rounds.     I have personally provided ____ minutes of critical care time excluding time spent on separate procedures, in addition to initial critical care time provided by the CICU Attending, Dr. Field/ El/ Thony/ Amelia/ Martha/ Layo .

## 2023-02-03 NOTE — BRIEF OPERATIVE NOTE - OPERATION/FINDINGS
Prosthetic Mitral Valve Stenosis  Size 29 [employer] Mitral Valve Replacement using transcatheter femoral approach Prosthetic Mitral Valve Stenosis  Size 29 [employer]ix Mitral Valve Replacement using transcatheter femoral approach, bilateral femoral veins accessed

## 2023-02-03 NOTE — PROGRESS NOTE ADULT - SUBJECTIVE AND OBJECTIVE BOX
CCU Accept Note    Transfer from: (x ) Medicine    Accepting physician: Dr. Field    HPI:  77F PMH HTN, hypothyroidism, OA, DVT started on Xarelto 6 weeks ago, presents with palpitations. Patient reports that she began developing palpitations yesterday, which she believed would resolve alone, however symptoms got worse. She hit her head when climbing on a ladder, and then went to the hospital. Patient reports persistent cough, which has been ongoing since October. Reports weight loss of about 10 lbs, which has been intentional due to diet changes.     ED course: Tachycardia with stable BP. CBC within normal limits and without leukocytosis. CMP unremarkable. EKG with atrial flutter. CXR with small right effusion. CT chest with small effusion. Patient received metoprolol tartrate 5 mg IV 2x after which she returned to regular rate, and patient is now for admission to medicine service for further evaluation and treatment.     INTERVAL: Pt was found to have mitral valve stenosis of her previous bovine prosthetic MV (2012) and acute on chronic heart failure. Pt was then medically optimized for cardiac cath on 1/30 that revealed     SUBJECTIVE DATA:    OBJECTIVE DATA:    Vital Signs Last 24 Hrs  T(C): 36.5 (03 Feb 2023 12:15), Max: 36.9 (02 Feb 2023 19:46)  T(F): 97.7 (03 Feb 2023 12:15), Max: 98.4 (02 Feb 2023 19:46)  HR: 75 (03 Feb 2023 13:30) (67 - 75)  BP: 146/70 (03 Feb 2023 12:15) (108/63 - 146/70)  BP(mean): 100 (03 Feb 2023 12:15) (94 - 100)  RR: 40 (03 Feb 2023 13:30) (16 - 51)  SpO2: 100% (03 Feb 2023 13:30) (92% - 100%)    Parameters below as of 03 Feb 2023 13:00  Patient On (Oxygen Delivery Method): ventilator    O2 Concentration (%): 50  I&O's Summary    02 Feb 2023 07:01  -  03 Feb 2023 07:00  --------------------------------------------------------  IN: 680 mL / OUT: 650 mL / NET: 30 mL    03 Feb 2023 07:01  -  03 Feb 2023 14:34  --------------------------------------------------------  IN: 0 mL / OUT: 400 mL / NET: -400 mL        Allergies:      MEDICATIONS  (STANDING):  chlorhexidine 4% Liquid 1 Application(s) Topical <User Schedule>    MEDICATIONS  (PRN):      LABS            EKG:  Telemetry:  Echo:  Cardiac Cath:  Stress Test:  Imaging    ASSESSMENT & PLAN:       DIONNE Arriola CCU Accept Note    Transfer from: (x ) Medicine    Accepting physician: Dr. Field    HPI:  77F PMH HTN, hypothyroidism, OA, DVT started on Xarelto 6 weeks ago, presents with palpitations. Patient reports that she began developing palpitations yesterday, which she believed would resolve alone, however symptoms got worse. She hit her head when climbing on a ladder, and then went to the hospital. Patient reports persistent cough, which has been ongoing since October. Reports weight loss of about 10 lbs, which has been intentional due to diet changes.     ED course: Tachycardia with stable BP. CBC within normal limits and without leukocytosis. CMP unremarkable. EKG with atrial flutter. CXR with small right effusion. CT chest with small effusion. Patient received metoprolol tartrate 5 mg IV 2x after which she returned to regular rate, and patient is now for admission to medicine service for further evaluation and treatment.     INTERVAL: Pt was found to have mitral valve stenosis of her previous bovine prosthetic MV (2012) and acute on chronic heart failure. Pt was then medically optimized for cardiac cath on 1/30 that revealed 50% prox  circ lesion. At that point pt was scheduled for OR Friday for placement of new prosthetic MV with TAVR approach.    SUBJECTIVE DATA:    OBJECTIVE DATA:    Vital Signs Last 24 Hrs  T(C): 36.5 (03 Feb 2023 12:15), Max: 36.9 (02 Feb 2023 19:46)  T(F): 97.7 (03 Feb 2023 12:15), Max: 98.4 (02 Feb 2023 19:46)  HR: 75 (03 Feb 2023 13:30) (67 - 75)  BP: 146/70 (03 Feb 2023 12:15) (108/63 - 146/70)  BP(mean): 100 (03 Feb 2023 12:15) (94 - 100)  RR: 40 (03 Feb 2023 13:30) (16 - 51)  SpO2: 100% (03 Feb 2023 13:30) (92% - 100%)    Parameters below as of 03 Feb 2023 13:00  Patient On (Oxygen Delivery Method): ventilator    O2 Concentration (%): 50  I&O's Summary    02 Feb 2023 07:01  -  03 Feb 2023 07:00  --------------------------------------------------------  IN: 680 mL / OUT: 650 mL / NET: 30 mL    03 Feb 2023 07:01  -  03 Feb 2023 14:34  --------------------------------------------------------  IN: 0 mL / OUT: 400 mL / NET: -400 mL        Allergies:      MEDICATIONS  (STANDING):  chlorhexidine 4% Liquid 1 Application(s) Topical <User Schedule>    MEDICATIONS  (PRN):      LABS:  pending            EKG:  < from: 12 Lead ECG (02.02.23 @ 10:29) >  Ventricular Rate 77 BPM    Atrial Rate 77 BPM    P-R Interval 162 ms    QRS Duration 78 ms    Q-T Interval 432 ms    QTC Calculation(Bazett) 488 ms    P Axis 80 degrees    R Axis 53 degrees    T Axis 27 degrees    Diagnosis Line NORMAL SINUS RHYTHMWITH SINUS ARRHYTHMIA  NONSPECIFIC ST AND T WAVE ABNORMALITY  PROLONGED QT  ABNORMAL ECG  WHEN COMPARED WITH ECG OF 23-JAN-2023 20:50,  SIGNIFICANT CHANGES HAVE OCCURRED  Confirmed by Gene SILVER Jon ([id number]) on 2/3/2023 2:15:30 PM    < end of copied text >    Telemetry:  Echo:  < from: Transesophageal Echocardiogram w/o TTE (Transesophageal Echocardiogram) (01.26.23 @ 09:00) >    Patient name: SYEDA LOPEZ  YOB: 1945   Age: 77 (F)   MR#: [id number]  Study Date: 1/26/2023  [facility]  Study quality: Technically good  Referring Physician: Elizabeth Montana MD  Blood Pressure: 150/82 mmHg  Height: 163 cm  Weight: 64 kg  BSA: 1.7 m2  Heart Rate: 65 mmHg  ------------------------------------------------------------------------  PROCEDURE: Transesophageal (LISA) was performed.  Informed  consent was first obtained forTEE. The patient was sedated  - see anesthesia record.  The procedure was monitored with  automatic blood pressure monitoring, ECG tracings and pulse  oximetry. The transesophageal probe was placed in the  esophagus posterior to the heart without complications.  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), Unspecified complication of cardiac and vascular  prosthetic device, implant and graft, initial encounter  (T82.9XXA), Stenosis of cardiac prosthetic devices,  implants andgrafts, initial encounter (T82.857A)  ------------------------------------------------------------------------  Dimensions:    Normal Values:  LA:            2.0 - 4.0 cm  Ao:            2.0 - 3.8 cm  SEPTUM:        0.6 - 1.2 cm  PWT:           0.6 -1.1 cm  LVIDd:         3.0 - 5.6 cm  LVIDs:         1.8 - 4.0 cm  EF (Visual Estimate): 55-60 %  ------------------------------------------------------------------------  Observations:  Mitral Valve: Bioprosthetic mitral valve replacement. The  valveis well seated.  The prosthetic valve leaflets are  thickened.  From the surgeons view the leaflet at the  leaflet from the 3 o'clock to the 6 o'clock and the leaftet  from the 6 o'clock to the 9 o'clock position are fixed and  immobile.  The remaining leaflet is thickend with reduced  mobility. There is no significant pannus.  3D plainemtry of  the mitral valve area is 0.9 sqcm.  Minimal mitral  regurgitation. Severe mitral stenosis.  Aortic Valve/Aorta: Normal trileaflet aortic valve. No  aortic valve regurgitation seen.  Simple atheroma noted in aortic arch/descending aorta.  Left Atrium: Normal left atrium.  Left Ventricle: Normal left ventricular systolic function.  No segmental wall motion abnormalities. Normal left  ventricular internal dimensions and wall thicknesses.  Unable to determine diastolic function due to mitral valve  prosthesis.  Right Heart: Normal right atrium. Normal right ventricular  size and function. Normal tricuspid valve. Minimal  tricuspid regurgitation. Normal pulmonic valve. Minimal  pulmonic regurgitation.  Pericardium/Pleura: Normal pericardium with no pericardial  effusion.  ------------------------------------------------------------------------  Conclusions:  1. Bioprosthetic mitral valve replacement. The valve is  well seated.  The prosthetic valve leaflets are thickened.  From the surgeons view the leaflet at the leaflet from the  3 o'clock to the 6 o'clock and the leaftet from the 6  o'clock to the 9 o'clock position are fixed and immobile.  The remaining leaflet is thickend with reduced mobility.  There is no significant pannus.  3D plainemtry of the  mitral valve area is 0.9 sqcm.  Minimal mitral  regurgitation. Severe mitral stenosis.  2. Normal trileaflet aortic valve. No aortic valve  regurgitation seen.  3. Simple atheroma noted in aortic arch/descending aorta.  4. No left atrium or left atrial appendage thrombus.  Decreased left atrial appendage velocities noted.  5. Normal left ventricular systolic function. No segmental  wallmotion abnormalities.  6. Normal right ventricular size and function.  *** Compared with echocardiogram of 1/25/2023, no  significant changes noted.  Results communicated to Dr. Butler and Dr. Perez.  ------------------------------------------------------------------------  Confirmed on  1/26/2023 - 12:57:21 by Eugenio Bautista M.D.  ------------------------------------------------------------------------    < end of copied text >  Cardiac Cath:  < from: Cardiac Catheterization (01.30.23 @ 14:59) >  Coronary Angiography   The coronary circulation is right dominant. Cardiac catheterization  was performed electively.    LM   Left main artery: Angiography shows no disease.      LAD   Left anterior descending artery: Angiography shows minor  irregularities.    CX   Proximal circumflex: Angiography shows moderate atherosclerosis. iFR  0.99 . There is a 50 % stenosis.    RCA   Right coronary artery: Angiography shows no disease.      < end of copied text >   CCU Accept Note    Transfer from: (x ) Medicine    Accepting physician: Dr. Field    HPI:  77F PMH HTN, hypothyroidism, OA, DVT started on Xarelto 6 weeks ago, presents with palpitations. Patient reports that she began developing palpitations yesterday, which she believed would resolve alone, however symptoms got worse. She hit her head when climbing on a ladder, and then went to the hospital. Patient reports persistent cough, which has been ongoing since October. Reports weight loss of about 10 lbs, which has been intentional due to diet changes.     ED course: Tachycardia with stable BP. CBC within normal limits and without leukocytosis. CMP unremarkable. EKG with atrial flutter. CXR with small right effusion. CT chest with small effusion. Patient received metoprolol tartrate 5 mg IV 2x after which she returned to regular rate, and patient is now for admission to medicine service for further evaluation and treatment.     INTERVAL: Pt was found to have mitral valve stenosis of her previous bovine prosthetic MV (2012) and acute on chronic heart failure. Pt was then medically optimized for cardiac cath on 1/30 that revealed 50% prox  circ lesion. At that point pt was scheduled for OR Friday for placement of new prosthetic MV with TAVR approach.    SUBJECTIVE DATA: Unable t assess as pt is sedated    OBJECTIVE DATA:    Vital Signs Last 24 Hrs  T(C): 36.5 (03 Feb 2023 12:15), Max: 36.9 (02 Feb 2023 19:46)  T(F): 97.7 (03 Feb 2023 12:15), Max: 98.4 (02 Feb 2023 19:46)  HR: 75 (03 Feb 2023 13:30) (67 - 75)  BP: 146/70 (03 Feb 2023 12:15) (108/63 - 146/70)  BP(mean): 100 (03 Feb 2023 12:15) (94 - 100)  RR: 40 (03 Feb 2023 13:30) (16 - 51)  SpO2: 100% (03 Feb 2023 13:30) (92% - 100%)    Parameters below as of 03 Feb 2023 13:00  Patient On (Oxygen Delivery Method): ventilator    O2 Concentration (%): 50  I&O's Summary    02 Feb 2023 07:01  -  03 Feb 2023 07:00  --------------------------------------------------------  IN: 680 mL / OUT: 650 mL / NET: 30 mL    03 Feb 2023 07:01  -  03 Feb 2023 14:34  --------------------------------------------------------  IN: 0 mL / OUT: 400 mL / NET: -400 mL        Allergies:      MEDICATIONS  (STANDING):  chlorhexidine 4% Liquid 1 Application(s) Topical <User Schedule>    MEDICATIONS  (PRN):      LABS:  pending            EKG:  < from: 12 Lead ECG (02.02.23 @ 10:29) >  Ventricular Rate 77 BPM    Atrial Rate 77 BPM    P-R Interval 162 ms    QRS Duration 78 ms    Q-T Interval 432 ms    QTC Calculation(Bazett) 488 ms    P Axis 80 degrees    R Axis 53 degrees    T Axis 27 degrees    Diagnosis Line NORMAL SINUS RHYTHMWITH SINUS ARRHYTHMIA  NONSPECIFIC ST AND T WAVE ABNORMALITY  PROLONGED QT  ABNORMAL ECG  WHEN COMPARED WITH ECG OF 23-JAN-2023 20:50,  SIGNIFICANT CHANGES HAVE OCCURRED  Confirmed by Gene SILVER Jon ([id number]) on 2/3/2023 2:15:30 PM    < end of copied text >    Telemetry:  Echo:  < from: Transesophageal Echocardiogram w/o TTE (Transesophageal Echocardiogram) (01.26.23 @ 09:00) >    Patient name: SYEDA LOPEZ  YOB: 1945   Age: 77 (F)   MR#: [id number]  Study Date: 1/26/2023  [facility]  Study quality: Technically good  Referring Physician: Elizabeth Montana MD  Blood Pressure: 150/82 mmHg  Height: 163 cm  Weight: 64 kg  BSA: 1.7 m2  Heart Rate: 65 mmHg  ------------------------------------------------------------------------  PROCEDURE: Transesophageal (LISA) was performed.  Informed  consent was first obtained forTEE. The patient was sedated  - see anesthesia record.  The procedure was monitored with  automatic blood pressure monitoring, ECG tracings and pulse  oximetry. The transesophageal probe was placed in the  esophagus posterior to the heart without complications.  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), Unspecified complication of cardiac and vascular  prosthetic device, implant and graft, initial encounter  (T82.9XXA), Stenosis of cardiac prosthetic devices,  implants andgrafts, initial encounter (T82.857A)  ------------------------------------------------------------------------  Dimensions:    Normal Values:  LA:            2.0 - 4.0 cm  Ao:            2.0 - 3.8 cm  SEPTUM:        0.6 - 1.2 cm  PWT:           0.6 -1.1 cm  LVIDd:         3.0 - 5.6 cm  LVIDs:         1.8 - 4.0 cm  EF (Visual Estimate): 55-60 %  ------------------------------------------------------------------------  Observations:  Mitral Valve: Bioprosthetic mitral valve replacement. The  valveis well seated.  The prosthetic valve leaflets are  thickened.  From the surgeons view the leaflet at the  leaflet from the 3 o'clock to the 6 o'clock and the leaftet  from the 6 o'clock to the 9 o'clock position are fixed and  immobile.  The remaining leaflet is thickend with reduced  mobility. There is no significant pannus.  3D plainemtry of  the mitral valve area is 0.9 sqcm.  Minimal mitral  regurgitation. Severe mitral stenosis.  Aortic Valve/Aorta: Normal trileaflet aortic valve. No  aortic valve regurgitation seen.  Simple atheroma noted in aortic arch/descending aorta.  Left Atrium: Normal left atrium.  Left Ventricle: Normal left ventricular systolic function.  No segmental wall motion abnormalities. Normal left  ventricular internal dimensions and wall thicknesses.  Unable to determine diastolic function due to mitral valve  prosthesis.  Right Heart: Normal right atrium. Normal right ventricular  size and function. Normal tricuspid valve. Minimal  tricuspid regurgitation. Normal pulmonic valve. Minimal  pulmonic regurgitation.  Pericardium/Pleura: Normal pericardium with no pericardial  effusion.  ------------------------------------------------------------------------  Conclusions:  1. Bioprosthetic mitral valve replacement. The valve is  well seated.  The prosthetic valve leaflets are thickened.  From the surgeons view the leaflet at the leaflet from the  3 o'clock to the 6 o'clock and the leaftet from the 6  o'clock to the 9 o'clock position are fixed and immobile.  The remaining leaflet is thickend with reduced mobility.  There is no significant pannus.  3D plainemtry of the  mitral valve area is 0.9 sqcm.  Minimal mitral  regurgitation. Severe mitral stenosis.  2. Normal trileaflet aortic valve. No aortic valve  regurgitation seen.  3. Simple atheroma noted in aortic arch/descending aorta.  4. No left atrium or left atrial appendage thrombus.  Decreased left atrial appendage velocities noted.  5. Normal left ventricular systolic function. No segmental  wallmotion abnormalities.  6. Normal right ventricular size and function.  *** Compared with echocardiogram of 1/25/2023, no  significant changes noted.  Results communicated to Dr. Butler and Dr. Perez.  ------------------------------------------------------------------------  Confirmed on  1/26/2023 - 12:57:21 by Eugenio Bautista M.D.  ------------------------------------------------------------------------    < end of copied text >  Cardiac Cath:  < from: Cardiac Catheterization (01.30.23 @ 14:59) >  Coronary Angiography   The coronary circulation is right dominant. Cardiac catheterization  was performed electively.    LM   Left main artery: Angiography shows no disease.      LAD   Left anterior descending artery: Angiography shows minor  irregularities.    CX   Proximal circumflex: Angiography shows moderate atherosclerosis. iFR  0.99 . There is a 50 % stenosis.    RCA   Right coronary artery: Angiography shows no disease.      < end of copied text >    Chest XR: L sided opacification CCU Accept Note    Transfer from: (x ) Medicine    Accepting physician: Dr. Field    HPI:  77F PMH HTN, Afib with RVR, hypothyroidism, OA, DVT started on Xarelto 6 weeks ago, presents with palpitations. Patient reports that she began developing palpitations yesterday, which she believed would resolve alone, however symptoms got worse. She hit her head when climbing on a ladder, and then went to the hospital. Patient reports persistent cough, which has been ongoing since October. Reports weight loss of about 10 lbs, which has been intentional due to diet changes.     ED course: Tachycardia with stable BP. CBC within normal limits and without leukocytosis. CMP unremarkable. EKG with atrial flutter. CXR with small right effusion. CT chest with small effusion. Patient received metoprolol tartrate 5 mg IV 2x after which she returned to regular rate, and patient is now for admission to medicine service for further evaluation and treatment.     INTERVAL: Pt was found to be in A-flutter that broke spontaneously, found to have mitral valve stenosis of her previous bovine prosthetic MV (2012) and acute on chronic heart failure. Pt was then medically optimized for cardiac cath on 1/30 that revealed 50% prox  circ lesion. At that point pt was scheduled for OR Friday for placement of new prosthetic MV with TAVR approach.    SUBJECTIVE DATA: Unable t assess as pt is sedated    OBJECTIVE DATA:    Vital Signs Last 24 Hrs  T(C): 36.5 (03 Feb 2023 12:15), Max: 36.9 (02 Feb 2023 19:46)  T(F): 97.7 (03 Feb 2023 12:15), Max: 98.4 (02 Feb 2023 19:46)  HR: 75 (03 Feb 2023 13:30) (67 - 75)  BP: 146/70 (03 Feb 2023 12:15) (108/63 - 146/70)  BP(mean): 100 (03 Feb 2023 12:15) (94 - 100)  RR: 40 (03 Feb 2023 13:30) (16 - 51)  SpO2: 100% (03 Feb 2023 13:30) (92% - 100%)    Parameters below as of 03 Feb 2023 13:00  Patient On (Oxygen Delivery Method): ventilator    O2 Concentration (%): 50  I&O's Summary    02 Feb 2023 07:01  -  03 Feb 2023 07:00  --------------------------------------------------------  IN: 680 mL / OUT: 650 mL / NET: 30 mL    03 Feb 2023 07:01  -  03 Feb 2023 14:34  --------------------------------------------------------  IN: 0 mL / OUT: 400 mL / NET: -400 mL        Allergies:      MEDICATIONS  (STANDING):  chlorhexidine 4% Liquid 1 Application(s) Topical <User Schedule>    MEDICATIONS  (PRN):      LABS:  pending            EKG:  < from: 12 Lead ECG (02.02.23 @ 10:29) >  Ventricular Rate 77 BPM    Atrial Rate 77 BPM    P-R Interval 162 ms    QRS Duration 78 ms    Q-T Interval 432 ms    QTC Calculation(Bazett) 488 ms    P Axis 80 degrees    R Axis 53 degrees    T Axis 27 degrees    Diagnosis Line NORMAL SINUS RHYTHMWITH SINUS ARRHYTHMIA  NONSPECIFIC ST AND T WAVE ABNORMALITY  PROLONGED QT  ABNORMAL ECG  WHEN COMPARED WITH ECG OF 23-JAN-2023 20:50,  SIGNIFICANT CHANGES HAVE OCCURRED  Confirmed by Jon Mills MD ([id number]) on 2/3/2023 2:15:30 PM    < end of copied text >    Telemetry:  Echo:  < from: Transesophageal Echocardiogram w/o TTE (Transesophageal Echocardiogram) (01.26.23 @ 09:00) >    Patient name: SYEDA LOPEZ  YOB: 1945   Age: 77 (F)   MR#: [id number]  Study Date: 1/26/2023  [facility]: Lane Dewtit M.D.  Study quality: Technically good  Referring Physician: Elizabeth Montana MD  Blood Pressure: 150/82 mmHg  Height: 163 cm  Weight: 64 kg  BSA: 1.7 m2  Heart Rate: 65 mmHg  ------------------------------------------------------------------------  PROCEDURE: Transesophageal (LISA) was performed.  Informed  consent was first obtained [facility]. The patient was sedated  - see anesthesia record.  The procedure was monitored with  automatic blood pressure monitoring, ECG tracings and pulse  oximetry. The transesophageal probe was placed in the  esophagus posterior to the heart without complications.  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), Unspecified complication of cardiac and vascular  prosthetic device, implant and graft, initial encounter  (T82.9XXA), Stenosis of cardiac prosthetic devices,  implants andgrafts, initial encounter (T82.857A)  ------------------------------------------------------------------------  Dimensions:    Normal Values:  LA:            2.0 - 4.0 cm  Ao:            2.0 - 3.8 cm  SEPTUM:        0.6 - 1.2 cm  PWT:           0.6 -1.1 cm  LVIDd:         3.0 - 5.6 cm  LVIDs:         1.8 - 4.0 cm  EF (Visual Estimate): 55-60 %  ------------------------------------------------------------------------  Observations:  Mitral Valve: Bioprosthetic mitral valve replacement. The  valveis well seated.  The prosthetic valve leaflets are  thickened.  From the surgeons view the leaflet at the  leaflet from the 3 o'clock to the 6 o'clock and the leaftet  from the 6 o'clock to the 9 o'clock position are fixed and  immobile.  The remaining leaflet is thickend with reduced  mobility. There is no significant pannus.  3D plainemtry of  the mitral valve area is 0.9 sqcm.  Minimal mitral  regurgitation. Severe mitral stenosis.  Aortic Valve/Aorta: Normal trileaflet aortic valve. No  aortic valve regurgitation seen.  Simple atheroma noted in aortic arch/descending aorta.  Left Atrium: Normal left atrium.  Left Ventricle: Normal left ventricular systolic function.  No segmental wall motion abnormalities. Normal left  ventricular internal dimensions and wall thicknesses.  Unable to determine diastolic function due to mitral valve  prosthesis.  Right Heart: Normal right atrium. Normal right ventricular  size and function. Normal tricuspid valve. Minimal  tricuspid regurgitation. Normal pulmonic valve. Minimal  pulmonic regurgitation.  Pericardium/Pleura: Normal pericardium with no pericardial  effusion.  ------------------------------------------------------------------------  Conclusions:  1. Bioprosthetic mitral valve replacement. The valve is  well seated.  The prosthetic valve leaflets are thickened.  From the surgeons view the leaflet at the leaflet from the  3 o'clock to the 6 o'clock and the leaftet from the 6  o'clock to the 9 o'clock position are fixed and immobile.  The remaining leaflet is thickend with reduced mobility.  There is no significant pannus.  3D plainemtry of the  mitral valve area is 0.9 sqcm.  Minimal mitral  regurgitation. Severe mitral stenosis.  2. Normal trileaflet aortic valve. No aortic valve  regurgitation seen.  3. Simple atheroma noted in aortic arch/descending aorta.  4. No left atrium or left atrial appendage thrombus.  Decreased left atrial appendage velocities noted.  5. Normal left ventricular systolic function. No segmental  wallmotion abnormalities.  6. Normal right ventricular size and function.  *** Compared with echocardiogram of 1/25/2023, no  significant changes noted.  Results communicated to Dr. Butler and Dr. Perez.  ------------------------------------------------------------------------  Confirmed on  1/26/2023 - 12:57:21 by Eugenio Bautista M.D.  ------------------------------------------------------------------------    < end of copied text >  Cardiac Cath:  < from: Cardiac Catheterization (01.30.23 @ 14:59) >  Coronary Angiography   The coronary circulation is right dominant. Cardiac catheterization  was performed electively.    LM   Left main artery: Angiography shows no disease.      LAD   Left anterior descending artery: Angiography shows minor  irregularities.    CX   Proximal circumflex: Angiography shows moderate atherosclerosis. iFR  0.99 . There is a 50 % stenosis.    RCA   Right coronary artery: Angiography shows no disease.      < end of copied text >    Chest XR: L sided opacification
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
## 2023-02-03 NOTE — BRIEF OPERATIVE NOTE - NSICDXBRIEFPROCEDURE_GEN_ALL_CORE_FT
PROCEDURES:  Replacement, mitral valve, minimally invasive, with LISA 03-Feb-2023 11:06:37 Transcatheter approach Alpesh oHyt

## 2023-02-03 NOTE — PROCEDURE NOTE - NSUSCPTCODES_ED_ALL
57139 Echocardiography Transthoracic with Image 2D (Echo/FAST)
71322 US Chest (PTX, Pleural Effussion/CHF vs COPD)

## 2023-02-03 NOTE — PROGRESS NOTE ADULT - CRITICAL CARE ATTENDING COMMENT
History of MVR now with prosthetic valve mitral stenosis  Transferred to CICU post percutaneous mitral valve-in-valve complicated by respiratory failure requiring re-intubation  Sedated with Propofol - wean to extubate  Shock requiring Phenylephrine infusion, likely due to vasoplegia from sedation - wean as able  Structural Heart team is following - f/u recs  Acute hypoxic respiratory failure requiring mechanical ventilation, on minimal settings - wean to extubate  NPO  Normal renal function, dry on exam - target even  H/H acceptable - start ASA tonight, resume outpatient Xarelto tomorrow  Afebrile, no antibiotics  Sugars controlled  Natalie placed in OR

## 2023-02-03 NOTE — PROCEDURE NOTE - NSICDXPROCEDURE_GEN_ALL_CORE_FT
PROCEDURES:  Replacement, mitral valve, minimally invasive, with LISA 03-Feb-2023 11:06:37 Transcatheter approach Alpesh Hoyt

## 2023-02-03 NOTE — BRIEF OPERATIVE NOTE - COMMENTS
Patient initially extubated, but was not ventilating appropriately and using accessory muscles. Decision was made to reintubate patient with glidescope. Repeat TTE did not demonstrate any acute changes from intraop LISA. show

## 2023-02-04 DIAGNOSIS — I25.10 ATHEROSCLEROTIC HEART DISEASE OF NATIVE CORONARY ARTERY WITHOUT ANGINA PECTORIS: ICD-10-CM

## 2023-02-04 DIAGNOSIS — R06.03 ACUTE RESPIRATORY DISTRESS: ICD-10-CM

## 2023-02-04 DIAGNOSIS — Z95.2 PRESENCE OF PROSTHETIC HEART VALVE: ICD-10-CM

## 2023-02-04 LAB
ALBUMIN SERPL ELPH-MCNC: 3.2 G/DL — LOW (ref 3.3–5)
ALP SERPL-CCNC: 63 U/L — SIGNIFICANT CHANGE UP (ref 40–120)
ALT FLD-CCNC: 38 U/L — SIGNIFICANT CHANGE UP (ref 10–45)
ANION GAP SERPL CALC-SCNC: 8 MMOL/L — SIGNIFICANT CHANGE UP (ref 5–17)
AST SERPL-CCNC: 57 U/L — HIGH (ref 10–40)
BASOPHILS # BLD AUTO: 0.05 K/UL — SIGNIFICANT CHANGE UP (ref 0–0.2)
BASOPHILS NFR BLD AUTO: 0.7 % — SIGNIFICANT CHANGE UP (ref 0–2)
BILIRUB SERPL-MCNC: 0.4 MG/DL — SIGNIFICANT CHANGE UP (ref 0.2–1.2)
BUN SERPL-MCNC: 20 MG/DL — SIGNIFICANT CHANGE UP (ref 7–23)
CALCIUM SERPL-MCNC: 9.1 MG/DL — SIGNIFICANT CHANGE UP (ref 8.4–10.5)
CHLORIDE SERPL-SCNC: 103 MMOL/L — SIGNIFICANT CHANGE UP (ref 96–108)
CO2 SERPL-SCNC: 28 MMOL/L — SIGNIFICANT CHANGE UP (ref 22–31)
CREAT SERPL-MCNC: 1.01 MG/DL — SIGNIFICANT CHANGE UP (ref 0.5–1.3)
EGFR: 58 ML/MIN/1.73M2 — LOW
EOSINOPHIL # BLD AUTO: 0.27 K/UL — SIGNIFICANT CHANGE UP (ref 0–0.5)
EOSINOPHIL NFR BLD AUTO: 3.8 % — SIGNIFICANT CHANGE UP (ref 0–6)
GLUCOSE SERPL-MCNC: 98 MG/DL — SIGNIFICANT CHANGE UP (ref 70–99)
HCT VFR BLD CALC: 37.5 % — SIGNIFICANT CHANGE UP (ref 34.5–45)
HGB BLD-MCNC: 11.8 G/DL — SIGNIFICANT CHANGE UP (ref 11.5–15.5)
IMM GRANULOCYTES NFR BLD AUTO: 1 % — HIGH (ref 0–0.9)
LYMPHOCYTES # BLD AUTO: 0.76 K/UL — LOW (ref 1–3.3)
LYMPHOCYTES # BLD AUTO: 10.6 % — LOW (ref 13–44)
MAGNESIUM SERPL-MCNC: 2.2 MG/DL — SIGNIFICANT CHANGE UP (ref 1.6–2.6)
MCHC RBC-ENTMCNC: 30.2 PG — SIGNIFICANT CHANGE UP (ref 27–34)
MCHC RBC-ENTMCNC: 31.5 GM/DL — LOW (ref 32–36)
MCV RBC AUTO: 95.9 FL — SIGNIFICANT CHANGE UP (ref 80–100)
MONOCYTES # BLD AUTO: 0.68 K/UL — SIGNIFICANT CHANGE UP (ref 0–0.9)
MONOCYTES NFR BLD AUTO: 9.5 % — SIGNIFICANT CHANGE UP (ref 2–14)
NEUTROPHILS # BLD AUTO: 5.32 K/UL — SIGNIFICANT CHANGE UP (ref 1.8–7.4)
NEUTROPHILS NFR BLD AUTO: 74.4 % — SIGNIFICANT CHANGE UP (ref 43–77)
NRBC # BLD: 0 /100 WBCS — SIGNIFICANT CHANGE UP (ref 0–0)
PHOSPHATE SERPL-MCNC: 4.3 MG/DL — SIGNIFICANT CHANGE UP (ref 2.5–4.5)
PLATELET # BLD AUTO: 280 K/UL — SIGNIFICANT CHANGE UP (ref 150–400)
POTASSIUM SERPL-MCNC: 4.3 MMOL/L — SIGNIFICANT CHANGE UP (ref 3.5–5.3)
POTASSIUM SERPL-SCNC: 4.3 MMOL/L — SIGNIFICANT CHANGE UP (ref 3.5–5.3)
PROT SERPL-MCNC: 5.8 G/DL — LOW (ref 6–8.3)
RBC # BLD: 3.91 M/UL — SIGNIFICANT CHANGE UP (ref 3.8–5.2)
RBC # FLD: 13.2 % — SIGNIFICANT CHANGE UP (ref 10.3–14.5)
SODIUM SERPL-SCNC: 139 MMOL/L — SIGNIFICANT CHANGE UP (ref 135–145)
WBC # BLD: 7.15 K/UL — SIGNIFICANT CHANGE UP (ref 3.8–10.5)
WBC # FLD AUTO: 7.15 K/UL — SIGNIFICANT CHANGE UP (ref 3.8–10.5)

## 2023-02-04 PROCEDURE — ZZZZZ: CPT

## 2023-02-04 PROCEDURE — 93306 TTE W/DOPPLER COMPLETE: CPT | Mod: 26

## 2023-02-04 PROCEDURE — 71045 X-RAY EXAM CHEST 1 VIEW: CPT | Mod: 26

## 2023-02-04 PROCEDURE — 99231 SBSQ HOSP IP/OBS SF/LOW 25: CPT

## 2023-02-04 RX ORDER — BENZOCAINE AND MENTHOL 5; 1 G/100ML; G/100ML
1 LIQUID ORAL ONCE
Refills: 0 | Status: COMPLETED | OUTPATIENT
Start: 2023-02-04 | End: 2023-02-04

## 2023-02-04 RX ORDER — RIVAROXABAN 15 MG-20MG
20 KIT ORAL
Refills: 0 | Status: DISCONTINUED | OUTPATIENT
Start: 2023-02-04 | End: 2023-02-05

## 2023-02-04 RX ORDER — METOPROLOL TARTRATE 50 MG
50 TABLET ORAL DAILY
Refills: 0 | Status: DISCONTINUED | OUTPATIENT
Start: 2023-02-04 | End: 2023-02-05

## 2023-02-04 RX ORDER — ACETAMINOPHEN 500 MG
650 TABLET ORAL ONCE
Refills: 0 | Status: COMPLETED | OUTPATIENT
Start: 2023-02-04 | End: 2023-02-04

## 2023-02-04 RX ADMIN — Medication 50 MICROGRAM(S): at 06:32

## 2023-02-04 RX ADMIN — ATORVASTATIN CALCIUM 40 MILLIGRAM(S): 80 TABLET, FILM COATED ORAL at 21:43

## 2023-02-04 RX ADMIN — BENZOCAINE AND MENTHOL 1 LOZENGE: 5; 1 LIQUID ORAL at 21:45

## 2023-02-04 RX ADMIN — Medication 81 MILLIGRAM(S): at 12:26

## 2023-02-04 RX ADMIN — Medication 50 MILLIGRAM(S): at 06:32

## 2023-02-04 RX ADMIN — CHLORHEXIDINE GLUCONATE 1 APPLICATION(S): 213 SOLUTION TOPICAL at 12:26

## 2023-02-04 RX ADMIN — Medication 650 MILLIGRAM(S): at 18:25

## 2023-02-04 RX ADMIN — Medication 650 MILLIGRAM(S): at 17:55

## 2023-02-04 RX ADMIN — RIVAROXABAN 20 MILLIGRAM(S): KIT at 17:22

## 2023-02-04 RX ADMIN — Medication 1 SPRAY(S): at 21:45

## 2023-02-04 RX ADMIN — Medication 1 SPRAY(S): at 06:32

## 2023-02-04 NOTE — PROGRESS NOTE ADULT - PROBLEM SELECTOR PROBLEM 3
Hypothyroidism
Ear discomfort, left
Respiratory distress
Hypothyroidism
Ear discomfort, left
DVT, lower extremity

## 2023-02-04 NOTE — PROGRESS NOTE ADULT - PROBLEM SELECTOR PROBLEM 6
Need for prophylactic measure
Need for prophylactic measure
S/P transcatheter mitral valve replacement (TMVR)

## 2023-02-04 NOTE — PROGRESS NOTE ADULT - ASSESSMENT
77 year old female with a PMHx of HTN, Afib with RVR, hypothyroidism, OA, DVT started on Xarelto 6 weeks prior to admission, MV stenosis s/p prosthetic MV (2012) and , who presented with palpitations. Pt was found to have prosthetic MV stenosis and scheduled for TAVR and prosthetic MV placement. Additionally s/p cardiac cath on 1/30 that revealed 50% prox circ lesion.  Pt.  s/p mitral JUNIOR with Dr. Vasques on 2/3/23.  Size 29 Calix Mitral Valve Replacement using transcatheter femoral approach, bilateral femoral veins accessed. Patient initially extubated, but was not ventilating appropriately and using accessory muscles, patient reintubated with glidescope. Repeat TTE indicated no acute changes from intraop LISA. Transferred to CICU, extubated on 2/3 and placed on bipap 4pm.   77 year old female with a PMHx of HTN, Afib with RVR, hypothyroidism, OA, DVT started on Xarelto 6 weeks prior to admission, MV stenosis s/p prosthetic MV (2012) and , who presented with palpitations. Pt was found to have prosthetic MV stenosis and scheduled for TAVR and prosthetic MV placement. Additionally s/p cardiac cath on 1/30 that revealed 50% prox circ lesion.  Pt.  s/p mitral JUNIOR with Dr. Vasques on 2/3/23.  Size 29 Calix Mitral Valve Replacement using transcatheter femoral approach, bilateral femoral veins accessed. Patient initially extubated, but was not ventilating appropriately and using accessory muscles, patient reintubated with glidescope. Repeat TTE indicated no acute changes from intraop LISA. Transferred to CICU, extubated on 2/3 and placed on bipap 4pm. Right femoral suture removed,  4x4 dressing with pressure applied.  Plan for discharge tomorrow.    77 year old female with a PMHx of HTN, Afib with RVR, hypothyroidism, OA, DVT started on Xarelto 6 weeks prior to admission, MV stenosis s/p prosthetic MV (2012) and , who presented with palpitations. Pt was found to have prosthetic MV stenosis and scheduled for TAVR and prosthetic MV placement. Additionally s/p cardiac cath on 1/30 that revealed 50% prox circ lesion.  Pt.  s/p mitral JUNIOR with Dr. Vasques on 2/3/23.  Size 29 Calix Mitral Valve Replacement using transcatheter femoral approach, bilateral femoral veins accessed. Patient initially extubated, but was not ventilating appropriately and using accessory muscles, patient reintubated with glidescope. Repeat TTE indicated no acute changes from intraop LISA. Transferred to CICU, extubated on 2/3 and placed on bipap 4pm. Right femoral suture removed, 4x4 dressing with pressure applied.  Plan for discharge tomorrow.
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
## 2023-02-04 NOTE — PROGRESS NOTE ADULT - SUBJECTIVE AND OBJECTIVE BOX
VITAL SIGNS    Telemetry:    Overnight Events: no acute events    Vital Signs Last 24 Hrs  T(C): 36.6 (23 @ 04:32), Max: 36.7 (23 @ 20:00)  T(F): 97.9 (23 @ 04:32), Max: 98 (23 @ 20:00)  HR: 77 (23 @ 04:32) (67 - 81)  BP: 111/64 (23 @ 04:32) (96/50 - 146/70)  RR: 20 (23 @ 04:32) (16 - 51)  SpO2: 99% (23 @ 04:32) (95% - 100%)             @ 07:01  -   @ 07:00  --------------------------------------------------------  IN: 680 mL / OUT: 650 mL / NET: 30 mL     @ 07:01  -   @ 04:41  --------------------------------------------------------  IN: 240 mL / OUT: 600 mL / NET: -360 mL       Daily Height in cm: 163.83 (2023 09:01)    Daily Weight in k.4 (2023 05:13)  Admit Wt: Drug Dosing Weight  Height (cm): 163.8 (2023 09:01)  Weight (kg): 63.5 (2023 09:01)  BMI (kg/m2): 23.7 (2023 09:01)  BSA (m2): 1.69 (2023 09:01)    Bilirubin Total, Serum: 0.4 mg/dL ( 03:06)  Bilirubin Total, Serum: 0.6 mg/dL ( @ 15:00)    CAPILLARY BLOOD GLUCOSE              aspirin enteric coated 81 milliGRAM(s) Oral daily  atorvastatin 40 milliGRAM(s) Oral at bedtime  chlorhexidine 4% Liquid 1 Application(s) Topical <User Schedule>  fluticasone propionate 50 MICROgram(s)/spray Nasal Spray 1 Spray(s) Both Nostrils two times a day  levothyroxine 50 MICROGram(s) Oral daily  metoprolol succinate ER 50 milliGRAM(s) Oral daily  rivaroxaban 20 milliGRAM(s) Oral with dinner                            11.8   7.15  )-----------( 280      ( 2023 03:06 )             37.5     02-04    139  |  103  |  20  ----------------------------<  98  4.3   |  28  |  1.01    Ca    9.1      2023 03:06  Phos  4.3     02-04  Mg     2.2     02-04    TPro  5.8<L>  /  Alb  3.2<L>  /  TBili  0.4  /  DBili  x   /  AST  57<H>  /  ALT  38  /  AlkPhos  63  02-04    PT/INR - ( 2023 15:00 )   PT: 13.4 sec;   INR: 1.15 ratio         PTT - ( 2023 15:00 )  PTT:30.7 sec    PHYSICAL EXAM    Subjective: "Hi."   General: well appearing female, in no acute distress, laying in bed. POD #1  Neurology: alert and oriented x 3, nonfocal, no gross deficits  CV : S1/S2, no murmurs/rubs/gallops  Lungs: clear. RR easy, unlabored   Abdomen: soft, nontender, nondistended, positive bowel sounds, +bowel movement yest, Neg N/V/D   :  pt voiding without difficulty   Extremities: ABBOTT; trace edema, neg calf tenderness. PPP bilaterally, groins stable, R groin suture in place        Coumadin    [ ] YES          [x]      NO         Eliquis    [ ] YES          [x]      NO       Heparin gtt   [  ] YES              [x]   NO  Dose: Xarelto    Disposition:  Home [ x  ]  when stable   Rehab [   ]       OR Date:    Plan of care discussed with Cardiothoracic Team at AM rounds.

## 2023-02-04 NOTE — PROGRESS NOTE ADULT - NUTRITIONAL ASSESSMENT
This patient has been assessed with a concern for Malnutrition and has been determined to have a diagnosis/diagnoses of Moderate protein-calorie malnutrition.    This patient is being managed with:   Diet DASH/TLC-  Sodium & Cholesterol Restricted  Entered: Feb  3 2023  5:09PM    
This patient has been assessed with a concern for Malnutrition and has been determined to have a diagnosis/diagnoses of Moderate protein-calorie malnutrition.    This patient is being managed with:   Diet Regular-  Entered: Jan 24 2023 11:23AM    
This patient has been assessed with a concern for Malnutrition and has been determined to have a diagnosis/diagnoses of Moderate protein-calorie malnutrition.    This patient is being managed with:   Diet Regular-  Low Sodium  Entered: Feb 1 2023 11:46AM    
This patient has been assessed with a concern for Malnutrition and has been determined to have a diagnosis/diagnoses of Moderate protein-calorie malnutrition.    This patient is being managed with:   Diet NPO-  Entered: Feb  3 2023  1:56PM

## 2023-02-04 NOTE — PROGRESS NOTE ADULT - PROBLEM SELECTOR PLAN 3
Acute Hypoxemic Respiratory Failure s/p Mitral JUNIOR extubation  - reintubated after OR case for incr WOB, desaturation   - Extubated in CICU 2/3 4pm to BiPAP   - Now on 2L NC w/ appropriate spO2  - Encourage IS and ensure net negative

## 2023-02-04 NOTE — PROGRESS NOTE ADULT - PROBLEM SELECTOR PLAN 1
2/3 s/p mitral JUNIOR with Dr. Vasques. Size 29 Calix   TTE in AM, EKG  Monitor groins   R groin suture to be removed at 11am

## 2023-02-04 NOTE — PROGRESS NOTE ADULT - PROBLEM SELECTOR PLAN 6
s/p mitral JUNIOR with Dr. Vasques on 2/3/23.  Will D/C groin suture at 11:00 am today  Continue Lipitor 40mg daily  Repeat TTE today  Possible discharge to home tomorrow  Plan discussed with Dr. Vasques & CTS in am rounds. s/p mitral JUNIOR with Dr. Vasques on 2/3/23.  D/C groin suture at 11:00 am today  Continue ASA & Lipitor  TTE completed  Possible discharge to home tomorrow  Plan discussed with Dr. Vasques & CTS in am rounds.

## 2023-02-04 NOTE — PROGRESS NOTE ADULT - NSPROGADDITIONALINFOA_GEN_ALL_CORE
Itzel Rosario, AGACNP-BC  Cardiovascular & Thoracic Surgery  [address] [place] [address]  Office: [phone]    Available on Microsoft Teams  Spectra: [id number]  New Consults: [id number]
d/w Medicine ACP Ilda
Case discussed with [facility] ACP

## 2023-02-04 NOTE — PROGRESS NOTE ADULT - PROBLEM SELECTOR PLAN 4
Continue DASH diet  Continue Toprol for blood pressure & HR control
- Currently in SR  - c/w BB Lopressor 12.5 BID  - Resume Xarelto PM dose 2/4
- Continue with home Xarelto 20 mg PO Qd
- Continue with home Xarelto 20 mg PO Qd

## 2023-02-04 NOTE — PROGRESS NOTE ADULT - ASSESSMENT
77F PMHx HTN, Afib with RVR, hypothyroidism, OA, DVT started on Xarelto 6 weeks prior to admission, MV stenosis s/p prosthetic MV (2012) and , who presented with palpitations. Pt was found to have prosthetic MV stenosis and scheduled for TAVR and prosthetic MV placement. Additionally s/p cardiac cath on 1/30 that revealed 50% prox circ lesion.    2/3 s/p mitral JUNIOR with Dr. Vasques. Size 29 Calix Mitral Valve Replacement using transcatheter femoral approach, bilateral femoral veins accessed. Patient initially extubated, but was not ventilating appropriately and using accessory muscles. Decision was made to reintubate patient with glidescope. Repeat TTE did not demonstrate any acute changes from intraop LISA. Transferred to CICU, extubated and placed on bipap 4pm.
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
## 2023-02-05 ENCOUNTER — TRANSCRIPTION ENCOUNTER (OUTPATIENT)
Age: 78
End: 2023-02-05

## 2023-02-05 VITALS
HEART RATE: 78 BPM | SYSTOLIC BLOOD PRESSURE: 116 MMHG | RESPIRATION RATE: 18 BRPM | TEMPERATURE: 99 F | OXYGEN SATURATION: 95 % | DIASTOLIC BLOOD PRESSURE: 64 MMHG

## 2023-02-05 LAB
ALBUMIN SERPL ELPH-MCNC: 3.3 G/DL — SIGNIFICANT CHANGE UP (ref 3.3–5)
ALP SERPL-CCNC: 65 U/L — SIGNIFICANT CHANGE UP (ref 40–120)
ALT FLD-CCNC: 42 U/L — SIGNIFICANT CHANGE UP (ref 10–45)
ANION GAP SERPL CALC-SCNC: 9 MMOL/L — SIGNIFICANT CHANGE UP (ref 5–17)
AST SERPL-CCNC: 48 U/L — HIGH (ref 10–40)
BILIRUB SERPL-MCNC: 0.4 MG/DL — SIGNIFICANT CHANGE UP (ref 0.2–1.2)
BUN SERPL-MCNC: 17 MG/DL — SIGNIFICANT CHANGE UP (ref 7–23)
CALCIUM SERPL-MCNC: 9.2 MG/DL — SIGNIFICANT CHANGE UP (ref 8.4–10.5)
CHLORIDE SERPL-SCNC: 103 MMOL/L — SIGNIFICANT CHANGE UP (ref 96–108)
CO2 SERPL-SCNC: 28 MMOL/L — SIGNIFICANT CHANGE UP (ref 22–31)
CREAT SERPL-MCNC: 0.9 MG/DL — SIGNIFICANT CHANGE UP (ref 0.5–1.3)
EGFR: 66 ML/MIN/1.73M2 — SIGNIFICANT CHANGE UP
GLUCOSE SERPL-MCNC: 94 MG/DL — SIGNIFICANT CHANGE UP (ref 70–99)
HCT VFR BLD CALC: 38.1 % — SIGNIFICANT CHANGE UP (ref 34.5–45)
HGB BLD-MCNC: 11.9 G/DL — SIGNIFICANT CHANGE UP (ref 11.5–15.5)
MAGNESIUM SERPL-MCNC: 2.1 MG/DL — SIGNIFICANT CHANGE UP (ref 1.6–2.6)
MCHC RBC-ENTMCNC: 30.3 PG — SIGNIFICANT CHANGE UP (ref 27–34)
MCHC RBC-ENTMCNC: 31.2 GM/DL — LOW (ref 32–36)
MCV RBC AUTO: 96.9 FL — SIGNIFICANT CHANGE UP (ref 80–100)
NRBC # BLD: 0 /100 WBCS — SIGNIFICANT CHANGE UP (ref 0–0)
PHOSPHATE SERPL-MCNC: 2.6 MG/DL — SIGNIFICANT CHANGE UP (ref 2.5–4.5)
PLATELET # BLD AUTO: 284 K/UL — SIGNIFICANT CHANGE UP (ref 150–400)
POTASSIUM SERPL-MCNC: 3.9 MMOL/L — SIGNIFICANT CHANGE UP (ref 3.5–5.3)
POTASSIUM SERPL-SCNC: 3.9 MMOL/L — SIGNIFICANT CHANGE UP (ref 3.5–5.3)
PROT SERPL-MCNC: 5.9 G/DL — LOW (ref 6–8.3)
RBC # BLD: 3.93 M/UL — SIGNIFICANT CHANGE UP (ref 3.8–5.2)
RBC # FLD: 12.9 % — SIGNIFICANT CHANGE UP (ref 10.3–14.5)
SODIUM SERPL-SCNC: 140 MMOL/L — SIGNIFICANT CHANGE UP (ref 135–145)
WBC # BLD: 6.15 K/UL — SIGNIFICANT CHANGE UP (ref 3.8–10.5)
WBC # FLD AUTO: 6.15 K/UL — SIGNIFICANT CHANGE UP (ref 3.8–10.5)

## 2023-02-05 PROCEDURE — 71045 X-RAY EXAM CHEST 1 VIEW: CPT | Mod: 26

## 2023-02-05 PROCEDURE — 93306 TTE W/DOPPLER COMPLETE: CPT

## 2023-02-05 PROCEDURE — 93312 ECHO TRANSESOPHAGEAL: CPT

## 2023-02-05 PROCEDURE — 84132 ASSAY OF SERUM POTASSIUM: CPT

## 2023-02-05 PROCEDURE — 36415 COLL VENOUS BLD VENIPUNCTURE: CPT

## 2023-02-05 PROCEDURE — 85014 HEMATOCRIT: CPT

## 2023-02-05 PROCEDURE — 82947 ASSAY GLUCOSE BLOOD QUANT: CPT

## 2023-02-05 PROCEDURE — 93970 EXTREMITY STUDY: CPT

## 2023-02-05 PROCEDURE — C1894: CPT

## 2023-02-05 PROCEDURE — 80048 BASIC METABOLIC PNL TOTAL CA: CPT

## 2023-02-05 PROCEDURE — 0225U NFCT DS DNA&RNA 21 SARSCOV2: CPT

## 2023-02-05 PROCEDURE — C1889: CPT

## 2023-02-05 PROCEDURE — 86803 HEPATITIS C AB TEST: CPT

## 2023-02-05 PROCEDURE — 94660 CPAP INITIATION&MGMT: CPT

## 2023-02-05 PROCEDURE — 86901 BLOOD TYPING SEROLOGIC RH(D): CPT

## 2023-02-05 PROCEDURE — 71250 CT THORAX DX C-: CPT | Mod: MA

## 2023-02-05 PROCEDURE — 85025 COMPLETE CBC W/AUTO DIFF WBC: CPT

## 2023-02-05 PROCEDURE — C9399: CPT

## 2023-02-05 PROCEDURE — 85730 THROMBOPLASTIN TIME PARTIAL: CPT

## 2023-02-05 PROCEDURE — 86850 RBC ANTIBODY SCREEN: CPT

## 2023-02-05 PROCEDURE — 71045 X-RAY EXAM CHEST 1 VIEW: CPT

## 2023-02-05 PROCEDURE — 85018 HEMOGLOBIN: CPT

## 2023-02-05 PROCEDURE — C1769: CPT

## 2023-02-05 PROCEDURE — 84145 PROCALCITONIN (PCT): CPT

## 2023-02-05 PROCEDURE — 84100 ASSAY OF PHOSPHORUS: CPT

## 2023-02-05 PROCEDURE — 83880 ASSAY OF NATRIURETIC PEPTIDE: CPT

## 2023-02-05 PROCEDURE — 71046 X-RAY EXAM CHEST 2 VIEWS: CPT

## 2023-02-05 PROCEDURE — 99238 HOSP IP/OBS DSCHRG MGMT 30/<: CPT

## 2023-02-05 PROCEDURE — 81001 URINALYSIS AUTO W/SCOPE: CPT

## 2023-02-05 PROCEDURE — L8699: CPT

## 2023-02-05 PROCEDURE — 96374 THER/PROPH/DIAG INJ IV PUSH: CPT

## 2023-02-05 PROCEDURE — C1725: CPT

## 2023-02-05 PROCEDURE — 87640 STAPH A DNA AMP PROBE: CPT

## 2023-02-05 PROCEDURE — 93355 ECHO TRANSESOPHAGEAL (TEE): CPT

## 2023-02-05 PROCEDURE — 85520 HEPARIN ASSAY: CPT

## 2023-02-05 PROCEDURE — 80053 COMPREHEN METABOLIC PANEL: CPT

## 2023-02-05 PROCEDURE — 93325 DOPPLER ECHO COLOR FLOW MAPG: CPT

## 2023-02-05 PROCEDURE — 83036 HEMOGLOBIN GLYCOSYLATED A1C: CPT

## 2023-02-05 PROCEDURE — 82565 ASSAY OF CREATININE: CPT

## 2023-02-05 PROCEDURE — 82803 BLOOD GASES ANY COMBINATION: CPT

## 2023-02-05 PROCEDURE — 99285 EMERGENCY DEPT VISIT HI MDM: CPT | Mod: 25

## 2023-02-05 PROCEDURE — 93005 ELECTROCARDIOGRAM TRACING: CPT

## 2023-02-05 PROCEDURE — 93320 DOPPLER ECHO COMPLETE: CPT

## 2023-02-05 PROCEDURE — 84443 ASSAY THYROID STIM HORMONE: CPT

## 2023-02-05 PROCEDURE — C1887: CPT

## 2023-02-05 PROCEDURE — 96376 TX/PRO/DX INJ SAME DRUG ADON: CPT

## 2023-02-05 PROCEDURE — 70450 CT HEAD/BRAIN W/O DYE: CPT | Mod: MA

## 2023-02-05 PROCEDURE — 93454 CORONARY ARTERY ANGIO S&I: CPT

## 2023-02-05 PROCEDURE — 82435 ASSAY OF BLOOD CHLORIDE: CPT

## 2023-02-05 PROCEDURE — 84295 ASSAY OF SERUM SODIUM: CPT

## 2023-02-05 PROCEDURE — 94010 BREATHING CAPACITY TEST: CPT

## 2023-02-05 PROCEDURE — 83735 ASSAY OF MAGNESIUM: CPT

## 2023-02-05 PROCEDURE — 83605 ASSAY OF LACTIC ACID: CPT

## 2023-02-05 PROCEDURE — 93880 EXTRACRANIAL BILAT STUDY: CPT

## 2023-02-05 PROCEDURE — 82330 ASSAY OF CALCIUM: CPT

## 2023-02-05 PROCEDURE — 85610 PROTHROMBIN TIME: CPT

## 2023-02-05 PROCEDURE — 85027 COMPLETE CBC AUTOMATED: CPT

## 2023-02-05 PROCEDURE — U0005: CPT

## 2023-02-05 PROCEDURE — 86923 COMPATIBILITY TEST ELECTRIC: CPT

## 2023-02-05 PROCEDURE — C1766: CPT

## 2023-02-05 PROCEDURE — U0003: CPT

## 2023-02-05 PROCEDURE — 75572 CT HRT W/3D IMAGE: CPT

## 2023-02-05 PROCEDURE — 76000 FLUOROSCOPY <1 HR PHYS/QHP: CPT

## 2023-02-05 PROCEDURE — 94640 AIRWAY INHALATION TREATMENT: CPT

## 2023-02-05 PROCEDURE — 86900 BLOOD TYPING SEROLOGIC ABO: CPT

## 2023-02-05 PROCEDURE — 87641 MR-STAPH DNA AMP PROBE: CPT

## 2023-02-05 PROCEDURE — 93799 UNLISTED CV SVC/PROCEDURE: CPT | Mod: LC

## 2023-02-05 RX ORDER — RIVAROXABAN 15 MG-20MG
1 KIT ORAL
Qty: 0 | Refills: 0 | DISCHARGE
Start: 2023-02-05

## 2023-02-05 RX ORDER — METOPROLOL TARTRATE 50 MG
1 TABLET ORAL
Qty: 0 | Refills: 0 | DISCHARGE
Start: 2023-02-05

## 2023-02-05 RX ORDER — LEVOTHYROXINE SODIUM 125 MCG
0 TABLET ORAL
Qty: 0 | Refills: 0 | DISCHARGE

## 2023-02-05 RX ORDER — METOPROLOL TARTRATE 50 MG
1 TABLET ORAL
Qty: 0 | Refills: 0 | DISCHARGE

## 2023-02-05 RX ORDER — ASPIRIN/CALCIUM CARB/MAGNESIUM 324 MG
1 TABLET ORAL
Qty: 30 | Refills: 0
Start: 2023-02-05 | End: 2023-03-06

## 2023-02-05 RX ORDER — ATORVASTATIN CALCIUM 80 MG/1
1 TABLET, FILM COATED ORAL
Qty: 30 | Refills: 0
Start: 2023-02-05 | End: 2023-03-06

## 2023-02-05 RX ORDER — LEVOTHYROXINE SODIUM 125 MCG
1 TABLET ORAL
Qty: 0 | Refills: 0 | DISCHARGE
Start: 2023-02-05

## 2023-02-05 RX ORDER — ATORVASTATIN CALCIUM 80 MG/1
1 TABLET, FILM COATED ORAL
Qty: 0 | Refills: 0 | DISCHARGE

## 2023-02-05 RX ORDER — RIVAROXABAN 15 MG-20MG
1 KIT ORAL
Qty: 0 | Refills: 0 | DISCHARGE

## 2023-02-05 RX ADMIN — Medication 81 MILLIGRAM(S): at 12:24

## 2023-02-05 RX ADMIN — Medication 50 MICROGRAM(S): at 06:14

## 2023-02-05 RX ADMIN — Medication 1 SPRAY(S): at 06:15

## 2023-02-05 RX ADMIN — Medication 50 MILLIGRAM(S): at 06:14

## 2023-02-05 RX ADMIN — CHLORHEXIDINE GLUCONATE 1 APPLICATION(S): 213 SOLUTION TOPICAL at 12:25

## 2023-02-05 NOTE — DISCHARGE NOTE PROVIDER - NSDCCPCAREPLAN_GEN_ALL_CORE_FT
PRINCIPAL DISCHARGE DIAGNOSIS  Diagnosis: S/P transcatheter mitral valve replacement (TMVR)  Assessment and Plan of Treatment: shower daily  weigh yourself daily  continue current prescriptions as ordered  increase activity as tolerated   no added salt; low fat; low cholesterol, low salt diet.   follow up with Cardiologist in 1-2 weeks. Call to schedule appointment.  follow up with cardiac surgeon         PRINCIPAL DISCHARGE DIAGNOSIS  Diagnosis: S/P transcatheter mitral valve replacement (TMVR)  Assessment and Plan of Treatment: shower daily  weigh yourself daily  continue current prescriptions as ordered  increase activity as tolerated   no added salt; low fat; low cholesterol, low salt diet.   follow up with Cardiologist in 1-2 weeks. Call to schedule appointment.  follow up with cardiac surgeon, Dr. Vasques in 2-3 days; Call office monday 2/6 to schedule appointment. [phone]  follow up echocardiogram in 30 days at Dr. Vasques's office

## 2023-02-05 NOTE — DISCHARGE NOTE PROVIDER - HOSPITAL COURSE
77 year old female with a PMHx of HTN, Afib with RVR, hypothyroidism, OA, DVT started on Xarelto 6 weeks prior to admission, MV stenosis s/p prosthetic MV (2012) and , who presented with palpitations. Pt was found to have prosthetic MV stenosis and scheduled for TAVR and prosthetic MV placement. Additionally s/p cardiac cath on 1/30 that revealed 50% prox circ lesion.  Pt.  s/p mitral JUNIOR with Dr. Vasques on 2/3/23.  Size 29 Calix Mitral Valve Replacement using transcatheter femoral approach, bilateral femoral veins accessed. Patient initially extubated, but was not ventilating appropriately and using accessory muscles, patient reintubated with glidescope. Repeat TTE indicated no acute changes from intraop LISA. Transferred to CICU, extubated on 2/3 and placed on bipap 4pm. Right femoral suture removed, 4x4 dressing with pressure applied.  Plan for discharge tomorrow.      77 year old female with a PMHx of HTN, Afib with RVR, hypothyroidism, OA, DVT started on Xarelto 6 weeks prior to admission, MV stenosis s/p prosthetic MV (2012) and , who presented with palpitations. Pt was found to have prosthetic MV stenosis and scheduled for TAVR and prosthetic MV placement. Additionally s/p cardiac cath on 1/30 that revealed 50% prox circ lesion.  Pt.  s/p mitral JUNIOR with Dr. Vasques on 2/3/23.  Size 29 Calix Mitral Valve Replacement using transcatheter femoral approach, bilateral femoral veins accessed. Patient initially extubated, but was not ventilating appropriately and using accessory muscles, patient reintubated with glidescope. Repeat TTE indicated no acute changes from intraop LISA. Transferred to CICU, extubated on 2/3 and placed on bipap 4pm. Right femoral suture removed, 4x4 dressing with pressure applied.  Plan for discharge tomorrow.   2/5 VSS; rt groin stable neg hematoma/bleeding; RSR 60-80; discharge pt home today as per Dr. Vasques

## 2023-02-05 NOTE — PROGRESS NOTE ADULT - SUBJECTIVE AND OBJECTIVE BOX
DATE OF SERVICE: 02-05-23 @ 08:24    Patient is a 77y old  Female who presents with a chief complaint of Shortness of breath (04 Feb 2023 07:30)      INTERVAL HISTORY: Feels ok.     REVIEW OF SYSTEMS:  CONSTITUTIONAL: No weakness  EYES/ENT: No visual changes;  No throat pain   NECK: No pain or stiffness  RESPIRATORY: No cough, wheezing; No shortness of breath  CARDIOVASCULAR: No chest pain or palpitations  GASTROINTESTINAL: No abdominal  pain. No nausea, vomiting, or hematemesis  GENITOURINARY: No dysuria, frequency or hematuria  NEUROLOGICAL: No stroke like symptoms  SKIN: No rashes    TELEMETRY Personally reviewed: SR 60-90 PVC/PACs  	  MEDICATIONS:  metoprolol succinate ER 50 milliGRAM(s) Oral daily        PHYSICAL EXAM:  T(C): 36.6 (02-05-23 @ 04:26), Max: 36.9 (02-04-23 @ 12:42)  HR: 74 (02-05-23 @ 04:26) (71 - 74)  BP: 127/74 (02-05-23 @ 04:26) (94/52 - 127/74)  RR: 18 (02-05-23 @ 04:26) (18 - 18)  SpO2: 93% (02-05-23 @ 04:26) (93% - 94%)  Wt(kg): --  I&O's Summary    04 Feb 2023 07:01  -  05 Feb 2023 07:00  --------------------------------------------------------  IN: 900 mL / OUT: 575 mL / NET: 325 mL          Appearance: In no distress	  HEENT:    PERRL, EOMI	  Cardiovascular:  S1 S2, No JVD  Respiratory: Lungs clear to auscultation	  Gastrointestinal:  Soft, Non-tender, + BS	  Vascularature:  No edema of LE  Psychiatric: Appropriate affect   Neuro: no acute focal deficits                               11.9   6.15  )-----------( 284      ( 05 Feb 2023 06:13 )             38.1     02-05    140  |  103  |  17  ----------------------------<  94  3.9   |  28  |  0.90    Ca    9.2      05 Feb 2023 06:13  Phos  2.6     02-05  Mg     2.1     02-05    TPro  5.9<L>  /  Alb  3.3  /  TBili  0.4  /  DBili  x   /  AST  48<H>  /  ALT  42  /  AlkPhos  65  02-05        Labs personally reviewed      ASSESSMENT/PLAN: 	    77F PMH HTN, hypothyroidism, OA, DVT started on Xarelto 6 weeks ago, presents with palpitations. Patient reports that she began developing palpitations yesterday, which she believed would resolve alone, however symptoms got worse. She hit her head when climbing on a ladder, and then went to the hospital. Patient reports persistent cough, which has been ongoing since October. Reports weight loss of about 10 lbs, which has been intentional due to diet changes.     Problem/Plan -1  Problem: New Onset Atrial Flutter  - ECG reveals A-flutter  - Initially tachycardic on presentation but now rate controlled in SR  - Anticoagulated prior on Xarelto for DVT Dx ~ 6 weeks ago  - c/w Metoprolol and Xarelto (lifelong AC)  - Allscript records from Dr Cali Butler reviewed, past med history is notable for AF   - TSH wnl  - we discussed antiarrythmic strategies including ablation vs meds  - Consulted EP    - AF likely secondary to severe MS    Problem/Plan -2  Problem: Hypertension  - c/w Metoprolol 50mg PO daily    Problem/Plan -3  Problem: Hx of DVT  - Repeat US neg for DVT  - c/w Xarelto 20mg PO daily (indication is for AF)    Problem/Plan -4  Problem: Mitral Stenosis  - Hx of Bioprosthetic MVR  - s/p LISA reveals severe MS  - plan for mitral Parth  - s/p successful TMVR 2/3< from: Transthoracic Echocardiogram (02.04.23 @ 08:55) >  - TTE 2/4 shows normal left ventricular systolic function. No segmental wall motion abnormalities. Transcatheter mitral valve in previous bioprostehsis, with normal function.  - OP follow up with Dr. Vasques and Dr. Luke Poon, AG-NP   Jonathan Perez DO [facility]  Cardiovascular Medicine  [address], [address]  Available through call or text on [employer] TEAM[employer]  Office: [phone]

## 2023-02-05 NOTE — DISCHARGE NOTE NURSING/CASE MANAGEMENT/SOCIAL WORK - PATIENT PORTAL LINK FT
You can access the FollowMyHealth Patient Portal offered by [facility] by registering at the following website: [email or web address]. By joining [employer]’s FollowMyHealth portal, you will also be able to view your health information using other applications (apps) compatible with our system.

## 2023-02-05 NOTE — DISCHARGE NOTE PROVIDER - NSDCPNSUBOBJ_GEN_ALL_CORE
VSS  tele: RSR 60-80  neuro: alert and oriented-3; no focal neuro deficits noted  lungs clear; RR easy unlabored  + bs nt nd + bm; neg n/v/d  LE: neg calf tenderness, rt groin ecchymotic but soft; neg bleeding noted; neg LE edema ppp bilaterally    Discharge pt home today 2/5 as per Dr. Vasques

## 2023-02-05 NOTE — DISCHARGE NOTE PROVIDER - CARE PROVIDER_API CALL
Arsen Vasques)  Interventional Cardiology  [address] [place] [address]  Phone: [phone]  Fax: [phone]  Follow Up Time:

## 2023-02-05 NOTE — DISCHARGE NOTE PROVIDER - DETAILS OF MALNUTRITION DIAGNOSIS/DIAGNOSES
This patient has been assessed with a concern for Malnutrition and was treated during this hospitalization for the following Nutrition diagnosis/diagnoses:     -  02/01/2023: Moderate protein-calorie malnutrition

## 2023-02-05 NOTE — DISCHARGE NOTE PROVIDER - NSDCFUSCHEDAPPT_GEN_ALL_CORE_FT
Chad Chatterjee  [facility] Phys[facility]  OTOLARYNG [address]  Scheduled Appointment: 02/09/2023    Kiara Barnes  [facility]  PULMMED [address]  Scheduled Appointment: 03/10/2023    Justino Butler  [facility]  CARDIOLOGY [address]  Scheduled Appointment: 03/16/2023

## 2023-02-05 NOTE — DISCHARGE NOTE PROVIDER - NSDCMRMEDTOKEN_GEN_ALL_CORE_FT
aspirin 81 mg oral delayed release tablet: 2 tab(s) orally every 12 hours  atorvastatin 20 mg oral tablet: 1 tab(s) orally once a day  Hip Kit: Dx: s/p Right THR  levothyroxine 50 mcg (0.05 mg)/mL oral solution: orally once a day  metoprolol succinate 50 mg oral tablet, extended release: 1 tab(s) orally once a day  pantoprazole 40 mg oral delayed release tablet: 1 tab(s) orally once a day  Vitamin B12 1000 mcg/mL injectable solution: injectable every 2 months  Vitamin D3 5000 intl units oral capsule: 1 cap(s) orally once a day  Xarelto 20 mg oral tablet: 1 tab(s) orally once a day (in the evening)   aspirin 81 mg oral delayed release tablet: 1 tab(s) orally once a day  atorvastatin 40 mg oral tablet: 1 tab(s) orally once a day (at bedtime)  levothyroxine 50 mcg (0.05 mg) oral tablet: 1 tab(s) orally once a day  metoprolol succinate 50 mg oral tablet, extended release: 1 tab(s) orally once a day  pantoprazole 40 mg oral delayed release tablet: 1 tab(s) orally once a day  rivaroxaban 20 mg oral tablet: 1 tab(s) orally once a day (before a meal)  Vitamin B12 1000 mcg/mL injectable solution: injectable every 2 months  Vitamin D3 5000 intl units oral capsule: 1 cap(s) orally once a day

## 2023-02-05 NOTE — PROGRESS NOTE ADULT - PROVIDER SPECIALTY LIST ADULT
Cardiology
CT Surgery
Cardiology
TIGRE
Cardiology
Electrophysiology
Structural Heart
TIGRE
CT Surgery
Internal Medicine
CT Surgery
Hospitalist
CT Surgery

## 2023-02-05 NOTE — DISCHARGE NOTE PROVIDER - NSDCFUADDINST_GEN_ALL_CORE_FT
call Dr. Vasques for fever > 101 or right groin bleeding  antibiotic prophylaxis prior to any invasive procedures including dental work  follow up echocardiogram in 30 days at Dr. Vasques's office

## 2023-02-05 NOTE — DISCHARGE NOTE NURSING/CASE MANAGEMENT/SOCIAL WORK - NSDCPEFALRISK_GEN_ALL_CORE
For information on Fall & Injury Prevention, visit: [email or web address] OR  [email or web address] OR  https://www.cdc.gov/steadi/patient.html

## 2023-02-06 ENCOUNTER — NON-APPOINTMENT (OUTPATIENT)
Age: 78
End: 2023-02-06

## 2023-02-07 ENCOUNTER — NON-APPOINTMENT (OUTPATIENT)
Age: 78
End: 2023-02-07

## 2023-02-07 RX ORDER — AMOXICILLIN AND CLAVULANATE POTASSIUM 875; 125 MG/1; MG/1
875-125 TABLET, COATED ORAL
Qty: 20 | Refills: 0 | Status: DISCONTINUED | COMMUNITY
Start: 2023-01-19 | End: 2023-02-07

## 2023-02-07 RX ORDER — METHYLPREDNISOLONE 4 MG/1
4 TABLET ORAL
Qty: 1 | Refills: 0 | Status: DISCONTINUED | COMMUNITY
Start: 2022-11-21 | End: 2023-02-07

## 2023-02-07 RX ORDER — LOSARTAN POTASSIUM 50 MG/1
50 TABLET, FILM COATED ORAL DAILY
Qty: 90 | Refills: 3 | Status: DISCONTINUED | COMMUNITY
Start: 2020-12-14 | End: 2023-02-07

## 2023-02-07 RX ORDER — ALBUTEROL SULFATE 90 UG/1
108 (90 BASE) INHALANT RESPIRATORY (INHALATION)
Qty: 7 | Refills: 0 | Status: DISCONTINUED | COMMUNITY
Start: 2022-10-22 | End: 2023-02-07

## 2023-02-07 RX ORDER — SENNOSIDES 8.6 MG
TABLET ORAL
Refills: 0 | Status: DISCONTINUED | COMMUNITY
End: 2023-02-07

## 2023-02-07 RX ORDER — BENZONATATE 100 MG/1
100 CAPSULE ORAL 3 TIMES DAILY
Qty: 21 | Refills: 0 | Status: DISCONTINUED | COMMUNITY
Start: 2022-11-09 | End: 2023-02-07

## 2023-02-07 RX ORDER — FUROSEMIDE 20 MG/1
20 TABLET ORAL DAILY
Qty: 30 | Refills: 0 | Status: DISCONTINUED | COMMUNITY
Start: 2022-11-18 | End: 2023-02-07

## 2023-02-07 RX ORDER — PREDNISONE 20 MG/1
20 TABLET ORAL
Qty: 10 | Refills: 0 | Status: DISCONTINUED | COMMUNITY
Start: 2022-10-22 | End: 2023-02-07

## 2023-02-07 RX ORDER — NEBULIZER ACCESSORIES
KIT MISCELLANEOUS
Qty: 1 | Refills: 0 | Status: DISCONTINUED | COMMUNITY
Start: 2022-12-15 | End: 2023-02-07

## 2023-02-07 RX ORDER — ALBUTEROL SULFATE 2.5 MG/3ML
(2.5 MG/3ML) SOLUTION RESPIRATORY (INHALATION)
Qty: 180 | Refills: 2 | Status: DISCONTINUED | COMMUNITY
Start: 2022-12-15 | End: 2023-02-07

## 2023-02-07 RX ORDER — RIVAROXABAN 15 MG/1
15 TABLET, FILM COATED ORAL
Qty: 42 | Refills: 0 | Status: DISCONTINUED | COMMUNITY
Start: 2022-12-19 | End: 2023-02-07

## 2023-02-07 RX ORDER — FLUTICASONE FUROATE, UMECLIDINIUM BROMIDE AND VILANTEROL TRIFENATATE 100; 62.5; 25 UG/1; UG/1; UG/1
100-62.5-25 POWDER RESPIRATORY (INHALATION)
Qty: 1 | Refills: 5 | Status: DISCONTINUED | COMMUNITY
Start: 2022-11-21 | End: 2023-02-07

## 2023-02-07 RX ORDER — FLUTICASONE PROPIONATE 50 UG/1
50 SPRAY, METERED NASAL DAILY
Qty: 1 | Refills: 1 | Status: DISCONTINUED | COMMUNITY
Start: 2022-11-09 | End: 2023-02-07

## 2023-02-07 RX ORDER — DOXYCYCLINE HYCLATE 100 MG/1
100 TABLET ORAL
Qty: 14 | Refills: 0 | Status: DISCONTINUED | COMMUNITY
Start: 2022-12-15 | End: 2023-02-07

## 2023-02-07 RX ORDER — PREDNISONE 10 MG/1
10 TABLET ORAL
Qty: 30 | Refills: 1 | Status: DISCONTINUED | COMMUNITY
Start: 2022-12-30 | End: 2023-02-07

## 2023-02-07 RX ORDER — DOXYCYCLINE HYCLATE 100 MG/1
100 CAPSULE ORAL
Qty: 10 | Refills: 0 | Status: DISCONTINUED | COMMUNITY
Start: 2022-10-22 | End: 2023-02-07

## 2023-02-07 RX ORDER — PANTOPRAZOLE 40 MG/1
40 TABLET, DELAYED RELEASE ORAL DAILY
Qty: 14 | Refills: 0 | Status: DISCONTINUED | COMMUNITY
Start: 2023-02-06 | End: 2023-02-07

## 2023-02-08 ENCOUNTER — INPATIENT (INPATIENT)
Facility: HOSPITAL | Age: 78
LOS: 2 days | Discharge: HOME HEALTH | DRG: 310 | End: 2023-02-11
Attending: THORACIC SURGERY (CARDIOTHORACIC VASCULAR SURGERY) | Admitting: THORACIC SURGERY (CARDIOTHORACIC VASCULAR SURGERY)
Payer: MEDICARE

## 2023-02-08 ENCOUNTER — APPOINTMENT (OUTPATIENT)
Dept: CARDIOTHORACIC SURGERY | Facility: CLINIC | Age: 78
End: 2023-02-08
Payer: MEDICARE

## 2023-02-08 ENCOUNTER — LABORATORY RESULT (OUTPATIENT)
Age: 78
End: 2023-02-08

## 2023-02-08 ENCOUNTER — NON-APPOINTMENT (OUTPATIENT)
Age: 78
End: 2023-02-08

## 2023-02-08 VITALS
BODY MASS INDEX: 23.9 KG/M2 | TEMPERATURE: 98.1 F | HEART RATE: 166 BPM | DIASTOLIC BLOOD PRESSURE: 67 MMHG | HEIGHT: 64 IN | WEIGHT: 140 LBS | OXYGEN SATURATION: 100 % | RESPIRATION RATE: 18 BRPM | SYSTOLIC BLOOD PRESSURE: 108 MMHG

## 2023-02-08 VITALS — DIASTOLIC BLOOD PRESSURE: 82 MMHG | SYSTOLIC BLOOD PRESSURE: 128 MMHG

## 2023-02-08 VITALS
HEART RATE: 90 BPM | RESPIRATION RATE: 18 BRPM | WEIGHT: 132.72 LBS | OXYGEN SATURATION: 98 % | SYSTOLIC BLOOD PRESSURE: 111 MMHG | TEMPERATURE: 98 F | DIASTOLIC BLOOD PRESSURE: 64 MMHG | HEIGHT: 64 IN

## 2023-02-08 DIAGNOSIS — I48.91 UNSPECIFIED ATRIAL FIBRILLATION: ICD-10-CM

## 2023-02-08 DIAGNOSIS — I05.9 RHEUMATIC MITRAL VALVE DISEASE, UNSPECIFIED: ICD-10-CM

## 2023-02-08 DIAGNOSIS — Z95.2 PRESENCE OF PROSTHETIC HEART VALVE: Chronic | ICD-10-CM

## 2023-02-08 DIAGNOSIS — Z98.89 OTHER SPECIFIED POSTPROCEDURAL STATES: Chronic | ICD-10-CM

## 2023-02-08 DIAGNOSIS — Z96.7 PRESENCE OF OTHER BONE AND TENDON IMPLANTS: Chronic | ICD-10-CM

## 2023-02-08 DIAGNOSIS — Z96.659 PRESENCE OF UNSPECIFIED ARTIFICIAL KNEE JOINT: Chronic | ICD-10-CM

## 2023-02-08 LAB
ALBUMIN SERPL ELPH-MCNC: 4 G/DL — SIGNIFICANT CHANGE UP (ref 3.3–5)
ALP SERPL-CCNC: 80 U/L — SIGNIFICANT CHANGE UP (ref 40–120)
ALT FLD-CCNC: 25 U/L — SIGNIFICANT CHANGE UP (ref 10–45)
ANION GAP SERPL CALC-SCNC: 14 MMOL/L — SIGNIFICANT CHANGE UP (ref 5–17)
APTT BLD: 33.2 SEC — SIGNIFICANT CHANGE UP (ref 27.5–35.5)
AST SERPL-CCNC: 29 U/L — SIGNIFICANT CHANGE UP (ref 10–40)
BASOPHILS # BLD AUTO: 0.08 K/UL — SIGNIFICANT CHANGE UP (ref 0–0.2)
BASOPHILS NFR BLD AUTO: 0.9 % — SIGNIFICANT CHANGE UP (ref 0–2)
BILIRUB SERPL-MCNC: 0.5 MG/DL — SIGNIFICANT CHANGE UP (ref 0.2–1.2)
BLD GP AB SCN SERPL QL: NEGATIVE — SIGNIFICANT CHANGE UP
BUN SERPL-MCNC: 25 MG/DL — HIGH (ref 7–23)
CALCIUM SERPL-MCNC: 10.1 MG/DL — SIGNIFICANT CHANGE UP (ref 8.4–10.5)
CHLORIDE SERPL-SCNC: 102 MMOL/L — SIGNIFICANT CHANGE UP (ref 96–108)
CO2 SERPL-SCNC: 24 MMOL/L — SIGNIFICANT CHANGE UP (ref 22–31)
CREAT SERPL-MCNC: 0.98 MG/DL — SIGNIFICANT CHANGE UP (ref 0.5–1.3)
EGFR: 60 ML/MIN/1.73M2 — SIGNIFICANT CHANGE UP
EOSINOPHIL # BLD AUTO: 0.07 K/UL — SIGNIFICANT CHANGE UP (ref 0–0.5)
EOSINOPHIL NFR BLD AUTO: 0.7 % — SIGNIFICANT CHANGE UP (ref 0–6)
GLUCOSE SERPL-MCNC: 115 MG/DL — HIGH (ref 70–99)
HCT VFR BLD CALC: 42.3 % — SIGNIFICANT CHANGE UP (ref 34.5–45)
HGB BLD-MCNC: 13.8 G/DL — SIGNIFICANT CHANGE UP (ref 11.5–15.5)
IMM GRANULOCYTES NFR BLD AUTO: 0.4 % — SIGNIFICANT CHANGE UP (ref 0–0.9)
INR BLD: 1.7 RATIO — HIGH (ref 0.88–1.16)
LYMPHOCYTES # BLD AUTO: 0.93 K/UL — LOW (ref 1–3.3)
LYMPHOCYTES # BLD AUTO: 9.9 % — LOW (ref 13–44)
MAGNESIUM SERPL-MCNC: 1.9 MG/DL — SIGNIFICANT CHANGE UP (ref 1.6–2.6)
MCHC RBC-ENTMCNC: 30.6 PG — SIGNIFICANT CHANGE UP (ref 27–34)
MCHC RBC-ENTMCNC: 32.6 GM/DL — SIGNIFICANT CHANGE UP (ref 32–36)
MCV RBC AUTO: 93.8 FL — SIGNIFICANT CHANGE UP (ref 80–100)
MONOCYTES # BLD AUTO: 0.65 K/UL — SIGNIFICANT CHANGE UP (ref 0–0.9)
MONOCYTES NFR BLD AUTO: 6.9 % — SIGNIFICANT CHANGE UP (ref 2–14)
NEUTROPHILS # BLD AUTO: 7.62 K/UL — HIGH (ref 1.8–7.4)
NEUTROPHILS NFR BLD AUTO: 81.2 % — HIGH (ref 43–77)
NRBC # BLD: 0 /100 WBCS — SIGNIFICANT CHANGE UP (ref 0–0)
NT-PROBNP SERPL-SCNC: 7263 PG/ML — HIGH (ref 0–300)
PHOSPHATE SERPL-MCNC: 3.9 MG/DL — SIGNIFICANT CHANGE UP (ref 2.5–4.5)
PLATELET # BLD AUTO: 368 K/UL — SIGNIFICANT CHANGE UP (ref 150–400)
POTASSIUM SERPL-MCNC: 4.1 MMOL/L — SIGNIFICANT CHANGE UP (ref 3.5–5.3)
POTASSIUM SERPL-SCNC: 4.1 MMOL/L — SIGNIFICANT CHANGE UP (ref 3.5–5.3)
PROT SERPL-MCNC: 6.9 G/DL — SIGNIFICANT CHANGE UP (ref 6–8.3)
PROTHROM AB SERPL-ACNC: 19.6 SEC — HIGH (ref 10.5–13.4)
RBC # BLD: 4.51 M/UL — SIGNIFICANT CHANGE UP (ref 3.8–5.2)
RBC # FLD: 13 % — SIGNIFICANT CHANGE UP (ref 10.3–14.5)
RH IG SCN BLD-IMP: POSITIVE — SIGNIFICANT CHANGE UP
SODIUM SERPL-SCNC: 140 MMOL/L — SIGNIFICANT CHANGE UP (ref 135–145)
T3 SERPL-MCNC: 113 NG/DL — SIGNIFICANT CHANGE UP (ref 80–200)
T4 AB SER-ACNC: 12.6 UG/DL — HIGH (ref 4.6–12)
TSH SERPL-MCNC: 0.75 UIU/ML — SIGNIFICANT CHANGE UP (ref 0.27–4.2)
WBC # BLD: 9.39 K/UL — SIGNIFICANT CHANGE UP (ref 3.8–10.5)
WBC # FLD AUTO: 9.39 K/UL — SIGNIFICANT CHANGE UP (ref 3.8–10.5)

## 2023-02-08 PROCEDURE — 93010 ELECTROCARDIOGRAM REPORT: CPT

## 2023-02-08 PROCEDURE — ZZZZZ: CPT

## 2023-02-08 PROCEDURE — 71045 X-RAY EXAM CHEST 1 VIEW: CPT | Mod: 26

## 2023-02-08 PROCEDURE — 99221 1ST HOSP IP/OBS SF/LOW 40: CPT

## 2023-02-08 RX ORDER — METOPROLOL TARTRATE 50 MG
2.5 TABLET ORAL ONCE
Refills: 0 | Status: COMPLETED | OUTPATIENT
Start: 2023-02-08 | End: 2023-02-08

## 2023-02-08 RX ORDER — LEVOTHYROXINE SODIUM 125 MCG
50 TABLET ORAL DAILY
Refills: 0 | Status: DISCONTINUED | OUTPATIENT
Start: 2023-02-09 | End: 2023-02-11

## 2023-02-08 RX ORDER — AMIODARONE HYDROCHLORIDE 400 MG/1
400 TABLET ORAL EVERY 8 HOURS
Refills: 0 | Status: DISCONTINUED | OUTPATIENT
Start: 2023-02-08 | End: 2023-02-11

## 2023-02-08 RX ORDER — ATORVASTATIN CALCIUM 80 MG/1
40 TABLET, FILM COATED ORAL AT BEDTIME
Refills: 0 | Status: DISCONTINUED | OUTPATIENT
Start: 2023-02-08 | End: 2023-02-11

## 2023-02-08 RX ORDER — MAGNESIUM SULFATE 500 MG/ML
2 VIAL (ML) INJECTION ONCE
Refills: 0 | Status: COMPLETED | OUTPATIENT
Start: 2023-02-08 | End: 2023-02-08

## 2023-02-08 RX ORDER — AMIODARONE HYDROCHLORIDE 400 MG/1
200 TABLET ORAL DAILY
Refills: 0 | Status: DISCONTINUED | OUTPATIENT
Start: 2023-02-12 | End: 2023-02-11

## 2023-02-08 RX ORDER — RIVAROXABAN 15 MG-20MG
20 KIT ORAL
Refills: 0 | Status: DISCONTINUED | OUTPATIENT
Start: 2023-02-08 | End: 2023-02-11

## 2023-02-08 RX ORDER — PANTOPRAZOLE SODIUM 20 MG/1
40 TABLET, DELAYED RELEASE ORAL
Refills: 0 | Status: DISCONTINUED | OUTPATIENT
Start: 2023-02-09 | End: 2023-02-11

## 2023-02-08 RX ORDER — FLUTICASONE PROPIONATE 50 MCG
1 SPRAY, SUSPENSION NASAL
Refills: 0 | Status: DISCONTINUED | OUTPATIENT
Start: 2023-02-08 | End: 2023-02-11

## 2023-02-08 RX ORDER — AMIODARONE HYDROCHLORIDE 400 MG/1
TABLET ORAL
Refills: 0 | Status: DISCONTINUED | OUTPATIENT
Start: 2023-02-08 | End: 2023-02-11

## 2023-02-08 RX ORDER — METOPROLOL TARTRATE 50 MG
25 TABLET ORAL EVERY 8 HOURS
Refills: 0 | Status: DISCONTINUED | OUTPATIENT
Start: 2023-02-08 | End: 2023-02-11

## 2023-02-08 RX ORDER — AMIODARONE HYDROCHLORIDE 400 MG/1
400 TABLET ORAL ONCE
Refills: 0 | Status: COMPLETED | OUTPATIENT
Start: 2023-02-08 | End: 2023-02-08

## 2023-02-08 RX ADMIN — AMIODARONE HYDROCHLORIDE 400 MILLIGRAM(S): 400 TABLET ORAL at 15:46

## 2023-02-08 RX ADMIN — AMIODARONE HYDROCHLORIDE 400 MILLIGRAM(S): 400 TABLET ORAL at 21:12

## 2023-02-08 RX ADMIN — ATORVASTATIN CALCIUM 40 MILLIGRAM(S): 80 TABLET, FILM COATED ORAL at 21:02

## 2023-02-08 RX ADMIN — Medication 2.5 MILLIGRAM(S): at 17:06

## 2023-02-08 RX ADMIN — Medication 1 SPRAY(S): at 21:02

## 2023-02-08 RX ADMIN — RIVAROXABAN 20 MILLIGRAM(S): KIT at 21:02

## 2023-02-08 RX ADMIN — Medication 25 GRAM(S): at 18:36

## 2023-02-08 RX ADMIN — Medication 25 MILLIGRAM(S): at 21:12

## 2023-02-08 NOTE — H&P ADULT - NSHPLABSRESULTS_GEN_ALL_CORE
Prothrombin Time and INR, Plasma in AM (02.08.23 @ 15:39)    Prothrombin Time, Plasma: 19.6 sec    INR: 1.70:     CBC Full  -  ( 08 Feb 2023 15:39 )  WBC Count : 9.39 K/uL  RBC Count : 4.51 M/uL  Hemoglobin : 13.8 g/dL  Hematocrit : 42.3 %  Platelet Count - Automated : 368 K/uL  Mean Cell Volume : 93.8 fl  Mean Cell Hemoglobin : 30.6 pg  Mean Cell Hemoglobin Concentration : 32.6 gm/dL  Auto Neutrophil # : 7.62 K/uL  Auto Lymphocyte # : 0.93 K/uL  Auto Monocyte # : 0.65 K/uL  Auto Eosinophil # : 0.07 K/uL  Auto Basophil # : 0.08 K/uL  Auto Neutrophil % : 81.2 %  Auto Lymphocyte % : 9.9 %  Auto Monocyte % : 6.9 %  Auto Eosinophil % : 0.7 %  Auto Basophil % : 0.9 %      Comprehensive Metabolic Panel (02.08.23 @ 15:39)    Sodium, Serum: 140 mmol/L    Potassium, Serum: 4.1 mmol/L    Chloride, Serum: 102 mmol/L    Carbon Dioxide, Serum: 24 mmol/L    Anion Gap, Serum: 14 mmol/L    Blood Urea Nitrogen, Serum: 25 mg/dL    Creatinine, Serum: 0.98 mg/dL    Glucose, Serum: 115 mg/dL    Calcium, Total Serum: 10.1 mg/dL    Protein Total, Serum: 6.9 g/dL    Albumin, Serum: 4.0 g/dL    Bilirubin Total, Serum: 0.5 mg/dL    Alkaline Phosphatase, Serum: 80 U/L    Aspartate Aminotransferase (AST/SGOT): 29 U/L    Alanine Aminotransferase (ALT/SGPT): 25 U/L    eGFR: 60:     Serum Pro-Brain Natriuretic Peptide (02.08.23 @ 15:39)    Serum Pro-Brain Natriuretic Peptide: 7263 pg/mL

## 2023-02-08 NOTE — ASSESSMENT
[FreeTextEntry1] : Pt with symptomatic rapid afib with rapid RVR .  SOB and not feeling well.  AMio 400mg PO given at 13:20 with no response.  Admit to inpatient for management and EP consult. \par \par Plan:\par \par - Admit to inpatient

## 2023-02-08 NOTE — H&P ADULT - PROBLEM SELECTOR PLAN 1
TAVR in mitral position admitted 2/8 for rapid afib    -EPS consulted.  Amio load started.  NPO after midnight for cardioversion in AM  --Continue Xarelto 20mg HS.  Must remain on Xarelto uninterrupted post DCCV  -Has active Type and Screen  -Results of negative Covid PCR from today placed in Lab section of chart    -Toprol 50mg QD changed to Lopressor 25mg Q8 will titrate dosing as BP/HR allow

## 2023-02-08 NOTE — PATIENT PROFILE ADULT - FALL HARM RISK - HARM RISK INTERVENTIONS
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
## 2023-02-08 NOTE — PHYSICAL EXAM
[] : no respiratory distress [Respiration, Rhythm And Depth] : normal respiratory rhythm and effort [Exaggerated Use Of Accessory Muscles For Inspiration] : no accessory muscle use [Auscultation Breath Sounds / Voice Sounds] : lungs were clear to auscultation bilaterally [Apical Impulse] : the apical impulse was normal [Heart Rate And Rhythm] : heart rate was normal and rhythm regular [Heart Sounds] : normal S1 and S2 [Murmurs] : no murmurs [No Edema] : no edema

## 2023-02-08 NOTE — CONSULT NOTE ADULT - ASSESSMENT
76 y/o F with a PMHx of HTN, pAF with RVR, hypothyroidism, OA, recent LE DVT on Xarelto, s/p MVR 2012 w/ Dr. Wheeler, w/ recent admission for AFL w/ RVR s/p transcatheter mitral JUNIOR w/ Dr. 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 She presented to the CTS office today from home c/o palpitations and found to be in AF w/ -160's for which EP was consulted.    1) bioprosthetic MV stenosis s/p transcatheter mitral JUNIOR   2) Atrial fibrillation w/ RVR    NPO s/p mn for DCCV, no LISA given patient has been compliant w/ A/C   Started on Amiodarone by CTS -- agree. D/w patient re: black box warning, she will need monitoring of TFT's, LFT's, yearly ophthalmologic evals  Continue BB, uptitrate for rate control   Continue Xarelto, d/w team and w/ patient that following DCCV she cannot have interruptions in her AC for 30 days   Continue tele monitoring   Replete lytes for K>4 and Mg>2

## 2023-02-08 NOTE — H&P ADULT - ASSESSMENT
Patient is a 77F PMHx of HTN, Afib with RVR, hypothyroidism, OA, DVT started on Xarelto 6 weeks prior to admission, MV stenosis s/p prosthetic MV (2012) s/p mitral JUNIOR Size 29 Calix Mitral Valve Replacement using transcatheter femoral approach, bilateral femoral veins accessed with Dr. Vasques on 2/3/23 c/b reintubation due to hypoventilation.   Patient was extubated to BiPap on 2/3 was discharged home on 2/5/23. She presented to CTS office today from home c/o palpitations/fluttering for the last several days on and off with improvement/worsening of symptoms and while in office was found to be in afib with RVR up to 160s.  Received amio 400mg PO x 1 in office and admitted inpatient to tele floor bed on 2[facility]. Denies fevers/chills.  No headache / dizziness. No chest pain or shortness of breath.  No abdominal pain/nausea/vomiting.     Patient now admitted to [facility] tele bed under Dr. Belcher's service for further management of rapid atrial fibrillation.  Case discussed with Dr. Vasques     Plan/Recs:  EPS consulted.  Amio load started.  NPO after midnight for DCCV in AM.    Will continue Xarelto 20mg, needs to be uninterrupted for cardioversion  On Toprol 50mg at home.  Will transition to 25mg PO Q8 and titrate as HR/BP tolerated  Will keep K+ > 4 ,  Mag > 2

## 2023-02-08 NOTE — REASON FOR VISIT
[de-identified] : 77 year old female with a PMHx of HTN, Afib with RVR, hypothyroidism, OA, DVT started on Xarelto 6 weeks prior to admission, MV stenosis s/p prosthetic MV (2012) and , who presented with palpitations. Pt was found to have prosthetic MV stenosis and scheduled for TAVR and prosthetic MV placement. Additionally s/p cardiac cath on 1/30 that revealed 50% prox circ lesion.  Pt.  s/p mitral JUNIOR with Dr. Vasques on 2/3/23.  Size 29 Calix Mitral Valve Replacement using transcatheter femoral approach, bilateral femoral veins accessed. Patient initially extubated, but was not ventilating appropriately and using accessory muscles, patient reintubated with glidescope. Repeat TTE indicated no acute changes from intraop LISA. Transferred to CICU, extubated on 2/3 and placed on bipap 4pm. Right femoral suture removed, 4x4 dressing with pressure applied.  Plan for discharge tomorrow.  2/5 VSS; rt groin stable neg hematoma/bleeding; RSR 60-80; discharge pt home today as per Dr. Vasques \par \par Presents today and reports she has felt SOB and palpitations since she was DC home.  She reports she has had a cough for months but feels more SOB over the past couple days.  She also feels as if her HR is beating very fast for the past couple days.  Denies CP, dizziness, swelling, syncope or weight gain.  No fever or chills.  \par \par aspirin 81 mg oral delayed release tablet: 1 tab(s) orally once a day\par atorvastatin 40 mg oral tablet: 1 tab(s) orally once a day (at bedtime)\par levothyroxine 50 mcg (0.05 mg) oral tablet: 1 tab(s) orally once a day\par metoprolol succinate 50 mg oral tablet, extended release: 1 tab(s) orally once a day\par pantoprazole 40 mg oral delayed release tablet: 1 tab(s) orally once a day\par rivaroxaban 20 mg oral tablet: 1 tab(s) orally once a day (before a meal)\par Vitamin B12 1000 mcg/mL injectable solution: injectable every 2 months\par Vitamin D3 5000 intl units oral capsule: 1 cap(s) orally once a day\par \par

## 2023-02-08 NOTE — H&P ADULT - NSHPPHYSICALEXAM_GEN_ALL_CORE
Tele:  Afib 120s-160s     General: NAD  HEENT:  NC/AT  Neuro: A&Ox4, gait steady, speech clear, no focal deficits noted  Respiratory: B/L bibasilar rales, no wheeze  Cardiovascular: Tachycardic, Irreg/irreg normal S1S2  GI: Abd soft, NT/ND, +BSx4Q  Peripheral Vascular: BL groin sites without bleeding/hematoma.  Some soft ecchymosis over right groin. Trace  B/L LE edema, 1+ peripheral pulses, no clubbing, cyanosis, varicosities/PVD noted  Musculoskeletal: B/L UE and LE 5/5 strength   Psychiatric: Normal mood, normal affect observed  Skin: Normal exam to inspection and palpation. no bleeding, no hematoma.

## 2023-02-08 NOTE — H&P ADULT - HISTORY OF PRESENT ILLNESS
Patient is a 77F PMHx of HTN, Afib with RVR, hypothyroidism, OA, DVT started on Xarelto 6 weeks prior to admission, MV stenosis s/p prosthetic MV (2012) s/p mitral JUNIOR Size 29 Calix Mitral Valve Replacement using transcatheter femoral approach, bilateral femoral veins accessed with Dr. Vasques on 2/3/23 c/b reintubation due to hypoventilation.   Patient was extubated to BiPap on 2/3 was discharged home on 2/5/23. She presented to CTS office today from home c/o palpitations/fluttering for the last several days on and off with improvement/worsening of symptoms and while in office was found to be in afib with RVR up to 160s.  Received amio 400mg PO x 1 in office and admitted inpatient to [facility] floor bed on 2co[facility]. Denies fevers/chills.  No headache / dizziness. No chest pain or shortness of breath.  No abdominal pain/nausea/vomiting.

## 2023-02-08 NOTE — H&P ADULT - NSHPREVIEWOFSYSTEMS_GEN_ALL_CORE
General:  no weakness, fatigue, fevers or chills  Skin: no itching, burning, rashes, or lesions   HEENT: no visual changes;  no headache, no vertigo, no recent colds, nasal stuffiness or sore throat   Neck: no pain, stiffness or swollen glands  Respiratory: +cough unproductive since October 2022,no sputum, wheezing, hemoptysis; no shortness of breath  Cardiovascular:+palpitations. No chest pain,   GI: no abdominal or epigastric pain. no heartburn, nausea, vomiting, or hematemesis; no diarrhea or constipation. no melena or hematochezia  : no dysuria, frequency or hematuria  Peripheral Vascular: no intermittent claudication, leg cramps, varicose veins, swelling or swelling with redness and tenderness  Musculoskeletal: Denies limitations of movement or paralysis,  Neuro: no changes in orientation, memory, insight or judgment, no changes in mood, attention or speech.  no dizziness, vertigo or fainting, numbness, tingling or weakness

## 2023-02-09 ENCOUNTER — APPOINTMENT (OUTPATIENT)
Dept: OTOLARYNGOLOGY | Facility: CLINIC | Age: 78
End: 2023-02-09

## 2023-02-09 ENCOUNTER — APPOINTMENT (OUTPATIENT)
Dept: CARE COORDINATION | Facility: HOME HEALTH | Age: 78
End: 2023-02-09

## 2023-02-09 LAB
ALBUMIN SERPL ELPH-MCNC: 3.6 G/DL — SIGNIFICANT CHANGE UP (ref 3.3–5)
ALP SERPL-CCNC: 70 U/L — SIGNIFICANT CHANGE UP (ref 40–120)
ALT FLD-CCNC: 21 U/L — SIGNIFICANT CHANGE UP (ref 10–45)
ANION GAP SERPL CALC-SCNC: 10 MMOL/L — SIGNIFICANT CHANGE UP (ref 5–17)
ANION GAP SERPL CALC-SCNC: 11 MMOL/L — SIGNIFICANT CHANGE UP (ref 5–17)
AST SERPL-CCNC: 28 U/L — SIGNIFICANT CHANGE UP (ref 10–40)
BILIRUB SERPL-MCNC: 0.6 MG/DL — SIGNIFICANT CHANGE UP (ref 0.2–1.2)
BUN SERPL-MCNC: 23 MG/DL — SIGNIFICANT CHANGE UP (ref 7–23)
BUN SERPL-MCNC: 24 MG/DL — HIGH (ref 7–23)
CALCIUM SERPL-MCNC: 9 MG/DL — SIGNIFICANT CHANGE UP (ref 8.4–10.5)
CALCIUM SERPL-MCNC: 9.3 MG/DL — SIGNIFICANT CHANGE UP (ref 8.4–10.5)
CHLORIDE SERPL-SCNC: 103 MMOL/L — SIGNIFICANT CHANGE UP (ref 96–108)
CHLORIDE SERPL-SCNC: 104 MMOL/L — SIGNIFICANT CHANGE UP (ref 96–108)
CO2 SERPL-SCNC: 26 MMOL/L — SIGNIFICANT CHANGE UP (ref 22–31)
CO2 SERPL-SCNC: 26 MMOL/L — SIGNIFICANT CHANGE UP (ref 22–31)
CREAT SERPL-MCNC: 0.97 MG/DL — SIGNIFICANT CHANGE UP (ref 0.5–1.3)
CREAT SERPL-MCNC: 1.04 MG/DL — SIGNIFICANT CHANGE UP (ref 0.5–1.3)
EGFR: 56 ML/MIN/1.73M2 — LOW
EGFR: 61 ML/MIN/1.73M2 — SIGNIFICANT CHANGE UP
GLUCOSE SERPL-MCNC: 113 MG/DL — HIGH (ref 70–99)
GLUCOSE SERPL-MCNC: 85 MG/DL — SIGNIFICANT CHANGE UP (ref 70–99)
HCT VFR BLD CALC: 36.8 % — SIGNIFICANT CHANGE UP (ref 34.5–45)
HGB BLD-MCNC: 11.8 G/DL — SIGNIFICANT CHANGE UP (ref 11.5–15.5)
MCHC RBC-ENTMCNC: 30.3 PG — SIGNIFICANT CHANGE UP (ref 27–34)
MCHC RBC-ENTMCNC: 32.1 GM/DL — SIGNIFICANT CHANGE UP (ref 32–36)
MCV RBC AUTO: 94.6 FL — SIGNIFICANT CHANGE UP (ref 80–100)
NRBC # BLD: 0 /100 WBCS — SIGNIFICANT CHANGE UP (ref 0–0)
PLATELET # BLD AUTO: 264 K/UL — SIGNIFICANT CHANGE UP (ref 150–400)
POTASSIUM SERPL-MCNC: 3.5 MMOL/L — SIGNIFICANT CHANGE UP (ref 3.5–5.3)
POTASSIUM SERPL-MCNC: 4.4 MMOL/L — SIGNIFICANT CHANGE UP (ref 3.5–5.3)
POTASSIUM SERPL-SCNC: 3.5 MMOL/L — SIGNIFICANT CHANGE UP (ref 3.5–5.3)
POTASSIUM SERPL-SCNC: 4.4 MMOL/L — SIGNIFICANT CHANGE UP (ref 3.5–5.3)
PROT SERPL-MCNC: 6.2 G/DL — SIGNIFICANT CHANGE UP (ref 6–8.3)
RBC # BLD: 3.89 M/UL — SIGNIFICANT CHANGE UP (ref 3.8–5.2)
RBC # FLD: 13.1 % — SIGNIFICANT CHANGE UP (ref 10.3–14.5)
SARS-COV-2 RNA SPEC QL NAA+PROBE: SIGNIFICANT CHANGE UP
SODIUM SERPL-SCNC: 140 MMOL/L — SIGNIFICANT CHANGE UP (ref 135–145)
SODIUM SERPL-SCNC: 140 MMOL/L — SIGNIFICANT CHANGE UP (ref 135–145)
WBC # BLD: 6.18 K/UL — SIGNIFICANT CHANGE UP (ref 3.8–10.5)
WBC # FLD AUTO: 6.18 K/UL — SIGNIFICANT CHANGE UP (ref 3.8–10.5)

## 2023-02-09 PROCEDURE — 99231 SBSQ HOSP IP/OBS SF/LOW 25: CPT | Mod: FS

## 2023-02-09 PROCEDURE — 92960 CARDIOVERSION ELECTRIC EXT: CPT

## 2023-02-09 PROCEDURE — 93010 ELECTROCARDIOGRAM REPORT: CPT | Mod: 76

## 2023-02-09 PROCEDURE — 99233 SBSQ HOSP IP/OBS HIGH 50: CPT

## 2023-02-09 RX ORDER — POTASSIUM CHLORIDE 20 MEQ
10 PACKET (EA) ORAL ONCE
Refills: 0 | Status: COMPLETED | OUTPATIENT
Start: 2023-02-09 | End: 2023-02-09

## 2023-02-09 RX ORDER — BENZOCAINE AND MENTHOL 5; 1 G/100ML; G/100ML
1 LIQUID ORAL ONCE
Refills: 0 | Status: COMPLETED | OUTPATIENT
Start: 2023-02-09 | End: 2023-02-09

## 2023-02-09 RX ORDER — POTASSIUM CHLORIDE 20 MEQ
10 PACKET (EA) ORAL ONCE
Refills: 0 | Status: DISCONTINUED | OUTPATIENT
Start: 2023-02-09 | End: 2023-02-09

## 2023-02-09 RX ADMIN — Medication 50 MICROGRAM(S): at 05:27

## 2023-02-09 RX ADMIN — Medication 1 SPRAY(S): at 21:58

## 2023-02-09 RX ADMIN — ATORVASTATIN CALCIUM 40 MILLIGRAM(S): 80 TABLET, FILM COATED ORAL at 21:58

## 2023-02-09 RX ADMIN — Medication 25 MILLIGRAM(S): at 21:58

## 2023-02-09 RX ADMIN — Medication 10 MILLIEQUIVALENT(S): at 08:34

## 2023-02-09 RX ADMIN — BENZOCAINE AND MENTHOL 1 LOZENGE: 5; 1 LIQUID ORAL at 23:46

## 2023-02-09 RX ADMIN — Medication 25 MILLIGRAM(S): at 05:28

## 2023-02-09 RX ADMIN — PANTOPRAZOLE SODIUM 40 MILLIGRAM(S): 20 TABLET, DELAYED RELEASE ORAL at 05:27

## 2023-02-09 RX ADMIN — AMIODARONE HYDROCHLORIDE 400 MILLIGRAM(S): 400 TABLET ORAL at 13:44

## 2023-02-09 RX ADMIN — AMIODARONE HYDROCHLORIDE 400 MILLIGRAM(S): 400 TABLET ORAL at 21:58

## 2023-02-09 RX ADMIN — RIVAROXABAN 20 MILLIGRAM(S): KIT at 18:36

## 2023-02-09 RX ADMIN — AMIODARONE HYDROCHLORIDE 400 MILLIGRAM(S): 400 TABLET ORAL at 05:27

## 2023-02-09 RX ADMIN — Medication 25 MILLIGRAM(S): at 13:45

## 2023-02-09 RX ADMIN — Medication 1 SPRAY(S): at 07:59

## 2023-02-09 NOTE — PROGRESS NOTE ADULT - ASSESSMENT
Patient is a 77F PMHx of HTN, Afib with RVR, hypothyroidism, OA, DVT started on Xarelto 6 weeks prior to admission, MV stenosis s/p prosthetic MV (2012) s/p mitral JUNIOR Size 29 Calix Mitral Valve Replacement using transcatheter femoral approach, bilateral femoral veins accessed with Dr. Vasques on 2/3/23 c/b reintubation due to hypoventilation.   Patient was extubated to BiPap on 2/3 was discharged home on 2/5/23. She presented to CTS office today from home c/o palpitations/fluttering for the last several days on and off with improvement/worsening of symptoms and while in office was found to be in afib with RVR up to 160s.  Received amio 400mg PO x 1 in office and admitted inpatient to tele floor bed on 2cohen. Denies fevers/chills.  No headache / dizziness. No chest pain or shortness of breath.  No abdominal pain/nausea/vomiting.   Patient now admitted to [facility] tele bed under Dr. Belcher's service for further management of rapid atrial fibrillation.  Case discussed with Dr. Vasques    This is a 77F PMHx of HTN, Afib with RVR, hypothyroidism, OA, DVT started on Xarelto 6 weeks prior to admission, MV stenosis s/p prosthetic MV (2012) s/p mitral JUNIOR Size 29 Calix Mitral Valve Replacement using transcatheter femoral approach, bilateral femoral veins accessed with Dr. Vasques on 2/3/23 c/b reintubation due to hypoventilation.   Patient was extubated to BiPap on 2/3 was discharged home on 2/5/23.       2/8 presented to CTS office today from home c/o palpitations/fluttering for the last several days on and off with improvement/worsening of symptoms and while in office was found to be in afib with RVR up to 160s.  Received amio 400mg PO x 1 in office and admitted inpatient to tele floor bed on 2cohen. Denies fevers/chills.  No headache / dizziness. No chest pain or shortness of breath.  No abdominal pain/nausea/vomiting.   Patient now admitted to [facility] tele bed under Dr. Belcher's service for further management of rapid atrial fibrillation.  Case discussed with Dr. Vasques   2/9 VSS NPO for Cardioversion today compliant with Xarelto

## 2023-02-09 NOTE — PROGRESS NOTE ADULT - ASSESSMENT
76 y/o F with a PMHx of HTN, pAF with RVR, hypothyroidism, OA, recent LE DVT on Xarelto, s/p MVR 2012 w/ Dr. Wheeler, w/ recent admission for AFL w/ RVR s/p transcatheter mitral JUNIOR w/ Dr. 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 She presented to the CTS office today from home c/o palpitations and found to be in AF w/ -160's for which EP was consulted.    1. 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 Atrial fibrillation w/ RVR    - AF 60-110s, was in NSR on 2/2/23 - on uninterrupted AC  - NPO for DCCV today  - Started on Amiodarone 400 mg Q8H by [facility], continue. Patient aware of black box warning, she will need monitoring of TFT's, LFT's, yearly ophthalmologic evals/PFT  - Continue Metoprolol, can uptitrate for rate control if needed  - Continue Xarelto 20 mg, she cannot have interruptions in her AC for 30 days   - Continue telemetry monitoring  - Consent obtained and placed in chart  - Maintain K >4 and Mg >2

## 2023-02-09 NOTE — PRE-ANESTHESIA EVALUATION ADULT - NSRADCARDRESULTSFT_GEN_ALL_CORE
< from: Transthoracic Echocardiogram (02.04.23 @ 08:55) >    Conclusions:  Normal left ventricular systolic function. No segmental  wall motion abnormalities.  Transcatheter mitral valve in previous bioprostehsis, with  normal function.  Trace pericardial effusion.    < end of copied text >

## 2023-02-10 LAB
ALBUMIN SERPL ELPH-MCNC: 3.2 G/DL — LOW (ref 3.3–5)
ALP SERPL-CCNC: 60 U/L — SIGNIFICANT CHANGE UP (ref 40–120)
ALT FLD-CCNC: 17 U/L — SIGNIFICANT CHANGE UP (ref 10–45)
ANION GAP SERPL CALC-SCNC: 11 MMOL/L — SIGNIFICANT CHANGE UP (ref 5–17)
AST SERPL-CCNC: 22 U/L — SIGNIFICANT CHANGE UP (ref 10–40)
BILIRUB SERPL-MCNC: 0.5 MG/DL — SIGNIFICANT CHANGE UP (ref 0.2–1.2)
BUN SERPL-MCNC: 22 MG/DL — SIGNIFICANT CHANGE UP (ref 7–23)
CALCIUM SERPL-MCNC: 9 MG/DL — SIGNIFICANT CHANGE UP (ref 8.4–10.5)
CHLORIDE SERPL-SCNC: 105 MMOL/L — SIGNIFICANT CHANGE UP (ref 96–108)
CO2 SERPL-SCNC: 25 MMOL/L — SIGNIFICANT CHANGE UP (ref 22–31)
CREAT SERPL-MCNC: 0.96 MG/DL — SIGNIFICANT CHANGE UP (ref 0.5–1.3)
EGFR: 61 ML/MIN/1.73M2 — SIGNIFICANT CHANGE UP
GLUCOSE SERPL-MCNC: 80 MG/DL — SIGNIFICANT CHANGE UP (ref 70–99)
HCT VFR BLD CALC: 36.1 % — SIGNIFICANT CHANGE UP (ref 34.5–45)
HGB BLD-MCNC: 11.5 G/DL — SIGNIFICANT CHANGE UP (ref 11.5–15.5)
MAGNESIUM SERPL-MCNC: 2.1 MG/DL — SIGNIFICANT CHANGE UP (ref 1.6–2.6)
MCHC RBC-ENTMCNC: 30.7 PG — SIGNIFICANT CHANGE UP (ref 27–34)
MCHC RBC-ENTMCNC: 31.9 GM/DL — LOW (ref 32–36)
MCV RBC AUTO: 96.5 FL — SIGNIFICANT CHANGE UP (ref 80–100)
NRBC # BLD: 0 /100 WBCS — SIGNIFICANT CHANGE UP (ref 0–0)
PHOSPHATE SERPL-MCNC: 3.2 MG/DL — SIGNIFICANT CHANGE UP (ref 2.5–4.5)
PLATELET # BLD AUTO: 289 K/UL — SIGNIFICANT CHANGE UP (ref 150–400)
POTASSIUM SERPL-MCNC: 4 MMOL/L — SIGNIFICANT CHANGE UP (ref 3.5–5.3)
POTASSIUM SERPL-SCNC: 4 MMOL/L — SIGNIFICANT CHANGE UP (ref 3.5–5.3)
PROT SERPL-MCNC: 5.5 G/DL — LOW (ref 6–8.3)
RBC # BLD: 3.74 M/UL — LOW (ref 3.8–5.2)
RBC # FLD: 13.2 % — SIGNIFICANT CHANGE UP (ref 10.3–14.5)
SODIUM SERPL-SCNC: 141 MMOL/L — SIGNIFICANT CHANGE UP (ref 135–145)
WBC # BLD: 7.07 K/UL — SIGNIFICANT CHANGE UP (ref 3.8–10.5)
WBC # FLD AUTO: 7.07 K/UL — SIGNIFICANT CHANGE UP (ref 3.8–10.5)

## 2023-02-10 PROCEDURE — 99231 SBSQ HOSP IP/OBS SF/LOW 25: CPT | Mod: FS

## 2023-02-10 PROCEDURE — 99233 SBSQ HOSP IP/OBS HIGH 50: CPT

## 2023-02-10 RX ADMIN — AMIODARONE HYDROCHLORIDE 400 MILLIGRAM(S): 400 TABLET ORAL at 13:32

## 2023-02-10 RX ADMIN — Medication 25 MILLIGRAM(S): at 07:46

## 2023-02-10 RX ADMIN — Medication 25 MILLIGRAM(S): at 13:32

## 2023-02-10 RX ADMIN — PANTOPRAZOLE SODIUM 40 MILLIGRAM(S): 20 TABLET, DELAYED RELEASE ORAL at 06:04

## 2023-02-10 RX ADMIN — AMIODARONE HYDROCHLORIDE 400 MILLIGRAM(S): 400 TABLET ORAL at 06:04

## 2023-02-10 RX ADMIN — Medication 1 SPRAY(S): at 07:46

## 2023-02-10 RX ADMIN — AMIODARONE HYDROCHLORIDE 400 MILLIGRAM(S): 400 TABLET ORAL at 22:17

## 2023-02-10 RX ADMIN — Medication 50 MICROGRAM(S): at 06:05

## 2023-02-10 RX ADMIN — Medication 1 SPRAY(S): at 21:33

## 2023-02-10 RX ADMIN — ATORVASTATIN CALCIUM 40 MILLIGRAM(S): 80 TABLET, FILM COATED ORAL at 21:32

## 2023-02-10 RX ADMIN — RIVAROXABAN 20 MILLIGRAM(S): KIT at 17:03

## 2023-02-10 NOTE — PROGRESS NOTE ADULT - ASSESSMENT
78 y/o F with a PMHx of HTN, pAF with RVR, hypothyroidism, OA, recent LE DVT on Xarelto, s/p MVR 2012 w/ Dr. Wheeler, w/ recent admission for AFL w/ RVR, s/p transcatheter mitral JUNIOR w/ Dr. 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 On day of presentation 2/8 presented to the CTS office from home c/o palpitations and found to be in AF w/ -160's for which EP was consulted. Now s/p DCCV on 2/9/23, remains in sinus rhythm.    1. 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 Atrial fibrillation w/ RVR    - at presentation AF 60-110s, was in NSR on 2/2/23 - on uninterrupted AC  - s/p DCCV 2/9/23, remains in sinus rhythm  - Continue Amiodarone 400 mg Q8H loading dose with transition to 200mg daily. Patient aware of black box warning, she will need monitoring of TFT's, LFT's, yearly ophthalmologic evals/PFT  - Continue Metoprolol, can uptitrate for rate control if needed  - Continue Xarelto 20 mg, she cannot have interruptions in her AC for 30 days   - Continue telemetry monitoring while admitted  - Maintain K >4 and Mg >2    Mukul Ro MD  Cardiology Fellow - PGY4    Please check [email or web address]: [email or web address] for cardiology service schedule and contact information.  78 y/o F with a PMHx of HTN, pAF with RVR, hypothyroidism, OA, recent LE DVT on Xarelto, s/p MVR 2012 w/ Dr. Wheeler, w/ recent admission for AFL w/ RVR, s/p transcatheter mitral JUNIOR w/ Dr. 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 On day of presentation 2/8 presented to the CTS office from home c/o palpitations and found to be in AF w/ -160's for which EP was consulted. Now s/p DCCV on 2/9/23, remains in sinus rhythm.    1. 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 Atrial fibrillation w/ RVR    - at presentation AF 60-110s, was in NSR on 2/2/23 - on uninterrupted AC  - s/p DCCV 2/9/23, remains in sinus rhythm  - Continue Amiodarone 400 mg Q8H loading dose with transition to 200mg daily. Patient aware of black box warning, she will need monitoring of TFT's, LFT's, yearly ophthalmologic evals/PFT  - Continue Metoprolol, can uptitrate as tolerated if needed  - Continue Xarelto 20 mg, she cannot have interruptions in her AC for 30 days   - Continue telemetry monitoring while admitted  - Maintain K >4 and Mg >2    Mukul Ro MD  Cardiology Fellow - PGY4    Please check [email or web address]: [email or web address]" for cardiology service schedule and contact information.  78 y/o F with a PMHx of HTN, pAF with RVR, hypothyroidism, OA, recent LE DVT on Xarelto, s/p MVR 2012 w/ Dr. Wheeler, w/ recent admission for AFL w/ RVR, s/p transcatheter mitral JUNIOR w/ Dr. 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 On day of presentation 2/8 presented to the CTS office from home c/o palpitations and found to be in AF w/ -160's for which EP was consulted. Now s/p DCCV on 2/9/23, remains in sinus rhythm.    1. 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 Atrial fibrillation w/ RVR    - at presentation AF 60-110s, was in NSR on 2/2/23 - on uninterrupted AC  - s/p DCCV 2/9/23, remains in sinus rhythm  - Continue Amiodarone 400 mg Q8H loading dose with transition to 200mg daily. Patient aware of black box warning, she will need monitoring of TFT's, LFT's, yearly ophthalmologic evals/PFT  - Continue Metoprolol, can uptitrate as tolerated if needed  - Continue Xarelto 20 mg, she cannot have interruptions in her AC for 30 days   - Continue telemetry monitoring while admitted  - Maintain K >4 and Mg >2    EP will sign off, please re-consult if needed.    Mukul Ro MD  Cardiology Fellow - PGY4    Please check [email or web address]: [email or web address]" for cardiology service schedule and contact information.

## 2023-02-10 NOTE — PROGRESS NOTE ADULT - ASSESSMENT
This is a 77F PMHx of HTN, Afib with RVR, hypothyroidism, OA, DVT started on Xarelto 6 weeks prior to admission, MV stenosis s/p prosthetic MV (2012) s/p mitral JUNIOR Size 29 Calix Mitral Valve Replacement using transcatheter femoral approach, bilateral femoral veins accessed with Dr. Vasques on 2/3/23 c/b reintubation due to hypoventilation.   Patient was extubated to BiPap on 2/3 was discharged home on 2/5/23.       2/8 presented to CTS office today from home c/o palpitations/fluttering for the last several days on and off with improvement/worsening of symptoms and while in office was found to be in afib with RVR up to 160s.  Received amio 400mg PO x 1 in office and admitted inpatient to tele floor bed on 2cohen. Denies fevers/chills.  No headache / dizziness. No chest pain or shortness of breath.  No abdominal pain/nausea/vomiting.   Patient now admitted to 2cohen tele bed under Dr. Belcher's service for further management of rapid atrial fibrillation.  Case discussed with Dr. Vasques   2/9 VSS NPO for Cardioversion today compliant with Xarelto   2/10 VSS in NSR s/p cardioversion- amiodarone load dose  4/12  titate BB as tolerated likely home Sunday Monday

## 2023-02-11 ENCOUNTER — TRANSCRIPTION ENCOUNTER (OUTPATIENT)
Age: 78
End: 2023-02-11

## 2023-02-11 VITALS
SYSTOLIC BLOOD PRESSURE: 130 MMHG | TEMPERATURE: 98 F | HEART RATE: 66 BPM | DIASTOLIC BLOOD PRESSURE: 77 MMHG | OXYGEN SATURATION: 94 % | RESPIRATION RATE: 18 BRPM

## 2023-02-11 LAB
HCT VFR BLD CALC: 38.4 % — SIGNIFICANT CHANGE UP (ref 34.5–45)
HGB BLD-MCNC: 12.3 G/DL — SIGNIFICANT CHANGE UP (ref 11.5–15.5)
MCHC RBC-ENTMCNC: 30.5 PG — SIGNIFICANT CHANGE UP (ref 27–34)
MCHC RBC-ENTMCNC: 32 GM/DL — SIGNIFICANT CHANGE UP (ref 32–36)
MCV RBC AUTO: 95.3 FL — SIGNIFICANT CHANGE UP (ref 80–100)
NRBC # BLD: 0 /100 WBCS — SIGNIFICANT CHANGE UP (ref 0–0)
PLATELET # BLD AUTO: 264 K/UL — SIGNIFICANT CHANGE UP (ref 150–400)
RBC # BLD: 4.03 M/UL — SIGNIFICANT CHANGE UP (ref 3.8–5.2)
RBC # FLD: 13.2 % — SIGNIFICANT CHANGE UP (ref 10.3–14.5)
WBC # BLD: 6.62 K/UL — SIGNIFICANT CHANGE UP (ref 3.8–10.5)
WBC # FLD AUTO: 6.62 K/UL — SIGNIFICANT CHANGE UP (ref 3.8–10.5)

## 2023-02-11 PROCEDURE — 71045 X-RAY EXAM CHEST 1 VIEW: CPT

## 2023-02-11 PROCEDURE — 85730 THROMBOPLASTIN TIME PARTIAL: CPT

## 2023-02-11 PROCEDURE — 80048 BASIC METABOLIC PNL TOTAL CA: CPT

## 2023-02-11 PROCEDURE — 83735 ASSAY OF MAGNESIUM: CPT

## 2023-02-11 PROCEDURE — 83880 ASSAY OF NATRIURETIC PEPTIDE: CPT

## 2023-02-11 PROCEDURE — 84436 ASSAY OF TOTAL THYROXINE: CPT

## 2023-02-11 PROCEDURE — 86900 BLOOD TYPING SEROLOGIC ABO: CPT

## 2023-02-11 PROCEDURE — 80053 COMPREHEN METABOLIC PANEL: CPT

## 2023-02-11 PROCEDURE — 86850 RBC ANTIBODY SCREEN: CPT

## 2023-02-11 PROCEDURE — 94640 AIRWAY INHALATION TREATMENT: CPT

## 2023-02-11 PROCEDURE — 84443 ASSAY THYROID STIM HORMONE: CPT

## 2023-02-11 PROCEDURE — 84480 ASSAY TRIIODOTHYRONINE (T3): CPT

## 2023-02-11 PROCEDURE — 99239 HOSP IP/OBS DSCHRG MGMT >30: CPT

## 2023-02-11 PROCEDURE — U0005: CPT

## 2023-02-11 PROCEDURE — 92960 CARDIOVERSION ELECTRIC EXT: CPT

## 2023-02-11 PROCEDURE — 36415 COLL VENOUS BLD VENIPUNCTURE: CPT

## 2023-02-11 PROCEDURE — U0003: CPT

## 2023-02-11 PROCEDURE — 85610 PROTHROMBIN TIME: CPT

## 2023-02-11 PROCEDURE — 86901 BLOOD TYPING SEROLOGIC RH(D): CPT

## 2023-02-11 PROCEDURE — 84100 ASSAY OF PHOSPHORUS: CPT

## 2023-02-11 PROCEDURE — 93005 ELECTROCARDIOGRAM TRACING: CPT

## 2023-02-11 PROCEDURE — 85025 COMPLETE CBC W/AUTO DIFF WBC: CPT

## 2023-02-11 PROCEDURE — 85027 COMPLETE CBC AUTOMATED: CPT

## 2023-02-11 RX ORDER — FLUTICASONE PROPIONATE 50 MCG
1 SPRAY, SUSPENSION NASAL
Qty: 1 | Refills: 0
Start: 2023-02-11 | End: 2023-03-12

## 2023-02-11 RX ORDER — AMIODARONE HYDROCHLORIDE 400 MG/1
1 TABLET ORAL
Qty: 30 | Refills: 0
Start: 2023-02-11 | End: 2023-03-12

## 2023-02-11 RX ORDER — METOPROLOL TARTRATE 50 MG
1 TABLET ORAL
Qty: 60 | Refills: 0
Start: 2023-02-11 | End: 2023-03-12

## 2023-02-11 RX ADMIN — AMIODARONE HYDROCHLORIDE 400 MILLIGRAM(S): 400 TABLET ORAL at 05:59

## 2023-02-11 RX ADMIN — PANTOPRAZOLE SODIUM 40 MILLIGRAM(S): 20 TABLET, DELAYED RELEASE ORAL at 05:59

## 2023-02-11 RX ADMIN — Medication 25 MILLIGRAM(S): at 05:59

## 2023-02-11 RX ADMIN — Medication 1 SPRAY(S): at 08:03

## 2023-02-11 RX ADMIN — Medication 50 MICROGRAM(S): at 05:59

## 2023-02-11 NOTE — DISCHARGE NOTE PROVIDER - NSDCMRMEDTOKEN_GEN_ALL_CORE_FT
amiodarone 200 mg oral tablet: 1 tab(s) orally once a day   aspirin 81 mg oral delayed release tablet: 1 tab(s) orally once a day  atorvastatin 40 mg oral tablet: 1 tab(s) orally once a day (at bedtime)  fluticasone 50 mcg/inh nasal spray: 1 spray(s) nasal once a day   levothyroxine 50 mcg (0.05 mg) oral tablet: 1 tab(s) orally once a day  metoprolol tartrate 25 mg oral tablet: 1 tab(s) orally 2 times a day   pantoprazole 40 mg oral delayed release tablet: 1 tab(s) orally once a day  rivaroxaban 20 mg oral tablet: 1 tab(s) orally once a day (before a meal)  Vitamin B12 1000 mcg/mL injectable solution: injectable every 2 months  Vitamin D3 5000 intl units oral capsule: 1 cap(s) orally once a day

## 2023-02-11 NOTE — DISCHARGE NOTE NURSING/CASE MANAGEMENT/SOCIAL WORK - PATIENT PORTAL LINK FT
You can access the FollowMyHealth Patient Portal offered by [facility] by registering at the following website: [email or web address]. By joining [employer]’s FollowMyHealth portal, you will also be able to view your health information using other applications (apps) compatible with our system.

## 2023-02-11 NOTE — DISCHARGE NOTE PROVIDER - HOSPITAL COURSE
This is a 77F PMHx of HTN, Afib with RVR, hypothyroidism, OA, DVT started on Xarelto 6 weeks prior to admission, MV stenosis s/p prosthetic MV (2012) s/p mitral JUNIOR Size 29 Calix Mitral Valve Replacement using transcatheter femoral approach, bilateral femoral veins accessed with Dr. Vasques on 2/3/23 c/b reintubation due to hypoventilation.   Patient was extubated to BiPap on 2/3 was discharged home on 2/5/23.       2/8 presented to CTS office today from home c/o palpitations/fluttering for the last several days on and off with improvement/worsening of symptoms and while in office was found to be in afib with RVR up to 160s.  Received amio 400mg PO x 1 in office and admitted inpatient to tele floor bed on 2cohen. Denies fevers/chills.  No headache / dizziness. No chest pain or shortness of breath.  No abdominal pain/nausea/vomiting.   Patient now admitted to 2cohen tele bed under Dr. Belcher's service for further management of rapid atrial fibrillation.  Case discussed with Dr. Vasques   2/9 VSS NPO for Cardioversion today compliant with Xarelto   2/10 VSS in NSR s/p cardioversion- amiodarone load dose  4/12  titate BB as tolerated likely home Sunday Monday 2/11: No active issues overnight. Remains SR on Amio and lopressor and tolerating well. Medically cleared for discharge
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
## 2023-02-11 NOTE — DISCHARGE NOTE PROVIDER - NSDCFUSCHEDAPPT_GEN_ALL_CORE_FT
Kiara Barnes  [facility] Physician [facility]  PULMMED [address]  Scheduled Appointment: 03/10/2023    Justino Butler  [facility] Physician [facility]  CARDIOLOGY [phone]  Scheduled Appointment: 03/16/2023

## 2023-02-11 NOTE — PROGRESS NOTE ADULT - ASSESSMENT
This is a 77F PMHx of HTN, Afib with RVR, hypothyroidism, OA, DVT started on Xarelto 6 weeks prior to admission, MV stenosis s/p prosthetic MV (2012) s/p mitral JUNIOR Size 29 Calix Mitral Valve Replacement using transcatheter femoral approach, bilateral femoral veins accessed with Dr. Vasques on 2/3/23 c/b reintubation due to hypoventilation.   Patient was extubated to BiPap on 2/3 was discharged home on 2/5/23.       2/8 presented to CTS office today from home c/o palpitations/fluttering for the last several days on and off with improvement/worsening of symptoms and while in office was found to be in afib with RVR up to 160s.  Received amio 400mg PO x 1 in office and admitted inpatient to tele floor bed on 2co[facility]. Denies fevers/chills.  No headache / dizziness. No chest pain or shortness of breath.  No abdominal pain/nausea/vomiting.   Patient now admitted to 2[facility] tele bed under Dr. Belcher's service for further management of rapid atrial fibrillation.  Case discussed with Dr. Vasques   2/9 VSS NPO for Cardioversion today compliant with Xarelto   2/10 VSS in NSR s/p cardioversion- amiodarone load dose  4/12  titate BB as tolerated likely home Sunday Monday     This is a 77F PMHx of HTN, Afib with RVR, hypothyroidism, OA, DVT started on Xarelto 6 weeks prior to admission, MV stenosis s/p prosthetic MV (2012) s/p mitral JUNIOR Size 29 Calix Mitral Valve Replacement using transcatheter femoral approach, bilateral femoral veins accessed with Dr. Vasques on 2/3/23 c/b reintubation due to hypoventilation.   Patient was extubated to BiPap on 2/3 was discharged home on 2/5/23.       2/8 presented to CTS office today from home c/o palpitations/fluttering for the last several days on and off with improvement/worsening of symptoms and while in office was found to be in afib with RVR up to 160s.  Received amio 400mg PO x 1 in office and admitted inpatient to tele floor bed on 2co[facility]. Denies fevers/chills.  No headache / dizziness. No chest pain or shortness of breath.  No abdominal pain/nausea/vomiting.   Patient now admitted to 2[facility] tele bed under Dr. Belcher's service for further management of rapid atrial fibrillation.  Case discussed with Dr. Vasques   2/9 VSS NPO for Cardioversion today compliant with Xarelto   2/10 VSS in NSR s/p cardioversion- amiodarone load dose  4/12  titate BB as tolerated likely home Sunday Monday 2/11: No active issues overnight. Remains SR on Amio and lopressor and tolerating well

## 2023-02-11 NOTE — DISCHARGE NOTE PROVIDER - CARE PROVIDER_API CALL
David Belcher)  Thoracic and Cardiac Surgery  [address] [place] [address]  Phone: [phone]  Fax: [phone]  Follow Up Time:

## 2023-02-11 NOTE — PROGRESS NOTE ADULT - PROBLEM SELECTOR PLAN 1
s/p Mitral humaira readmitted for AFIB  2/9 Cardioversion  with Dr Brooks  Continue Xarelto 20 mg, she cannot have interruptions in her AC for 30 days   increase BB   Amiodarone load  started 2/8- dose   Telemetry - NSR  Plan d/w Dr Vasques/ Dr Belcher  Likely home sunday /monday  - Continue Amiodarone 400 mg Q8H loading dose with transition to 200mg daily. Patient aware of black box warning, she will need monitoring of TFT's, LFT's, yearly ophthalmologic evals/PFT
s/p Mitral humaira readmitted for AFIB  2/9 Cardioversion  with Dr Brooks  Continue Xarelto 20 mg, she cannot have interruptions in her AC for 30 days   increase BB   Amiodarone load  started 2/8- dose   Telemetry - NSR  Plan d/w Dr Vasques/ Dr Belcher  Likely home sunday /monday  - Continue Amiodarone 400 mg Q8H loading dose with transition to 200mg daily. Patient aware of black box warning, she will need monitoring of TFT's, LFT's, yearly ophthalmologic evals/PFT
s/p Mitral humaira readmitted for AFIB   NPO   Cardioversion today with EP  ON Xarelto   Amiodarone load  started 2/8  Telemetry  Plan d/w Dr Vasques

## 2023-02-11 NOTE — DISCHARGE NOTE PROVIDER - NSDCCPCAREPLAN_GEN_ALL_CORE_FT
PRINCIPAL DISCHARGE DIAGNOSIS  Diagnosis: Rapid atrial fibrillation  Assessment and Plan of Treatment: Please call your cardiolkogist on Monday and make an appointment to see Your cardiologist for ongoing management and treatment of your your afib.  Please continue to keep your old appointment with Dr. Belcher for follow up.  Please follows the Do's and Dont's of cardiac surgery.       PRINCIPAL DISCHARGE DIAGNOSIS  Diagnosis: Rapid atrial fibrillation  Assessment and Plan of Treatment: Please call your cardiolkogist on Monday and make an appointment to see Your cardiologist for ongoing management and treatment of your your afib And monitoring of your LFTs TFTs while on amiodarone. In addition yearly opthalmic routine checks.  Please continue to keep your old appointment with Dr. Belcher for follow up.  Please follows the Do's and Dont's of cardiac surgery.

## 2023-02-12 ENCOUNTER — TRANSCRIPTION ENCOUNTER (OUTPATIENT)
Age: 78
End: 2023-02-12

## 2023-02-13 ENCOUNTER — NON-APPOINTMENT (OUTPATIENT)
Age: 78
End: 2023-02-13

## 2023-02-13 VITALS — HEIGHT: 64 IN | WEIGHT: 132.2 LBS | BODY MASS INDEX: 22.57 KG/M2

## 2023-02-14 ENCOUNTER — APPOINTMENT (OUTPATIENT)
Dept: CARE COORDINATION | Facility: HOME HEALTH | Age: 78
End: 2023-02-14

## 2023-02-14 ENCOUNTER — NON-APPOINTMENT (OUTPATIENT)
Age: 78
End: 2023-02-14

## 2023-02-15 ENCOUNTER — NON-APPOINTMENT (OUTPATIENT)
Age: 78
End: 2023-02-15

## 2023-02-15 ENCOUNTER — APPOINTMENT (OUTPATIENT)
Dept: OTOLARYNGOLOGY | Facility: CLINIC | Age: 78
End: 2023-02-15
Payer: MEDICARE

## 2023-02-15 VITALS
BODY MASS INDEX: 22.71 KG/M2 | WEIGHT: 133 LBS | DIASTOLIC BLOOD PRESSURE: 72 MMHG | HEIGHT: 64 IN | SYSTOLIC BLOOD PRESSURE: 140 MMHG | HEART RATE: 51 BPM

## 2023-02-15 PROCEDURE — 99213 OFFICE O/P EST LOW 20 MIN: CPT

## 2023-02-15 NOTE — ASSESSMENT
[FreeTextEntry1] : Diane Perry presents for follow-up. She was started on treatment of left otitis media with effusion at last visit but this was interrupted by a hospitalization for mitral valve replacement. She continues to have left otitis media with effusion on exam. She is being treated for chronic cough by Dr. Barnes. Previous sinonasal endoscopy showed thin nasal secretions bilaterally. Previous flexible laryngoscopy showed thin postnasal secretions, no nasopharyngeal mass or lesion. We will treat her infection with augmentin and chronic rhinitis with nasal steroid spray as well as azelastine.\par \par - Augmentin x 10 days. Side effects were discussed and include but are not limited to nausea, vomiting, diarrhea, and skin rash.\par - Fluticasone 2 sprays to each nostril BID x 3 weeks.\par - Azelastine 2 sprays to each nostril BID x 3 weeks.\par - Follow up in 3 weeks.

## 2023-02-15 NOTE — HISTORY OF PRESENT ILLNESS
[de-identified] : Diane Perry is a 76 yo female with hx mitral valve replacement, currently on xarelto, DVT who was referred by Dr. Barnes for evaluatoin of aural fullness. She notes left greater than right aural fullness starting end of Dec 2022. She denies otalgia, otorrhea. She notes intermittent nonpulsatile tinnitus, unsure of laterality. She denies vertigo. She feels that her hearing is diminished from the left ear. She denies history of recurrent ear infections. She denies fevers, chills. She denies family history of early onset hearing loss or significant noise exposure.\par \par She notes postnasal drainage. She denies nasal congestion or rhinorrhea. She denies history of recurrent sinus infections. She notes possible allergies. She does not use any nasal sprays.\par \par She is currently being treated for cough by Dr. Barnes. CT chest showed bilateral atelectasis and right pleural effusion. She is being treated with nebulizers and recently completed oral steroids. [FreeTextEntry1] : 2/15/23 - Ms. Perry presents for follow-up. Since last visit, she was admitted to the hospital and underwent heart valve replacement. She was using fluticasone nasal spray but her antibiotic was stopped at that time because she was on antibiotics then. She denies otalgia or otorrhea. She notes left diminished hearing. She notes left tinnitus. She denies vertigo, fevers, chills. She notes postnasal drainage. She notes continued issues with cough.

## 2023-02-15 NOTE — PHYSICAL EXAM
[de-identified] : Right TM intact with no effusion. Left TM intact with yellow mucoid effusion. [Midline] : trachea located in midline position [Normal] : no rashes

## 2023-02-15 NOTE — REVIEW OF SYSTEMS
Care Due:                  Date            Visit Type   Department     Provider  --------------------------------------------------------------------------------                                EP [facility]                              PRIMARY      MED/ INTERNAL  Ravi Cosme  Last Visit: 01-      CARE ([facility])   MED/ PEDS      Ehrensing  Next Visit: None Scheduled  None         None Found                                                            Last  Test          Frequency    Reason                     Performed    Due Date  --------------------------------------------------------------------------------    CMP.........  12 months..  hydroCHLOROthiazide,       11- 11-                             irbesartan...............    Powered by [employer]. Reference number: [id number].   3/15/2022 9:25:06 AM CDT   [Seasonal Allergies] : seasonal allergies [Post Nasal Drip] : post nasal drip [Hearing Loss] : hearing loss [Ear Noises] : ear noises [Nasal Congestion] : nasal congestion [Negative] : Heme/Lymph

## 2023-02-16 NOTE — HISTORY OF PRESENT ILLNESS
[FreeTextEntry1] : FOLLOW YOUR HEART HOME VISIT-[facility]\par Telehealth Home Visit made to  Mrs. Perry   for post discharge transitional care management and post follow up.  Patient of Dr. FERNANDEZ/P       on        Discharge on      from [facility]\par \par \par Mrs. Perry is a 77F PMHx of HTN, Afib with RVR, hypothyroidism, OA, DVT started on Xarelto 6 weeks prior to admission, MV stenosis s/p prosthetic MV (2012) s/p mitral JUNIOR Size 29 Calix Mitral Valve Replacement using transcatheter femoral approach, bilateral femoral veins accessed with Dr. Vasques on 2/3/23 c/b reintubation due to hypoventilation.   Patient was extubated to BiPap on 2/3 was discharged home on 2/5/23. \par  2/8 presented to CTS office today from home c/o palpitations/fluttering for the last several days on and off with improvement/worsening of symptoms and while in office was found to be in afib with RVR up to 160s.  Received amio 400mg PO x 1 in office and admitted inpatient to tele floor bed on 2cohen. Denies fevers/chills.  No headache / dizziness. No chest pain or shortness of breath.  No abdominal pain/nausea/vomiting. \par Patient now admitted to 2cohen tele bed under Dr. Belcher's service for further management of rapid atrial fibrillation.  Case discussed with Dr. Vasques \par 2/9 VSS NPO for Cardioversion today compliant with Xarelto \par 2/10 VSS in NSR s/p cardioversion- amiodarone load dose  4/12  titate BB as tolerated likely home Sunday Monday  \par 2/11: No active issues overnight. Remains SR on Amio and lopressor and tolerating well. Medically cleared for discharge\par \par 24hr call: Spoke with               for follow up s/p hospitalization. Denies any new complaints/issues at this time..  Compliant with all medications as per d/c order with +teach back. HCS initiated with (name of home care).  Patient has scheduled follow up appointment. Reminded of CN role and contact number was provided to the patient.  Advised to call with any questions/concerns, verbalized understanding.\par

## 2023-02-18 NOTE — COUNSELING
[Hygeine (Including Daily Shower)] : hygeine (including daily shower) [Importance of Regular Medical Follow-Up] : the importance of regular medical follow-up [Blood Pressure Control] : blood pressure control [S/S of infection] : signs and symptoms of infection (and to whom it should be reported) [Progressive Ambulation/Activity] : progressive ambulation/activity [Medication/Vitamin/Herb/Food Interaction] : medication/vitamin/herb/food interaction [Stress Management] : stress management

## 2023-02-21 ENCOUNTER — NON-APPOINTMENT (OUTPATIENT)
Age: 78
End: 2023-02-21

## 2023-02-21 ENCOUNTER — APPOINTMENT (OUTPATIENT)
Dept: CARDIOTHORACIC SURGERY | Facility: CLINIC | Age: 78
End: 2023-02-21
Payer: MEDICARE

## 2023-02-21 VITALS — HEART RATE: 47 BPM | OXYGEN SATURATION: 97 % | SYSTOLIC BLOOD PRESSURE: 146 MMHG | DIASTOLIC BLOOD PRESSURE: 84 MMHG

## 2023-02-21 VITALS — TEMPERATURE: 98.3 F

## 2023-02-21 PROCEDURE — 99212 OFFICE O/P EST SF 10 MIN: CPT

## 2023-02-21 RX ORDER — AMIODARONE HYDROCHLORIDE 200 MG/1
200 TABLET ORAL DAILY
Qty: 30 | Refills: 0 | Status: COMPLETED | COMMUNITY
Start: 2023-02-13 | End: 2023-02-21

## 2023-02-21 RX ORDER — METOPROLOL TARTRATE 25 MG/1
25 TABLET, FILM COATED ORAL TWICE DAILY
Qty: 60 | Refills: 3 | Status: COMPLETED | COMMUNITY
Start: 2023-02-13 | End: 2023-02-21

## 2023-02-21 RX ORDER — ATORVASTATIN CALCIUM 40 MG/1
40 TABLET, FILM COATED ORAL
Qty: 30 | Refills: 0 | Status: COMPLETED | COMMUNITY
Start: 2023-02-07 | End: 2023-02-21

## 2023-02-21 NOTE — PHYSICAL EXAM
[] : no respiratory distress [Heart Rate And Rhythm] : heart rate was normal and rhythm regular [Clean] : clean [Dry] : dry [Healing Well] : healing well

## 2023-02-21 NOTE — REASON FOR VISIT
[Spouse] : spouse [de-identified] : Mitral valve in valve Size # 29 Calix Mitral Valve replacement [de-identified] : 2/3/2023 [de-identified] : Ms Perry is feeling improved since her TMVR. She had one post op readmission for rapid afib.

## 2023-03-02 ENCOUNTER — NON-APPOINTMENT (OUTPATIENT)
Age: 78
End: 2023-03-02

## 2023-03-02 ENCOUNTER — APPOINTMENT (OUTPATIENT)
Dept: CARDIOLOGY | Facility: CLINIC | Age: 78
End: 2023-03-02
Payer: MEDICARE

## 2023-03-02 VITALS
WEIGHT: 142.5 LBS | OXYGEN SATURATION: 95 % | SYSTOLIC BLOOD PRESSURE: 193 MMHG | DIASTOLIC BLOOD PRESSURE: 68 MMHG | HEIGHT: 64 IN | HEART RATE: 45 BPM | BODY MASS INDEX: 24.33 KG/M2

## 2023-03-02 PROCEDURE — 99215 OFFICE O/P EST HI 40 MIN: CPT

## 2023-03-02 PROCEDURE — 93000 ELECTROCARDIOGRAM COMPLETE: CPT

## 2023-03-05 NOTE — CARDIOLOGY SUMMARY
[de-identified] : \par 03/02/23 - marked sinus bradycardia, 47 bpm, LAE, negative T-waves, consider anteroseptal ischemia\par  [de-identified] : \par 10/14/10, exercise thallium, 10 METS, diaphragmatic attenuation, LVEF 55%, 4 beats of NSVT, frequent PVCs, PACs\par  [de-identified] : \par 02/09/23 (DCCV) - synchronized cardioversion of atrial fibrillation\par  [de-identified] : \par 01/26/23 (LISA) - bio MVR, severe MS (MVA 0.9 cm2), normal LV systolic function, normal RV size and function, LVEF 55-60%\par 06/09/22 - bio MVR, MV gradient (mean 7 mmHg), severe LAE, grossly normal LV systolic function, normal RV size and function, normal pericardium, RVSP 28 mmHg, LVEF 55%\par 06/08/21 - bio MVR, MV gradient (mean 4 mmHg), mod LAE, mild global LV systolic dysfunction, normal RV size and function, LVEF 51%\par 12/10/20 - bio MVR, MV gradient (peak 7 mmHg, mean 3 mmHg), mild LAE, moderate global LV systolic dysfunction, mild LVE, normal RV size and function, LVEF 42%\par 07/17/18 - bio MVR, mod LAE, eccentric LVH, mild diastolic dysfunction, normal RV size and function, LVEF 59%\par  [de-identified] : \par 01/30/23 (CATH) - pCx 50% (iFR 0.99)\par 11/14/12 (CATH) - pLAD 20%, LVEF 50%, CI 3.51 L/min/m2\par  [de-identified] : \par 02/03/23 - TMVR with #29 Calix mitral valve by Arsen Vasques MD and Michael Marvin MD\par 12/11/12 - MVR with #31 bovine pericardial valve by Ignacio Wheeler MD

## 2023-03-05 NOTE — PHYSICAL EXAM
[Well Developed] : well developed [Well Nourished] : well nourished [No Acute Distress] : no acute distress [Normal Conjunctiva] : normal conjunctiva [Normal Venous Pressure] : normal venous pressure [No Carotid Bruit] : no carotid bruit [No Murmur] : no murmur [No Rub] : no rub [No Gallop] : no gallop [Clear Lung Fields] : clear lung fields [Good Air Entry] : good air entry [No Respiratory Distress] : no respiratory distress  [Soft] : abdomen soft [Non Tender] : non-tender [Normal Gait] : normal gait [No Cyanosis] : no cyanosis [No Rash] : no rash [No Skin Lesions] : no skin lesions [Moves all extremities] : moves all extremities [No Focal Deficits] : no focal deficits [Normal Speech] : normal speech [Alert and Oriented] : alert and oriented [de-identified] : bradycardia [de-identified] : bilateral trace to 1+ pitting LE edema noted

## 2023-03-05 NOTE — DISCUSSION/SUMMARY
[Hypertension] : hypertension [Paroxysmal Atrial Fibrillation] : paroxysmal atrial fibrillation [Stable] : stable [Compensated] : compensated [EKG obtained to assist in diagnosis and management of assessed problem(s)] : EKG obtained to assist in diagnosis and management of assessed problem(s) [FreeTextEntry1] : \par Currently stable from a cardiovascular standpoint. Hypertensive today (patient with "white coat" hypertension). Appears slightly volume overloaded today (142.5 lbs). History of cardiomyopathy likely nonischemic in origin with interval improvement (LVEF 42% -> 55%). Nonobstructive CAD on recent cath. 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 No ischemic or CHF symptoms. Has chronic cough. History of atrial fibrillation (s/p DCCV). Currently with asymptomatic marked sinus bradycardia. Will reduce metoprolol to once daily. Will start furosemide 40 mg daily for diuresis. Continue all other current medications including amiodarone and Xarelto. ECG completed today and reviewed (findings as noted above). Recent hospitalization records reviewed including cardiac test results. Low salt diet and daily weights advised. Follow up in 1 month. Patient to call next week to give clinical update.

## 2023-03-05 NOTE — HISTORY OF PRESENT ILLNESS
[FreeTextEntry1] : Experiences shortness of breath on mild exertion. Denies chest pain or palpitations. Have noted slow heart rates (30-40's bpm). Denies dizziness or lightheadedness.

## 2023-03-06 ENCOUNTER — APPOINTMENT (OUTPATIENT)
Dept: PULMONOLOGY | Facility: CLINIC | Age: 78
End: 2023-03-06
Payer: MEDICARE

## 2023-03-06 VITALS
WEIGHT: 142 LBS | OXYGEN SATURATION: 96 % | SYSTOLIC BLOOD PRESSURE: 181 MMHG | HEIGHT: 64 IN | HEART RATE: 54 BPM | BODY MASS INDEX: 24.24 KG/M2 | DIASTOLIC BLOOD PRESSURE: 98 MMHG

## 2023-03-06 PROCEDURE — 99214 OFFICE O/P EST MOD 30 MIN: CPT

## 2023-03-06 NOTE — HISTORY OF PRESENT ILLNESS
[Never] : never [TextBox_4] : since last visit had mitral valve repair\par has afib on amio\par now on xarelto indefinitely bc of fib\par still has some sob/cough\par seeing ENT soon, ears/sinuses very clogged\par not using nebs

## 2023-03-06 NOTE — PROCEDURE
[FreeTextEntry1] : Reviewed:\par \par EXAM: [id number] - US DPLX LWR EXT VEINS LTD RT - ORDERED BY: OG MCLEAN\par \par \par PROCEDURE DATE: 12/17/2022\par \par \par \par INTERPRETATION: CLINICAL INFORMATION: Right calf pain for one week.\par \par COMPARISON: None available.\par \par TECHNIQUE: Duplex sonography of the RIGHT LOWER extremity veins with color and spectral Doppler, with and without compression.\par \par FINDINGS:\par \par There is normal compressibility of the right common femoral, femoral and popliteal veins.\par The contralateral common femoral vein is patent.\par Doppler examination shows normal spontaneous and phasic flow.\par \par There is focal, noncompressible nonocclusive thrombus in a mid peroneal vein. Otherwise no evidence of calf DVT.\par \par IMPRESSION:\par Right focal calf vein DVT. Discussed the findings with Dr. Chavez, for Dr. Mclean on 12/17/2022 at 2:15 PM. I advised the patient to proceed to the NS or J ED for further evaluation, as per Dr. Chavez.\par \par \par \par \par --- End of Report ---\par \par \par \par EXAM: [id number] - CT CHEST - ORDERED BY: OG MCLEAN\par \par \par PROCEDURE DATE: 12/06/2022\par \par \par \par INTERPRETATION: HISTORY: Admitting Dxs: R05.9 COUGH, UNSPECIFIED\par \par EXAMINATION: CT CHEST was performed without IV contrast.\par \par COMPARISON: 9/15/2014.\par \par FINDINGS:\par \par AIRWAYS, LUNGS, PLEURA: Peribronchial thickening and mucus impacted left lower lobe airways. Small right pleural effusion. Subsegmental bibasilar atelectasis.\par \par MEDIASTINUM: Dilated left atrium and left ventricle. Mitral valve replacement. No pericardial effusion. Thoracic aorta normal caliber. No large mediastinal lymph nodes.\par \par IMAGED ABDOMEN: Cholelithiasis. Subcentimeter hepatic hypodensities, likely cysts and unchanged. Tiny nonobstructing left renal calyceal calculi.\par \par SOFT TISSUES: Unremarkable.\par \par BONES: Unremarkable.\par \par \par IMPRESSION:.\par \par Small right pleural effusion.\par \par Subsegmental bilateral lower lobe atelectasis.\par \par --- End of Report ---\par \par \par \par \par \par \par  DENNY MARTINEZ MD; Attending Radiologist\par This document has been electronically signed. Dec 12 2022 8:49AM\par \par \par \par Prior exams Reviewed:\par \par CXR: slight improvement of effusion on left, small possibly loculated effusion on right.  no focal consolidation cardiac silhouette appears enlarged.  No bony abnormality.\par \par leni: mod obstruction, improvement in FEV1 by ~200cc\par \par \par PFT: severe obstruction with some bd respone.  Lung volumes reduced with evidence of air trapping. DLCO severely reduced.\par \par \par \par \par ACC: [id number] EXAM: XR CHEST PORTABLE URGENT 1V\par \par PROCEDURE DATE: 10/30/2022\par \par \par \par INTERPRETATION: EXAMINATION: XR CHEST URGENT\par \par CLINICAL INDICATION: Cough x 1m r/o pna\par \par TECHNIQUE: Single frontal, portable view of the chest was obtained.\par \par COMPARISON: Chest x-ray 10/22/2022.\par \par FINDINGS:\par Mitral valve replacement is noted.\par The heart size appears enlarged, but cannot be accurately assessed in this projection.\par Bibasilar hazy opacities more prominent on the left, possibly small layering effusions with compressive atelectasis.\par There is no pneumothorax.\par No acute bony abnormality.\par \par IMPRESSION:\par Bibasilar hazy opacities more prominent on the left, possibly small layering effusions with compressive atelectasis.\par \par --- End of Report ---\par \par \par \par \par MARISOL WARNER MD; Resident Radiologist\par This document has been electronically signed.\par DUNIA CARMONA MD; Attending Radiologist\par This document has been electronically signed. Oct 31 2022 10:01AM

## 2023-03-06 NOTE — ASSESSMENT
[FreeTextEntry1] : can use nebs prn\par fu with ENT\par fu with cardiology\par fu 6 mo for PFT given amio use

## 2023-03-07 ENCOUNTER — TRANSCRIPTION ENCOUNTER (OUTPATIENT)
Age: 78
End: 2023-03-07

## 2023-03-08 ENCOUNTER — APPOINTMENT (OUTPATIENT)
Dept: OTOLARYNGOLOGY | Facility: CLINIC | Age: 78
End: 2023-03-08
Payer: MEDICARE

## 2023-03-08 VITALS
DIASTOLIC BLOOD PRESSURE: 80 MMHG | SYSTOLIC BLOOD PRESSURE: 169 MMHG | WEIGHT: 137 LBS | HEART RATE: 52 BPM | BODY MASS INDEX: 23.39 KG/M2 | HEIGHT: 64 IN

## 2023-03-08 PROCEDURE — 92557 COMPREHENSIVE HEARING TEST: CPT

## 2023-03-08 PROCEDURE — 92567 TYMPANOMETRY: CPT

## 2023-03-08 PROCEDURE — 99214 OFFICE O/P EST MOD 30 MIN: CPT

## 2023-03-08 NOTE — ASSESSMENT
[FreeTextEntry1] : Diane Perry presents for follow-up. She has chronic rhinitis and cough, as well as left otitis media with effusion, still persistent after treatment with augmentin and topical nasal regimen.Previous sinonasal endoscopy showed thin nasal secretions bilaterally. Previous flexible laryngoscopy showed thin postnasal secretions, no nasopharyngeal mass or lesion. 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 Will try new antibiotic and oral steroids.\par \par - Levaquin x 7 days. Risks of fluoroquinolone use were discussed, including but not limited to pain, burning, tingling, numbness, weakness, tendinopathy, effects on muscles and joints, mental status changes.\par - Medrol dose pack The potential side effects of high-dose steroid use were discussed at length. These risks include but are not limited to: increased appetite, insomnia, fluid retention, mood swings, weight gain, change in blood pressure, high blood glucose, possible adrenal suppression, osteoporosis, avascular necrosis of the hip, menstrual irregularities (if applicable), and cataracts. Patient notes that she was on prednisone not too long ago and had no issues.\par - Fluticasone 2 sprays to each nostril BID x 3 weeks.\par - Azelastine 2 sprays to each nostril BID x 3 weeks.\par - Follow up in 3 weeks.\par

## 2023-03-08 NOTE — DATA REVIEWED
[de-identified] : type A tymp AD; type B tymp AS\par AD: hearing wnl to a moderately severe SNHL 250-8000z\par AS: mild to severe mixed -8000 Hz

## 2023-03-08 NOTE — PHYSICAL EXAM
[Midline] : trachea located in midline position [Normal] : no rashes [de-identified] : Right cerumen removed. Left EAC wnl. [de-identified] : Right TM intact with no effusion. Left TM intact with yellow mucoid effusion.

## 2023-03-08 NOTE — HISTORY OF PRESENT ILLNESS
[de-identified] : Diane Perry is a 76 yo female with hx mitral valve replacement, currently on xarelto, DVT who was referred by Dr. Barnes for evaluatoin of aural fullness. She notes left greater than right aural fullness starting end of Dec 2022. She denies otalgia, otorrhea. She notes intermittent nonpulsatile tinnitus, unsure of laterality. She denies vertigo. She feels that her hearing is diminished from the left ear. She denies history of recurrent ear infections. She denies fevers, chills. She denies family history of early onset hearing loss or significant noise exposure.\par \par She notes postnasal drainage. She denies nasal congestion or rhinorrhea. She denies history of recurrent sinus infections. She notes possible allergies. She does not use any nasal sprays.\par \par She is currently being treated for cough by Dr. Barnes. CT chest showed bilateral atelectasis and right pleural effusion. She is being treated with nebulizers and recently completed oral steroids. [FreeTextEntry1] : 2/15/23 - Ms. Perry presents for follow-up. Since last visit, she was admitted to the hospital and underwent heart valve replacement. She was using fluticasone nasal spray but her antibiotic was stopped at that time because she was on antibiotics then. She denies otalgia or otorrhea. She notes left diminished hearing. She notes left tinnitus. She denies vertigo, fevers, chills. She notes postnasal drainage. She notes continued issues with cough.\par \par 3/8/23 - Ms. Perry presents for follow-up. She completed antibiotics and topical nasal regimen. She notes continued left aural fullness and diminished hearing. She denies otalgia or otorrhea. She notes left tinnitus. She denies vertigo. She notes postnasal drainage and continued cough. She denies fevers or chills.

## 2023-03-10 ENCOUNTER — APPOINTMENT (OUTPATIENT)
Dept: PULMONOLOGY | Facility: CLINIC | Age: 78
End: 2023-03-10

## 2023-03-16 ENCOUNTER — NON-APPOINTMENT (OUTPATIENT)
Age: 78
End: 2023-03-16

## 2023-03-16 ENCOUNTER — APPOINTMENT (OUTPATIENT)
Dept: CARDIOLOGY | Facility: CLINIC | Age: 78
End: 2023-03-16
Payer: MEDICARE

## 2023-03-16 VITALS
WEIGHT: 141 LBS | HEART RATE: 50 BPM | SYSTOLIC BLOOD PRESSURE: 177 MMHG | DIASTOLIC BLOOD PRESSURE: 85 MMHG | HEIGHT: 64 IN | TEMPERATURE: 97.2 F | OXYGEN SATURATION: 93 % | BODY MASS INDEX: 24.07 KG/M2

## 2023-03-16 DIAGNOSIS — U07.1 COVID-19: ICD-10-CM

## 2023-03-16 PROCEDURE — 99214 OFFICE O/P EST MOD 30 MIN: CPT

## 2023-03-16 PROCEDURE — 93000 ELECTROCARDIOGRAM COMPLETE: CPT

## 2023-03-16 RX ORDER — AMOXICILLIN AND CLAVULANATE POTASSIUM 875; 125 MG/1; MG/1
875-125 TABLET, COATED ORAL
Qty: 20 | Refills: 0 | Status: DISCONTINUED | COMMUNITY
Start: 2023-02-15 | End: 2023-03-16

## 2023-03-16 RX ORDER — LEVOFLOXACIN 500 MG/1
500 TABLET, FILM COATED ORAL
Qty: 7 | Refills: 0 | Status: DISCONTINUED | COMMUNITY
Start: 2023-03-08 | End: 2023-03-16

## 2023-03-16 RX ORDER — METHYLPREDNISOLONE 4 MG/1
4 TABLET ORAL
Qty: 1 | Refills: 0 | Status: DISCONTINUED | COMMUNITY
Start: 2023-03-08 | End: 2023-03-16

## 2023-03-16 RX ORDER — RIVAROXABAN 20 MG/1
20 TABLET, FILM COATED ORAL
Qty: 30 | Refills: 1 | Status: DISCONTINUED | COMMUNITY
Start: 2023-01-13 | End: 2023-03-16

## 2023-03-16 NOTE — DISCUSSION/SUMMARY
[Paroxysmal Atrial Fibrillation] : paroxysmal atrial fibrillation [Stable] : stable [Compensated] : compensated [Hypertension] : hypertension [FreeTextEntry1] : \par Currently stable from a cardiovascular standpoint. Hypertensive today (patient with "white coat" hypertension). Appears slightly volume overloaded today (141 lbs). History of cardiomyopathy likely nonischemic in origin with interval improvement (LVEF 42% -> 55%). History of nonobstructive CAD. 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 Exertional dyspnea likely secondary to underlying pulmonary issues (bronchiectasis, severe obstruction noted on PFT). Has chronic cough. History of atrial fibrillation (s/p DCCV). Currently with asymptomatic sinus bradycardia. Will change Xarelto to Eliquis 5 mg twice daily (cost reasons). Continue all other current medications including amiodarone and metoprolol. ECG completed today and reviewed (findings as noted above). Low salt diet and daily weights advised. Follow up in 4-6 weeks. [EKG obtained to assist in diagnosis and management of assessed problem(s)] : EKG obtained to assist in diagnosis and management of assessed problem(s)

## 2023-03-16 NOTE — REVIEW OF SYSTEMS
[Cough] : cough [Negative] : Musculoskeletal [Dyspnea on exertion] : dyspnea during exertion [Chest Discomfort] : no chest discomfort [Lower Ext Edema] : lower extremity edema [Leg Claudication] : no intermittent leg claudication [Palpitations] : no palpitations [Orthopnea] : no orthopnea [PND] : no PND [Syncope] : no syncope [Wheezing] : no wheezing [FreeTextEntry4] : see HPI

## 2023-03-16 NOTE — HISTORY OF PRESENT ILLNESS
[FreeTextEntry1] : Doing okay. Denies chest pain or palpitations. Experiences shortness of breath on exertion. Continues to have ear congestion and cough.

## 2023-03-16 NOTE — PHYSICAL EXAM
[Well Developed] : well developed [Well Nourished] : well nourished [No Acute Distress] : no acute distress [Normal Conjunctiva] : normal conjunctiva [Normal Venous Pressure] : normal venous pressure [No Carotid Bruit] : no carotid bruit [No Murmur] : no murmur [No Rub] : no rub [No Gallop] : no gallop [Clear Lung Fields] : clear lung fields [Good Air Entry] : good air entry [No Respiratory Distress] : no respiratory distress  [Soft] : abdomen soft [Non Tender] : non-tender [Normal Gait] : normal gait [No Cyanosis] : no cyanosis [No Rash] : no rash [No Skin Lesions] : no skin lesions [Moves all extremities] : moves all extremities [No Focal Deficits] : no focal deficits [Normal Speech] : normal speech [Alert and Oriented] : alert and oriented [de-identified] : bradycardia [de-identified] : bilateral trace to 1+ pitting LE edema noted

## 2023-03-16 NOTE — CARDIOLOGY SUMMARY
[de-identified] : \par 03/16/23 - sinus bradycardia, 50 bpm, LAE, nonspecific T-wave abnormality (unchanged)\par  [de-identified] : \par 10/14/10, exercise thallium, 10 METS, diaphragmatic attenuation, LVEF 55%, 4 beats of NSVT, frequent PVCs, PACs\par  [de-identified] : \par 01/26/23 (LISA) - bio MVR, severe MS (MVA 0.9 cm2), normal LV systolic function, normal RV size and function, LVEF 55-60%\par 06/09/22 - bio MVR, MV gradient (mean 7 mmHg), severe LAE, grossly normal LV systolic function, normal RV size and function, normal pericardium, RVSP 28 mmHg, LVEF 55%\par 06/08/21 - bio MVR, MV gradient (mean 4 mmHg), mod LAE, mild global LV systolic dysfunction, normal RV size and function, LVEF 51%\par 12/10/20 - bio MVR, MV gradient (peak 7 mmHg, mean 3 mmHg), mild LAE, moderate global LV systolic dysfunction, mild LVE, normal RV size and function, LVEF 42%\par 07/17/18 - bio MVR, mod LAE, eccentric LVH, mild diastolic dysfunction, normal RV size and function, LVEF 59%\par  [de-identified] : \par 02/09/23 (DCCV) - synchronized cardioversion of atrial fibrillation\par  [de-identified] : \par 01/30/23 (CATH) - pCx 50% (iFR 0.99)\par 11/14/12 (CATH) - pLAD 20%, LVEF 50%, CI 3.51 L/min/m2\par  [de-identified] : \par 02/03/23 - TMVR with #29 Calix mitral valve by Arsen Vasques MD and Michael Marvin MD\par 12/11/12 - MVR with #31 bovine pericardial valve by Ignacio Wheeler MD

## 2023-03-29 ENCOUNTER — APPOINTMENT (OUTPATIENT)
Dept: OTOLARYNGOLOGY | Facility: CLINIC | Age: 78
End: 2023-03-29
Payer: MEDICARE

## 2023-03-29 VITALS
SYSTOLIC BLOOD PRESSURE: 145 MMHG | WEIGHT: 141 LBS | HEIGHT: 64 IN | BODY MASS INDEX: 24.07 KG/M2 | DIASTOLIC BLOOD PRESSURE: 80 MMHG | HEART RATE: 50 BPM

## 2023-03-29 PROCEDURE — 99214 OFFICE O/P EST MOD 30 MIN: CPT

## 2023-03-29 NOTE — PHYSICAL EXAM
[de-identified] : Right TM intact with no effusion. Left TM intact with yellow mucoid effusion. [Midline] : trachea located in midline position [Normal] : no rashes

## 2023-03-29 NOTE — REVIEW OF SYSTEMS
[Post Nasal Drip] : post nasal drip [Hearing Loss] : hearing loss [Ear Noises] : ear noises [Nasal Congestion] : nasal congestion [Negative] : Heme/Lymph

## 2023-03-29 NOTE — HISTORY OF PRESENT ILLNESS
[de-identified] : Diane Perry is a 76 yo female with hx mitral valve replacement, currently on xarelto, DVT who was referred by Dr. Barnes for evaluatoin of aural fullness. She notes left greater than right aural fullness starting end of Dec 2022. She denies otalgia, otorrhea. She notes intermittent nonpulsatile tinnitus, unsure of laterality. She denies vertigo. She feels that her hearing is diminished from the left ear. She denies history of recurrent ear infections. She denies fevers, chills. She denies family history of early onset hearing loss or significant noise exposure.\par \par She notes postnasal drainage. She denies nasal congestion or rhinorrhea. She denies history of recurrent sinus infections. She notes possible allergies. She does not use any nasal sprays.\par \par She is currently being treated for cough by Dr. Barnes. CT chest showed bilateral atelectasis and right pleural effusion. She is being treated with nebulizers and recently completed oral steroids. [FreeTextEntry1] : 2/15/23 - Ms. Perry presents for follow-up. Since last visit, she was admitted to the hospital and underwent heart valve replacement. She was using fluticasone nasal spray but her antibiotic was stopped at that time because she was on antibiotics then. She denies otalgia or otorrhea. She notes left diminished hearing. She notes left tinnitus. She denies vertigo, fevers, chills. She notes postnasal drainage. She notes continued issues with cough.\par \par 3/8/23 - Ms. Perry presents for follow-up. She completed antibiotics and topical nasal regimen. She notes continued left aural fullness and diminished hearing. She denies otalgia or otorrhea. She notes left tinnitus. She denies vertigo. She notes postnasal drainage and continued cough. She denies fevers or chills.\par \par 3/29/23 - Ms. Perry presents for follow-up. She completed oral antibiotics, oral steroids, fluticasone, azelastine. She notes continued left aural fullness and diminished hearing. She has intermittent left tinnitus. She denies otalgia, otorrhea, or vertigo. She denies fevers or chills. She denies facial weakness or numbness. She notes intermittent left rhinorrhea and congestion.

## 2023-03-29 NOTE — ASSESSMENT
[FreeTextEntry1] : Diane Perry presents for follow-up. She has chronic rhinitis and left otitis media with effusion, still persistent after two rounds of antibiotics, oral steroids, fluticasone, and azelastine. Previous sinonasal endoscopy showed thin nasal secretions bilaterally. Previous flexible laryngoscopy showed thin postnasal secretions, no nasopharyngeal mass or lesion. Previous audiogram showed type A tymps AD, type B tymp AS, normal to moderately severe sensorineural hearing loss AD, and mild to severe mixed hearing loss AS. Her otitis media with effusion is still present on exam today. We discussed placement of left PE tube. Risks of tympanostomy tube placement were discussed. Risks include but are not limited to bleeding, infection, otorrhea, early extrusion of tube, persistent tympanic membrane perforation, worsening of hearing, need for further procedures. She would like to continue sprays for now and then reevaluate in one month.\par \par - Fluticasone 2 sprays to each nostril BID\par - Azelastine 2 sprays to each nostril BID\par - Follow up in  1 month.\par

## 2023-04-08 ENCOUNTER — RX RENEWAL (OUTPATIENT)
Age: 78
End: 2023-04-08

## 2023-04-14 ENCOUNTER — LABORATORY RESULT (OUTPATIENT)
Age: 78
End: 2023-04-14

## 2023-04-14 ENCOUNTER — APPOINTMENT (OUTPATIENT)
Dept: INTERNAL MEDICINE | Facility: CLINIC | Age: 78
End: 2023-04-14
Payer: MEDICARE

## 2023-04-14 VITALS
DIASTOLIC BLOOD PRESSURE: 76 MMHG | SYSTOLIC BLOOD PRESSURE: 150 MMHG | BODY MASS INDEX: 23.05 KG/M2 | HEART RATE: 55 BPM | WEIGHT: 135 LBS | HEIGHT: 64 IN | RESPIRATION RATE: 14 BRPM | TEMPERATURE: 98 F | OXYGEN SATURATION: 99 %

## 2023-04-14 DIAGNOSIS — Z00.00 ENCOUNTER FOR GENERAL ADULT MEDICAL EXAMINATION W/OUT ABNORMAL FINDINGS: ICD-10-CM

## 2023-04-14 PROCEDURE — G0439: CPT

## 2023-04-14 RX ORDER — CYANOCOBALAMIN 1000 UG/ML
1000 INJECTION INTRAMUSCULAR; SUBCUTANEOUS
Qty: 0 | Refills: 0 | Status: COMPLETED | OUTPATIENT
Start: 2023-04-14

## 2023-04-14 RX ORDER — PANTOPRAZOLE 40 MG/1
40 TABLET, DELAYED RELEASE ORAL DAILY
Qty: 90 | Refills: 2 | Status: DISCONTINUED | COMMUNITY
Start: 2018-12-21 | End: 2023-04-14

## 2023-04-14 RX ADMIN — CYANOCOBALAMIN 0 MCG/ML: 1000 INJECTION INTRAMUSCULAR; SUBCUTANEOUS at 00:00

## 2023-04-14 NOTE — HISTORY OF PRESENT ILLNESS
[FreeTextEntry1] : cpe [de-identified] : Pt had bovine valve mitral 12/11/12 by Dr Alba. She was hospitalized for 3 weeks when the valve failed. She had mitral valve replaced without open heart surgery. She presented with heart failure and sob. \par \par She has a cough and congestion for months.  Has been to pulmonologist and ENT. Ear is still clogged and may need a tube. Has a slight yellow mucus. \par \par Her BP at home is much lower than in the office. Average is 125/65.

## 2023-04-14 NOTE — HEALTH RISK ASSESSMENT
[Yes] : Yes [2 - 4 times a month (2 pts)] : 2-4 times a month (2 points) [1 or 2 (0 pts)] : 1 or 2 (0 points) [0] : 2) Feeling down, depressed, or hopeless: Not at all (0) [PHQ-2 Negative - No further assessment needed] : PHQ-2 Negative - No further assessment needed [[id number]] : 0 [Never] : Never

## 2023-04-14 NOTE — PHYSICAL EXAM
[No Acute Distress] : no acute distress [Well Nourished] : well nourished [Well Developed] : well developed [Well-Appearing] : well-appearing [Normal Sclera/Conjunctiva] : normal sclera/conjunctiva [PERRL] : pupils equal round and reactive to light [EOMI] : extraocular movements intact [Normal Outer Ear/Nose] : the outer ears and nose were normal in appearance [Normal Oropharynx] : the oropharynx was normal [No JVD] : no jugular venous distention [No Lymphadenopathy] : no lymphadenopathy [Supple] : supple [Thyroid Normal, No Nodules] : the thyroid was normal and there were no nodules present [No Respiratory Distress] : no respiratory distress  [No Accessory Muscle Use] : no accessory muscle use [Clear to Auscultation] : lungs were clear to auscultation bilaterally [Normal Rate] : normal rate  [Regular Rhythm] : with a regular rhythm [Normal S1, S2] : normal S1 and S2 [No Murmur] : no murmur heard [No Carotid Bruits] : no carotid bruits [No Abdominal Bruit] : a ~M bruit was not heard ~T in the abdomen [No Varicosities] : no varicosities [Pedal Pulses Present] : the pedal pulses are present [No Edema] : there was no peripheral edema [No Palpable Aorta] : no palpable aorta [No Extremity Clubbing/Cyanosis] : no extremity clubbing/cyanosis [Normal Appearance] : normal in appearance [Soft] : abdomen soft [Non Tender] : non-tender [Non-distended] : non-distended [No Masses] : no abdominal mass palpated [No HSM] : no HSM [Normal Bowel Sounds] : normal bowel sounds [Normal Posterior Cervical Nodes] : no posterior cervical lymphadenopathy [Normal Anterior Cervical Nodes] : no anterior cervical lymphadenopathy [No CVA Tenderness] : no CVA  tenderness [No Spinal Tenderness] : no spinal tenderness [No Joint Swelling] : no joint swelling [Grossly Normal Strength/Tone] : grossly normal strength/tone [No Rash] : no rash [Coordination Grossly Intact] : coordination grossly intact [No Focal Deficits] : no focal deficits [Normal Gait] : normal gait [Deep Tendon Reflexes (DTR)] : deep tendon reflexes were 2+ and symmetric [Normal Affect] : the affect was normal [Normal Insight/Judgement] : insight and judgment were intact [de-identified] : ventral hernia

## 2023-04-15 LAB
25(OH)D3 SERPL-MCNC: 79.1 NG/ML
ALBUMIN SERPL ELPH-MCNC: 4.2 G/DL
ALP BLD-CCNC: 70 U/L
ALT SERPL-CCNC: 18 U/L
ANION GAP SERPL CALC-SCNC: 14 MMOL/L
AST SERPL-CCNC: 31 U/L
BASOPHILS # BLD AUTO: 0.07 K/UL
BASOPHILS NFR BLD AUTO: 1.1 %
BILIRUB SERPL-MCNC: 0.6 MG/DL
BUN SERPL-MCNC: 18 MG/DL
CALCIUM SERPL-MCNC: 9.3 MG/DL
CHLORIDE SERPL-SCNC: 103 MMOL/L
CHOLEST SERPL-MCNC: 146 MG/DL
CO2 SERPL-SCNC: 25 MMOL/L
CREAT SERPL-MCNC: 1.17 MG/DL
EGFR: 48 ML/MIN/1.73M2
EOSINOPHIL # BLD AUTO: 0.2 K/UL
EOSINOPHIL NFR BLD AUTO: 3.3 %
ESTIMATED AVERAGE GLUCOSE: 105 MG/DL
FOLATE SERPL-MCNC: 12 NG/ML
GLUCOSE SERPL-MCNC: 96 MG/DL
HBA1C MFR BLD HPLC: 5.3 %
HCT VFR BLD CALC: 40.8 %
HDLC SERPL-MCNC: 65 MG/DL
HGB BLD-MCNC: 12.7 G/DL
IMM GRANULOCYTES NFR BLD AUTO: 0.2 %
LDLC SERPL CALC-MCNC: 59 MG/DL
LYMPHOCYTES # BLD AUTO: 0.68 K/UL
LYMPHOCYTES NFR BLD AUTO: 11.1 %
MAN DIFF?: NORMAL
MCHC RBC-ENTMCNC: 30.9 PG
MCHC RBC-ENTMCNC: 31.1 GM/DL
MCV RBC AUTO: 99.3 FL
MONOCYTES # BLD AUTO: 0.41 K/UL
MONOCYTES NFR BLD AUTO: 6.7 %
NEUTROPHILS # BLD AUTO: 4.76 K/UL
NEUTROPHILS NFR BLD AUTO: 77.6 %
NONHDLC SERPL-MCNC: 81 MG/DL
PLATELET # BLD AUTO: 231 K/UL
POTASSIUM SERPL-SCNC: 4.4 MMOL/L
PROT SERPL-MCNC: 6 G/DL
RBC # BLD: 4.11 M/UL
RBC # FLD: 13.7 %
SODIUM SERPL-SCNC: 142 MMOL/L
TRIGL SERPL-MCNC: 109 MG/DL
TSH SERPL-ACNC: 5.28 UIU/ML
URATE SERPL-MCNC: 4.7 MG/DL
VIT B12 SERPL-MCNC: 430 PG/ML
WBC # FLD AUTO: 6.13 K/UL

## 2023-04-27 ENCOUNTER — APPOINTMENT (OUTPATIENT)
Dept: CARDIOLOGY | Facility: CLINIC | Age: 78
End: 2023-04-27
Payer: MEDICARE

## 2023-04-27 ENCOUNTER — NON-APPOINTMENT (OUTPATIENT)
Age: 78
End: 2023-04-27

## 2023-04-27 VITALS
HEART RATE: 48 BPM | SYSTOLIC BLOOD PRESSURE: 201 MMHG | BODY MASS INDEX: 23.73 KG/M2 | HEIGHT: 64 IN | OXYGEN SATURATION: 92 % | DIASTOLIC BLOOD PRESSURE: 93 MMHG | WEIGHT: 139 LBS

## 2023-04-27 VITALS — DIASTOLIC BLOOD PRESSURE: 73 MMHG | SYSTOLIC BLOOD PRESSURE: 187 MMHG

## 2023-04-27 PROCEDURE — 99214 OFFICE O/P EST MOD 30 MIN: CPT

## 2023-04-27 PROCEDURE — 93000 ELECTROCARDIOGRAM COMPLETE: CPT

## 2023-04-27 RX ORDER — AMIODARONE HYDROCHLORIDE 200 MG/1
200 TABLET ORAL DAILY
Qty: 90 | Refills: 3 | Status: DISCONTINUED | COMMUNITY
Start: 2023-02-21 | End: 2023-04-27

## 2023-04-27 RX ORDER — FUROSEMIDE 40 MG/1
40 TABLET ORAL DAILY
Qty: 90 | Refills: 3 | Status: ACTIVE | COMMUNITY
Start: 2023-03-02 | End: 1900-01-01

## 2023-05-01 NOTE — HISTORY OF PRESENT ILLNESS
[FreeTextEntry1] : Experiences shortness of breath on mild exertion. Denies chest pain or palpitations.

## 2023-05-01 NOTE — CARDIOLOGY SUMMARY
[de-identified] : \par 04/27/23 - marked sinus bradycardia, 48 bpm, LAE, nonspecific ST abnormality\par  [de-identified] : \par 10/14/10, exercise thallium, 10 METS, diaphragmatic attenuation, LVEF 55%, 4 beats of NSVT, frequent PVCs, PACs\par  [de-identified] : \par 01/26/23 (LISA) - bio MVR, severe MS (MVA 0.9 cm2), normal LV systolic function, normal RV size and function, LVEF 55-60%\par 06/09/22 - bio MVR, MV gradient (mean 7 mmHg), severe LAE, grossly normal LV systolic function, normal RV size and function, normal pericardium, RVSP 28 mmHg, LVEF 55%\par 06/08/21 - bio MVR, MV gradient (mean 4 mmHg), mod LAE, mild global LV systolic dysfunction, normal RV size and function, LVEF 51%\par 12/10/20 - bio MVR, MV gradient (peak 7 mmHg, mean 3 mmHg), mild LAE, moderate global LV systolic dysfunction, mild LVE, normal RV size and function, LVEF 42%\par 07/17/18 - bio MVR, mod LAE, eccentric LVH, mild diastolic dysfunction, normal RV size and function, LVEF 59%\par  [de-identified] : \par 02/09/23 (DCCV) - synchronized cardioversion of atrial fibrillation\par  [de-identified] : \par 01/30/23 (CATH) - pCx 50% (iFR 0.99)\par 11/14/12 (CATH) - pLAD 20%, LVEF 50%, CI 3.51 L/min/m2\par  [de-identified] : \par 02/03/23 - TMVR with #29 Calix mitral valve by Arsen Vasques MD and Michael Marvin MD\par 12/11/12 - MVR with #31 bovine pericardial valve by Ignacio Wheeler MD

## 2023-05-01 NOTE — PHYSICAL EXAM
[Well Developed] : well developed [Well Nourished] : well nourished [No Acute Distress] : no acute distress [Normal Conjunctiva] : normal conjunctiva [Normal Venous Pressure] : normal venous pressure [No Carotid Bruit] : no carotid bruit [No Murmur] : no murmur [No Rub] : no rub [No Gallop] : no gallop [Clear Lung Fields] : clear lung fields [Good Air Entry] : good air entry [No Respiratory Distress] : no respiratory distress  [Soft] : abdomen soft [Non Tender] : non-tender [Normal Gait] : normal gait [No Cyanosis] : no cyanosis [No Rash] : no rash [No Skin Lesions] : no skin lesions [Moves all extremities] : moves all extremities [No Focal Deficits] : no focal deficits [Normal Speech] : normal speech [Alert and Oriented] : alert and oriented [de-identified] : bradycardia [de-identified] : bilateral trace pitting LE edema noted

## 2023-05-01 NOTE — DISCUSSION/SUMMARY
[Paroxysmal Atrial Fibrillation] : paroxysmal atrial fibrillation [Stable] : stable [Compensated] : compensated [Hypertension] : hypertension [FreeTextEntry1] : \par Currently stable from a cardiovascular standpoint. Hypertensive today (patient with "white coat" hypertension). Appears slightly volume overloaded today (139 lbs). History of cardiomyopathy likely nonischemic in origin with interval improvement (LVEF 42% -> 55%). History of nonobstructive CAD. 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 Exertional dyspnea likely secondary to underlying pulmonary issues (bronchiectasis, severe obstruction noted on PFT) and possibly underlying ENT issues. Has chronic cough. History of atrial fibrillation (s/p DCCV). Currently with asymptomatic sinus bradycardia. Will discontinue amiodarone at this time given bradycardia. Continue all other current medications including metoprolol and apixaban. ECG completed today and reviewed (findings as noted above). Low salt diet and daily weights advised. Advised patient to follow up with ENT and pulmonary. Follow up in 2 months. [EKG obtained to assist in diagnosis and management of assessed problem(s)] : EKG obtained to assist in diagnosis and management of assessed problem(s)

## 2023-05-01 NOTE — REVIEW OF SYSTEMS
[Dyspnea on exertion] : dyspnea during exertion [Cough] : cough [Negative] : Musculoskeletal [Chest Discomfort] : no chest discomfort [Lower Ext Edema] : no extremity edema [Leg Claudication] : no intermittent leg claudication [Palpitations] : no palpitations [Orthopnea] : no orthopnea [PND] : no PND [Syncope] : no syncope [Wheezing] : no wheezing [FreeTextEntry4] : see HPI

## 2023-05-03 ENCOUNTER — RESULT REVIEW (OUTPATIENT)
Age: 78
End: 2023-05-03

## 2023-05-03 ENCOUNTER — APPOINTMENT (OUTPATIENT)
Dept: OTOLARYNGOLOGY | Facility: CLINIC | Age: 78
End: 2023-05-03
Payer: MEDICARE

## 2023-05-03 VITALS
DIASTOLIC BLOOD PRESSURE: 80 MMHG | HEART RATE: 45 BPM | HEIGHT: 64 IN | WEIGHT: 139 LBS | BODY MASS INDEX: 23.73 KG/M2 | SYSTOLIC BLOOD PRESSURE: 194 MMHG

## 2023-05-03 VITALS — SYSTOLIC BLOOD PRESSURE: 160 MMHG | DIASTOLIC BLOOD PRESSURE: 84 MMHG

## 2023-05-03 VITALS — DIASTOLIC BLOOD PRESSURE: 90 MMHG | SYSTOLIC BLOOD PRESSURE: 180 MMHG

## 2023-05-03 PROCEDURE — 92567 TYMPANOMETRY: CPT

## 2023-05-03 PROCEDURE — 92557 COMPREHENSIVE HEARING TEST: CPT

## 2023-05-03 PROCEDURE — 99214 OFFICE O/P EST MOD 30 MIN: CPT

## 2023-05-03 NOTE — ASSESSMENT
[FreeTextEntry1] : Diane Perry presents for follow-up. She has chronic rhinitis and left otitis media with effusion, still persistent after two rounds of antibiotics, oral steroids, fluticasone, and azelastine. Previous sinonasal endoscopy showed thin nasal secretions bilaterally. Previous flexible laryngoscopy showed thin postnasal secretions, no nasopharyngeal mass or lesion. She has persistent left middle ear effusion on exam today. Audiogram today showed type A tymp AD, type B tymp AS, normal sloping to moderately severe sensorineural hearing loss AD, and mild to severe mixed hearing loss AS. We discussed placement of left PE tube. Risks of tympanostomy tube placement were discussed. Risks include but are not limited to bleeding, infection, otorrhea, early extrusion of tube, persistent tympanic membrane perforation, worsening of hearing, need for further procedures. We will schedule this in the upcoming weeks. Will obtain CT temporal bone prior. In addition, can consider allergy referral after.\par \par - Daily antihistamine\par - Fluticasone 2 sprays to each nostril BID\par - Azelastine 2 sprays to each nostril BID\par - CT temporal bone\par - Follow up for ear tube placement\par \par

## 2023-05-03 NOTE — HISTORY OF PRESENT ILLNESS
[de-identified] : Diane Perry is a 76 yo female with hx mitral valve replacement, currently on xarelto, DVT who was referred by Dr. Barnes for evaluatoin of aural fullness. She notes left greater than right aural fullness starting end of Dec 2022. She denies otalgia, otorrhea. She notes intermittent nonpulsatile tinnitus, unsure of laterality. She denies vertigo. She feels that her hearing is diminished from the left ear. She denies history of recurrent ear infections. She denies fevers, chills. She denies family history of early onset hearing loss or significant noise exposure.\par \par She notes postnasal drainage. She denies nasal congestion or rhinorrhea. She denies history of recurrent sinus infections. She notes possible allergies. She does not use any nasal sprays.\par \par She is currently being treated for cough by Dr. Barnes. CT chest showed bilateral atelectasis and right pleural effusion. She is being treated with nebulizers and recently completed oral steroids. [FreeTextEntry1] : 2/15/23 - Ms. Perry presents for follow-up. Since last visit, she was admitted to the hospital and underwent heart valve replacement. She was using fluticasone nasal spray but her antibiotic was stopped at that time because she was on antibiotics then. She denies otalgia or otorrhea. She notes left diminished hearing. She notes left tinnitus. She denies vertigo, fevers, chills. She notes postnasal drainage. She notes continued issues with cough.\par \par 3/8/23 - Ms. Perry presents for follow-up. She completed antibiotics and topical nasal regimen. She notes continued left aural fullness and diminished hearing. She denies otalgia or otorrhea. She notes left tinnitus. She denies vertigo. She notes postnasal drainage and continued cough. She denies fevers or chills.\par \par 3/29/23 - Ms. Perry presents for follow-up. She completed oral antibiotics, oral steroids, fluticasone, azelastine. She notes continued left aural fullness and diminished hearing. She has intermittent left tinnitus. She denies otalgia, otorrhea, or vertigo. She denies fevers or chills. She denies facial weakness or numbness. She notes intermittent left rhinorrhea and congestion.\par \par 5/3/23 - Ms. Perry presents for follow-up. She has been using fluticasone and azelastine nasal sprays BID. She notes continued issues with clear rhinorrhea and postnasal drainage. She notes left aural fullness and hearing loss. She notes intermittent left tinnitus but no vertigo. She denies otalgia or otorrhea. She denies fevers or chills.

## 2023-05-03 NOTE — DATA REVIEWED
[de-identified] : Tymps:Right: Type A; Left: Type B\par Right: WNL sloping to a moderately-severe snhl [id number]\par Left: Mild to severe mixed hl [id number]

## 2023-05-03 NOTE — PHYSICAL EXAM
At this time writer attempted to call patient to convey the below. Voicemail left for patient to call office back to convey the below, obtain a pharmacy and see if patient would like to proceed with steroid burst.    [Midline] : trachea located in midline position [Normal] : no rashes [de-identified] : Right TM intact with no effusion. Left TM intact with yellow mucoid effusion.

## 2023-05-12 ENCOUNTER — APPOINTMENT (OUTPATIENT)
Dept: CT IMAGING | Facility: CLINIC | Age: 78
End: 2023-05-12
Payer: MEDICARE

## 2023-05-12 ENCOUNTER — OUTPATIENT (OUTPATIENT)
Dept: OUTPATIENT SERVICES | Facility: HOSPITAL | Age: 78
LOS: 1 days | End: 2023-05-12
Payer: MEDICARE

## 2023-05-12 DIAGNOSIS — Z95.2 PRESENCE OF PROSTHETIC HEART VALVE: Chronic | ICD-10-CM

## 2023-05-12 DIAGNOSIS — Z98.89 OTHER SPECIFIED POSTPROCEDURAL STATES: Chronic | ICD-10-CM

## 2023-05-12 DIAGNOSIS — H66.92 OTITIS MEDIA, UNSPECIFIED, LEFT EAR: ICD-10-CM

## 2023-05-12 DIAGNOSIS — Z96.7 PRESENCE OF OTHER BONE AND TENDON IMPLANTS: Chronic | ICD-10-CM

## 2023-05-12 DIAGNOSIS — Z96.659 PRESENCE OF UNSPECIFIED ARTIFICIAL KNEE JOINT: Chronic | ICD-10-CM

## 2023-05-12 PROCEDURE — 70480 CT ORBIT/EAR/FOSSA W/O DYE: CPT

## 2023-05-12 PROCEDURE — 70480 CT ORBIT/EAR/FOSSA W/O DYE: CPT | Mod: 26,MH

## 2023-05-25 NOTE — ED ADULT NURSE NOTE - NS ED NURSE DC INFO COMPLEXITY
Mom states patient hasnt really gained weight since seen last mom looking for guidance on what they can do. Please advise.    Simple: Patient demonstrates quick and easy understanding/Verbalized Understanding

## 2023-05-31 ENCOUNTER — APPOINTMENT (OUTPATIENT)
Dept: OTOLARYNGOLOGY | Facility: CLINIC | Age: 78
End: 2023-05-31
Payer: MEDICARE

## 2023-05-31 VITALS — SYSTOLIC BLOOD PRESSURE: 180 MMHG | DIASTOLIC BLOOD PRESSURE: 74 MMHG

## 2023-05-31 VITALS
HEART RATE: 49 BPM | HEIGHT: 64 IN | BODY MASS INDEX: 23.56 KG/M2 | WEIGHT: 138 LBS | DIASTOLIC BLOOD PRESSURE: 79 MMHG | SYSTOLIC BLOOD PRESSURE: 183 MMHG

## 2023-05-31 PROCEDURE — 99213 OFFICE O/P EST LOW 20 MIN: CPT | Mod: 25

## 2023-05-31 PROCEDURE — 92511 NASOPHARYNGOSCOPY: CPT

## 2023-05-31 NOTE — HISTORY OF PRESENT ILLNESS
[de-identified] : Diane Perry is a 76 yo female with hx mitral valve replacement, currently on xarelto, DVT who was referred by Dr. Barnes for evaluatoin of aural fullness. She notes left greater than right aural fullness starting end of Dec 2022. She denies otalgia, otorrhea. She notes intermittent nonpulsatile tinnitus, unsure of laterality. She denies vertigo. She feels that her hearing is diminished from the left ear. She denies history of recurrent ear infections. She denies fevers, chills. She denies family history of early onset hearing loss or significant noise exposure.\par \par She notes postnasal drainage. She denies nasal congestion or rhinorrhea. She denies history of recurrent sinus infections. She notes possible allergies. She does not use any nasal sprays.\par \par She is currently being treated for cough by Dr. Barnes. CT chest showed bilateral atelectasis and right pleural effusion. She is being treated with nebulizers and recently completed oral steroids. [FreeTextEntry1] : 2/15/23 - Ms. Perry presents for follow-up. Since last visit, she was admitted to the hospital and underwent heart valve replacement. She was using fluticasone nasal spray but her antibiotic was stopped at that time because she was on antibiotics then. She denies otalgia or otorrhea. She notes left diminished hearing. She notes left tinnitus. She denies vertigo, fevers, chills. She notes postnasal drainage. She notes continued issues with cough.\par \par 3/8/23 - Ms. Perry presents for follow-up. She completed antibiotics and topical nasal regimen. She notes continued left aural fullness and diminished hearing. She denies otalgia or otorrhea. She notes left tinnitus. She denies vertigo. She notes postnasal drainage and continued cough. She denies fevers or chills.\par \par 3/29/23 - Ms. Perry presents for follow-up. She completed oral antibiotics, oral steroids, fluticasone, azelastine. She notes continued left aural fullness and diminished hearing. She has intermittent left tinnitus. She denies otalgia, otorrhea, or vertigo. She denies fevers or chills. She denies facial weakness or numbness. She notes intermittent left rhinorrhea and congestion.\par \par 5/3/23 - Ms. Perry presents for follow-up. She has been using fluticasone and azelastine nasal sprays BID. She notes continued issues with clear rhinorrhea and postnasal drainage. She notes left aural fullness and hearing loss. She notes intermittent left tinnitus but no vertigo. She denies otalgia or otorrhea. She denies fevers or chills.\par \par 5/31/23 - Ms. Perry presents for follow-up. She continues to have postnasal drainage and cough. She is using fluticasone and azelastine sprays. She denies otalgia, otorrhea. She has intermittent left tinnitus, no vertigo. Diminished hearing on the left. No fevers or chills.

## 2023-05-31 NOTE — PHYSICAL EXAM
[de-identified] : Right TM intact with no effusion. Left TM intact with yellow mucoid effusion. [Midline] : trachea located in midline position [Normal] : no rashes

## 2023-05-31 NOTE — DATA REVIEWED
[de-identified] : CT temporal bone 5/12/23:\par FINDINGS:\par \par On the right, the ear and periauricular soft tissues appear unremarkable apart from  cartilaginous calcifications. A tiny amount of aerated debris is seen within the right external auditory canal. Otherwise, the external auditory canal is normal in appearance. The tympanic membrane appears unremarkable. The middle ear cavity is well-aerated. The ossicular chain is intact. The otic capsule appears within normal limits. The inner ear structures are normally formed. The cochlea, vestibule, and semicircular canals appear unremarkable. The internal auditory canal is normal in size. The facial nerve has a normal course. Mastoid air cells are well-aerated. No bony erosion or destruction is seen. The vestibular aqueduct appears unremarkable. The bony coverings of the vascular structures are intact.\par \par On the left, the ear and periauricular soft tissues appear unremarkable apart from cartilaginous calcifications. The external auditory canal appears unremarkable. The tympanic membrane appears within normal limits. A tiny amount of soft tissue is seen adherent to the malleus handle abutting the tympanic membrane at the middle ear inner surface. No ossicular chain erosion is seen. The remainder of the ossicular chain appears unremarkable. The middle ear cavity is mostly well aerated. Small amount of soft tissue and/or fluid layers within the sinus tympani and facial recess. The round window niche appears clear. The aditus and mastoid antrum appear clear. There is layering fluid within the left mastoid cavity extending to the mastoid process and tip. No mastoid destruction is appreciated. There is no mastoid sclerosis. The internal auditory canal is normal in caliber. The facial nerve has a normal course. The otic capsule appears intact. The inner ear structures are normally formed. The cochlea, vestibule, and semicircular canals appear unremarkable. The vestibular aqueduct appears within normal limits. The bony coverings of the osseous structures are intact.\par \par The visualized portion of the brain is unremarkable.\par \par Minimal nonspecific soft tissue and/or fluid fills and effaces the left fossa of Rosenmuller. Otherwise, the nasopharynx appears unremarkable.\par \par A small amount of aerated secretions layer within the right maxillary sinus. Additional mild mucosal thickening is seen throughout the ethmoid labyrinth. The remainder of the visualized paranasal sinuses are clear.\par \par Severe bilateral TMJ arthropathy is notable, right side worse than left. Flattening and erosive changes of the bottle condylar heads are seen.\par \par IMPRESSION: Findings most compatible with a left-sided tympanomastoid serous effusion secondary to left-sided eustachian tube dysfunction and/or obstruction.\par \par Minimal nonspecific soft tissue and/or fluid within the left fossa of Rosenmuller which appears effaced compared to the contralateral side. Correlate with direct visualization as an early neoplastic process cannot be completely excluded.\par \par Grossly unremarkable right-sided temporal bone CT exam.

## 2023-05-31 NOTE — ASSESSMENT
[FreeTextEntry1] : Diane Perry presents for follow-up. She has chronic rhinitis and chronic left otitis media with effusion, still persistent after two rounds of antibiotics, oral steroids, fluticasone, and azelastine. She has persistent chronic rhinitis and cough. She had CT temporal bone showing left tympanomastoid effusion. Nasopharyngoscopy was performed today and showed thin secretions near the left eustachian orifice. \par \par The plan was to place left PE tube today. Risks of tympanostomy tube placement were discussed. Risks include but are not limited to bleeding, infection, otorrhea, early extrusion of tube, persistent tympanic membrane perforation, worsening of hearing, need for further procedures. However, prior to starting the procedure, the patient expressed significant anxiety. She recently lost a family member and did not want ot proceed with procedure today. She would possibly like to do this in the OR if her cardiologist clears her for this. She will speak with her cardiologist. Her blood pressure is elevated today but she showed me her reading at home this morning, with SBP in the 140s. She has known white coat syndrome. She will recheck her blood pressure at home.\par \par - Daily antihistamine\par - Fluticasone 2 sprays to each nostril BID\par - Azelastine 2 sprays to each nostril BID\par - Follow up in 1 month. They will call sooner regarding decision to proceed with procedure in OR. If not, we can try again in the office.\par \par

## 2023-06-06 ENCOUNTER — LABORATORY RESULT (OUTPATIENT)
Age: 78
End: 2023-06-06

## 2023-06-06 ENCOUNTER — APPOINTMENT (OUTPATIENT)
Dept: PEDIATRIC ALLERGY IMMUNOLOGY | Facility: CLINIC | Age: 78
End: 2023-06-06
Payer: MEDICARE

## 2023-06-06 VITALS
SYSTOLIC BLOOD PRESSURE: 174 MMHG | HEIGHT: 63.7 IN | HEART RATE: 47 BPM | DIASTOLIC BLOOD PRESSURE: 83 MMHG | BODY MASS INDEX: 23.85 KG/M2 | OXYGEN SATURATION: 97 % | RESPIRATION RATE: 20 BRPM | WEIGHT: 138 LBS

## 2023-06-06 PROCEDURE — 95004 PERQ TESTS W/ALRGNC XTRCS: CPT

## 2023-06-06 PROCEDURE — 99213 OFFICE O/P EST LOW 20 MIN: CPT

## 2023-06-06 PROCEDURE — 99203 OFFICE O/P NEW LOW 30 MIN: CPT | Mod: 25

## 2023-06-06 RX ORDER — LORAZEPAM 0.5 MG/1
0.5 TABLET ORAL ONCE
Qty: 1 | Refills: 0 | Status: DISCONTINUED | COMMUNITY
Start: 2023-06-02 | End: 2023-06-06

## 2023-06-06 NOTE — REASON FOR VISIT
[Initial Consultation] : an initial consultation for [Allergy Evaluation/ Skin Testing] : allergy evaluation and or skin testing [Congestion] : congestion [Runny Nose] : runny nose [To Medication] : allergy to medication [Cough] : cough [Shortness of Breath] : shortness of breath [Family Member] : family member [FreeTextEntry3] : SOB on excursion

## 2023-06-06 NOTE — HISTORY OF PRESENT ILLNESS
[de-identified] : 77 y.o female with hx mitral valve replacement and DVT currently on Xarelto presents for allergy evaluation. Patient has been dealing with B/L ear full ness L>R since December 2022. She also has a persistent PND and a cough since October of 2022. Her nose can run intermittently but the PND is always present. She is constantly clearing her throat and coughing. She denies any SOB or chest tightness. She also denies any significant sinus pressure/pain. She denies any reflux and does not eat late but does have carbonated beverages daily. She denies any recurrent infections or antibiotic use. \par \par Patient under care of pulmonologist Dr. Barnes. Initially she was placed on Trelegy which did not help cough. She is currently on Albuterol nebulization PRN which she also claims does not help cough. Prior CT chest 12/6/22 showed bilateral atelectasis and right pleural effusion(too small to tap) which she claims has been presents for many years. PFT: severe obstruction with some bd respone. Lung volumes reduced with evidence of air trapping. DLCO severely reduced. [place]one 2/3/23: mod obstruction, improvement in FEV1 by ~200cc.  \par \par Patient seen by ENT Dr. Chad Chatterjee for chronic rhinitis and left OM with effusion. Symptoms and ear effusion persisted despite two rounds of antibiotics, oral steroids, fluticasone, and azelastine nasal sprays. Sinonasal endoscopy previously showed thin nasal secretions bilaterally. Previous flexible laryngoscopy showed thin postnasal secretions, no nasopharyngeal mass or lesion. CT 5/12/23 temporal bone findings most compatible with a left-sided tympanomastoid serous effusion secondary to left-sided eustachian tube dysfunction and/or obstruction. Minimal nonspecific soft tissue and/or fluid within the left fossa of Rosenmuller which appears effaced compared to the contralateral side. Early neoplastic process cannot be completely excluded. Left tube placement recommended which she plans on scheduling. Patient asked to remain on antihistamine, Flonase 2s BID and azelastine 2s BID. Allergy eval also recommended.  \par \par Patient claims she's seen no improvement in her cough, PND or ear fullness with use of both Flonase and azelastine which she's continued to use. She has since discontinued Zyrtec as she felt it was not helpful. She is interested in allergy testing. She has testing done 40 years ago which was positive to ragweed and underwent immunotherapy for a few years.

## 2023-06-06 NOTE — ASSESSMENT
[FreeTextEntry1] : 77 y.o female with hx mitral valve replacement and DVT currently on Xarelto presents with chronic ear effusion, post nasal drip and cough despite use of abx, oral/topical steroids, ICS, and oral/topical antihistamines.\par \par Skin test today shows: negative but poor histamine control\par \par Will sent aeroallergen ImmunoCAP and immune eval given recurrent effusion\par \par Suggest:\par - Start Ipratropium 0.06% 1-2 sprays TID-QID\par - if no better after 2 weeks will treat for silent reflux with Pepcid 20mg BID. Patient to call me first\par

## 2023-06-06 NOTE — SOCIAL HISTORY
[House] : [unfilled] lives in a house  [Radiator/Baseboard] : heating provided by radiator(s)/baseboard(s) [Central] : air conditioning provided by central unit [Dry] : dry [Dust Mite Covers] : has dust mite covers [None] : none [] :  [FreeTextEntry1] : lives with \par \par retired [Humidifier] : does not use a humidifier [Dehumidifier] : does not use a dehumidifier [Feather Pillows] : does not have feather pillows [Feather Comforter] : does not have a feather comforter [Bedroom] : not in the bedroom [Basement] : not in the basement [Living Area] : not in the living area [Smokers in Household] : there are no smokers in the home [de-identified] : partial basement [de-identified] : area rug in living area

## 2023-06-06 NOTE — CONSULT LETTER
[Dear  ___] : Dear  [unfilled], [Consult Letter:] : I had the pleasure of evaluating your patient, [unfilled]. [Please see my note below.] : Please see my note below. [Consult Closing:] : Thank you very much for allowing me to participate in the care of this patient.  If you have any questions, please do not hesitate to contact me. [Sincerely,] : Sincerely, [FreeTextEntry3] : Monserrat PACHECO\par

## 2023-06-06 NOTE — REVIEW OF SYSTEMS
[Rhinorrhea] : rhinorrhea [Post Nasal Drip] : post nasal drip [Cough] : cough [Nl] : Integumentary [Immunizations are up to date] : Immunizations are up to date [Difficulty Breathing] : no dyspnea [Wheezing] : no wheezing

## 2023-06-07 LAB
CD16+CD56+ CELLS # BLD: 353 CELLS/UL
CD16+CD56+ CELLS NFR BLD: 41 %
CD19 CELLS NFR BLD: 30 CELLS/UL
CD3 CELLS # BLD: 466 CELLS/UL
CD3 CELLS NFR BLD: 54 %
CD3+CD4+ CELLS # BLD: 350 CELLS/UL
CD3+CD4+ CELLS NFR BLD: 41 %
CD3+CD4+ CELLS/CD3+CD8+ CLL SPEC: 3.79 RATIO
CD3+CD8+ CELLS # SPEC: 92 CELLS/UL
CD3+CD8+ CELLS NFR BLD: 11 %
CELLS.CD3-CD19+/CELLS IN BLOOD: 3 %
DEPRECATED KAPPA LC FREE/LAMBDA SER: 1.66 RATIO
IGA SER QL IEP: 204 MG/DL
IGG SER QL IEP: 771 MG/DL
IGM SER QL IEP: 145 MG/DL
KAPPA LC CSF-MCNC: 1.96 MG/DL
KAPPA LC SERPL-MCNC: 3.25 MG/DL

## 2023-06-08 LAB
A ALTERNATA IGE QN: <0.1 KUA/L
A FUMIGATUS IGE QN: <0.1 KUA/L
BERMUDA GRASS IGE QN: <0.1 KUA/L
BOXELDER IGE QN: <0.1 KUA/L
C HERBARUM IGE QN: <0.1 KUA/L
CALIF WALNUT IGE QN: <0.1 KUA/L
CAT DANDER IGE QN: <0.1 KUA/L
CMN PIGWEED IGE QN: <0.1 KUA/L
COMMON RAGWEED IGE QN: 0.27 KUA/L
COTTONWOOD IGE QN: <0.1 KUA/L
D FARINAE IGE QN: <0.1 KUA/L
D PTERONYSS IGE QN: <0.1 KUA/L
DEPRECATED A ALTERNATA IGE RAST QL: 0
DEPRECATED A FUMIGATUS IGE RAST QL: 0
DEPRECATED BERMUDA GRASS IGE RAST QL: 0
DEPRECATED BOXELDER IGE RAST QL: 0
DEPRECATED C HERBARUM IGE RAST QL: 0
DEPRECATED CAT DANDER IGE RAST QL: 0
DEPRECATED COMMON PIGWEED IGE RAST QL: 0
DEPRECATED COMMON RAGWEED IGE RAST QL: NORMAL
DEPRECATED COTTONWOOD IGE RAST QL: 0
DEPRECATED D FARINAE IGE RAST QL: 0
DEPRECATED D PTERONYSS IGE RAST QL: 0
DEPRECATED DOG DANDER IGE RAST QL: 0
DEPRECATED GOOSEFOOT IGE RAST QL: 0
DEPRECATED LONDON PLANE IGE RAST QL: 0
DEPRECATED MOUSE URINE PROT IGE RAST QL: 0
DEPRECATED MUGWORT IGE RAST QL: 0
DEPRECATED P NOTATUM IGE RAST QL: 0
DEPRECATED RED CEDAR IGE RAST QL: 0
DEPRECATED ROACH IGE RAST QL: 0
DEPRECATED S PNEUM 1 IGG SER-MCNC: 4.4 MCG/ML
DEPRECATED S PNEUM12 AB SER-ACNC: 1.6 MCG/ML
DEPRECATED S PNEUM14 AB SER-ACNC: 3.2 MCG/ML
DEPRECATED S PNEUM17 IGG SER IA-MCNC: 14.8 MCG/ML
DEPRECATED S PNEUM18 IGG SER IA-MCNC: 1.4 MCG/ML
DEPRECATED S PNEUM19 IGG SER-MCNC: 5.6 MCG/ML
DEPRECATED S PNEUM19 IGG SER-MCNC: 7.2 MCG/ML
DEPRECATED S PNEUM2 IGG SER-MCNC: 3.3 MCG/ML
DEPRECATED S PNEUM20 IGG SER-MCNC: 3.3 MCG/ML
DEPRECATED S PNEUM22 IGG SER-MCNC: 16.1 MCG/ML
DEPRECATED S PNEUM23 AB SER-ACNC: 14.4 MCG/ML
DEPRECATED S PNEUM3 AB SER-ACNC: 2.8 MCG/ML
DEPRECATED S PNEUM34 IGG SER-MCNC: 8.7 MCG/ML
DEPRECATED S PNEUM4 AB SER-ACNC: 1.2 MCG/ML
DEPRECATED S PNEUM5 IGG SER-MCNC: 6.6 MCG/ML
DEPRECATED S PNEUM6 IGG SER-MCNC: 45.6 MCG/ML
DEPRECATED S PNEUM7 IGG SER-ACNC: 44.4 MCG/ML
DEPRECATED S PNEUM8 AB SER-ACNC: 6.7 MCG/ML
DEPRECATED S PNEUM9 AB SER-ACNC: NORMAL MCG/ML
DEPRECATED S PNEUM9 IGG SER-MCNC: 5.2 MCG/ML
DEPRECATED SHEEP SORREL IGE RAST QL: 0
DEPRECATED SILVER BIRCH IGE RAST QL: 0
DEPRECATED TIMOTHY IGE RAST QL: 0
DEPRECATED WHITE ASH IGE RAST QL: 0
DEPRECATED WHITE OAK IGE RAST QL: 0
DOG DANDER IGE QN: <0.1 KUA/L
GOOSEFOOT IGE QN: <0.1 KUA/L
LONDON PLANE IGE QN: <0.1 KUA/L
MOUSE URINE PROT IGE QN: <0.1 KUA/L
MUGWORT IGE QN: <0.1 KUA/L
MULBERRY (T70) CLASS: 0
MULBERRY (T70) CONC: <0.1 KUA/L
P NOTATUM IGE QN: <0.1 KUA/L
RED CEDAR IGE QN: <0.1 KUA/L
ROACH IGE QN: <0.1 KUA/L
SHEEP SORREL IGE QN: <0.1 KUA/L
SILVER BIRCH IGE QN: <0.1 KUA/L
STREPTOCOCCUS PNEUMONIAE SEROTYPE 11A: 1.4 MCG/ML
STREPTOCOCCUS PNEUMONIAE SEROTYPE 15B: 3.1 MCG/ML
STREPTOCOCCUS PNEUMONIAE SEROTYPE 33F: 2.5 MCG/ML
TIMOTHY IGE QN: <0.1 KUA/L
TREE ALLERG MIX1 IGE QL: 0
WHITE ASH IGE QN: <0.1 KUA/L
WHITE ELM IGE QN: 0
WHITE ELM IGE QN: <0.1 KUA/L
WHITE OAK IGE QN: <0.1 KUA/L

## 2023-06-12 ENCOUNTER — NON-APPOINTMENT (OUTPATIENT)
Age: 78
End: 2023-06-12

## 2023-06-12 LAB — C TETANI IGG SER-ACNC: 0.67 IU/ML

## 2023-06-14 ENCOUNTER — APPOINTMENT (OUTPATIENT)
Dept: OTOLARYNGOLOGY | Facility: CLINIC | Age: 78
End: 2023-06-14
Payer: MEDICARE

## 2023-06-14 VITALS
SYSTOLIC BLOOD PRESSURE: 158 MMHG | DIASTOLIC BLOOD PRESSURE: 84 MMHG | BODY MASS INDEX: 23.56 KG/M2 | HEART RATE: 60 BPM | HEIGHT: 64 IN | WEIGHT: 138 LBS

## 2023-06-14 PROCEDURE — 69433 CREATE EARDRUM OPENING: CPT | Mod: LT

## 2023-06-14 NOTE — HISTORY OF PRESENT ILLNESS
[de-identified] : Diane Perry is a 78 yo female with hx mitral valve replacement, currently on xarelto, DVT who was referred by Dr. Barnes for evaluatoin of aural fullness. She notes left greater than right aural fullness starting end of Dec 2022. She denies otalgia, otorrhea. She notes intermittent nonpulsatile tinnitus, unsure of laterality. She denies vertigo. She feels that her hearing is diminished from the left ear. She denies history of recurrent ear infections. She denies fevers, chills. She denies family history of early onset hearing loss or significant noise exposure.\par \par She notes postnasal drainage. She denies nasal congestion or rhinorrhea. She denies history of recurrent sinus infections. She notes possible allergies. She does not use any nasal sprays.\par \par She is currently being treated for cough by Dr. Barnes. CT chest showed bilateral atelectasis and right pleural effusion. She is being treated with nebulizers and recently completed oral steroids. [FreeTextEntry1] : 2/15/23 - Ms. Perry presents for follow-up. Since last visit, she was admitted to the hospital and underwent heart valve replacement. She was using fluticasone nasal spray but her antibiotic was stopped at that time because she was on antibiotics then. She denies otalgia or otorrhea. She notes left diminished hearing. She notes left tinnitus. She denies vertigo, fevers, chills. She notes postnasal drainage. She notes continued issues with cough.\par \par 3/8/23 - Ms. Perry presents for follow-up. She completed antibiotics and topical nasal regimen. She notes continued left aural fullness and diminished hearing. She denies otalgia or otorrhea. She notes left tinnitus. She denies vertigo. She notes postnasal drainage and continued cough. She denies fevers or chills.\par \par 3/29/23 - Ms. Perry presents for follow-up. She completed oral antibiotics, oral steroids, fluticasone, azelastine. She notes continued left aural fullness and diminished hearing. She has intermittent left tinnitus. She denies otalgia, otorrhea, or vertigo. She denies fevers or chills. She denies facial weakness or numbness. She notes intermittent left rhinorrhea and congestion.\par \par 5/3/23 - Ms. Perry presents for follow-up. She has been using fluticasone and azelastine nasal sprays BID. She notes continued issues with clear rhinorrhea and postnasal drainage. She notes left aural fullness and hearing loss. She notes intermittent left tinnitus but no vertigo. She denies otalgia or otorrhea. She denies fevers or chills.\par \par 5/31/23 - Ms. ePrry presents for follow-up. She continues to have postnasal drainage and cough. She is using fluticasone and azelastine sprays. She denies otalgia, otorrhea. She has intermittent left tinnitus, no vertigo. Diminished hearing on the left. No fevers or chills.\par \par 6/14/23 - Ms. Perry presents today for left tympanostomy tube placement. She continues to have left aural fullness and diminished hearing. No fevers or chills.

## 2023-06-14 NOTE — ASSESSMENT
[FreeTextEntry1] : Diane Perry presents for follow-up. She has chronic rhinitis and chronic left otitis media with effusion, still persistent after two rounds of antibiotics, oral steroids, fluticasone, and azelastine. She had CT temporal bone showing left tympanomastoid effusion. Nasopharyngoscopy was performed previously and showed thin secretions near the left eustachian orifice. \par \par Left tympanostomy tube was placed today (see above).\par \par - continue fluticasone and azelastine sprays BID.\par - start ciprofloxacin drops - 5 drops BID to left ear for 7 days.\par - follow up in 4 weeks.\par \par \par

## 2023-06-14 NOTE — PHYSICAL EXAM
[de-identified] : Right TM intact with no effusion. Left TM intact with yellow mucoid effusion. [Midline] : trachea located in midline position [Normal] : no rashes

## 2023-06-27 NOTE — ED ADULT TRIAGE NOTE - HEIGHT IN FEET
5

## 2023-06-29 ENCOUNTER — APPOINTMENT (OUTPATIENT)
Dept: CARDIOLOGY | Facility: CLINIC | Age: 78
End: 2023-06-29
Payer: MEDICARE

## 2023-06-29 ENCOUNTER — NON-APPOINTMENT (OUTPATIENT)
Age: 78
End: 2023-06-29

## 2023-06-29 VITALS
DIASTOLIC BLOOD PRESSURE: 83 MMHG | OXYGEN SATURATION: 97 % | HEART RATE: 56 BPM | SYSTOLIC BLOOD PRESSURE: 163 MMHG | HEIGHT: 64 IN | BODY MASS INDEX: 23.56 KG/M2 | WEIGHT: 138 LBS

## 2023-06-29 PROCEDURE — 93000 ELECTROCARDIOGRAM COMPLETE: CPT

## 2023-06-29 PROCEDURE — 99214 OFFICE O/P EST MOD 30 MIN: CPT

## 2023-06-29 NOTE — HISTORY OF PRESENT ILLNESS
[FreeTextEntry1] : Doing okay. Still experiences some shortness of breath on exertion. Denies chest pain or palpitations.

## 2023-06-29 NOTE — CARDIOLOGY SUMMARY
[de-identified] : \par 06/29/23 - sinus bradycardia, 52 bpm, occasional PVC, poor R-wave progression\par  [de-identified] : \par 10/14/10, exercise thallium, 10 METS, diaphragmatic attenuation, LVEF 55%, 4 beats of NSVT, frequent PVCs, PACs\par  [de-identified] : \par 01/26/23 ([facility]) - bio MVR, severe MS (MVA 0.9 cm2), normal LV systolic function, normal RV size and function, LVEF 55-60%\par 06/09/22 - bio MVR, MV gradient (mean 7 mmHg), severe LAE, grossly normal LV systolic function, normal RV size and function, normal pericardium, RVSP 28 mmHg, LVEF 55%\par 06/08/21 - bio MVR, MV gradient (mean 4 mmHg), mod LAE, mild global LV systolic dysfunction, normal RV size and function, LVEF 51%\par 12/10/20 - bio MVR, MV gradient (peak 7 mmHg, mean 3 mmHg), mild LAE, moderate global LV systolic dysfunction, mild LVE, normal RV size and function, LVEF 42%\par 07/17/18 - bio MVR, mod LAE, eccentric LVH, mild diastolic dysfunction, normal RV size and function, LVEF 59%\par  [de-identified] : \par 02/09/23 (DCCV) - synchronized cardioversion of atrial fibrillation\par  [de-identified] : \par 01/30/23 (CATH) - pCx 50% (iFR 0.99)\par 11/14/12 (CATH) - pLAD 20%, LVEF 50%, CI 3.51 L/min/m2\par  [de-identified] : \par 02/03/23 - TMVR with #29 Calix mitral valve by Arsen Vasques MD and Michael Marvin MD\par 12/11/12 - MVR with #31 bovine pericardial valve by Ignacio Wheeler MD

## 2023-06-29 NOTE — DISCUSSION/SUMMARY
[Paroxysmal Atrial Fibrillation] : paroxysmal atrial fibrillation [Stable] : stable [Compensated] : compensated [Hypertension] : hypertension [FreeTextEntry1] : \par Currently stable from a cardiovascular standpoint. Hypertensive today (patient with "white coat" hypertension). Appears euvolemic. History of cardiomyopathy likely nonischemic in origin with interval improvement (LVEF 42% -> 55%). History of nonobstructive CAD. 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 Exertional dyspnea likely secondary to underlying pulmonary issues (bronchiectasis, severe obstruction noted on PFT) and possibly underlying ENT issues. Has chronic cough possibly from post-nasal drip. History of atrial fibrillation (s/p DCCV). Currently with asymptomatic sinus bradycardia. Continue current medications including metoprolol daily and apixaban. ECG completed today and reviewed (findings as noted above). Low salt diet and daily weights advised. Follow up in 4 months. [EKG obtained to assist in diagnosis and management of assessed problem(s)] : EKG obtained to assist in diagnosis and management of assessed problem(s)

## 2023-06-29 NOTE — PHYSICAL EXAM
[Well Developed] : well developed [Well Nourished] : well nourished [No Acute Distress] : no acute distress [Normal Conjunctiva] : normal conjunctiva [Normal Venous Pressure] : normal venous pressure [No Carotid Bruit] : no carotid bruit [No Murmur] : no murmur [No Rub] : no rub [No Gallop] : no gallop [Clear Lung Fields] : clear lung fields [Good Air Entry] : good air entry [No Respiratory Distress] : no respiratory distress  [Soft] : abdomen soft [Non Tender] : non-tender [Normal Gait] : normal gait [No Edema] : no edema [No Cyanosis] : no cyanosis [No Rash] : no rash [No Skin Lesions] : no skin lesions [Moves all extremities] : moves all extremities [No Focal Deficits] : no focal deficits [Normal Speech] : normal speech [Alert and Oriented] : alert and oriented [de-identified] : bradycardia

## 2023-07-14 ENCOUNTER — APPOINTMENT (OUTPATIENT)
Dept: OTOLARYNGOLOGY | Facility: CLINIC | Age: 78
End: 2023-07-14
Payer: MEDICARE

## 2023-07-14 VITALS
HEIGHT: 64 IN | HEART RATE: 52 BPM | WEIGHT: 138 LBS | BODY MASS INDEX: 23.56 KG/M2 | SYSTOLIC BLOOD PRESSURE: 163 MMHG | DIASTOLIC BLOOD PRESSURE: 72 MMHG

## 2023-07-14 PROCEDURE — 99213 OFFICE O/P EST LOW 20 MIN: CPT

## 2023-07-14 NOTE — ASSESSMENT
[FreeTextEntry1] : Diane Perry presents for follow-up. She has chronic rhinitis. She underwent left tympanostomy tube placement at last visit for chronic otitis media with effusion and eustachian tube dysfunction. She has improvement in symptoms overall. Tube is in place but partially occluded by cerumen. Will start drops to clear this. \par \par - continue fluticasone and azelastine sprays BID.\par - start ciprodex drops - 5 drops BID to left ear for 7 days.\par - Dry ear precautions.\par - follow up in 2 months with audio.\par \par \par

## 2023-07-14 NOTE — HISTORY OF PRESENT ILLNESS
[de-identified] : Diane Perry is a 78 yo female with hx mitral valve replacement, currently on xarelto, DVT who was referred by Dr. Barnes for evaluatoin of aural fullness. She notes left greater than right aural fullness starting end of Dec 2022. She denies otalgia, otorrhea. She notes intermittent nonpulsatile tinnitus, unsure of laterality. She denies vertigo. She feels that her hearing is diminished from the left ear. She denies history of recurrent ear infections. She denies fevers, chills. She denies family history of early onset hearing loss or significant noise exposure.\par \par She notes postnasal drainage. She denies nasal congestion or rhinorrhea. She denies history of recurrent sinus infections. She notes possible allergies. She does not use any nasal sprays.\par \par She is currently being treated for cough by Dr. Barnes. CT chest showed bilateral atelectasis and right pleural effusion. She is being treated with nebulizers and recently completed oral steroids. [FreeTextEntry1] : 2/15/23 - Ms. Perry presents for follow-up. Since last visit, she was admitted to the hospital and underwent heart valve replacement. She was using fluticasone nasal spray but her antibiotic was stopped at that time because she was on antibiotics then. She denies otalgia or otorrhea. She notes left diminished hearing. She notes left tinnitus. She denies vertigo, fevers, chills. She notes postnasal drainage. She notes continued issues with cough.\par \par 3/8/23 - Ms. Perry presents for follow-up. She completed antibiotics and topical nasal regimen. She notes continued left aural fullness and diminished hearing. She denies otalgia or otorrhea. She notes left tinnitus. She denies vertigo. She notes postnasal drainage and continued cough. She denies fevers or chills.\par \par 3/29/23 - Ms. Perry presents for follow-up. She completed oral antibiotics, oral steroids, fluticasone, azelastine. She notes continued left aural fullness and diminished hearing. She has intermittent left tinnitus. She denies otalgia, otorrhea, or vertigo. She denies fevers or chills. She denies facial weakness or numbness. She notes intermittent left rhinorrhea and congestion.\par \par 5/3/23 - Ms. Perry presents for follow-up. She has been using fluticasone and azelastine nasal sprays BID. She notes continued issues with clear rhinorrhea and postnasal drainage. She notes left aural fullness and hearing loss. She notes intermittent left tinnitus but no vertigo. She denies otalgia or otorrhea. She denies fevers or chills.\par \par 5/31/23 - Ms. Perry presents for follow-up. She continues to have postnasal drainage and cough. She is using fluticasone and azelastine sprays. She denies otalgia, otorrhea. She has intermittent left tinnitus, no vertigo. Diminished hearing on the left. No fevers or chills.\par \par 6/14/23 - Ms. Perry presents today for left tympanostomy tube placement. She continues to have left aural fullness and diminished hearing. No fevers or chills.\par \par 7/14/23 - Ms. Perry presents for follow-up. She underwent left tympanostomy tube placement at last visit. She dneies otalgia, otorrhea. She notes improved left aural fullness. She notes some left tinnitus. Hearing is improved but not back to baseline. No fevers or chills. She notes nasla congestion and postnasal drip but is not using her sprays.

## 2023-07-14 NOTE — PHYSICAL EXAM
[de-identified] : Right TM intact with no effusion. Left tympanostomy tube in place partially occluded with cerumen. No effusion. [Midline] : trachea located in midline position [Normal] : no rashes

## 2023-08-03 ENCOUNTER — NON-APPOINTMENT (OUTPATIENT)
Age: 78
End: 2023-08-03

## 2023-08-03 ENCOUNTER — APPOINTMENT (OUTPATIENT)
Dept: INTERNAL MEDICINE | Facility: CLINIC | Age: 78
End: 2023-08-03
Payer: MEDICARE

## 2023-08-03 VITALS
WEIGHT: 136 LBS | OXYGEN SATURATION: 94 % | DIASTOLIC BLOOD PRESSURE: 72 MMHG | RESPIRATION RATE: 16 BRPM | HEART RATE: 66 BPM | SYSTOLIC BLOOD PRESSURE: 144 MMHG | HEIGHT: 64 IN | BODY MASS INDEX: 23.22 KG/M2

## 2023-08-03 DIAGNOSIS — R31.9 HEMATURIA, UNSPECIFIED: ICD-10-CM

## 2023-08-03 DIAGNOSIS — N39.0 URINARY TRACT INFECTION, SITE NOT SPECIFIED: ICD-10-CM

## 2023-08-03 PROCEDURE — 99214 OFFICE O/P EST MOD 30 MIN: CPT

## 2023-08-03 NOTE — HISTORY OF PRESENT ILLNESS
[FreeTextEntry8] : Pt is c/o "reddish-brown" urine for 2 days. The urine is darkest in the morning. Pt is on Eliquis due to Afib. Pt denies dysuria or increased urinary frequency.

## 2023-08-05 LAB
ALBUMIN SERPL ELPH-MCNC: 4.2 G/DL
ALP BLD-CCNC: 74 U/L
ALT SERPL-CCNC: 26 U/L
ANION GAP SERPL CALC-SCNC: 12 MMOL/L
AST SERPL-CCNC: 40 U/L
BILIRUB SERPL-MCNC: 0.8 MG/DL
BUN SERPL-MCNC: 20 MG/DL
CALCIUM SERPL-MCNC: 9.5 MG/DL
CHLORIDE SERPL-SCNC: 101 MMOL/L
CO2 SERPL-SCNC: 28 MMOL/L
CREAT SERPL-MCNC: 1.16 MG/DL
CRP SERPL-MCNC: <3 MG/L
EGFR: 49 ML/MIN/1.73M2
GLUCOSE SERPL-MCNC: 85 MG/DL
POTASSIUM SERPL-SCNC: 4.7 MMOL/L
PROT SERPL-MCNC: 6.4 G/DL
SODIUM SERPL-SCNC: 141 MMOL/L

## 2023-08-06 LAB — BACTERIA UR CULT: NORMAL

## 2023-08-08 ENCOUNTER — OUTPATIENT (OUTPATIENT)
Dept: OUTPATIENT SERVICES | Facility: HOSPITAL | Age: 78
LOS: 1 days | End: 2023-08-08
Payer: MEDICARE

## 2023-08-08 ENCOUNTER — APPOINTMENT (OUTPATIENT)
Dept: ULTRASOUND IMAGING | Facility: CLINIC | Age: 78
End: 2023-08-08
Payer: MEDICARE

## 2023-08-08 DIAGNOSIS — Z96.659 PRESENCE OF UNSPECIFIED ARTIFICIAL KNEE JOINT: Chronic | ICD-10-CM

## 2023-08-08 DIAGNOSIS — R31.9 HEMATURIA, UNSPECIFIED: ICD-10-CM

## 2023-08-08 DIAGNOSIS — N39.0 URINARY TRACT INFECTION, SITE NOT SPECIFIED: ICD-10-CM

## 2023-08-08 DIAGNOSIS — Z96.7 PRESENCE OF OTHER BONE AND TENDON IMPLANTS: Chronic | ICD-10-CM

## 2023-08-08 DIAGNOSIS — Z95.2 PRESENCE OF PROSTHETIC HEART VALVE: Chronic | ICD-10-CM

## 2023-08-08 DIAGNOSIS — Z98.89 OTHER SPECIFIED POSTPROCEDURAL STATES: Chronic | ICD-10-CM

## 2023-08-08 PROCEDURE — 76775 US EXAM ABDO BACK WALL LIM: CPT | Mod: 26

## 2023-08-08 PROCEDURE — 76775 US EXAM ABDO BACK WALL LIM: CPT

## 2023-08-09 ENCOUNTER — NON-APPOINTMENT (OUTPATIENT)
Age: 78
End: 2023-08-09

## 2023-08-15 NOTE — DISCHARGE NOTE ADULT - VISION (WITH CORRECTIVE LENSES IF THE PATIENT USUALLY WEARS THEM):
Partially impaired: cannot see medication labels or newsprint, but can see obstacles in path, and the surrounding layout; can count fingers at arm's length Olumiant Pregnancy And Lactation Text: Based on animal studies, Olumiant may cause embryo-fetal harm when administered to pregnant women.  The medication should not be used in pregnancy.  Breastfeeding is not recommended during treatment.

## 2023-09-01 ENCOUNTER — APPOINTMENT (OUTPATIENT)
Dept: INTERNAL MEDICINE | Facility: CLINIC | Age: 78
End: 2023-09-01
Payer: MEDICARE

## 2023-09-01 VITALS
DIASTOLIC BLOOD PRESSURE: 76 MMHG | BODY MASS INDEX: 23.56 KG/M2 | HEART RATE: 57 BPM | SYSTOLIC BLOOD PRESSURE: 138 MMHG | HEIGHT: 64 IN | RESPIRATION RATE: 14 BRPM | WEIGHT: 138 LBS | OXYGEN SATURATION: 97 % | TEMPERATURE: 98.3 F

## 2023-09-01 PROCEDURE — 96372 THER/PROPH/DIAG INJ SC/IM: CPT

## 2023-09-01 PROCEDURE — 99214 OFFICE O/P EST MOD 30 MIN: CPT | Mod: 25

## 2023-09-01 RX ORDER — CYANOCOBALAMIN 1000 UG/ML
1000 INJECTION INTRAMUSCULAR; SUBCUTANEOUS
Qty: 0 | Refills: 0 | Status: COMPLETED | OUTPATIENT
Start: 2023-09-01

## 2023-09-01 RX ADMIN — CYANOCOBALAMIN 0 MCG/ML: 1000 INJECTION INTRAMUSCULAR; SUBCUTANEOUS at 00:00

## 2023-09-01 NOTE — ED ADULT TRIAGE NOTE - BSA (M2)
[place] Augmentin dos veces al día para la infección dental    Seguimiento con cirugía oral según lo planeado    Use lidocaína viscosa según lo prescrito para el dolor de boca    [place] Naprosyn según sea necesario para el dolor. No tome con el estómago vacío. No lo combine con Motrin, ibuprofeno o Advil (es la misma clase de medicamento)     [place] Tylenol según lo prescrito para el dolor de boca    Regresa por empeoramiento del dolor, fiebre, hinchazón facial o cualquier otro síntoma nuevo/preocupante    ==============================  Take Augmentin twice a day for dental infection    Follow up with oral surgery as planned    Use viscous lidocaine as prescribed for mouth pain    Take Naprosyn as needed for pain. Do not take on an empty stomach.  Do not combine with Motrin, Ibuprofen, or Advil (it is the same class of medication)     Take Tylenol as prescribed for mouth pain    Return for worsening pain, fever, facial swelling, or any other new/concerning symptoms
1.72

## 2023-09-01 NOTE — ASSESSMENT
[FreeTextEntry1] : CKD stable  check labs  HTN continue metoprolol  hypothyroid continue levothyroxine  PND try allegra or claritin

## 2023-09-01 NOTE — PHYSICAL EXAM
[Normal] : affect was normal and insight and judgment were intact [de-identified] : coughing intermittently

## 2023-09-01 NOTE — HISTORY OF PRESENT ILLNESS
[Spouse] : spouse [FreeTextEntry1] : BP check [de-identified] : Pt had episode of urinary bleeding. She had ultrasound and dx with renal stones. She knows that she needs to see her urologist. Bleeding  has now cleared up.  c/o chronic cough in her throat.

## 2023-09-02 LAB
ALBUMIN SERPL ELPH-MCNC: 4.1 G/DL
ALP BLD-CCNC: 74 U/L
ALT SERPL-CCNC: 21 U/L
ANION GAP SERPL CALC-SCNC: 11 MMOL/L
APPEARANCE: CLEAR
AST SERPL-CCNC: 30 U/L
BACTERIA: NEGATIVE /HPF
BASOPHILS # BLD AUTO: 0.08 K/UL
BASOPHILS NFR BLD AUTO: 1.6 %
BILIRUB SERPL-MCNC: 0.6 MG/DL
BILIRUBIN URINE: NEGATIVE
BLOOD URINE: NEGATIVE
BUN SERPL-MCNC: 16 MG/DL
CALCIUM SERPL-MCNC: 9.5 MG/DL
CAST: 0 /LPF
CHLORIDE SERPL-SCNC: 103 MMOL/L
CHOLEST SERPL-MCNC: 155 MG/DL
CO2 SERPL-SCNC: 27 MMOL/L
COLOR: YELLOW
CREAT SERPL-MCNC: 1.03 MG/DL
EGFR: 56 ML/MIN/1.73M2
EOSINOPHIL # BLD AUTO: 0.33 K/UL
EOSINOPHIL NFR BLD AUTO: 6.5 %
EPITHELIAL CELLS: 0 /HPF
ESTIMATED AVERAGE GLUCOSE: 114 MG/DL
FOLATE SERPL-MCNC: 12.6 NG/ML
GLUCOSE QUALITATIVE U: NEGATIVE MG/DL
GLUCOSE SERPL-MCNC: 93 MG/DL
HBA1C MFR BLD HPLC: 5.6 %
HCT VFR BLD CALC: 41.7 %
HDLC SERPL-MCNC: 61 MG/DL
HGB BLD-MCNC: 13.1 G/DL
IMM GRANULOCYTES NFR BLD AUTO: 0.2 %
KETONES URINE: NEGATIVE MG/DL
LDLC SERPL CALC-MCNC: 70 MG/DL
LEUKOCYTE ESTERASE URINE: NEGATIVE
LYMPHOCYTES # BLD AUTO: 0.99 K/UL
LYMPHOCYTES NFR BLD AUTO: 19.5 %
MAN DIFF?: NORMAL
MCHC RBC-ENTMCNC: 29.8 PG
MCHC RBC-ENTMCNC: 31.4 GM/DL
MCV RBC AUTO: 95 FL
MICROSCOPIC-UA: NORMAL
MONOCYTES # BLD AUTO: 0.43 K/UL
MONOCYTES NFR BLD AUTO: 8.5 %
NEUTROPHILS # BLD AUTO: 3.23 K/UL
NEUTROPHILS NFR BLD AUTO: 63.7 %
NITRITE URINE: NEGATIVE
NONHDLC SERPL-MCNC: 94 MG/DL
PH URINE: 7
PLATELET # BLD AUTO: 223 K/UL
POTASSIUM SERPL-SCNC: 3.8 MMOL/L
PROT SERPL-MCNC: 6.2 G/DL
PROTEIN URINE: NEGATIVE MG/DL
RBC # BLD: 4.39 M/UL
RBC # FLD: 13 %
RED BLOOD CELLS URINE: 0 /HPF
SODIUM SERPL-SCNC: 142 MMOL/L
SPECIFIC GRAVITY URINE: 1.01
TRIGL SERPL-MCNC: 139 MG/DL
TSH SERPL-ACNC: 3.47 UIU/ML
UROBILINOGEN URINE: 0.2 MG/DL
VIT B12 SERPL-MCNC: 561 PG/ML
WBC # FLD AUTO: 5.07 K/UL
WHITE BLOOD CELLS URINE: 0 /HPF

## 2023-09-07 ENCOUNTER — APPOINTMENT (OUTPATIENT)
Dept: PULMONOLOGY | Facility: CLINIC | Age: 78
End: 2023-09-07
Payer: MEDICARE

## 2023-09-07 VITALS
SYSTOLIC BLOOD PRESSURE: 168 MMHG | DIASTOLIC BLOOD PRESSURE: 80 MMHG | OXYGEN SATURATION: 96 % | WEIGHT: 137 LBS | RESPIRATION RATE: 16 BRPM | HEART RATE: 55 BPM | BODY MASS INDEX: 23.39 KG/M2 | HEIGHT: 64 IN

## 2023-09-07 PROCEDURE — 99214 OFFICE O/P EST MOD 30 MIN: CPT | Mod: 25

## 2023-09-07 PROCEDURE — ZZZZZ: CPT

## 2023-09-07 PROCEDURE — 94729 DIFFUSING CAPACITY: CPT

## 2023-09-07 PROCEDURE — 94010 BREATHING CAPACITY TEST: CPT

## 2023-09-07 PROCEDURE — 94727 GAS DIL/WSHOT DETER LNG VOL: CPT

## 2023-09-07 NOTE — ASSESSMENT
[FreeTextEntry1] : restart nebs bid obtain updated ct chest obtain sputum samples fu after ct  Total encounter time 30 minutes.

## 2023-09-07 NOTE — PROCEDURE
[FreeTextEntry1] : PFT: moderate obstruction.  Lung volumes decreased. DLCO mod reduction, corrects to normal for volumes.  CXR: clear lungs, no focal consolidation or pleural effusions, cardiac silhouette appears normal.  No bony abnormality. cardiac valve.  Reviewed:  EXAM: [id number] - US DPLX LWR EXT VEINS LTD RT - ORDERED BY: OG MCLEAN   PROCEDURE DATE: 12/17/2022    INTERPRETATION: CLINICAL INFORMATION: Right calf pain for one week.  COMPARISON: None available.  TECHNIQUE: Duplex sonography of the RIGHT LOWER extremity veins with color and spectral Doppler, with and without compression.  FINDINGS:  There is normal compressibility of the right common femoral, femoral and popliteal veins. The contralateral common femoral vein is patent. Doppler examination shows normal spontaneous and phasic flow.  There is focal, noncompressible nonocclusive thrombus in a mid peroneal vein. Otherwise no evidence of calf DVT.  IMPRESSION: Right focal calf vein DVT. Discussed the findings with Dr. Chavez, for Dr. Mclean on 12/17/2022 at 2:15 PM. I advised the patient to proceed to the [facility] or [facility] ED for further evaluation, as per Dr. Chavez.     --- End of Report ---    EXAM: [id number] - CT CHEST - ORDERED BY: OG MCLEAN   PROCEDURE DATE: 12/06/2022    INTERPRETATION: HISTORY: Admitting Dxs: R05.9 COUGH, UNSPECIFIED  EXAMINATION: CT CHEST was performed without IV contrast.  COMPARISON: 9/15/2014.  FINDINGS:  AIRWAYS, LUNGS, PLEURA: Peribronchial thickening and mucus impacted left lower lobe airways. Small right pleural effusion. Subsegmental bibasilar atelectasis.  MEDIASTINUM: Dilated left atrium and left ventricle. Mitral valve replacement. No pericardial effusion. Thoracic aorta normal caliber. No large mediastinal lymph nodes.  IMAGED ABDOMEN: Cholelithiasis. Subcentimeter hepatic hypodensities, likely cysts and unchanged. Tiny nonobstructing left renal calyceal calculi.  SOFT TISSUES: Unremarkable.  BONES: Unremarkable.   IMPRESSION:.  Small right pleural effusion.  Subsegmental bilateral lower lobe atelectasis.  --- End of Report ---        DENNY AMRTINEZ MD; Attending Radiologist This document has been electronically signed. Dec 12 2022 8:49AM    Prior exams Reviewed:  CXR: slight improvement of effusion on left, small possibly loculated effusion on right.  no focal consolidation cardiac silhouette appears enlarged.  No bony abnormality.  leni: mod obstruction, improvement in FEV1 by ~200cc   PFT: severe obstruction with some bd respone.  Lung volumes reduced with evidence of air trapping. DLCO severely reduced.     ACC: [id number] EXAM: XR CHEST PORTABLE URGENT 1V  PROCEDURE DATE: 10/30/2022    INTERPRETATION: EXAMINATION: XR CHEST URGENT  CLINICAL INDICATION: Cough x 1m r/o pna  TECHNIQUE: Single frontal, portable view of the chest was obtained.  COMPARISON: Chest x-ray 10/22/2022.  FINDINGS: Mitral valve replacement is noted. The heart size appears enlarged, but cannot be accurately assessed in this projection. Bibasilar hazy opacities more prominent on the left, possibly small layering effusions with compressive atelectasis. There is no pneumothorax. No acute bony abnormality.  IMPRESSION: Bibasilar hazy opacities more prominent on the left, possibly small layering effusions with compressive atelectasis.  --- End of Report ---     MARISOL WARNER MD; Resident Radiologist This document has been electronically signed. DUNIA CARMONA MD; Attending Radiologist This document has been electronically signed. Oct 31 2022 10:01AM

## 2023-09-15 ENCOUNTER — APPOINTMENT (OUTPATIENT)
Dept: OTOLARYNGOLOGY | Facility: CLINIC | Age: 78
End: 2023-09-15
Payer: MEDICARE

## 2023-09-15 VITALS
SYSTOLIC BLOOD PRESSURE: 151 MMHG | HEIGHT: 64 IN | WEIGHT: 137 LBS | DIASTOLIC BLOOD PRESSURE: 84 MMHG | HEART RATE: 51 BPM | BODY MASS INDEX: 23.39 KG/M2

## 2023-09-15 DIAGNOSIS — J31.0 CHRONIC RHINITIS: ICD-10-CM

## 2023-09-15 PROCEDURE — 99213 OFFICE O/P EST LOW 20 MIN: CPT

## 2023-09-15 PROCEDURE — 92557 COMPREHENSIVE HEARING TEST: CPT

## 2023-09-15 PROCEDURE — 92567 TYMPANOMETRY: CPT

## 2023-09-26 ENCOUNTER — APPOINTMENT (OUTPATIENT)
Dept: CT IMAGING | Facility: CLINIC | Age: 78
End: 2023-09-26
Payer: MEDICARE

## 2023-09-26 ENCOUNTER — OUTPATIENT (OUTPATIENT)
Dept: OUTPATIENT SERVICES | Facility: HOSPITAL | Age: 78
LOS: 1 days | End: 2023-09-26
Payer: MEDICARE

## 2023-09-26 DIAGNOSIS — Z98.89 OTHER SPECIFIED POSTPROCEDURAL STATES: Chronic | ICD-10-CM

## 2023-09-26 DIAGNOSIS — Z96.659 PRESENCE OF UNSPECIFIED ARTIFICIAL KNEE JOINT: Chronic | ICD-10-CM

## 2023-09-26 DIAGNOSIS — J47.9 BRONCHIECTASIS, UNCOMPLICATED: ICD-10-CM

## 2023-09-26 DIAGNOSIS — Z95.2 PRESENCE OF PROSTHETIC HEART VALVE: Chronic | ICD-10-CM

## 2023-09-26 DIAGNOSIS — Z96.7 PRESENCE OF OTHER BONE AND TENDON IMPLANTS: Chronic | ICD-10-CM

## 2023-09-26 PROCEDURE — 71250 CT THORAX DX C-: CPT | Mod: 26,MH

## 2023-09-26 PROCEDURE — 71250 CT THORAX DX C-: CPT

## 2023-09-29 LAB — BACTERIA SPT CULT: NORMAL

## 2023-11-02 LAB — FUNGUS SPT CULT: ABNORMAL

## 2023-11-16 ENCOUNTER — APPOINTMENT (OUTPATIENT)
Dept: CARDIOLOGY | Facility: CLINIC | Age: 78
End: 2023-11-16

## 2024-01-25 ENCOUNTER — APPOINTMENT (OUTPATIENT)
Dept: CARDIOLOGY | Facility: CLINIC | Age: 79
End: 2024-01-25
Payer: MEDICARE

## 2024-01-25 ENCOUNTER — NON-APPOINTMENT (OUTPATIENT)
Age: 79
End: 2024-01-25

## 2024-01-25 VITALS
DIASTOLIC BLOOD PRESSURE: 74 MMHG | HEART RATE: 56 BPM | BODY MASS INDEX: 23.56 KG/M2 | HEIGHT: 64 IN | WEIGHT: 138 LBS | SYSTOLIC BLOOD PRESSURE: 170 MMHG | OXYGEN SATURATION: 97 %

## 2024-01-25 DIAGNOSIS — H65.192 OTHER ACUTE NONSUPPURATIVE OTITIS MEDIA, LEFT EAR: ICD-10-CM

## 2024-01-25 PROCEDURE — 99214 OFFICE O/P EST MOD 30 MIN: CPT

## 2024-01-25 PROCEDURE — 93000 ELECTROCARDIOGRAM COMPLETE: CPT

## 2024-01-28 PROBLEM — H65.192 ACUTE OTITIS MEDIA WITH EFFUSION OF LEFT EAR: Status: RESOLVED | Noted: 2023-01-19 | Resolved: 2024-01-28

## 2024-01-28 NOTE — DISCUSSION/SUMMARY
[FreeTextEntry1] : Currently stable from a cardiovascular standpoint. Hypertensive today (patient with "white coat" hypertension). Appears euvolemic. History of cardiomyopathy likely nonischemic in origin with interval improvement (LVEF 42% -> 55%). History of nonobstructive CAD. 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 Exertional dyspnea likely secondary to underlying pulmonary issues (bronchiectasis, severe obstruction noted on PFT) and possibly underlying ENT issues. Has chronic cough possibly from post-nasal drip. History of atrial fibrillation (s/p DCCV). Currently with asymptomatic sinus bradycardia. Continue current medications including metoprolol daily and apixaban. ECG completed today and reviewed (findings as noted above). Low salt diet and daily weights advised. Will schedule an echocardiogram to reassess her cardiac structures and function. Follow up in 4 months. [EKG obtained to assist in diagnosis and management of assessed problem(s)] : EKG obtained to assist in diagnosis and management of assessed problem(s)

## 2024-01-28 NOTE — REVIEW OF SYSTEMS
[Chest Discomfort] : no chest discomfort [Lower Ext Edema] : no extremity edema [Leg Claudication] : no intermittent leg claudication [Palpitations] : no palpitations [Orthopnea] : no orthopnea [PND] : no PND [Syncope] : no syncope [Wheezing] : no wheezing [FreeTextEntry4] : see HPI

## 2024-01-28 NOTE — CARDIOLOGY SUMMARY
[de-identified] : 01/25/24 - sinus bradycardia, 51 bpm, LAE, nonspecific ST abnormality [de-identified] : \par  10/14/10, exercise thallium, 10 METS, diaphragmatic attenuation, LVEF 55%, 4 beats of NSVT, frequent PVCs, PACs\par   [de-identified] : \par  01/26/23 (LIAS) - bio MVR, severe MS (MVA 0.9 cm2), normal LV systolic function, normal RV size and function, LVEF 55-60%\par  06/09/22 - bio MVR, MV gradient (mean 7 mmHg), severe LAE, grossly normal LV systolic function, normal RV size and function, normal pericardium, RVSP 28 mmHg, LVEF 55%\par  06/08/21 - bio MVR, MV gradient (mean 4 mmHg), mod LAE, mild global LV systolic dysfunction, normal RV size and function, LVEF 51%\par  12/10/20 - bio MVR, MV gradient (peak 7 mmHg, mean 3 mmHg), mild LAE, moderate global LV systolic dysfunction, mild LVE, normal RV size and function, LVEF 42%\par  07/17/18 - bio MVR, mod LAE, eccentric LVH, mild diastolic dysfunction, normal RV size and function, LVEF 59%\par   [de-identified] : \par  02/09/23 (DCCV) - synchronized cardioversion of atrial fibrillation\par   [de-identified] : \par  01/30/23 (CATH) - pCx 50% (iFR 0.99)\par  11/14/12 (CATH) - pLAD 20%, LVEF 50%, CI 3.51 L/min/m2\par   [de-identified] : \par  02/03/23 - TMVR with #29 Calix mitral valve by Arsen Vasques MD and Michael Marvin MD\par  12/11/12 - MVR with #31 bovine pericardial valve by Ignacio Wheeler MD

## 2024-01-28 NOTE — HISTORY OF PRESENT ILLNESS
[FreeTextEntry1] : Doing okay. Denies chest pain, shortness of breath or palpitations. Continues to have a cough. BP readings at home has been ranging 120-130's mmHg systolic.

## 2024-02-01 ENCOUNTER — OUTPATIENT (OUTPATIENT)
Dept: OUTPATIENT SERVICES | Facility: HOSPITAL | Age: 79
LOS: 1 days | End: 2024-02-01
Payer: MEDICARE

## 2024-02-01 ENCOUNTER — APPOINTMENT (OUTPATIENT)
Dept: CV DIAGNOSITCS | Facility: HOSPITAL | Age: 79
End: 2024-02-01

## 2024-02-01 DIAGNOSIS — Z96.7 PRESENCE OF OTHER BONE AND TENDON IMPLANTS: Chronic | ICD-10-CM

## 2024-02-01 DIAGNOSIS — Z95.2 PRESENCE OF PROSTHETIC HEART VALVE: ICD-10-CM

## 2024-02-01 DIAGNOSIS — Z95.2 PRESENCE OF PROSTHETIC HEART VALVE: Chronic | ICD-10-CM

## 2024-02-01 DIAGNOSIS — Z98.89 OTHER SPECIFIED POSTPROCEDURAL STATES: Chronic | ICD-10-CM

## 2024-02-01 DIAGNOSIS — Z96.659 PRESENCE OF UNSPECIFIED ARTIFICIAL KNEE JOINT: Chronic | ICD-10-CM

## 2024-02-01 PROCEDURE — 93306 TTE W/DOPPLER COMPLETE: CPT | Mod: 26

## 2024-02-01 RX ORDER — APIXABAN 5 MG/1
5 TABLET, FILM COATED ORAL
Qty: 180 | Refills: 3 | Status: ACTIVE | COMMUNITY
Start: 2023-03-16 | End: 1900-01-01

## 2024-02-26 ENCOUNTER — APPOINTMENT (OUTPATIENT)
Dept: ORTHOPEDIC SURGERY | Facility: CLINIC | Age: 79
End: 2024-02-26
Payer: MEDICARE

## 2024-02-26 VITALS
HEIGHT: 64 IN | DIASTOLIC BLOOD PRESSURE: 94 MMHG | HEART RATE: 56 BPM | BODY MASS INDEX: 23.56 KG/M2 | WEIGHT: 138 LBS | SYSTOLIC BLOOD PRESSURE: 199 MMHG

## 2024-02-26 DIAGNOSIS — M79.642 PAIN IN RIGHT HAND: ICD-10-CM

## 2024-02-26 DIAGNOSIS — M79.641 PAIN IN RIGHT HAND: ICD-10-CM

## 2024-02-26 PROCEDURE — 20550 NJX 1 TENDON SHEATH/LIGAMENT: CPT | Mod: RT

## 2024-02-26 PROCEDURE — 99204 OFFICE O/P NEW MOD 45 MIN: CPT | Mod: 25

## 2024-02-26 PROCEDURE — 73130 X-RAY EXAM OF HAND: CPT | Mod: 50

## 2024-02-26 PROCEDURE — 73110 X-RAY EXAM OF WRIST: CPT | Mod: 50

## 2024-02-26 RX ORDER — LIDOCAINE HYDROCHLORIDE 10 MG/ML
1 INJECTION, SOLUTION INFILTRATION; PERINEURAL
Refills: 0 | Status: COMPLETED | OUTPATIENT
Start: 2024-02-26

## 2024-02-26 RX ORDER — BETAMETHA AC,SOD PHOS/WATER/PF 6 MG/ML
6 (3-3) VIAL (ML) INJECTION
Qty: 1 | Refills: 0 | Status: COMPLETED | OUTPATIENT
Start: 2024-02-26

## 2024-02-26 RX ADMIN — BETAMETHASONE ACETATE AND BETAMETHASONE SODIUM PHOSPHATE 1 MG/ML: 3; 3 INJECTION, SUSPENSION INTRA-ARTICULAR; INTRALESIONAL; INTRAMUSCULAR; SOFT TISSUE at 00:00

## 2024-02-26 RX ADMIN — LIDOCAINE HYDROCHLORIDE 0.5 %: 10 INJECTION, SOLUTION EPIDURAL; INFILTRATION; INTRACAUDAL; PERINEURAL at 00:00

## 2024-02-26 NOTE — PROCEDURE
[FreeTextEntry1] : -  After a discussion of risks and benefits, the patient agreed to proceed with a cortisone injection. -  Side: Right -  Finger: Ring trigger finger -  Medications: 0.5 cc of 1% Lidocaine and 1 cc of Celestone Soluspan, 6mg/cc, using sterile technique. -  Patient tolerated the procedure well, without complications. -  Patient was told that the symptoms may worsen for a day or two, and should then begin to improve. -  Instructions: Patient was instructed on activity modification for the next several days. -  Follow-up: Patient will follow up according to symptoms.

## 2024-02-26 NOTE — HISTORY OF PRESENT ILLNESS
[Right] : right hand dominant [FreeTextEntry1] : She comes in today for evaluation of locking and clicking in her right ring finger that began a month ago.  She also notes pain and cramping in her hands bilaterally that has persisted for a few years.  She takes Eliquis.  She is accompanied by her daughter today.

## 2024-02-26 NOTE — DISCUSSION/SUMMARY
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   I had a discussion with the patient regarding today's visit, the prognosis of this diagnosis, and treatment recommendations and options. At this time, I recommended a cortisone injection at her right ring trigger finger. She opted to proceed with the injection at this time. She will follow up in one month if symptoms at that finger persist or worsen.   Regarding her arthritis, given that she takes Eliquis, I advised that she take extra-strength Tylenol as needed for her symptoms.  The patient has agreed to the above plan of management and has expressed full understanding.  All questions were fully answered to the patient's satisfaction.   My cumulative time spent on this visit included: Preparation for the visit, review of the medical records, review of pertinent diagnostic studies, examination and counseling of the patient on the above diagnosis, treatment plan and prognosis, orders of diagnostic tests, medication and/or appropriate procedures and documentation in the medical records of today's visit.

## 2024-02-26 NOTE — ADDENDUM
[FreeTextEntry1] : I, Conner Vazquez, acted solely as a scribe for Dr. Barrett on this date on 02/26/2024.

## 2024-02-26 NOTE — END OF VISIT
[FreeTextEntry3] : This note was written by Conner Vazquez on 02/26/2024 acting solely as a scribe for Dr. Santino Barrett.   All medical record entries made by the Scribe were at my, Dr. Santino Barrett, direction and personally dictated by me on 02/26/2024. I have personally reviewed the chart and agree that the record accurately reflects my personal performance of the history, physical exam, assessment and plan.

## 2024-02-26 NOTE — PHYSICAL EXAM
[de-identified] :  - Constitutional: This is a female in no obvious distress.   - Psych: Patient is alert and oriented to person, place and time.  Patient has a normal mood and affect. - Cardiovascular: Normal pulses throughout the upper extremities.  No significant varicosities are noted in the upper extremities.  - Neuro: Strength and sensation are intact throughout the upper extremities.  Patient has normal coordination. - Respiratory:  Patient exhibits no evidence of shortness of breath or difficulty breathing. - Skin: No rashes, lesions, or other abnormalities are noted in the upper extremities. ---  Examination of both hands demonstrates findings consistent with arthritis throughout the digits.  She has limitation of terminal flexion and extension.  Examination of her right hand demonstrates swelling and tenderness along the A1 pulley of the ring finger.  She has a mild ring finger PIP joint flexion contracture.  There is no obvious triggering, as she cannot flex to the point of triggering but she states that it does trigger and lock.  She is neurovascularly intact distally in both hands.  [de-identified] :  PA, lateral, and oblique radiographs of both wrists hands demonstrate left greater than right CMC joint arthritis of the thumbs. There is arthritis at the PIP and DIP joints of the digits, most notably at the left middle finger DIP joint and at the PIP joints of the right index, middle, and ring fingers.

## 2024-02-26 NOTE — CONSULT LETTER
[Consult Letter:] : I had the pleasure of evaluating your patient, [unfilled]. [Dear  ___] : Dear  [unfilled], [Consult Closing:] : Thank you very much for allowing me to participate in the care of this patient.  If you have any questions, please do not hesitate to contact me. [Sincerely,] : Sincerely, [FreeTextEntry3] : Santino Barrett M.D. Surgery of the Hand & Upper Extremity Orthopaedic Surgery Chief, Hand Service, [facility]  of Orthopedic Surgery, [employer] at [facility] CenterEmail: [email or web address] Office Phone: [phone]

## 2024-03-06 ENCOUNTER — APPOINTMENT (OUTPATIENT)
Dept: CT IMAGING | Facility: CLINIC | Age: 79
End: 2024-03-06
Payer: MEDICARE

## 2024-03-06 ENCOUNTER — APPOINTMENT (OUTPATIENT)
Dept: UROLOGY | Facility: CLINIC | Age: 79
End: 2024-03-06
Payer: MEDICARE

## 2024-03-06 ENCOUNTER — OUTPATIENT (OUTPATIENT)
Dept: OUTPATIENT SERVICES | Facility: HOSPITAL | Age: 79
LOS: 1 days | End: 2024-03-06
Payer: MEDICARE

## 2024-03-06 ENCOUNTER — RESULT REVIEW (OUTPATIENT)
Age: 79
End: 2024-03-06

## 2024-03-06 DIAGNOSIS — N20.0 CALCULUS OF KIDNEY: ICD-10-CM

## 2024-03-06 DIAGNOSIS — Z96.659 PRESENCE OF UNSPECIFIED ARTIFICIAL KNEE JOINT: Chronic | ICD-10-CM

## 2024-03-06 DIAGNOSIS — Z98.89 OTHER SPECIFIED POSTPROCEDURAL STATES: Chronic | ICD-10-CM

## 2024-03-06 DIAGNOSIS — Z96.7 PRESENCE OF OTHER BONE AND TENDON IMPLANTS: Chronic | ICD-10-CM

## 2024-03-06 DIAGNOSIS — R31.0 GROSS HEMATURIA: ICD-10-CM

## 2024-03-06 PROCEDURE — 74178 CT ABD&PLV WO CNTR FLWD CNTR: CPT

## 2024-03-06 PROCEDURE — 74178 CT ABD&PLV WO CNTR FLWD CNTR: CPT | Mod: 26,MH

## 2024-03-06 PROCEDURE — 99204 OFFICE O/P NEW MOD 45 MIN: CPT

## 2024-03-06 RX ORDER — CIPROFLOXACIN AND DEXAMETHASONE 3; 1 MG/ML; MG/ML
0.3-0.1 SUSPENSION/ DROPS AURICULAR (OTIC) TWICE DAILY
Qty: 1 | Refills: 1 | Status: DISCONTINUED | COMMUNITY
Start: 2023-07-14 | End: 2024-03-06

## 2024-03-06 RX ORDER — CEFUROXIME AXETIL 250 MG/1
250 TABLET ORAL
Qty: 14 | Refills: 0 | Status: DISCONTINUED | COMMUNITY
Start: 2023-08-03 | End: 2024-03-06

## 2024-03-06 RX ORDER — FLUTICASONE PROPIONATE 50 UG/1
50 SPRAY, METERED NASAL TWICE DAILY
Qty: 1 | Refills: 3 | Status: DISCONTINUED | COMMUNITY
Start: 2023-01-19 | End: 2024-03-06

## 2024-03-06 RX ORDER — IPRATROPIUM BROMIDE 42 UG/1
0.06 SPRAY NASAL
Qty: 1 | Refills: 3 | Status: DISCONTINUED | COMMUNITY
Start: 2023-06-06 | End: 2024-03-06

## 2024-03-06 RX ORDER — CIPROFLOXACIN 3 MG/ML
0.3 SOLUTION OPHTHALMIC TWICE DAILY
Qty: 1 | Refills: 1 | Status: DISCONTINUED | COMMUNITY
Start: 2023-06-14 | End: 2024-03-06

## 2024-03-06 NOTE — ASSESSMENT
[FreeTextEntry1] : Recurrent gross hematuria. No risk factors. Likely 2/2 stones in setting of eliquis use but needs full eval.  --UA< UCx, cyto --CTU --Cysto

## 2024-03-06 NOTE — HISTORY OF PRESENT ILLNESS
[FreeTextEntry1] : 78 year old F with cc of gross hematuria. Pt reports that she had tea colored urine back in Aug 2023. She saw PCP. She had US that showed B non obstructing stones. The color resolved. She had again recently. She did not have any flank pain. Hematuria resolved again on its own. Passed a stone on her own once 2014. No issues with UTIs. No tobacco hx. No exposure hx. No family hx of  malignancy. father with head and neck ca. On eliquis for TAVR surgery 2/2023.   At baseline, no urinary complaints.

## 2024-03-07 LAB
APPEARANCE: CLEAR
BACTERIA: NEGATIVE /HPF
BILIRUBIN URINE: NEGATIVE
BLOOD URINE: NEGATIVE
CAST: 1 /LPF
COLOR: NORMAL
EPITHELIAL CELLS: 1 /HPF
GLUCOSE QUALITATIVE U: NEGATIVE MG/DL
KETONES URINE: NEGATIVE MG/DL
LEUKOCYTE ESTERASE URINE: ABNORMAL
MICROSCOPIC-UA: NORMAL
NITRITE URINE: NEGATIVE
PH URINE: 7
PROTEIN URINE: NORMAL MG/DL
RED BLOOD CELLS URINE: 3 /HPF
SPECIFIC GRAVITY URINE: 1.02
URINE CYTOLOGY: NORMAL
UROBILINOGEN URINE: 1 MG/DL
WHITE BLOOD CELLS URINE: 3 /HPF

## 2024-03-08 LAB — BACTERIA UR CULT: NORMAL

## 2024-03-15 ENCOUNTER — OUTPATIENT (OUTPATIENT)
Dept: OUTPATIENT SERVICES | Facility: HOSPITAL | Age: 79
LOS: 1 days | End: 2024-03-15
Payer: MEDICARE

## 2024-03-15 ENCOUNTER — APPOINTMENT (OUTPATIENT)
Dept: UROLOGY | Facility: CLINIC | Age: 79
End: 2024-03-15
Payer: MEDICARE

## 2024-03-15 VITALS
OXYGEN SATURATION: 98 % | RESPIRATION RATE: 16 BRPM | SYSTOLIC BLOOD PRESSURE: 188 MMHG | HEART RATE: 56 BPM | HEIGHT: 64 IN | BODY MASS INDEX: 23.56 KG/M2 | WEIGHT: 138 LBS | DIASTOLIC BLOOD PRESSURE: 83 MMHG

## 2024-03-15 DIAGNOSIS — Z95.2 PRESENCE OF PROSTHETIC HEART VALVE: Chronic | ICD-10-CM

## 2024-03-15 DIAGNOSIS — Z96.7 PRESENCE OF OTHER BONE AND TENDON IMPLANTS: Chronic | ICD-10-CM

## 2024-03-15 DIAGNOSIS — R35.0 FREQUENCY OF MICTURITION: ICD-10-CM

## 2024-03-15 DIAGNOSIS — R31.0 GROSS HEMATURIA: ICD-10-CM

## 2024-03-15 DIAGNOSIS — Z96.659 PRESENCE OF UNSPECIFIED ARTIFICIAL KNEE JOINT: Chronic | ICD-10-CM

## 2024-03-15 DIAGNOSIS — Z98.89 OTHER SPECIFIED POSTPROCEDURAL STATES: Chronic | ICD-10-CM

## 2024-03-15 PROCEDURE — 52000 CYSTOURETHROSCOPY: CPT

## 2024-03-18 PROBLEM — M19.042 ARTHRITIS OF HAND, LEFT: Status: ACTIVE | Noted: 2024-02-26

## 2024-03-18 PROBLEM — M65.341 TRIGGER RING FINGER OF RIGHT HAND: Status: ACTIVE | Noted: 2024-02-26

## 2024-03-18 PROBLEM — R31.0 GROSS HEMATURIA: Status: ACTIVE | Noted: 2024-03-06

## 2024-03-18 PROBLEM — M19.041 ARTHRITIS OF HAND, RIGHT: Status: ACTIVE | Noted: 2024-02-26

## 2024-03-19 DIAGNOSIS — R31.0 GROSS HEMATURIA: ICD-10-CM

## 2024-03-20 ENCOUNTER — RX RENEWAL (OUTPATIENT)
Age: 79
End: 2024-03-20

## 2024-03-20 RX ORDER — AZELASTINE HYDROCHLORIDE 137 UG/1
137 SPRAY, METERED NASAL
Qty: 1 | Refills: 1 | Status: ACTIVE | COMMUNITY
Start: 2023-02-15 | End: 1900-01-01

## 2024-03-25 ENCOUNTER — APPOINTMENT (OUTPATIENT)
Dept: PULMONOLOGY | Facility: CLINIC | Age: 79
End: 2024-03-25
Payer: MEDICARE

## 2024-03-25 ENCOUNTER — APPOINTMENT (OUTPATIENT)
Dept: ORTHOPEDIC SURGERY | Facility: CLINIC | Age: 79
End: 2024-03-25
Payer: MEDICARE

## 2024-03-25 VITALS
SYSTOLIC BLOOD PRESSURE: 176 MMHG | DIASTOLIC BLOOD PRESSURE: 92 MMHG | OXYGEN SATURATION: 96 % | WEIGHT: 142 LBS | HEIGHT: 64 IN | BODY MASS INDEX: 24.24 KG/M2 | HEART RATE: 53 BPM

## 2024-03-25 DIAGNOSIS — M65.341 TRIGGER FINGER, RIGHT RING FINGER: ICD-10-CM

## 2024-03-25 DIAGNOSIS — M19.042 PRIMARY OSTEOARTHRITIS, LEFT HAND: ICD-10-CM

## 2024-03-25 DIAGNOSIS — M19.041 PRIMARY OSTEOARTHRITIS, RIGHT HAND: ICD-10-CM

## 2024-03-25 PROCEDURE — 99213 OFFICE O/P EST LOW 20 MIN: CPT

## 2024-03-25 PROCEDURE — 99214 OFFICE O/P EST MOD 30 MIN: CPT

## 2024-03-25 NOTE — DISCUSSION/SUMMARY
[FreeTextEntry1] : I had a discussion regarding today's visit, the diagnosis and treatment recommendations and options.  We also discussed changes since the last visit.  At this time, we discussed her options, including a course of hand therapy, a Medrol Dosepak and continued conservative management of her symptoms. Given that she is taking Eliquis, she is unable to take an anti-inflammatory medication.  She deferred a course of hand therapy.  At this time, I recommended observation at this time. She will follow up in one month if her symptoms worsen.  If she is not further improved, then I may consider either a Medrol Dosepak or possibly a cortisone injection at the CMC joint of her thumb.  The patient has agreed to the above plan of management and has expressed full understanding.  All questions were fully answered to the patient's satisfaction.  My cumulative time spent on today's visit was greater than 30 minutes and included: Preparation for the visit, review of the medical records, review of pertinent diagnostic studies, examination and counseling of the patient on the above diagnosis, treatment plan and prognosis, orders of diagnostic tests, medications and/or appropriate procedures and documentation in the medical records of today's visit.

## 2024-03-25 NOTE — END OF VISIT
[FreeTextEntry3] : This note was written by Conner Vazquez on 03/25/2024 acting solely as a scribe for Dr. Santino Barrett.   All medical record entries made by the Scribe were at my, Dr. Santino Barrett, direction and personally dictated by me on 03/25/2024. I have personally reviewed the chart and agree that the record accurately reflects my personal performance of the history, physical exam, assessment and plan.

## 2024-03-25 NOTE — PHYSICAL EXAM
[de-identified] :  - Constitutional: This is a female in no obvious distress.   - Psych: Patient is alert and oriented to person, place and time.  Patient has a normal mood and affect. - Cardiovascular: Normal pulses throughout the upper extremities.  No significant varicosities are noted in the upper extremities.  - Neuro: Strength and sensation are intact throughout the upper extremities.  Patient has normal coordination. - Respiratory:  Patient exhibits no evidence of shortness of breath or difficulty breathing. - Skin: No rashes, lesions, or other abnormalities are noted in the upper extremities. ---  Examination of both hands demonstrates findings consistent with arthritis throughout the digits.  She has limitation of terminal flexion and extension.  Examination of her right hand demonstrates no residual swelling or tenderness along the ring finger A1 pulley.  There is mild residual triggering.  She has a mild ring finger PIP joint flexion contracture.  There is clicking with flexion of the index finger but no localized swelling or tenderness along the A1 pulley.  She has complaints of pain along the hypothenar region and also has some tenderness along the CMC joint.  She is neurovascularly intact distally.  [de-identified] :  PA, lateral, and oblique radiographs of both wrists and hands dated 2/26/2024 demonstrated left greater than right CMC joint arthritis of the thumbs. There is arthritis at the PIP and DIP joints of the digits, most notably at the left middle finger DIP joint and at the PIP joints of the right index, middle, and ring fingers.

## 2024-03-25 NOTE — ADDENDUM
[FreeTextEntry1] : I, Conner Vazquez, acted solely as a scribe for Dr. Barrett on this date on 03/25/2024.

## 2024-03-25 NOTE — HISTORY OF PRESENT ILLNESS
[FreeTextEntry1] : Follow-up regarding right ring finger trigger finger and bilateral hand arthritis.  She was seen in the office 4 weeks ago and given a cortisone injection at the right ring finger trigger finger.  She is doing well with regards to her previously injected right ring trigger finger. She notes some occasional locking but her condition is much improved. However, she has developed locking and clicking at her right index finger.  She complains of burning along the volar radial aspect of her right wrist radiating up into her thumb. She also notes the presence of several cysts in her wrists bilaterally.  She takes Eliquis.  She is accompanied by her daughter today.

## 2024-04-08 ENCOUNTER — APPOINTMENT (OUTPATIENT)
Dept: PULMONOLOGY | Facility: CLINIC | Age: 79
End: 2024-04-08
Payer: MEDICARE

## 2024-04-08 VITALS
WEIGHT: 141 LBS | HEART RATE: 55 BPM | SYSTOLIC BLOOD PRESSURE: 140 MMHG | BODY MASS INDEX: 24.07 KG/M2 | OXYGEN SATURATION: 97 % | HEIGHT: 64 IN | DIASTOLIC BLOOD PRESSURE: 89 MMHG

## 2024-04-08 DIAGNOSIS — J47.9 BRONCHIECTASIS, UNCOMPLICATED: ICD-10-CM

## 2024-04-08 DIAGNOSIS — R05.9 COUGH, UNSPECIFIED: ICD-10-CM

## 2024-04-08 PROCEDURE — 99213 OFFICE O/P EST LOW 20 MIN: CPT

## 2024-04-08 RX ORDER — BUDESONIDE 0.5 MG/2ML
0.5 INHALANT ORAL
Qty: 60 | Refills: 5 | Status: ACTIVE | COMMUNITY
Start: 2024-04-08 | End: 1900-01-01

## 2024-04-08 NOTE — HISTORY OF PRESENT ILLNESS
[Never] : never [TextBox_4] : still coughing  no help with 2 nose sprays allegra trelegy albuterol nebs PPI  sputum is clear  needs to drop off sputum cultures

## 2024-04-08 NOTE — PROCEDURE
[FreeTextEntry1] : Prior exams reviewed:  CXR: clear lungs, no focal consolidation or pleural effusions, cardiac silhouette appears enlarged, MVR.  No bony abnormality.    PFT: moderate obstruction.  Lung volumes decreased. DLCO mod reduction, corrects to normal for volumes.  CXR: clear lungs, no focal consolidation or pleural effusions, cardiac silhouette appears normal.  No bony abnormality. cardiac valve.    EXAM: [id number] - US DPLX LWR EXT VEINS LTD RT - ORDERED BY: OG MCLEAN   PROCEDURE DATE: 12/17/2022    INTERPRETATION: CLINICAL INFORMATION: Right calf pain for one week.  COMPARISON: None available.  TECHNIQUE: Duplex sonography of the RIGHT LOWER extremity veins with color and spectral Doppler, with and without compression.  FINDINGS:  There is normal compressibility of the right common femoral, femoral and popliteal veins. The contralateral common femoral vein is patent. Doppler examination shows normal spontaneous and phasic flow.  There is focal, noncompressible nonocclusive thrombus in a mid peroneal vein. Otherwise no evidence of calf DVT.  IMPRESSION: Right focal calf vein DVT. Discussed the findings with Dr. Chavez, for Dr. Mclean on 12/17/2022 at 2:15 PM. I advised the patient to proceed to the [facility] or [facility] ED for further evaluation, as per Dr. Chavez.     --- End of Report ---    EXAM: [id number] - CT CHEST - ORDERED BY: OG MCLEAN   PROCEDURE DATE: 12/06/2022    INTERPRETATION: HISTORY: Admitting Dxs: R05.9 COUGH, UNSPECIFIED  EXAMINATION: CT CHEST was performed without IV contrast.  COMPARISON: 9/15/2014.  FINDINGS:  AIRWAYS, LUNGS, PLEURA: Peribronchial thickening and mucus impacted left lower lobe airways. Small right pleural effusion. Subsegmental bibasilar atelectasis.  MEDIASTINUM: Dilated left atrium and left ventricle. Mitral valve replacement. No pericardial effusion. Thoracic aorta normal caliber. No large mediastinal lymph nodes.  IMAGED ABDOMEN: Cholelithiasis. Subcentimeter hepatic hypodensities, likely cysts and unchanged. Tiny nonobstructing left renal calyceal calculi.  SOFT TISSUES: Unremarkable.  BONES: Unremarkable.   IMPRESSION:.  Small right pleural effusion.  Subsegmental bilateral lower lobe atelectasis.  --- End of Report ---        DENNY MARTINEZ MD; Attending Radiologist This document has been electronically signed. Dec 12 2022 8:49AM    Prior exams Reviewed:  CXR: slight improvement of effusion on left, small possibly loculated effusion on right.  no focal consolidation cardiac silhouette appears enlarged.  No bony abnormality.  leni: mod obstruction, improvement in FEV1 by ~200cc   PFT: severe obstruction with some bd respone.  Lung volumes reduced with evidence of air trapping. DLCO severely reduced.     ACC: [id number] EXAM: XR CHEST PORTABLE URGENT 1V  PROCEDURE DATE: 10/30/2022    INTERPRETATION: EXAMINATION: XR CHEST URGENT  CLINICAL INDICATION: Cough x 1m r/o pna  TECHNIQUE: Single frontal, portable view of the chest was obtained.  COMPARISON: Chest x-ray 10/22/2022.  FINDINGS: Mitral valve replacement is noted. The heart size appears enlarged, but cannot be accurately assessed in this projection. Bibasilar hazy opacities more prominent on the left, possibly small layering effusions with compressive atelectasis. There is no pneumothorax. No acute bony abnormality.  IMPRESSION: Bibasilar hazy opacities more prominent on the left, possibly small layering effusions with compressive atelectasis.  --- End of Report ---     MARISOL WARNER MD; Resident Radiologist This document has been electronically signed. DUNIA CARMONA MD; Attending Radiologist This document has been electronically signed. Oct 31 2022 10:01AM

## 2024-04-08 NOTE — ASSESSMENT
[FreeTextEntry1] : will dc ppi start budesonide nebs cont airway clearance drop off sputum samples should have fu with ENT as she has sinus congestion/ear tubes  fu 1 mo

## 2024-04-15 LAB — BACTERIA SPT CULT: NORMAL

## 2024-04-15 RX ORDER — AMOXICILLIN 500 MG/1
500 CAPSULE ORAL
Qty: 4 | Refills: 2 | Status: ACTIVE | COMMUNITY
Start: 2024-04-15 | End: 1900-01-01

## 2024-04-15 RX ORDER — METOPROLOL TARTRATE 25 MG/1
25 TABLET, FILM COATED ORAL TWICE DAILY
Qty: 180 | Refills: 3 | Status: ACTIVE | COMMUNITY
Start: 2023-02-21 | End: 1900-01-01

## 2024-04-15 RX ORDER — ATORVASTATIN CALCIUM 40 MG/1
40 TABLET, FILM COATED ORAL DAILY
Qty: 90 | Refills: 3 | Status: ACTIVE | COMMUNITY
Start: 2023-02-21 | End: 1900-01-01

## 2024-04-16 ENCOUNTER — NON-APPOINTMENT (OUTPATIENT)
Age: 79
End: 2024-04-16

## 2024-04-22 ENCOUNTER — APPOINTMENT (OUTPATIENT)
Dept: ORTHOPEDIC SURGERY | Facility: CLINIC | Age: 79
End: 2024-04-22
Payer: MEDICARE

## 2024-04-22 VITALS
HEART RATE: 53 BPM | WEIGHT: 138 LBS | SYSTOLIC BLOOD PRESSURE: 158 MMHG | HEIGHT: 64 IN | BODY MASS INDEX: 23.56 KG/M2 | DIASTOLIC BLOOD PRESSURE: 82 MMHG

## 2024-04-22 DIAGNOSIS — M76.891 OTHER SPECIFIED ENTHESOPATHIES OF RIGHT LOWER LIMB, EXCLUDING FOOT: ICD-10-CM

## 2024-04-22 PROCEDURE — 99213 OFFICE O/P EST LOW 20 MIN: CPT

## 2024-04-22 PROCEDURE — 99203 OFFICE O/P NEW LOW 30 MIN: CPT

## 2024-04-22 PROCEDURE — 73502 X-RAY EXAM HIP UNI 2-3 VIEWS: CPT | Mod: RT

## 2024-04-23 ENCOUNTER — OUTPATIENT (OUTPATIENT)
Dept: OUTPATIENT SERVICES | Facility: HOSPITAL | Age: 79
LOS: 1 days | End: 2024-04-23
Payer: MEDICARE

## 2024-04-23 ENCOUNTER — APPOINTMENT (OUTPATIENT)
Dept: MRI IMAGING | Facility: CLINIC | Age: 79
End: 2024-04-23
Payer: MEDICARE

## 2024-04-23 DIAGNOSIS — Z96.659 PRESENCE OF UNSPECIFIED ARTIFICIAL KNEE JOINT: Chronic | ICD-10-CM

## 2024-04-23 DIAGNOSIS — Z95.2 PRESENCE OF PROSTHETIC HEART VALVE: Chronic | ICD-10-CM

## 2024-04-23 DIAGNOSIS — M76.891 OTHER SPECIFIED ENTHESOPATHIES OF RIGHT LOWER LIMB, EXCLUDING FOOT: ICD-10-CM

## 2024-04-23 DIAGNOSIS — Z96.641 PRESENCE OF RIGHT ARTIFICIAL HIP JOINT: ICD-10-CM

## 2024-04-23 DIAGNOSIS — Z96.7 PRESENCE OF OTHER BONE AND TENDON IMPLANTS: Chronic | ICD-10-CM

## 2024-04-23 DIAGNOSIS — Z98.89 OTHER SPECIFIED POSTPROCEDURAL STATES: Chronic | ICD-10-CM

## 2024-04-23 PROCEDURE — 73721 MRI JNT OF LWR EXTRE W/O DYE: CPT

## 2024-04-23 PROCEDURE — 73721 MRI JNT OF LWR EXTRE W/O DYE: CPT | Mod: 26,RT,MH

## 2024-04-25 PROBLEM — M76.891 TENDINITIS OF RIGHT HIP FLEXOR: Status: ACTIVE | Noted: 2024-04-22

## 2024-04-25 NOTE — DISCUSSION/SUMMARY
[de-identified] : The patient presents with probable iliopsoas tendinitis status post right total hip. At this time I recommend a metal reduction MRI.   She will be reassessed after the MRI.

## 2024-04-25 NOTE — HISTORY OF PRESENT ILLNESS
[de-identified] : Diane comes in today with complaints of pain to her right hip.  She had a hip replacement back in 2018 and since then she has done very well.  Over the last several months, she has had increasing complaints of pain in her hip and having difficulty lifting her hip up.  This injury is not work related or due to an automobile accident.  The patient states the pain is localized. The patient describes the pain as sharp and burning.  The patient states lifting her leg makes the symptoms worse.

## 2024-04-25 NOTE — ADDENDUM
[FreeTextEntry1] : This note was written by Monica Moreland on 04/25/2024 acting as scribe for Michael Tuttle III, MD

## 2024-04-25 NOTE — PHYSICAL EXAM
[de-identified] : Left hip: Hip: Range of Motion in Degrees: 	                                 Claimant:	   Normal:	 Flexion (Active) 	                 120 	   120-degrees	 Flexion (Passive)	                 120	   120-degrees	 Extension (Active)	                 -30	   -30-degrees	 Extension (Passive)	 -30	   -30-degrees	 Abduction (Active)	                 45-50	   86-80-yamewxd	 Abduction (Passive)	 45-50	   48-20-xgopasj	 Adduction (Active)  	 20-30	   99-62-nknzpdo	 Adduction (Passive)	 20-30	   16-26-nqetqpd	 Internal Rotation (Active) 	 35	   35-degrees	 Internal Rotation (Passive)	 35	   35-degrees	 External Rotation (Active)	 45	   45-degrees	 External Rotation (Passive)	 45	   45-degrees	  No tenderness with internal or external rotation or axial load.  No tenderness to palpation over the greater trochanter.  Negative Trendelenburg.  No tenderness with resisted abduction.  No weakness to flexion, extension, abduction or adduction.  No evidence of instability.  No motor or sensory deficits.  2+ DP and PT pulses.  Skin is intact.  No scars, rashes or lesions.     Right hip: Hip: Range of Motion in Degrees: 	                                 Claimant:	   Normal:	 Flexion (Active) 	                 120 	   120-degrees	 Flexion (Passive)	                 120	   120-degrees	 Extension (Active)	                 -30	   -30-degrees	 Extension (Passive)	 -30	   -30-degrees	 Abduction (Active)	                 45-50	   76-42-dyioheb	 Abduction (Passive)	 45-50	   59-05-mqixptd	 Adduction (Active)  	 20-30	   31-47-grwwujr	 Adduction (Passive)	 20-30	   82-15-xjotjrd	 Internal Rotation (Active) 	 35	   35-degrees	 Internal Rotation (Passive)	 35	   35-degrees	 External Rotation (Active)	 45	   45-degrees	 External Rotation (Passive)	 45	   45-degrees	  Well-healed scar.  Significant tenderness with resisted hip flexion.  Negative Trendelenburg.  No weakness to flexion, extension, abduction or adduction.  No evidence of instability.  No motor or sensory deficits.  2+ DP and PT pulses.  Skin is intact.  No scars, rashes or lesions.     [de-identified] : Gait: Slightly antalgic to antalgic.  Station: Normal  [de-identified] : Appearance: Well-developed, well-nourished female  in no acute distress.  [de-identified] : Radiographs taken in the office today, two to three views of the right hip including pelvis, show the total hip arthroplasty in acceptable position.

## 2024-04-29 ENCOUNTER — APPOINTMENT (OUTPATIENT)
Dept: ORTHOPEDIC SURGERY | Facility: CLINIC | Age: 79
End: 2024-04-29
Payer: MEDICARE

## 2024-04-29 DIAGNOSIS — M79.606 PAIN IN LEG, UNSPECIFIED: ICD-10-CM

## 2024-04-29 PROCEDURE — 99441: CPT | Mod: NC,93

## 2024-04-30 DIAGNOSIS — S76.811A STRAIN OF OTHER SPECIFIED MUSCLES, FASCIA AND TENDONS AT THIGH LEVEL, RIGHT THIGH, INITIAL ENCOUNTER: ICD-10-CM

## 2024-04-30 DIAGNOSIS — Z96.641 PRESENCE OF RIGHT ARTIFICIAL HIP JOINT: ICD-10-CM

## 2024-05-09 PROBLEM — M79.606 LEG PAIN: Status: ACTIVE | Noted: 2022-12-15

## 2024-05-14 LAB — FUNGUS SPT CULT: NORMAL

## 2024-05-26 ENCOUNTER — RX RENEWAL (OUTPATIENT)
Age: 79
End: 2024-05-26

## 2024-05-26 RX ORDER — OMEPRAZOLE 40 MG/1
40 CAPSULE, DELAYED RELEASE ORAL
Qty: 30 | Refills: 1 | Status: ACTIVE | COMMUNITY
Start: 2024-03-25 | End: 1900-01-01

## 2024-05-30 ENCOUNTER — APPOINTMENT (OUTPATIENT)
Dept: CARDIOLOGY | Facility: CLINIC | Age: 79
End: 2024-05-30

## 2024-05-30 LAB — ACID FAST STN SPT: NORMAL

## 2024-06-13 ENCOUNTER — APPOINTMENT (OUTPATIENT)
Dept: INTERNAL MEDICINE | Facility: CLINIC | Age: 79
End: 2024-06-13

## 2024-06-13 VITALS
TEMPERATURE: 98.1 F | BODY MASS INDEX: 23.56 KG/M2 | WEIGHT: 138 LBS | DIASTOLIC BLOOD PRESSURE: 86 MMHG | OXYGEN SATURATION: 96 % | HEIGHT: 64 IN | HEART RATE: 54 BPM | SYSTOLIC BLOOD PRESSURE: 146 MMHG | RESPIRATION RATE: 16 BRPM

## 2024-06-13 DIAGNOSIS — F41.9 ANXIETY DISORDER, UNSPECIFIED: ICD-10-CM

## 2024-06-13 DIAGNOSIS — I48.0 PAROXYSMAL ATRIAL FIBRILLATION: ICD-10-CM

## 2024-06-13 DIAGNOSIS — N18.30 CHRONIC KIDNEY DISEASE, STAGE 3 UNSPECIFIED: ICD-10-CM

## 2024-06-13 DIAGNOSIS — E03.9 HYPOTHYROIDISM, UNSPECIFIED: ICD-10-CM

## 2024-06-13 PROCEDURE — 96372 THER/PROPH/DIAG INJ SC/IM: CPT

## 2024-06-13 PROCEDURE — G2211 COMPLEX E/M VISIT ADD ON: CPT

## 2024-06-13 PROCEDURE — 99214 OFFICE O/P EST MOD 30 MIN: CPT | Mod: 25

## 2024-06-13 RX ORDER — CYANOCOBALAMIN 1000 UG/ML
1000 INJECTION INTRAMUSCULAR; SUBCUTANEOUS
Qty: 0 | Refills: 0 | Status: COMPLETED | OUTPATIENT
Start: 2024-06-13

## 2024-06-13 RX ADMIN — CYANOCOBALAMIN 0 MCG/ML: 1000 INJECTION INTRAMUSCULAR; SUBCUTANEOUS at 00:00

## 2024-06-13 NOTE — ASSESSMENT
[FreeTextEntry1] : HTN borderline continue metoprolol  PAF continue eliquis  hypothyroid  continue levothyroxine  PND  she has continued symptoms  microhematuria had urology w/u

## 2024-06-13 NOTE — HISTORY OF PRESENT ILLNESS
[FreeTextEntry1] : BP check [de-identified] : Continues to have mucus in her throat and clearing her throat. Has been to ENT and has a tube in her ear.  Mucus started after covid in the fall 2022.  She saw ortho for hip pain and mri done and has tendinitis.

## 2024-06-13 NOTE — HEALTH RISK ASSESSMENT
[Little interest or pleasure doing things] : 1) Little interest or pleasure doing things [Feeling down, depressed, or hopeless] : 2) Feeling down, depressed, or hopeless [0] : 2) Feeling down, depressed, or hopeless: Not at all (0) [PHQ-2 Negative - No further assessment needed] : PHQ-2 Negative - No further assessment needed [[id number]] : 0 [Never] : Never

## 2024-06-14 LAB
ALBUMIN SERPL ELPH-MCNC: 4.1 G/DL
ALP BLD-CCNC: 72 U/L
ALT SERPL-CCNC: 17 U/L
ANION GAP SERPL CALC-SCNC: 8 MMOL/L
AST SERPL-CCNC: 27 U/L
BASOPHILS # BLD AUTO: 0.06 K/UL
BASOPHILS NFR BLD AUTO: 1 %
BILIRUB SERPL-MCNC: 0.8 MG/DL
BUN SERPL-MCNC: 19 MG/DL
CALCIUM SERPL-MCNC: 9.9 MG/DL
CHLORIDE SERPL-SCNC: 104 MMOL/L
CHOLEST SERPL-MCNC: 144 MG/DL
CO2 SERPL-SCNC: 28 MMOL/L
CREAT SERPL-MCNC: 1.09 MG/DL
EGFR: 52 ML/MIN/1.73M2
EOSINOPHIL # BLD AUTO: 0.3 K/UL
EOSINOPHIL NFR BLD AUTO: 5.2 %
ESTIMATED AVERAGE GLUCOSE: 105 MG/DL
GLUCOSE SERPL-MCNC: 92 MG/DL
HBA1C MFR BLD HPLC: 5.3 %
HCT VFR BLD CALC: 40.9 %
HDLC SERPL-MCNC: 58 MG/DL
HGB BLD-MCNC: 12.7 G/DL
IMM GRANULOCYTES NFR BLD AUTO: 0.2 %
LDLC SERPL CALC-MCNC: 69 MG/DL
LYMPHOCYTES # BLD AUTO: 1 K/UL
LYMPHOCYTES NFR BLD AUTO: 17.2 %
MAN DIFF?: NORMAL
MCHC RBC-ENTMCNC: 29.9 PG
MCHC RBC-ENTMCNC: 31.1 GM/DL
MCV RBC AUTO: 96.2 FL
MONOCYTES # BLD AUTO: 0.38 K/UL
MONOCYTES NFR BLD AUTO: 6.5 %
NEUTROPHILS # BLD AUTO: 4.07 K/UL
NEUTROPHILS NFR BLD AUTO: 69.9 %
NONHDLC SERPL-MCNC: 86 MG/DL
PLATELET # BLD AUTO: 215 K/UL
POTASSIUM SERPL-SCNC: 4.7 MMOL/L
PROT SERPL-MCNC: 6.1 G/DL
RBC # BLD: 4.25 M/UL
RBC # FLD: 13.2 %
SODIUM SERPL-SCNC: 140 MMOL/L
TRIGL SERPL-MCNC: 91 MG/DL
TSH SERPL-ACNC: 1 UIU/ML
WBC # FLD AUTO: 5.82 K/UL

## 2024-06-20 ENCOUNTER — NON-APPOINTMENT (OUTPATIENT)
Age: 79
End: 2024-06-20

## 2024-06-20 ENCOUNTER — APPOINTMENT (OUTPATIENT)
Dept: CARDIOLOGY | Facility: CLINIC | Age: 79
End: 2024-06-20
Payer: MEDICARE

## 2024-06-20 VITALS
HEIGHT: 64 IN | WEIGHT: 139 LBS | SYSTOLIC BLOOD PRESSURE: 184 MMHG | HEART RATE: 54 BPM | DIASTOLIC BLOOD PRESSURE: 78 MMHG | BODY MASS INDEX: 23.73 KG/M2 | OXYGEN SATURATION: 97 %

## 2024-06-20 VITALS — SYSTOLIC BLOOD PRESSURE: 175 MMHG | DIASTOLIC BLOOD PRESSURE: 80 MMHG

## 2024-06-20 DIAGNOSIS — I42.9 CARDIOMYOPATHY, UNSPECIFIED: ICD-10-CM

## 2024-06-20 DIAGNOSIS — H66.92 OTITIS MEDIA, UNSPECIFIED, LEFT EAR: ICD-10-CM

## 2024-06-20 DIAGNOSIS — Z95.2 PRESENCE OF PROSTHETIC HEART VALVE: ICD-10-CM

## 2024-06-20 DIAGNOSIS — I10 ESSENTIAL (PRIMARY) HYPERTENSION: ICD-10-CM

## 2024-06-20 PROCEDURE — 99214 OFFICE O/P EST MOD 30 MIN: CPT

## 2024-06-20 PROCEDURE — 93000 ELECTROCARDIOGRAM COMPLETE: CPT

## 2024-06-20 PROCEDURE — G2211 COMPLEX E/M VISIT ADD ON: CPT

## 2024-06-23 PROBLEM — H66.92 CHRONIC OTITIS MEDIA OF LEFT EAR: Status: RESOLVED | Noted: 2023-05-03 | Resolved: 2024-06-23

## 2024-06-23 NOTE — CARDIOLOGY SUMMARY
[de-identified] : 06/20/24 - sinus bradycardia, 52 bpm, LAE, nonspecific ST abnormality [de-identified] : 10/14/10, exercise thallium, 10 METS, diaphragmatic attenuation, LVEF 55%, 4 beats of NSVT, frequent PVCs, PACs [de-identified] : 02/01/24 - transcatheter MV replacement, mean MV gradient 3 mmHg, severe LAE, mildly decreased LV systolic function, normal RV size and function, PFO versus secundum ASD with left to right shunting by color flow doppler, LVEF 50% 01/26/23 ([facility]) - bio MVR, severe MS (MVA 0.9 cm2), normal LV systolic function, normal RV size and function, LVEF 55-60% 06/09/22 - bio MVR, MV gradient (mean 7 mmHg), severe LAE, grossly normal LV systolic function, normal RV size and function, normal pericardium, RVSP 28 mmHg, LVEF 55% 06/08/21 - bio MVR, MV gradient (mean 4 mmHg), mod LAE, mild global LV systolic dysfunction, normal RV size and function, LVEF 51% 12/10/20 - bio MVR, MV gradient (peak 7 mmHg, mean 3 mmHg), mild LAE, moderate global LV systolic dysfunction, mild LVE, normal RV size and function, LVEF 42% 07/17/18 - bio MVR, mod LAE, eccentric LVH, mild diastolic dysfunction, normal RV size and function, LVEF 59% [de-identified] : 02/09/23 ([facility]) - synchronized cardioversion of atrial fibrillation [de-identified] : 01/30/23 (CATH) - pCx 50% (iFR 0.99) 11/14/12 (CATH) - pLAD 20%, LVEF 50%, CI 3.51 L/min/m2 [de-identified] : 02/03/23 - TMVR with #29 Calix mitral valve by Arsen Vasques MD and Michael Marvin MD 12/11/12 - MVR with #31 bovine pericardial valve by Ignacio Wheeler MD

## 2024-06-23 NOTE — REVIEW OF SYSTEMS
[Dyspnea on exertion] : dyspnea during exertion [Negative] : Musculoskeletal [Chest Discomfort] : no chest discomfort [Lower Ext Edema] : no extremity edema [Leg Claudication] : no intermittent leg claudication [Palpitations] : no palpitations [Orthopnea] : no orthopnea [PND] : no PND [Syncope] : no syncope [FreeTextEntry4] : see HPI

## 2024-06-23 NOTE — HISTORY OF PRESENT ILLNESS
[FreeTextEntry1] : Complains of right hip pains which she has been dealing with for past 6 years. Denies chest pain or palpitations. Experiences shortness of breath on moderate exertion which is chronic.

## 2024-06-23 NOTE — DISCUSSION/SUMMARY
[Paroxysmal Atrial Fibrillation] : paroxysmal atrial fibrillation [Stable] : stable [Compensated] : compensated [Hypertension] : hypertension [FreeTextEntry1] : Currently stable from a cardiovascular standpoint. Hypertensive today (patient with "white coat" hypertension). Appears euvolemic. History of cardiomyopathy likely nonischemic in origin with interval improvement (LVEF 42% -> 50-55%). History of nonobstructive CAD. 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 Exertional dyspnea likely secondary to underlying pulmonary issues (bronchiectasis, severe obstruction noted on PFT) and possibly underlying ENT issues. Has chronic cough possibly from post-nasal drip. History of atrial fibrillation (s/p DCCV). Currently with asymptomatic sinus bradycardia. Continue current medications including metoprolol tartrate daily and apixaban. ECG completed today and reviewed (findings as noted above). Low salt diet and daily weights advised. Follow up in 4 months. [EKG obtained to assist in diagnosis and management of assessed problem(s)] : EKG obtained to assist in diagnosis and management of assessed problem(s)

## 2024-06-23 NOTE — PHYSICAL EXAM
[Well Developed] : well developed [Well Nourished] : well nourished [No Acute Distress] : no acute distress [Normal Conjunctiva] : normal conjunctiva [Normal Venous Pressure] : normal venous pressure [No Carotid Bruit] : no carotid bruit [No Murmur] : no murmur [No Rub] : no rub [No Gallop] : no gallop [Clear Lung Fields] : clear lung fields [Good Air Entry] : good air entry [No Respiratory Distress] : no respiratory distress  [Soft] : abdomen soft [Non Tender] : non-tender [Normal Gait] : normal gait [No Edema] : no edema [No Cyanosis] : no cyanosis [No Rash] : no rash [No Skin Lesions] : no skin lesions [Moves all extremities] : moves all extremities [No Focal Deficits] : no focal deficits [Normal Speech] : normal speech [Alert and Oriented] : alert and oriented [de-identified] : bradycardia

## 2024-07-04 NOTE — ASSESSMENT
[FreeTextEntry1] : This is a 77F PMHx of HTN, Afib with RVR, hypothyroidism, OA, DVT started on Xarelto 6 weeks prior to admission, MV stenosis s/p prosthetic MV (2012) s/p mitral JUNIOR Size 29 Calix Mitral Valve Replacement using transcatheter femoral approach, bilateral femoral veins accessed with Dr. Vasques on 2/3/23 c/b reintubation due to hypoventilation.   Patient was extubated to BiPap on 2/3 was discharged home on 2/5/23.\par  \par On 2/8 presented to CTS office today from home c/o palpitations/fluttering for the last several days on and off with improvement/worsening of symptoms and while in office was found to be in afib with RVR up to 160s.  Received amio 400mg PO x 1 in office and admitted inpatient to tele floor bed on [facility]. Denies fevers/chills.  No headache / dizziness. No chest pain or shortness of breath.  No abdominal pain/nausea/vomiting. Patient now admitted to [facility] tele bed under Dr. Belcher's service for further management of rapid atrial fibrillation.  Case discussed with Dr. Vasques.  NPO for Cardioversion today compliant with Xarelto in NSR s/p cardioversion- amiodarone load dose  4/12  titrate BB as tolerated likely home Sunday Monday.  No active issues overnight. Remains SR on Amio and lopressor and  tolerating well. Medically cleared for discharge.  Patient presents today for post hospitalization visit. \par  \par 
04-Jul-2024 19:25

## 2024-07-15 ENCOUNTER — RX RENEWAL (OUTPATIENT)
Age: 79
End: 2024-07-15

## 2024-07-18 ENCOUNTER — APPOINTMENT (OUTPATIENT)
Dept: UROLOGY | Facility: CLINIC | Age: 79
End: 2024-07-18

## 2024-08-05 ENCOUNTER — RX RENEWAL (OUTPATIENT)
Age: 79
End: 2024-08-05

## 2024-09-11 ENCOUNTER — APPOINTMENT (OUTPATIENT)
Dept: OTOLARYNGOLOGY | Facility: CLINIC | Age: 79
End: 2024-09-11
Payer: MEDICARE

## 2024-09-11 VITALS
SYSTOLIC BLOOD PRESSURE: 171 MMHG | WEIGHT: 138 LBS | DIASTOLIC BLOOD PRESSURE: 82 MMHG | HEIGHT: 64 IN | BODY MASS INDEX: 23.56 KG/M2 | HEART RATE: 49 BPM

## 2024-09-11 VITALS — DIASTOLIC BLOOD PRESSURE: 72 MMHG | SYSTOLIC BLOOD PRESSURE: 158 MMHG

## 2024-09-11 DIAGNOSIS — H60.502 UNSPECIFIED ACUTE NONINFECTIVE OTITIS EXTERNA, LEFT EAR: ICD-10-CM

## 2024-09-11 DIAGNOSIS — J31.0 CHRONIC RHINITIS: ICD-10-CM

## 2024-09-11 PROCEDURE — 31231 NASAL ENDOSCOPY DX: CPT

## 2024-09-11 PROCEDURE — 99214 OFFICE O/P EST MOD 30 MIN: CPT | Mod: 25

## 2024-09-11 RX ORDER — IPRATROPIUM BROMIDE 42 UG/1
0.06 SPRAY NASAL
Qty: 3 | Refills: 1 | Status: ACTIVE | COMMUNITY
Start: 2024-09-11 | End: 1900-01-01

## 2024-09-11 RX ORDER — CIPROFLOXACIN AND DEXAMETHASONE 3; 1 MG/ML; MG/ML
0.3-0.1 SUSPENSION/ DROPS AURICULAR (OTIC) TWICE DAILY
Qty: 1 | Refills: 0 | Status: ACTIVE | COMMUNITY
Start: 2024-09-11 | End: 1900-01-01

## 2024-09-11 NOTE — HISTORY OF PRESENT ILLNESS
[de-identified] : Diane Perry is a 76 yo female with hx mitral valve replacement, currently on xarelto, DVT who was referred by Dr. Barnes for evaluatoin of aural fullness. She notes left greater than right aural fullness starting end of Dec 2022. She denies otalgia, otorrhea. She notes intermittent nonpulsatile tinnitus, unsure of laterality. She denies vertigo. She feels that her hearing is diminished from the left ear. She denies history of recurrent ear infections. She denies fevers, chills. She denies family history of early onset hearing loss or significant noise exposure.\par  \par  She notes postnasal drainage. She denies nasal congestion or rhinorrhea. She denies history of recurrent sinus infections. She notes possible allergies. She does not use any nasal sprays.\par  \par  She is currently being treated for cough by Dr. Barnes. CT chest showed bilateral atelectasis and right pleural effusion. She is being treated with nebulizers and recently completed oral steroids. [FreeTextEntry1] : 2/15/23 - Ms. Perry presents for follow-up. Since last visit, she was admitted to the hospital and underwent heart valve replacement. She was using fluticasone nasal spray but her antibiotic was stopped at that time because she was on antibiotics then. She denies otalgia or otorrhea. She notes left diminished hearing. She notes left tinnitus. She denies vertigo, fevers, chills. She notes postnasal drainage. She notes continued issues with cough.  3/8/23 - Ms. Perry presents for follow-up. She completed antibiotics and topical nasal regimen. She notes continued left aural fullness and diminished hearing. She denies otalgia or otorrhea. She notes left tinnitus. She denies vertigo. She notes postnasal drainage and continued cough. She denies fevers or chills.  3/29/23 - Ms. Perry presents for follow-up. She completed oral antibiotics, oral steroids, fluticasone, azelastine. She notes continued left aural fullness and diminished hearing. She has intermittent left tinnitus. She denies otalgia, otorrhea, or vertigo. She denies fevers or chills. She denies facial weakness or numbness. She notes intermittent left rhinorrhea and congestion.  5/3/23 - Ms. Perry presents for follow-up. She has been using fluticasone and azelastine nasal sprays BID. She notes continued issues with clear rhinorrhea and postnasal drainage. She notes left aural fullness and hearing loss. She notes intermittent left tinnitus but no vertigo. She denies otalgia or otorrhea. She denies fevers or chills.  5/31/23 - Ms. Perry presents for follow-up. She continues to have postnasal drainage and cough. She is using fluticasone and azelastine sprays. She denies otalgia, otorrhea. She has intermittent left tinnitus, no vertigo. Diminished hearing on the left. No fevers or chills.  6/14/23 - Ms. Perry presents today for left tympanostomy tube placement. She continues to have left aural fullness and diminished hearing. No fevers or chills.  7/14/23 - Ms. Perry presents for follow-up. She underwent left tympanostomy tube placement at last visit. She dneies otalgia, otorrhea. She notes improved left aural fullness. She notes some left tinnitus. Hearing is improved but not back to baseline. No fevers or chills. She notes nasla congestion and postnasal drip but is not using her sprays.  9/15/23 - iDane Perry presents for follow-up. She completed ciprodex drops to left ear. She denies otalgia, otorrhea. She notes some left tinnitus. She feels hearing is improved. She denies vertigo. She denies nasal congestion. She notes postnasal drip and cough. No fevers or chills. She has not tried using her nasal sprays.   9/11/24 - Ms Carrillo presents for follow-up. She notes some left ear clicking and itchiness. She denies otalgia, otorrhea, vertigo, or hearing change. No recnet fevers. She notes some nasal congestion. She notes intermittent rhinorrhea and postnasal drainage. She is on fluticasone and azelastine. No recent fevers.

## 2024-09-11 NOTE — PHYSICAL EXAM
[de-identified] : Clot in left EAC. Right EAC wnl. [de-identified] : Unable to visualize left tympanostomy tube due to clot. Right TM intact with no effusion. [Nasal Endoscopy Performed] : nasal endoscopy was performed, see procedure section for findings [Midline] : trachea located in midline position [Normal] : no rashes

## 2024-09-11 NOTE — ASSESSMENT
[FreeTextEntry1] : Diane Perry presents for follow-up. She has chronic rhinitis and has been using fluticasone/azelastine without benefit. Sinonasal endoscopy was performed showing left sided thin drainage. Will start ipratropium spray. She underwent left tympanostomy tube placement in 6/2023. 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 On exma, today, she has clot obstructing her left EAC, unable to visualize left tympanostomy tube. Will start ciprodex drops for otitis externa and to dissolve clot.  - start ciprodex - 5 drops BID to left ear for 7 days. - start ipratropium - 2 sprays to each nostril BID - f/u in 3 weeks. Needs new audio once clot is gone.

## 2024-10-02 ENCOUNTER — APPOINTMENT (OUTPATIENT)
Dept: OTOLARYNGOLOGY | Facility: CLINIC | Age: 79
End: 2024-10-02
Payer: MEDICARE

## 2024-10-02 VITALS
HEIGHT: 64 IN | WEIGHT: 137 LBS | BODY MASS INDEX: 23.39 KG/M2 | DIASTOLIC BLOOD PRESSURE: 83 MMHG | HEART RATE: 53 BPM | SYSTOLIC BLOOD PRESSURE: 164 MMHG

## 2024-10-02 DIAGNOSIS — H90.3 SENSORINEURAL HEARING LOSS, BILATERAL: ICD-10-CM

## 2024-10-02 DIAGNOSIS — J31.0 CHRONIC RHINITIS: ICD-10-CM

## 2024-10-02 DIAGNOSIS — R05.3 CHRONIC COUGH: ICD-10-CM

## 2024-10-02 PROCEDURE — 31231 NASAL ENDOSCOPY DX: CPT

## 2024-10-02 PROCEDURE — 92567 TYMPANOMETRY: CPT

## 2024-10-02 PROCEDURE — 92557 COMPREHENSIVE HEARING TEST: CPT

## 2024-10-02 PROCEDURE — 99213 OFFICE O/P EST LOW 20 MIN: CPT | Mod: 25

## 2024-10-02 NOTE — ASSESSMENT
[FreeTextEntry1] : Diane Perry presents for follow-up. She has chronic rhinitis. She has tried ipratropium, fluticasone, and azelastine without benefit. Sinonasal endoscopy was performed showing left sided thin drainage. Flexible laryngoscopy is normal. Will obtain CT sinus for further evaluation. She underwent left tympanostomy tube placement in 6/2023. It has extruded and was removed today. audiogram was performed and reviewed showing type A tymp AD, type C/Ad tymp AS, hearing wnl sloping to moderately severe SNHL AD, mild slopng ot profound SNHL AS. Hearing is stable with stable asymmetry. She defers MRI brain/IAC for asymmetric hearing. She defers hearing aid evaluation.  - CT Sinus - f/u after CT

## 2024-10-02 NOTE — PHYSICAL EXAM
[Nasal Endoscopy Performed] : nasal endoscopy was performed, see procedure section for findings [Midline] : trachea located in midline position No [Laryngoscopy Performed] : laryngoscopy was performed, see procedure section for findings [Normal] : no rashes [de-identified] : Extruded left PE tube removed. Right EAC wnl.

## 2024-10-02 NOTE — DATA REVIEWED
[de-identified] : Audiogram 10/2/24: Type A tymp AD, type C/Ad tymp AS Hearing wnl sloping to moderately severe SNHL AD. Mild slopng ot profound SNHL AS.

## 2024-10-02 NOTE — HISTORY OF PRESENT ILLNESS
[de-identified] : Diane Perry is a 76 yo female with hx mitral valve replacement, currently on xarelto, DVT who was referred by Dr. Barnes for evaluatoin of aural fullness. She notes left greater than right aural fullness starting end of Dec 2022. She denies otalgia, otorrhea. She notes intermittent nonpulsatile tinnitus, unsure of laterality. She denies vertigo. She feels that her hearing is diminished from the left ear. She denies history of recurrent ear infections. She denies fevers, chills. She denies family history of early onset hearing loss or significant noise exposure.\par  \par  She notes postnasal drainage. She denies nasal congestion or rhinorrhea. She denies history of recurrent sinus infections. She notes possible allergies. She does not use any nasal sprays.\par  \par  She is currently being treated for cough by Dr. Barnes. CT chest showed bilateral atelectasis and right pleural effusion. She is being treated with nebulizers and recently completed oral steroids. [FreeTextEntry1] : 2/15/23 - Ms. Perry presents for follow-up. Since last visit, she was admitted to the hospital and underwent heart valve replacement. She was using fluticasone nasal spray but her antibiotic was stopped at that time because she was on antibiotics then. She denies otalgia or otorrhea. She notes left diminished hearing. She notes left tinnitus. She denies vertigo, fevers, chills. She notes postnasal drainage. She notes continued issues with cough.  3/8/23 - Ms. Perry presents for follow-up. She completed antibiotics and topical nasal regimen. She notes continued left aural fullness and diminished hearing. She denies otalgia or otorrhea. She notes left tinnitus. She denies vertigo. She notes postnasal drainage and continued cough. She denies fevers or chills.  3/29/23 - Ms. Perry presents for follow-up. She completed oral antibiotics, oral steroids, fluticasone, azelastine. She notes continued left aural fullness and diminished hearing. She has intermittent left tinnitus. She denies otalgia, otorrhea, or vertigo. She denies fevers or chills. She denies facial weakness or numbness. She notes intermittent left rhinorrhea and congestion.  5/3/23 - Ms. Perry presents for follow-up. She has been using fluticasone and azelastine nasal sprays BID. She notes continued issues with clear rhinorrhea and postnasal drainage. She notes left aural fullness and hearing loss. She notes intermittent left tinnitus but no vertigo. She denies otalgia or otorrhea. She denies fevers or chills.  5/31/23 - Ms. Perry presents for follow-up. She continues to have postnasal drainage and cough. She is using fluticasone and azelastine sprays. She denies otalgia, otorrhea. She has intermittent left tinnitus, no vertigo. Diminished hearing on the left. No fevers or chills.  6/14/23 - Ms. Perry presents today for left tympanostomy tube placement. She continues to have left aural fullness and diminished hearing. No fevers or chills.  7/14/23 - Ms. Perry presents for follow-up. She underwent left tympanostomy tube placement at last visit. She dneies otalgia, otorrhea. She notes improved left aural fullness. She notes some left tinnitus. Hearing is improved but not back to baseline. No fevers or chills. She notes nasla congestion and postnasal drip but is not using her sprays.  9/15/23 - Diane Perry presents for follow-up. She completed ciprodex drops to left ear. She denies otalgia, otorrhea. She notes some left tinnitus. She feels hearing is improved. She denies vertigo. She denies nasal congestion. She notes postnasal drip and cough. No fevers or chills. She has not tried using her nasal sprays.   9/11/24 - Ms Carrillo presents for follow-up. She notes some left ear clicking and itchiness. She denies otalgia, otorrhea, vertigo, or hearing change. No recnet fevers. She notes some nasal congestion. She notes intermittent rhinorrhea and postnasal drainage. She is on fluticasone and azelastine. No recent fevers.  10/2/24 - Ms. Perry presents for follow-up. She has used ipratropium BID. However, she notes intermittent postnasal drip and rhinorrhea. She denies nasal congestion. She notes cough related to this. She denies otalgia or otorrhea. She completed ciprodex drops. She notes intermittent tinnitus. She denies change in hearing. No fevers.

## 2024-10-10 ENCOUNTER — RX RENEWAL (OUTPATIENT)
Age: 79
End: 2024-10-10

## 2024-10-17 ENCOUNTER — NON-APPOINTMENT (OUTPATIENT)
Age: 79
End: 2024-10-17

## 2024-10-17 ENCOUNTER — APPOINTMENT (OUTPATIENT)
Dept: CARDIOLOGY | Facility: CLINIC | Age: 79
End: 2024-10-17
Payer: MEDICARE

## 2024-10-17 VITALS
BODY MASS INDEX: 23.56 KG/M2 | SYSTOLIC BLOOD PRESSURE: 173 MMHG | WEIGHT: 138 LBS | OXYGEN SATURATION: 95 % | HEART RATE: 59 BPM | HEIGHT: 64 IN | DIASTOLIC BLOOD PRESSURE: 76 MMHG

## 2024-10-17 DIAGNOSIS — R31.9 HEMATURIA, UNSPECIFIED: ICD-10-CM

## 2024-10-17 DIAGNOSIS — I10 ESSENTIAL (PRIMARY) HYPERTENSION: ICD-10-CM

## 2024-10-17 DIAGNOSIS — I48.0 PAROXYSMAL ATRIAL FIBRILLATION: ICD-10-CM

## 2024-10-17 PROCEDURE — 93000 ELECTROCARDIOGRAM COMPLETE: CPT

## 2024-10-17 PROCEDURE — 99214 OFFICE O/P EST MOD 30 MIN: CPT

## 2024-10-17 PROCEDURE — G2211 COMPLEX E/M VISIT ADD ON: CPT

## 2024-10-18 PROBLEM — R31.9 HEMATURIA OF UNKNOWN ETIOLOGY: Status: RESOLVED | Noted: 2023-08-03 | Resolved: 2024-10-18

## 2024-10-19 ENCOUNTER — OUTPATIENT (OUTPATIENT)
Dept: OUTPATIENT SERVICES | Facility: HOSPITAL | Age: 79
LOS: 1 days | End: 2024-10-19
Payer: MEDICARE

## 2024-10-19 ENCOUNTER — APPOINTMENT (OUTPATIENT)
Dept: CT IMAGING | Facility: CLINIC | Age: 79
End: 2024-10-19

## 2024-10-19 DIAGNOSIS — Z98.89 OTHER SPECIFIED POSTPROCEDURAL STATES: Chronic | ICD-10-CM

## 2024-10-19 DIAGNOSIS — Z96.659 PRESENCE OF UNSPECIFIED ARTIFICIAL KNEE JOINT: Chronic | ICD-10-CM

## 2024-10-19 DIAGNOSIS — J31.0 CHRONIC RHINITIS: ICD-10-CM

## 2024-10-19 DIAGNOSIS — Z96.7 PRESENCE OF OTHER BONE AND TENDON IMPLANTS: Chronic | ICD-10-CM

## 2024-10-19 DIAGNOSIS — Z95.2 PRESENCE OF PROSTHETIC HEART VALVE: Chronic | ICD-10-CM

## 2024-10-19 PROCEDURE — 70486 CT MAXILLOFACIAL W/O DYE: CPT | Mod: 26,MH

## 2024-11-12 ENCOUNTER — APPOINTMENT (OUTPATIENT)
Dept: OTOLARYNGOLOGY | Facility: CLINIC | Age: 79
End: 2024-11-12
Payer: MEDICARE

## 2024-11-12 VITALS
SYSTOLIC BLOOD PRESSURE: 165 MMHG | DIASTOLIC BLOOD PRESSURE: 73 MMHG | HEART RATE: 72 BPM | HEIGHT: 64 IN | WEIGHT: 138 LBS | BODY MASS INDEX: 23.56 KG/M2

## 2024-11-12 DIAGNOSIS — J31.0 CHRONIC RHINITIS: ICD-10-CM

## 2024-11-12 DIAGNOSIS — J32.9 CHRONIC SINUSITIS, UNSPECIFIED: ICD-10-CM

## 2024-11-12 PROCEDURE — 99213 OFFICE O/P EST LOW 20 MIN: CPT

## 2024-11-12 RX ORDER — FLUTICASONE PROPIONATE 50 UG/1
50 SPRAY, METERED NASAL TWICE DAILY
Qty: 1 | Refills: 1 | Status: ACTIVE | COMMUNITY
Start: 2024-11-12 | End: 1900-01-01

## 2024-11-12 RX ORDER — AMOXICILLIN AND CLAVULANATE POTASSIUM 875; 125 MG/1; MG/1
875-125 TABLET, COATED ORAL
Qty: 20 | Refills: 0 | Status: ACTIVE | COMMUNITY
Start: 2024-11-12 | End: 1900-01-01

## 2024-11-20 PROCEDURE — 70486 CT MAXILLOFACIAL W/O DYE: CPT

## 2024-12-17 ENCOUNTER — APPOINTMENT (OUTPATIENT)
Dept: OTOLARYNGOLOGY | Facility: CLINIC | Age: 79
End: 2024-12-17
Payer: MEDICARE

## 2024-12-17 VITALS
HEART RATE: 49 BPM | BODY MASS INDEX: 23.39 KG/M2 | SYSTOLIC BLOOD PRESSURE: 144 MMHG | HEIGHT: 64 IN | DIASTOLIC BLOOD PRESSURE: 76 MMHG | WEIGHT: 137 LBS

## 2024-12-17 DIAGNOSIS — J31.0 CHRONIC RHINITIS: ICD-10-CM

## 2024-12-17 DIAGNOSIS — J32.9 CHRONIC SINUSITIS, UNSPECIFIED: ICD-10-CM

## 2024-12-17 PROCEDURE — 99213 OFFICE O/P EST LOW 20 MIN: CPT | Mod: 25

## 2024-12-17 PROCEDURE — 31231 NASAL ENDOSCOPY DX: CPT

## 2025-01-02 ENCOUNTER — NON-APPOINTMENT (OUTPATIENT)
Age: 80
End: 2025-01-02

## 2025-01-02 ENCOUNTER — APPOINTMENT (OUTPATIENT)
Dept: CARDIOLOGY | Facility: CLINIC | Age: 80
End: 2025-01-02
Payer: MEDICARE

## 2025-01-02 VITALS
SYSTOLIC BLOOD PRESSURE: 182 MMHG | BODY MASS INDEX: 23.56 KG/M2 | TEMPERATURE: 98.3 F | HEART RATE: 52 BPM | WEIGHT: 138 LBS | DIASTOLIC BLOOD PRESSURE: 80 MMHG | RESPIRATION RATE: 16 BRPM | HEIGHT: 64 IN | OXYGEN SATURATION: 97 %

## 2025-01-02 DIAGNOSIS — I42.9 CARDIOMYOPATHY, UNSPECIFIED: ICD-10-CM

## 2025-01-02 DIAGNOSIS — Z95.2 PRESENCE OF PROSTHETIC HEART VALVE: ICD-10-CM

## 2025-01-02 DIAGNOSIS — I10 ESSENTIAL (PRIMARY) HYPERTENSION: ICD-10-CM

## 2025-01-02 DIAGNOSIS — I48.0 PAROXYSMAL ATRIAL FIBRILLATION: ICD-10-CM

## 2025-01-02 PROCEDURE — 99214 OFFICE O/P EST MOD 30 MIN: CPT

## 2025-01-02 PROCEDURE — G2211 COMPLEX E/M VISIT ADD ON: CPT

## 2025-01-02 PROCEDURE — 93000 ELECTROCARDIOGRAM COMPLETE: CPT

## 2025-01-07 NOTE — HISTORY OF PRESENT ILLNESS
[Never] : never [TextBox_4] : chronic cough  on nasal spray, seeing ENT, no change in cough tried inhalers and nebs last year, no change in cough  some days good some worse cannot pin point triggers What Type Of Note Output Would You Prefer (Optional)?: Bullet Format Is This A New Presentation, Or A Follow-Up?: Rash Additional History: Diagnosed with Shingles by PCP on 12/22 and was given antiviral Valacyclovir 1G 3 times a day for 7 days. Then got sick last week on Monday and on 12/31 saw PCP again, didn’t give no new meds. Then on Thursday did a virtual visit with [facility] and was given another round of antiviral. Saw Urgent care this past Saturday due to her eye getting very swollen and was told it’s impetigo and was given antibiotic eye drops that helped immediately and was also given doxycycline 100mg BID X 14 days.

## 2025-01-22 NOTE — H&P PST ADULT - CARDIOVASCULAR DETAILS
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 Jahaira Jangmyke reports ***    Jahairataz Jangmyke reports {talgfp:96834::\"good\"} compliance with HEP.    Pain:  ***/10  Pain location: LBP/L>R glute    Objective:    Palpation:  R elevated IT, R elevated MM, pubis level    Treatments:  Abbreviation Key  NC = not completed this visit   NV = next visit  // = parallel    Assigned HEP- Medbridge Access Code: [email or web address]    Therapeutic Exercise:    minutes    Nu-step lvl 4 x5 mins; LE warm-up and subjective; pt reports increase in LBP to 4-5/10 after activity  Lying Prone x3 mins; 2/10  Prone Press Up on Elbows x2 mins  Supine roll for control 10x with 10 sec hold - added to HEP      Manual Therapy:    minutes    **osteoporosis**  **hx of compression fractures, but none are identified in the lumbar spine per most recent MRI 10/15/24**  Pt prone:  STM/DTM + " myofascial cupping with external gliding to L lumbar paraspinals, around L PSIS, and L superior glute max  Self MET for pelvis 3 x 10 sec  Mild improvement after 1 cycle  MET to correct L depressed IT  2 cycles with mild improvement  Time spent on positioning, assessing pelvic landmarks included      Outpatient Education:   - cues/rationale for there-ex and manual tx interventions  - verbally added self massage with tennis ball on wall     Jahaira Moore verbalizes understanding of all education provided: Yes    Assessment:  Jahaira Moore completed treatment today which focused upon *** in order to address ***.  Jahaira presents with ***.  Jahaira requires ***.  Jahaira was challenged with  ***.  Jahaira's  response to tx was: {Response to intervention:26107}. Jahaira's Progress towards goals: {Progress towards functional goals:35659}. Jahaira Moore continues to have {taldeficits:68496} limiting participation in {gct6jvvblywuijxmninzvgraclteg:53109}. Further skilled therapy services are warranted to address the remaining impairments in order to progress towards functional goals. Jahaira left session with all questions answered and no increase in symptoms.      Plan:   Progress prone lying as maria elena. Monitor response to manual/STM and cont if beneficial; repeat MET if still or more beneficial  Gait and balance training as LBP allows.                                   positive S1/positive S2

## 2025-01-23 ENCOUNTER — APPOINTMENT (OUTPATIENT)
Dept: INTERNAL MEDICINE | Facility: CLINIC | Age: 80
End: 2025-01-23

## 2025-02-28 ENCOUNTER — APPOINTMENT (OUTPATIENT)
Dept: OTOLARYNGOLOGY | Facility: CLINIC | Age: 80
End: 2025-02-28
Payer: MEDICARE

## 2025-02-28 ENCOUNTER — RESULT REVIEW (OUTPATIENT)
Age: 80
End: 2025-02-28

## 2025-02-28 VITALS
HEART RATE: 62 BPM | DIASTOLIC BLOOD PRESSURE: 77 MMHG | HEIGHT: 64 IN | SYSTOLIC BLOOD PRESSURE: 169 MMHG | BODY MASS INDEX: 23.56 KG/M2 | WEIGHT: 138 LBS

## 2025-02-28 DIAGNOSIS — J31.0 CHRONIC RHINITIS: ICD-10-CM

## 2025-02-28 DIAGNOSIS — J32.9 CHRONIC SINUSITIS, UNSPECIFIED: ICD-10-CM

## 2025-02-28 PROCEDURE — 70486 CT MAXILLOFACIAL W/O DYE: CPT

## 2025-02-28 PROCEDURE — 99213 OFFICE O/P EST LOW 20 MIN: CPT

## 2025-03-18 ENCOUNTER — APPOINTMENT (OUTPATIENT)
Dept: OTOLARYNGOLOGY | Facility: CLINIC | Age: 80
End: 2025-03-18
Payer: MEDICARE

## 2025-03-18 VITALS — OXYGEN SATURATION: 97 % | SYSTOLIC BLOOD PRESSURE: 171 MMHG | HEART RATE: 69 BPM | DIASTOLIC BLOOD PRESSURE: 82 MMHG

## 2025-03-18 DIAGNOSIS — I82.409 ACUTE EMBOLISM AND THROMBOSIS OF UNSPECIFIED DEEP VEINS OF UNSPECIFIED LOWER EXTREMITY: ICD-10-CM

## 2025-03-18 DIAGNOSIS — J32.9 CHRONIC SINUSITIS, UNSPECIFIED: ICD-10-CM

## 2025-03-18 PROCEDURE — 31231 NASAL ENDOSCOPY DX: CPT

## 2025-03-18 PROCEDURE — 99214 OFFICE O/P EST MOD 30 MIN: CPT | Mod: 25

## 2025-03-18 RX ORDER — MOMETASONE FUROATE 100 %
POWDER (GRAM) MISCELLANEOUS
Qty: 1 | Refills: 3 | Status: ACTIVE | COMMUNITY
Start: 2025-03-18 | End: 1900-01-01

## 2025-04-11 ENCOUNTER — RESULT REVIEW (OUTPATIENT)
Age: 80
End: 2025-04-11

## 2025-04-11 ENCOUNTER — INPATIENT (INPATIENT)
Facility: HOSPITAL | Age: 80
LOS: 2 days | Discharge: ANOTHER HOSPITAL | DRG: 310 | End: 2025-04-14
Attending: HOSPITALIST | Admitting: HOSPITALIST
Payer: MEDICARE

## 2025-04-11 VITALS
TEMPERATURE: 99 F | OXYGEN SATURATION: 99 % | HEART RATE: 143 BPM | DIASTOLIC BLOOD PRESSURE: 89 MMHG | SYSTOLIC BLOOD PRESSURE: 161 MMHG | HEIGHT: 64 IN | WEIGHT: 136.03 LBS | RESPIRATION RATE: 15 BRPM

## 2025-04-11 DIAGNOSIS — I48.91 UNSPECIFIED ATRIAL FIBRILLATION: ICD-10-CM

## 2025-04-11 DIAGNOSIS — Z96.659 PRESENCE OF UNSPECIFIED ARTIFICIAL KNEE JOINT: Chronic | ICD-10-CM

## 2025-04-11 DIAGNOSIS — Z95.2 PRESENCE OF PROSTHETIC HEART VALVE: Chronic | ICD-10-CM

## 2025-04-11 DIAGNOSIS — Z98.89 OTHER SPECIFIED POSTPROCEDURAL STATES: Chronic | ICD-10-CM

## 2025-04-11 DIAGNOSIS — Z96.7 PRESENCE OF OTHER BONE AND TENDON IMPLANTS: Chronic | ICD-10-CM

## 2025-04-11 LAB
ALBUMIN SERPL ELPH-MCNC: 3.8 G/DL — SIGNIFICANT CHANGE UP (ref 3.3–5)
ALP SERPL-CCNC: 72 U/L — SIGNIFICANT CHANGE UP (ref 30–120)
ALT FLD-CCNC: 28 U/L — SIGNIFICANT CHANGE UP (ref 10–60)
ANION GAP SERPL CALC-SCNC: 7 MMOL/L — SIGNIFICANT CHANGE UP (ref 5–17)
APTT BLD: 36.8 SEC — HIGH (ref 24.5–35.6)
AST SERPL-CCNC: 33 U/L — SIGNIFICANT CHANGE UP (ref 10–40)
BASOPHILS # BLD AUTO: 0.06 K/UL — SIGNIFICANT CHANGE UP (ref 0–0.2)
BASOPHILS NFR BLD AUTO: 1.1 % — SIGNIFICANT CHANGE UP (ref 0–2)
BILIRUB SERPL-MCNC: 0.6 MG/DL — SIGNIFICANT CHANGE UP (ref 0.2–1.2)
BUN SERPL-MCNC: 17 MG/DL — SIGNIFICANT CHANGE UP (ref 7–23)
CALCIUM SERPL-MCNC: 9.3 MG/DL — SIGNIFICANT CHANGE UP (ref 8.4–10.5)
CHLORIDE SERPL-SCNC: 105 MMOL/L — SIGNIFICANT CHANGE UP (ref 96–108)
CO2 SERPL-SCNC: 31 MMOL/L — SIGNIFICANT CHANGE UP (ref 22–31)
CREAT SERPL-MCNC: 1.06 MG/DL — SIGNIFICANT CHANGE UP (ref 0.5–1.3)
EGFR: 54 ML/MIN/1.73M2 — LOW
EGFR: 54 ML/MIN/1.73M2 — LOW
EOSINOPHIL # BLD AUTO: 0.18 K/UL — SIGNIFICANT CHANGE UP (ref 0–0.5)
EOSINOPHIL NFR BLD AUTO: 3.3 % — SIGNIFICANT CHANGE UP (ref 0–6)
GLUCOSE SERPL-MCNC: 101 MG/DL — HIGH (ref 70–99)
HCT VFR BLD CALC: 39.5 % — SIGNIFICANT CHANGE UP (ref 34.5–45)
HGB BLD-MCNC: 12.7 G/DL — SIGNIFICANT CHANGE UP (ref 11.5–15.5)
IMM GRANULOCYTES NFR BLD AUTO: 0.4 % — SIGNIFICANT CHANGE UP (ref 0–0.9)
INR BLD: 1.38 RATIO — HIGH (ref 0.85–1.16)
LYMPHOCYTES # BLD AUTO: 1.04 K/UL — SIGNIFICANT CHANGE UP (ref 1–3.3)
LYMPHOCYTES # BLD AUTO: 19 % — SIGNIFICANT CHANGE UP (ref 13–44)
MCHC RBC-ENTMCNC: 29.8 PG — SIGNIFICANT CHANGE UP (ref 27–34)
MCHC RBC-ENTMCNC: 32.2 G/DL — SIGNIFICANT CHANGE UP (ref 32–36)
MCV RBC AUTO: 92.7 FL — SIGNIFICANT CHANGE UP (ref 80–100)
MONOCYTES # BLD AUTO: 0.41 K/UL — SIGNIFICANT CHANGE UP (ref 0–0.9)
MONOCYTES NFR BLD AUTO: 7.5 % — SIGNIFICANT CHANGE UP (ref 2–14)
NEUTROPHILS # BLD AUTO: 3.75 K/UL — SIGNIFICANT CHANGE UP (ref 1.8–7.4)
NEUTROPHILS NFR BLD AUTO: 68.7 % — SIGNIFICANT CHANGE UP (ref 43–77)
NRBC BLD AUTO-RTO: 0 /100 WBCS — SIGNIFICANT CHANGE UP (ref 0–0)
NT-PROBNP SERPL-SCNC: 2713 PG/ML — HIGH (ref 0–450)
PLATELET # BLD AUTO: 227 K/UL — SIGNIFICANT CHANGE UP (ref 150–400)
POTASSIUM SERPL-MCNC: 4.1 MMOL/L — SIGNIFICANT CHANGE UP (ref 3.5–5.3)
POTASSIUM SERPL-SCNC: 4.1 MMOL/L — SIGNIFICANT CHANGE UP (ref 3.5–5.3)
PROT SERPL-MCNC: 6.7 G/DL — SIGNIFICANT CHANGE UP (ref 6–8.3)
PROTHROM AB SERPL-ACNC: 15.9 SEC — HIGH (ref 9.9–13.4)
RBC # BLD: 4.26 M/UL — SIGNIFICANT CHANGE UP (ref 3.8–5.2)
RBC # FLD: 13 % — SIGNIFICANT CHANGE UP (ref 10.3–14.5)
SODIUM SERPL-SCNC: 143 MMOL/L — SIGNIFICANT CHANGE UP (ref 135–145)
TROPONIN I, HIGH SENSITIVITY RESULT: 212.5 NG/L — HIGH
TROPONIN I, HIGH SENSITIVITY RESULT: 78.3 NG/L — HIGH
WBC # BLD: 5.46 K/UL — SIGNIFICANT CHANGE UP (ref 3.8–10.5)
WBC # FLD AUTO: 5.46 K/UL — SIGNIFICANT CHANGE UP (ref 3.8–10.5)

## 2025-04-11 PROCEDURE — 93306 TTE W/DOPPLER COMPLETE: CPT | Mod: 26

## 2025-04-11 PROCEDURE — 93010 ELECTROCARDIOGRAM REPORT: CPT

## 2025-04-11 PROCEDURE — 71045 X-RAY EXAM CHEST 1 VIEW: CPT | Mod: 26

## 2025-04-11 PROCEDURE — 99221 1ST HOSP IP/OBS SF/LOW 40: CPT

## 2025-04-11 PROCEDURE — 99285 EMERGENCY DEPT VISIT HI MDM: CPT

## 2025-04-11 RX ORDER — ACETAMINOPHEN 500 MG/5ML
650 LIQUID (ML) ORAL EVERY 6 HOURS
Refills: 0 | Status: DISCONTINUED | OUTPATIENT
Start: 2025-04-11 | End: 2025-04-14

## 2025-04-11 RX ORDER — MAGNESIUM, ALUMINUM HYDROXIDE 200-200 MG
30 TABLET,CHEWABLE ORAL EVERY 4 HOURS
Refills: 0 | Status: DISCONTINUED | OUTPATIENT
Start: 2025-04-11 | End: 2025-04-14

## 2025-04-11 RX ORDER — APIXABAN 2.5 MG/1
5 TABLET, FILM COATED ORAL EVERY 12 HOURS
Refills: 0 | Status: DISCONTINUED | OUTPATIENT
Start: 2025-04-11 | End: 2025-04-12

## 2025-04-11 RX ORDER — METOPROLOL SUCCINATE 50 MG/1
25 TABLET, EXTENDED RELEASE ORAL ONCE
Refills: 0 | Status: COMPLETED | OUTPATIENT
Start: 2025-04-11 | End: 2025-04-11

## 2025-04-11 RX ORDER — ASPIRIN 325 MG
81 TABLET ORAL DAILY
Refills: 0 | Status: DISCONTINUED | OUTPATIENT
Start: 2025-04-11 | End: 2025-04-14

## 2025-04-11 RX ORDER — ATORVASTATIN CALCIUM 80 MG/1
40 TABLET, FILM COATED ORAL AT BEDTIME
Refills: 0 | Status: DISCONTINUED | OUTPATIENT
Start: 2025-04-11 | End: 2025-04-14

## 2025-04-11 RX ORDER — APIXABAN 2.5 MG/1
1 TABLET, FILM COATED ORAL
Refills: 0 | DISCHARGE

## 2025-04-11 RX ORDER — DILTIAZEM HYDROCHLORIDE 240 MG/1
10 TABLET, EXTENDED RELEASE ORAL ONCE
Refills: 0 | Status: COMPLETED | OUTPATIENT
Start: 2025-04-11 | End: 2025-04-11

## 2025-04-11 RX ORDER — MELATONIN 5 MG
3 TABLET ORAL AT BEDTIME
Refills: 0 | Status: DISCONTINUED | OUTPATIENT
Start: 2025-04-11 | End: 2025-04-14

## 2025-04-11 RX ORDER — LEVOTHYROXINE SODIUM 300 MCG
50 TABLET ORAL DAILY
Refills: 0 | Status: DISCONTINUED | OUTPATIENT
Start: 2025-04-11 | End: 2025-04-14

## 2025-04-11 RX ORDER — DILTIAZEM HYDROCHLORIDE 240 MG/1
30 TABLET, EXTENDED RELEASE ORAL
Refills: 0 | Status: DISCONTINUED | OUTPATIENT
Start: 2025-04-11 | End: 2025-04-12

## 2025-04-11 RX ORDER — METOPROLOL SUCCINATE 50 MG/1
25 TABLET, EXTENDED RELEASE ORAL DAILY
Refills: 0 | Status: DISCONTINUED | OUTPATIENT
Start: 2025-04-11 | End: 2025-04-12

## 2025-04-11 RX ORDER — ONDANSETRON HCL/PF 4 MG/2 ML
4 VIAL (ML) INJECTION EVERY 8 HOURS
Refills: 0 | Status: DISCONTINUED | OUTPATIENT
Start: 2025-04-11 | End: 2025-04-14

## 2025-04-11 RX ADMIN — Medication 500 MILLILITER(S): at 16:45

## 2025-04-11 RX ADMIN — Medication 3 MILLIGRAM(S): at 21:06

## 2025-04-11 RX ADMIN — ATORVASTATIN CALCIUM 40 MILLIGRAM(S): 80 TABLET, FILM COATED ORAL at 21:05

## 2025-04-11 RX ADMIN — Medication 500 MILLILITER(S): at 17:58

## 2025-04-11 RX ADMIN — METOPROLOL SUCCINATE 25 MILLIGRAM(S): 50 TABLET, EXTENDED RELEASE ORAL at 17:13

## 2025-04-11 RX ADMIN — DILTIAZEM HYDROCHLORIDE 10 MILLIGRAM(S): 240 TABLET, EXTENDED RELEASE ORAL at 16:20

## 2025-04-11 RX ADMIN — Medication 81 MILLIGRAM(S): at 21:06

## 2025-04-11 RX ADMIN — APIXABAN 5 MILLIGRAM(S): 2.5 TABLET, FILM COATED ORAL at 21:06

## 2025-04-11 NOTE — PATIENT PROFILE ADULT - FALL HARM RISK - HARM RISK INTERVENTIONS

## 2025-04-11 NOTE — ED PROVIDER NOTE - PROGRESS NOTE DETAILS
Heart rate improved after meds.  Troponin mildly elevated.  I spoke with cardiology Dr. May who recommends admission and repeat troponin and cardiac monitoring.  Patient agrees with plan  at bedside agrees with plan.

## 2025-04-11 NOTE — ED PROVIDER NOTE - CLINICAL SUMMARY MEDICAL DECISION MAKING FREE TEXT BOX
79-year-old female with history of mitral valve replacement and aortic valve replacement with history of A-fib on chronic anticoagulation complaining of rapid heartbeat and chest tightness for the past 2 hours.  Denies fever cough shortness of breath nausea vomiting diarrhea dysuria hematuria or other symptom.  Her PCP is Dr. Goodman.  Her cardiologist is Dr. Butler.    Physical exam is normal except for rapid irregular pulse.  EKG reveals rapid A-fib.  Plan will give Cardizem IV get labs with troponin and reassess.  May need cardiology evaluation and admission.

## 2025-04-11 NOTE — ED ADULT TRIAGE NOTE - ARRIVAL FROM
Detail Level: Simple Additional Notes: Patient consent was obtained to proceed with the visit and recommended plan of care after discussion of all risks and benefits, including the risks of COVID-19 exposure. Home

## 2025-04-11 NOTE — ED PROVIDER NOTE - OBJECTIVE STATEMENT
79-year-old female with history of mitral valve replacement and aortic valve replacement with history of A-fib on chronic anticoagulation complaining of rapid heartbeat and chest tightness for the past 2 hours.  Denies fever cough shortness of breath nausea vomiting diarrhea dysuria hematuria or other symptom.  Her PCP is Dr. Goodman.  Her cardiologist is Dr. Butler.

## 2025-04-11 NOTE — H&P ADULT - HISTORY OF PRESENT ILLNESS
80 y/o female with PMH of Atrial and mitral valve replacement, Afib on AC, HLD who presented to ED to be evaluated for palpitation. episode occurred 2 hrs prior to arrival when pt was in her office. denies any associated chest pain or shortness of breath with her symptoms. pt states that she is been having on and off chest pain under her Lt breast. pain usually goes away w/o any exacerbating  or alleviating factors.  pt also noted having noticed shortness of breath while laying flat. today while in her office pt felt her heart was racing and Lt arm was getting numb so decided to come to ED. here in ED  pt noted to be in Afib W RVR and received 10 mg IV Cardizem with HR <110. to be admitted for further management

## 2025-04-11 NOTE — H&P ADULT - ASSESSMENT
80 y/o female with PMH of Atrial and mitral valve replacement, Afib on AC, HLD who presented to ED to be evaluated for palpitation    Afib w RVR:  -S/p 10 mg IV cardizem in ED and metoprolol tartrate 25 mg   -Lat cardioversion 2 yrs ago with return to NSR  -Takes Met succ 25 mg qd at home. Can continue. will uptitrate if HR fails to improve  -maintain HR <120  -TTE  -OP F/u with Dr HAQUE  -Keep mg>2 and K>4  -F/u TSH  -Monitor on tele  -c/w eliquis 5 mg bid   -BNP 2713    Jaja trop:  -likely demand.   -Continue statin, BB, Eliquis and aspirin 81 mg qd   -trend  -cardio consulted from ED    Other chronic condition:  HLD :C/w lipitor 40 mg qd   Hypothyroidism: obtaining TSH, C/w synthroid 50 mcg  qd     Full code    40 min spent for this encounter     80 y/o female with PMH of Atrial and mitral valve replacement, Afib on AC, HLD who presented to ED to be evaluated for palpitation    Afib w RVR:  -S/p 10 mg IV cardizem in ED and metoprolol tartrate 25 mg   -Lat cardioversion 2 yrs ago with return to NSR  -Takes Met succ 25 mg qd at home. Can continue.   -HR still elevated in ED add cardizem 30 mg qid   -maintain HR <120  -TTE  -OP F/u with Dr HAQUE  -Keep mg>2 and K>4  -F/u TSH  -Monitor on tele  -c/w eliquis 5 mg bid   -BNP 2713    Jaja trop:  -likely demand.   -Continue statin, BB, Eliquis and aspirin 81 mg qd   -trend  -cardio consulted from ED    Other chronic condition:  HLD :C/w lipitor 40 mg qd   Hypothyroidism: obtaining TSH, C/w synthroid 50 mcg  qd     Full code    40 min spent for this encounter

## 2025-04-11 NOTE — ED ADULT NURSE NOTE - NSFALLUNIVINTERV_ED_ALL_ED
Bed/Stretcher in lowest position, wheels locked, appropriate side rails in place/Call bell, personal items and telephone in reach/Instruct patient to call for assistance before getting out of bed/chair/stretcher/Non-slip footwear applied when patient is off stretcher/Squire to call system/Physically safe environment - no spills, clutter or unnecessary equipment/Purposeful proactive rounding/Room/bathroom lighting operational, light cord in reach

## 2025-04-11 NOTE — H&P ADULT - NSICDXPASTMEDICALHX_GEN_ALL_CORE_FT
PAST MEDICAL HISTORY:  HLD (hyperlipidemia)     Hypertension     Hypothyroidism     Osteoarthrosis     Seasonal allergies     Vitamin B12 deficiency     
VIEW ALL

## 2025-04-11 NOTE — ED ADULT NURSE REASSESSMENT NOTE - NS ED NURSE REASSESS COMMENT FT1
Rec'd pt A&Ox4, neuro status stable. CM shows A Fib 114-119 w/out ectopics. Offers no c/o chest pain or discomfort. Presently awaiting orders for telemetry admission. Dr. Neely at bedside.  at bedside. Pleasant, good support system.

## 2025-04-11 NOTE — H&P ADULT - NSHPPHYSICALEXAM_GEN_ALL_CORE
T(C): 37 (04-11-25 @ 16:08), Max: 37 (04-11-25 @ 16:08)  HR: 114 (04-11-25 @ 19:24) (94 - 143)  BP: 120/94 (04-11-25 @ 19:24) (120/94 - 161/89)  RR: 16 (04-11-25 @ 19:24) (14 - 16)  SpO2: 99% (04-11-25 @ 19:24) (99% - 100%)  Wt(kg): --Vital Signs Last 24 Hrs  T(C): 37 (11 Apr 2025 16:08), Max: 37 (11 Apr 2025 16:08)  T(F): 98.6 (11 Apr 2025 16:08), Max: 98.6 (11 Apr 2025 16:08)  HR: 114 (11 Apr 2025 19:24) (94 - 143)  BP: 120/94 (11 Apr 2025 19:24) (120/94 - 161/89)  BP(mean): --  RR: 16 (11 Apr 2025 19:24) (14 - 16)  SpO2: 99% (11 Apr 2025 19:24) (99% - 100%)    Parameters below as of 11 Apr 2025 19:24  Patient On (Oxygen Delivery Method): room air        PHYSICAL EXAM:  GENERAL: NAD  HENT:  Atraumatic, Normocephalic; No tonsillar erythema, exudates, or enlargement; Moist mucous membranes;   EYES: EOMI, PERRLA, conjunctiva and sclera clear, no lid-lag  NECK: Supple, No JVD, Normal thyroid  NERVOUS SYSTEM:  CN II - XII intact; Sensation intact; Motor Strength 5/5 B/L upper and lower extremities  CHEST/LUNG: Clear to percussion bilaterally; No rales, rhonchi, wheezing, or rubs; normal respiratory effort, no intercostal retractions; No pitting edema  HEART: irregularly irregular rhythm  No murmurs, rubs, or gallops  ABDOMEN: Soft, Nontender, Nondistended; Bowel sounds present; No HSM  MUSCULOSKELETAL/EXTREMITIES:  2+ Peripheral Pulses, No clubbing, or digital cyanosis  SKIN: No rashes or lesions; normal texture and temperature  PSYCH: Appropriate affect, Alert & Oriented x 3

## 2025-04-12 ENCOUNTER — TRANSCRIPTION ENCOUNTER (OUTPATIENT)
Age: 80
End: 2025-04-12

## 2025-04-12 LAB
ANION GAP SERPL CALC-SCNC: 8 MMOL/L — SIGNIFICANT CHANGE UP (ref 5–17)
APTT BLD: 77.4 SEC — HIGH (ref 24.5–35.6)
BASOPHILS # BLD AUTO: 0.08 K/UL — SIGNIFICANT CHANGE UP (ref 0–0.2)
BASOPHILS NFR BLD AUTO: 1.4 % — SIGNIFICANT CHANGE UP (ref 0–2)
BUN SERPL-MCNC: 15 MG/DL — SIGNIFICANT CHANGE UP (ref 7–23)
CALCIUM SERPL-MCNC: 8.5 MG/DL — SIGNIFICANT CHANGE UP (ref 8.4–10.5)
CHLORIDE SERPL-SCNC: 108 MMOL/L — SIGNIFICANT CHANGE UP (ref 96–108)
CO2 SERPL-SCNC: 27 MMOL/L — SIGNIFICANT CHANGE UP (ref 22–31)
CREAT SERPL-MCNC: 0.81 MG/DL — SIGNIFICANT CHANGE UP (ref 0.5–1.3)
EGFR: 74 ML/MIN/1.73M2 — SIGNIFICANT CHANGE UP
EGFR: 74 ML/MIN/1.73M2 — SIGNIFICANT CHANGE UP
EOSINOPHIL # BLD AUTO: 0.18 K/UL — SIGNIFICANT CHANGE UP (ref 0–0.5)
EOSINOPHIL NFR BLD AUTO: 3.2 % — SIGNIFICANT CHANGE UP (ref 0–6)
GLUCOSE SERPL-MCNC: 88 MG/DL — SIGNIFICANT CHANGE UP (ref 70–99)
HCT VFR BLD CALC: 37.1 % — SIGNIFICANT CHANGE UP (ref 34.5–45)
HCT VFR BLD CALC: 37.3 % — SIGNIFICANT CHANGE UP (ref 34.5–45)
HGB BLD-MCNC: 12.1 G/DL — SIGNIFICANT CHANGE UP (ref 11.5–15.5)
HGB BLD-MCNC: 12.2 G/DL — SIGNIFICANT CHANGE UP (ref 11.5–15.5)
IMM GRANULOCYTES NFR BLD AUTO: 0.2 % — SIGNIFICANT CHANGE UP (ref 0–0.9)
LYMPHOCYTES # BLD AUTO: 0.86 K/UL — LOW (ref 1–3.3)
LYMPHOCYTES # BLD AUTO: 15.3 % — SIGNIFICANT CHANGE UP (ref 13–44)
MAGNESIUM SERPL-MCNC: 1.9 MG/DL — SIGNIFICANT CHANGE UP (ref 1.6–2.6)
MCHC RBC-ENTMCNC: 30.1 PG — SIGNIFICANT CHANGE UP (ref 27–34)
MCHC RBC-ENTMCNC: 30.4 PG — SIGNIFICANT CHANGE UP (ref 27–34)
MCHC RBC-ENTMCNC: 32.4 G/DL — SIGNIFICANT CHANGE UP (ref 32–36)
MCHC RBC-ENTMCNC: 32.9 G/DL — SIGNIFICANT CHANGE UP (ref 32–36)
MCV RBC AUTO: 92.5 FL — SIGNIFICANT CHANGE UP (ref 80–100)
MCV RBC AUTO: 92.8 FL — SIGNIFICANT CHANGE UP (ref 80–100)
MONOCYTES # BLD AUTO: 0.39 K/UL — SIGNIFICANT CHANGE UP (ref 0–0.9)
MONOCYTES NFR BLD AUTO: 6.9 % — SIGNIFICANT CHANGE UP (ref 2–14)
NEUTROPHILS # BLD AUTO: 4.1 K/UL — SIGNIFICANT CHANGE UP (ref 1.8–7.4)
NEUTROPHILS NFR BLD AUTO: 73 % — SIGNIFICANT CHANGE UP (ref 43–77)
NRBC BLD AUTO-RTO: 0 /100 WBCS — SIGNIFICANT CHANGE UP (ref 0–0)
NRBC BLD AUTO-RTO: 0 /100 WBCS — SIGNIFICANT CHANGE UP (ref 0–0)
PLATELET # BLD AUTO: 198 K/UL — SIGNIFICANT CHANGE UP (ref 150–400)
PLATELET # BLD AUTO: 210 K/UL — SIGNIFICANT CHANGE UP (ref 150–400)
POTASSIUM SERPL-MCNC: 3.7 MMOL/L — SIGNIFICANT CHANGE UP (ref 3.5–5.3)
POTASSIUM SERPL-SCNC: 3.7 MMOL/L — SIGNIFICANT CHANGE UP (ref 3.5–5.3)
RBC # BLD: 4.01 M/UL — SIGNIFICANT CHANGE UP (ref 3.8–5.2)
RBC # BLD: 4.02 M/UL — SIGNIFICANT CHANGE UP (ref 3.8–5.2)
RBC # FLD: 13 % — SIGNIFICANT CHANGE UP (ref 10.3–14.5)
RBC # FLD: 13.1 % — SIGNIFICANT CHANGE UP (ref 10.3–14.5)
SODIUM SERPL-SCNC: 143 MMOL/L — SIGNIFICANT CHANGE UP (ref 135–145)
TROPONIN I, HIGH SENSITIVITY RESULT: 3234.1 NG/L — HIGH
TROPONIN I, HIGH SENSITIVITY RESULT: 4064.5 NG/L — HIGH
TSH SERPL-MCNC: 1.7 UIU/ML — SIGNIFICANT CHANGE UP (ref 0.27–4.2)
WBC # BLD: 4.99 K/UL — SIGNIFICANT CHANGE UP (ref 3.8–10.5)
WBC # BLD: 5.62 K/UL — SIGNIFICANT CHANGE UP (ref 3.8–10.5)
WBC # FLD AUTO: 4.99 K/UL — SIGNIFICANT CHANGE UP (ref 3.8–10.5)
WBC # FLD AUTO: 5.62 K/UL — SIGNIFICANT CHANGE UP (ref 3.8–10.5)

## 2025-04-12 PROCEDURE — 93010 ELECTROCARDIOGRAM REPORT: CPT

## 2025-04-12 PROCEDURE — 99233 SBSQ HOSP IP/OBS HIGH 50: CPT

## 2025-04-12 RX ORDER — ACETAMINOPHEN 500 MG/5ML
2 LIQUID (ML) ORAL
Qty: 0 | Refills: 0 | DISCHARGE
Start: 2025-04-12

## 2025-04-12 RX ORDER — METOPROLOL SUCCINATE 50 MG/1
1 TABLET, EXTENDED RELEASE ORAL
Qty: 0 | Refills: 0 | DISCHARGE
Start: 2025-04-12

## 2025-04-12 RX ORDER — HEPARIN SODIUM 1000 [USP'U]/ML
3800 INJECTION INTRAVENOUS; SUBCUTANEOUS
Qty: 0 | Refills: 0 | DISCHARGE
Start: 2025-04-12

## 2025-04-12 RX ORDER — HEPARIN SODIUM 1000 [USP'U]/ML
INJECTION INTRAVENOUS; SUBCUTANEOUS
Qty: 25000 | Refills: 0 | Status: DISCONTINUED | OUTPATIENT
Start: 2025-04-12 | End: 2025-04-13

## 2025-04-12 RX ORDER — METOPROLOL SUCCINATE 50 MG/1
50 TABLET, EXTENDED RELEASE ORAL
Refills: 0 | Status: DISCONTINUED | OUTPATIENT
Start: 2025-04-12 | End: 2025-04-14

## 2025-04-12 RX ORDER — APIXABAN 2.5 MG/1
1 TABLET, FILM COATED ORAL
Refills: 0 | DISCHARGE

## 2025-04-12 RX ORDER — METOPROLOL SUCCINATE 50 MG/1
1 TABLET, EXTENDED RELEASE ORAL
Refills: 0 | DISCHARGE

## 2025-04-12 RX ORDER — HEPARIN SODIUM 1000 [USP'U]/ML
3800 INJECTION INTRAVENOUS; SUBCUTANEOUS EVERY 6 HOURS
Refills: 0 | Status: DISCONTINUED | OUTPATIENT
Start: 2025-04-12 | End: 2025-04-13

## 2025-04-12 RX ORDER — MELATONIN 5 MG
1 TABLET ORAL
Qty: 0 | Refills: 0 | DISCHARGE
Start: 2025-04-12

## 2025-04-12 RX ORDER — MAGNESIUM, ALUMINUM HYDROXIDE 200-200 MG
30 TABLET,CHEWABLE ORAL
Qty: 0 | Refills: 0 | DISCHARGE
Start: 2025-04-12

## 2025-04-12 RX ADMIN — HEPARIN SODIUM 700 UNIT(S)/HR: 1000 INJECTION INTRAVENOUS; SUBCUTANEOUS at 18:03

## 2025-04-12 RX ADMIN — HEPARIN SODIUM 750 UNIT(S)/HR: 1000 INJECTION INTRAVENOUS; SUBCUTANEOUS at 12:05

## 2025-04-12 RX ADMIN — HEPARIN SODIUM 700 UNIT(S)/HR: 1000 INJECTION INTRAVENOUS; SUBCUTANEOUS at 19:13

## 2025-04-12 RX ADMIN — APIXABAN 5 MILLIGRAM(S): 2.5 TABLET, FILM COATED ORAL at 06:41

## 2025-04-12 RX ADMIN — ATORVASTATIN CALCIUM 40 MILLIGRAM(S): 80 TABLET, FILM COATED ORAL at 21:27

## 2025-04-12 RX ADMIN — METOPROLOL SUCCINATE 50 MILLIGRAM(S): 50 TABLET, EXTENDED RELEASE ORAL at 10:06

## 2025-04-12 RX ADMIN — Medication 81 MILLIGRAM(S): at 12:02

## 2025-04-12 RX ADMIN — Medication 40 MILLIGRAM(S): at 06:42

## 2025-04-12 RX ADMIN — METOPROLOL SUCCINATE 25 MILLIGRAM(S): 50 TABLET, EXTENDED RELEASE ORAL at 06:41

## 2025-04-12 RX ADMIN — DILTIAZEM HYDROCHLORIDE 30 MILLIGRAM(S): 240 TABLET, EXTENDED RELEASE ORAL at 01:12

## 2025-04-12 RX ADMIN — DILTIAZEM HYDROCHLORIDE 30 MILLIGRAM(S): 240 TABLET, EXTENDED RELEASE ORAL at 06:42

## 2025-04-12 RX ADMIN — METOPROLOL SUCCINATE 50 MILLIGRAM(S): 50 TABLET, EXTENDED RELEASE ORAL at 18:08

## 2025-04-12 RX ADMIN — Medication 50 MICROGRAM(S): at 06:42

## 2025-04-12 NOTE — CONSULT NOTE ADULT - ASSESSMENT
well developed, well nourished , in no acute distress , ambulating without difficulty , normal communication ability The patient is a 79 year old female with a history of HTN, hypothyroidism, paroxysmal atrial fibrillation, DVT, TMVR, non-obstructive CAD who presents with chest pain and palpitations in the setting of rapid atrial fibrillation and possible NSTEMI.    Plan:  - ECG with atrial flutter vs. coarse atrial fibrillation and nonspecific findings  - Echo with grossly normal LV systolic function, normal TMVR function  - Troponin trended up to 3234. While this is likely due to demand ischemia from rapid atrial fibrillation, cannot exclude NSTEMI as there was concurrent chest pain symptoms.  - Hold apixaban  - Start heparin drip  - Continue aspirin 81 mg daily  - Discontinue diltiazem  - Change to metoprolol tartrate 50 mg bid  - Will plan for transfer to [facility] for further work-up with cardiac catheterization

## 2025-04-12 NOTE — DISCHARGE NOTE NURSING/CASE MANAGEMENT/SOCIAL WORK - FINANCIAL ASSISTANCE
[facility] provides services at a reduced cost to those who are determined to be eligible through [facility]’s financial assistance program. Information regarding [facility]’s financial assistance program can be found by going to [email or web address] or by calling [phone].

## 2025-04-12 NOTE — CONSULT NOTE ADULT - SUBJECTIVE AND OBJECTIVE BOX
History of Present Illness: The patient is a 79 year old female with a history of HTN, hypothyroidism, paroxysmal atrial fibrillation, DVT, TMVR, non-obstructive CAD who presents with chest pain and palpitations. She noted heart racing yesterday. There was associated lower chest tightness. She also notes some substernal chest pressure at times. No shortness of breath. No dizziness but her legs were weak. She currently feels improved.    Past Medical/Surgical History:  HTN, hypothyroidism, paroxysmal atrial fibrillation, DVT, TMVR, non-obstructive CAD    Medications:  Home Medications:  Eliquis 5 mg oral tablet: 1 tab(s) orally 2 times a day (11 Apr 2025 19:38)  levothyroxine 50 mcg (0.05 mg) oral tablet: 1 tab(s) orally once a day (11 Apr 2025 17:58)  metoprolol succinate 25 mg oral tablet, extended release: 1 tab(s) orally once a day (11 Apr 2025 19:38)  Vitamin B12 1000 mcg/mL injectable solution: injectable every 2 months (11 Apr 2025 19:40)  Vitamin D3 5000 intl units oral capsule: 1 cap(s) orally once a day (11 Apr 2025 19:40)      Family History: Non-contributory family history of premature cardiovascular atherosclerotic disease    Social History: No tobacco, alcohol or drug use    Review of Systems:  General: No fevers, chills, weight gain  Skin: No rashes, color changes  Cardiovascular: +chest pain, orthopnea  Respiratory: No shortness of breath, cough  Gastrointestinal: No nausea, abdominal pain  Genitourinary: No incontinence, pain with urination  Musculoskeletal: No pain, swelling, decreased range of motion  Neurological: No headache, weakness  Psychiatric: No depression, anxiety  Endocrine: No weight gain, increased thirst  All other systems are comprehensively negative.    Physical Exam:  Vitals:        Vital Signs Last 24 Hrs  T(C): 36.4 (12 Apr 2025 07:43), Max: 37 (11 Apr 2025 16:08)  T(F): 97.6 (12 Apr 2025 07:43), Max: 98.6 (11 Apr 2025 16:08)  HR: 86 (12 Apr 2025 07:43) (86 - 143)  BP: 127/94 (12 Apr 2025 07:43) (120/94 - 161/89)  BP(mean): --  RR: 16 (12 Apr 2025 07:43) (14 - 18)  SpO2: 95% (12 Apr 2025 07:43) (95% - 100%)    Parameters below as of 12 Apr 2025 07:43  Patient On (Oxygen Delivery Method): room air      General: NAD  HEENT: MMM  Neck: No JVD, no carotid bruit  Lungs: CTAB  CV: RRR, nl S1/S2, no M/R/G  Abdomen: S/NT/ND, +BS  Extremities: No LE edema, no cyanosis  Neuro: AAOx3, non-focal  Skin: No rash    Labs:                        12.1   5.62  )-----------( 198      ( 12 Apr 2025 06:00 )             37.3     04-12    143  |  108  |  15  ----------------------------<  88  3.7   |  27  |  0.81    Ca    8.5      12 Apr 2025 06:00  Mg     1.9     04-12    TPro  6.7  /  Alb  3.8  /  TBili  0.6  /  DBili  x   /  AST  33  /  ALT  28  /  AlkPhos  72  04-11        PT/INR - ( 11 Apr 2025 16:28 )   PT: 15.9 sec;   INR: 1.38 ratio         PTT - ( 11 Apr 2025 16:28 )  PTT:36.8 sec    ECG/Telemetry: Atypical atrial flutter with variable block vs. coarse atrial fibrillation, normal axis, nonspecific ST abnormality

## 2025-04-12 NOTE — PROVIDER CONTACT NOTE (CRITICAL VALUE NOTIFICATION) - ASSESSMENT
No c/o pain or discomfort. VSS. No change in status noted.
VSS. No c/o of new or increasing chest pain.   No signs of discomfort. No events on tele monitoring.

## 2025-04-12 NOTE — DISCHARGE NOTE PROVIDER - HOSPITAL COURSE
80 y/o female with PMH of Atrial and mitral valve replacement, Afib on AC, HLD who presented to ED to be evaluated for palpitation. episode occurred 2 hrs prior to arrival when pt was in her office. denies any associated chest pain or shortness of breath with her symptoms. pt states that she is been having on and off chest pain under her Lt breast. pain usually goes away w/o any exacerbating  or alleviating factors.  pt also noted having noticed shortness of breath while laying flat. today while in her office pt felt her heart was racing and Lt arm was getting numb so decided to come to ED. here in ED  pt noted to be in Afib W RVR and received 10 mg IV Cardizem with HR <110. to be admitted for further management        78 y/o female with PMH of Atrial and mitral valve replacement, Afib on AC, HLD who presented to ED to be evaluated for palpitation. episode occurred 2 hrs prior to arrival when pt was in her office. denies any associated chest pain or shortness of breath with her symptoms. pt states that she is been having on and off chest pain under her Lt breast. pain usually goes away w/o any exacerbating  or alleviating factors.  pt also noted having noticed shortness of breath while laying flat. today while in her office pt felt her heart was racing and Lt arm was getting numb so decided to come to ED. here in ED  pt noted to be in Afib W RVR and received 10 mg IV Cardizem with HR <110. to be admitted for further management       Afib w RVR:  -S/p 10 mg IV cardizem in ED and metoprolol tartrate 25 mg   -Lat cardioversion 2 yrs ago with return to NSR  -home metoprolol succinate switched to tartrate by cardiology, will continue  -HR still elevated in ED, added cardizem 30 mg qid   -maintain HR <120  -TTE unremarkable  -monitor on tele  -F/u as outpatient with Dr Butler  -Keep mg>2 and K>4  -F/u TSH  -BNP 2713    NSTEMI  -troponin 78, 212, 3234, 4064  -likely demand from afib RVR, but given chest pain cardiology is concerned about MI  -hold Eliquis  -started on heparin drip  -Continue statin, BB, and aspirin 81 mg qd   -plan for transfer, expected cardiac cath Monday. Family requested transfer to [facility] but no beds available. Spoke to Dr. Justino Butler, agrees for transfer at [facility]     HLD  -continue home atorvastatin 40 mg qd     Hypothyroidism:   -TSH 1.7  -continue home synthroid 50 mcg  QD    Full code     80 y/o female with PMH of Atrial and mitral valve replacement, Afib on AC, HLD who presented to ED to be evaluated for palpitation. episode occurred 2 hrs prior to arrival when pt was in her office. denies any associated chest pain or shortness of breath with her symptoms. pt states that she is been having on and off chest pain under her Lt breast. pain usually goes away w/o any exacerbating  or alleviating factors.  pt also noted having noticed shortness of breath while laying flat. today while in her office pt felt her heart was racing and Lt arm was getting numb so decided to come to ED. here in ED  pt noted to be in Afib W RVR and received 10 mg IV Cardizem with HR <110. to be admitted for further management       Afib w RVR:  -S/p 10 mg IV cardizem in ED and metoprolol tartrate 25 mg   -Lat cardioversion 2 yrs ago with return to NSR  -home metoprolol succinate switched to tartrate by cardiology, will continue  -HR still elevated in ED, added cardizem 30 mg qid   -maintain HR <120  -TTE EF 45-50%  -monitor on tele  -F/u as outpatient with Dr Butler  -Keep mg>2 and K>4  -F/u TSH  -BNP 2713    NSTEMI  -troponin 78, 212, 3234, 4064  -likely demand from afib RVR, but given chest pain cardiology is concerned about MI  -hold Eliquis  -started on heparin drip  -Continue statin, BB, and aspirin 81 mg qd   -plan for transfer, expected cardiac cath Monday. Family requested transfer to [facility] but no beds available. Spoke to Dr. Justino Butler, agrees for transfer at [facility]  -continue home atorvastatin 40 mg qd     Hypothyroidism:   -TSH 1.7  -continue home synthroid 50 mcg  QD    Full code

## 2025-04-12 NOTE — PROVIDER CONTACT NOTE (CRITICAL VALUE NOTIFICATION) - SITUATION
Patient troponin 3234.1 at 6am this morning (4/12)  Troponin 212.5 at 7pm last night (4/11)
Patient troponin 4064.5  This morning, troponin 3234.1

## 2025-04-12 NOTE — DISCHARGE NOTE PROVIDER - CARE PROVIDER_API CALL
Justino Butler  Interventional Cardiology  [address] [place] [address], [address]  [place] [address]  Phone: [phone]  Fax: [phone]  Established Patient  Follow Up Time:

## 2025-04-12 NOTE — DISCHARGE NOTE PROVIDER - NSDCCPCAREPLAN_GEN_ALL_CORE_FT
PRINCIPAL DISCHARGE DIAGNOSIS  Diagnosis: Rapid atrial fibrillation  Assessment and Plan of Treatment:       SECONDARY DISCHARGE DIAGNOSES  Diagnosis: NSTEMI (non-ST elevation myocardial infarction)  Assessment and Plan of Treatment:

## 2025-04-12 NOTE — DISCHARGE NOTE PROVIDER - NSDCMRMEDTOKEN_GEN_ALL_CORE_FT
aspirin 81 mg oral delayed release tablet: 1 tab(s) orally once a day  atorvastatin 40 mg oral tablet: 1 tab(s) orally once a day (at bedtime)  Eliquis 5 mg oral tablet: 1 tab(s) orally 2 times a day  fluticasone 50 mcg/inh nasal spray: 1 spray(s) nasal once a day   levothyroxine 50 mcg (0.05 mg) oral tablet: 1 tab(s) orally once a day  metoprolol succinate 25 mg oral tablet, extended release: 1 tab(s) orally once a day  pantoprazole 40 mg oral delayed release tablet: 1 tab(s) orally once a day  Vitamin B12 1000 mcg/mL injectable solution: injectable every 2 months  Vitamin D3 5000 intl units oral capsule: 1 cap(s) orally once a day   acetaminophen 325 mg oral tablet: 2 tab(s) orally every 6 hours As needed Temp greater or equal to 38C (100.4F), Mild Pain (1 - 3)  aluminum hydroxide-magnesium hydroxide 200 mg-200 mg/5 mL oral suspension: 30 milliliter(s) orally every 4 hours As needed Dyspepsia  aspirin 81 mg oral delayed release tablet: 1 tab(s) orally once a day  atorvastatin 40 mg oral tablet: 1 tab(s) orally once a day (at bedtime)  fluticasone 50 mcg/inh nasal spray: 1 spray(s) nasal once a day   heparin 100 units/mL-D5% intravenous solution: 3,800 unit(s) intravenous every 6 hours  levothyroxine 50 mcg (0.05 mg) oral tablet: 1 tab(s) orally once a day  melatonin 3 mg oral tablet: 1 tab(s) orally once a day (at bedtime) As needed Insomnia  metoprolol tartrate 50 mg oral tablet: 1 tab(s) orally 2 times a day  pantoprazole 40 mg oral delayed release tablet: 1 tab(s) orally once a day  Vitamin B12 1000 mcg/mL injectable solution: injectable every 2 months  Vitamin D3 5000 intl units oral capsule: 1 cap(s) orally once a day

## 2025-04-12 NOTE — DISCHARGE NOTE NURSING/CASE MANAGEMENT/SOCIAL WORK - PATIENT PORTAL LINK FT
You can access the FollowMyHealth Patient Portal offered by [facility] by registering at the following website: [email or web address]. By joining [employer]’s FollowMyHealth portal, you will also be able to view your health information using other applications (apps) compatible with our system.

## 2025-04-12 NOTE — PROVIDER CONTACT NOTE (CRITICAL VALUE NOTIFICATION) - BACKGROUND
Patient here for afib. Evaluated by cardiology this morning. To be transferred to other facility for further workup and cardiac cath. Started on heparin drip.
Luly PIKE Fib.

## 2025-04-12 NOTE — PROGRESS NOTE ADULT - SUBJECTIVE AND OBJECTIVE BOX
Patient is a 79y old  Female who presents with a chief complaint of palpitation (11 Apr 2025 19:44)      INTERVAL HPI/OVERNIGHT EVENTS:        REVIEW OF SYSTEMS:  CONSTITUTIONAL: No fever, weight loss, or fatigue  EYES: No eye pain, visual disturbances, or discharge  ENMT:  No difficulty hearing, tinnitus, vertigo; No sinus or throat pain  NECK: No pain or stiffness  RESPIRATORY: No cough, wheezing, chills or hemoptysis; No shortness of breath  CARDIOVASCULAR: No chest pain, palpitations, lightheadedness, or leg swelling  GASTROINTESTINAL: No abdominal or epigastric pain. No nausea, vomiting, or hematemesis; No diarrhea or constipation. No melena or hematochezia.  GENITOURINARY: No dysuria, frequency, hematuria, or incontinence  NEUROLOGICAL: No headaches, memory loss, vertigo, loss of strength, numbness, or tremors  SKIN: No itching, burning, rashes, or lesions   LYMPH NODES: No enlarged glands  ENDOCRINE: No heat or cold intolerance; No hair loss; No polydipsia or polyuria  MUSCULOSKELETAL: No joint pain or swelling; No muscle, back, or extremity pain  PSYCHIATRIC: No depression, anxiety, or mood swings  HEME/LYMPH: No easy bruising, or bleeding gums  ALLERGY AND IMMUNOLOGIC: No hives or eczema    PHYSICAL EXAM:  GENERAL: Adult Female, NAD, well-groomed, well-developed  HEAD:  Atraumatic, Normocephalic  EYES: EOMI, PERRLA, conjunctiva and sclera clear  ENMT: Moist mucous membranes, Good dentition, No lesions  NECK: Supple, No JVD appreciated  NERVOUS SYSTEM:  Alert & Oriented X3, Good concentration; All 4 extremities mobile, no gross sensory deficits.   CHEST/LUNG: Clear to auscultation bilaterally; No rales, rhonchi, wheezing, or rubs appreciated  HEART: Regular rate and rhythm; No murmurs, rubs, or gallops  ABDOMEN: Soft, Nontender, Nondistended  EXTREMITIES:  No clubbing, cyanosis, or edema appreciated  LYMPH: No lymphadenopathy noted  SKIN: No rashes or lesions appreciated    MEDICATIONS  (STANDING):  aspirin enteric coated 81 milliGRAM(s) Oral daily  atorvastatin 40 milliGRAM(s) Oral at bedtime  heparin  Infusion.  Unit(s)/Hr (7.5 mL/Hr) IV Continuous <Continuous>  levothyroxine 50 MICROGram(s) Oral daily  metoprolol tartrate 50 milliGRAM(s) Oral two times a day  pantoprazole    Tablet 40 milliGRAM(s) Oral before breakfast    MEDICATIONS  (PRN):  acetaminophen     Tablet .. 650 milliGRAM(s) Oral every 6 hours PRN Temp greater or equal to 38C (100.4F), Mild Pain (1 - 3)  aluminum hydroxide/magnesium hydroxide/simethicone Suspension 30 milliLiter(s) Oral every 4 hours PRN Dyspepsia  heparin   Injectable 3800 Unit(s) IV Push every 6 hours PRN For aPTT less than 40  melatonin 3 milliGRAM(s) Oral at bedtime PRN Insomnia  ondansetron Injectable 4 milliGRAM(s) IV Push every 8 hours PRN Nausea and/or Vomiting      Allergies    Macrodantin (Unknown)  sulfonylureas (Unknown)  sulfa drugs (Rash)    Intolerances        Vital Signs Last 24 Hrs  T(C): 36.4 (12 Apr 2025 07:43), Max: 37 (11 Apr 2025 16:08)  T(F): 97.6 (12 Apr 2025 07:43), Max: 98.6 (11 Apr 2025 16:08)  HR: 86 (12 Apr 2025 07:43) (86 - 143)  BP: 127/94 (12 Apr 2025 07:43) (120/94 - 161/89)  BP(mean): --  RR: 16 (12 Apr 2025 07:43) (14 - 18)  SpO2: 95% (12 Apr 2025 07:43) (95% - 100%)    Parameters below as of 12 Apr 2025 07:43  Patient On (Oxygen Delivery Method): room air        LABS:                        12.1   5.62  )-----------( 198      ( 12 Apr 2025 06:00 )             37.3     12 Apr 2025 06:00    143    |  108    |  15     ----------------------------<  88     3.7     |  27     |  0.81     Ca    8.5        12 Apr 2025 06:00  Mg     1.9       12 Apr 2025 06:00    TPro  6.7    /  Alb  3.8    /  TBili  0.6    /  DBili  x      /  AST  33     /  ALT  28     /  AlkPhos  72     11 Apr 2025 16:28    PT/INR - ( 11 Apr 2025 16:28 )   PT: 15.9 sec;   INR: 1.38 ratio         PTT - ( 11 Apr 2025 16:28 )  PTT:36.8 sec  Urinalysis Basic - ( 12 Apr 2025 06:00 )    Color: x / Appearance: x / SG: x / pH: x  Gluc: 88 mg/dL / Ketone: x  / Bili: x / Urobili: x   Blood: x / Protein: x / Nitrite: x   Leuk Esterase: x / RBC: x / WBC x   Sq Epi: x / Non Sq Epi: x / Bacteria: x      CAPILLARY BLOOD GLUCOSE       Patient is a 79y old  Female who presents with a chief complaint of palpitation (11 Apr 2025 19:44)      INTERVAL HPI/OVERNIGHT EVENTS:  Patient seen awake and sitting up in bed. She denies chest pain or palpitations. No reported overnight events.       REVIEW OF SYSTEMS:  CONSTITUTIONAL: No fever, weight loss, or fatigue  EYES: No eye pain, visual disturbances, or discharge  ENMT:  No difficulty hearing, tinnitus, vertigo; No sinus or throat pain  NECK: No pain or stiffness  RESPIRATORY: No cough, wheezing, chills or hemoptysis; No shortness of breath  CARDIOVASCULAR: No chest pain, palpitations, lightheadedness, or leg swelling  GASTROINTESTINAL: No abdominal or epigastric pain. No nausea, vomiting, or hematemesis; No diarrhea or constipation. No melena or hematochezia.  GENITOURINARY: No dysuria, frequency, hematuria, or incontinence  NEUROLOGICAL: No headaches, memory loss, vertigo, loss of strength, numbness, or tremors  SKIN: No itching, burning, rashes, or lesions   MUSCULOSKELETAL: No joint pain or swelling; No muscle, back, or extremity pain      PHYSICAL EXAM:  GENERAL: Adult Female, NAD, well-groomed, well-developed  HEAD:  Atraumatic, Normocephalic  EYES: EOMI, PERRLA, conjunctiva and sclera clear  ENMT: Moist mucous membranes, Good dentition, No lesions  NECK: Supple, No JVD appreciated  NERVOUS SYSTEM:  Alert & Oriented X3, Good concentration; All 4 extremities mobile, no gross sensory deficits.   CHEST/LUNG: Clear to auscultation bilaterally; No rales, rhonchi, wheezing, or rubs appreciated  HEART: Regular rate and rhythm; No murmurs, rubs, or gallops appreciated  ABDOMEN: Soft, Nontender, Nondistended  EXTREMITIES:  No clubbing, cyanosis, or edema appreciated  LYMPH: No lymphadenopathy noted  SKIN: No rashes or lesions appreciated    MEDICATIONS  (STANDING):  aspirin enteric coated 81 milliGRAM(s) Oral daily  atorvastatin 40 milliGRAM(s) Oral at bedtime  heparin  Infusion.  Unit(s)/Hr (7.5 mL/Hr) IV Continuous <Continuous>  levothyroxine 50 MICROGram(s) Oral daily  metoprolol tartrate 50 milliGRAM(s) Oral two times a day  pantoprazole    Tablet 40 milliGRAM(s) Oral before breakfast    MEDICATIONS  (PRN):  acetaminophen     Tablet .. 650 milliGRAM(s) Oral every 6 hours PRN Temp greater or equal to 38C (100.4F), Mild Pain (1 - 3)  aluminum hydroxide/magnesium hydroxide/simethicone Suspension 30 milliLiter(s) Oral every 4 hours PRN Dyspepsia  heparin   Injectable 3800 Unit(s) IV Push every 6 hours PRN For aPTT less than 40  melatonin 3 milliGRAM(s) Oral at bedtime PRN Insomnia  ondansetron Injectable 4 milliGRAM(s) IV Push every 8 hours PRN Nausea and/or Vomiting      Allergies    Macrodantin (Unknown)  sulfonylureas (Unknown)  sulfa drugs (Rash)    Intolerances        Vital Signs Last 24 Hrs  T(C): 36.4 (12 Apr 2025 07:43), Max: 37 (11 Apr 2025 16:08)  T(F): 97.6 (12 Apr 2025 07:43), Max: 98.6 (11 Apr 2025 16:08)  HR: 86 (12 Apr 2025 07:43) (86 - 143)  BP: 127/94 (12 Apr 2025 07:43) (120/94 - 161/89)  BP(mean): --  RR: 16 (12 Apr 2025 07:43) (14 - 18)  SpO2: 95% (12 Apr 2025 07:43) (95% - 100%)    Parameters below as of 12 Apr 2025 07:43  Patient On (Oxygen Delivery Method): room air        LABS:                        12.1   5.62  )-----------( 198      ( 12 Apr 2025 06:00 )             37.3     12 Apr 2025 06:00    143    |  108    |  15     ----------------------------<  88     3.7     |  27     |  0.81     Ca    8.5        12 Apr 2025 06:00  Mg     1.9       12 Apr 2025 06:00    TPro  6.7    /  Alb  3.8    /  TBili  0.6    /  DBili  x      /  AST  33     /  ALT  28     /  AlkPhos  72     11 Apr 2025 16:28    PT/INR - ( 11 Apr 2025 16:28 )   PT: 15.9 sec;   INR: 1.38 ratio         PTT - ( 11 Apr 2025 16:28 )  PTT:36.8 sec  Urinalysis Basic - ( 12 Apr 2025 06:00 )    Color: x / Appearance: x / SG: x / pH: x  Gluc: 88 mg/dL / Ketone: x  / Bili: x / Urobili: x   Blood: x / Protein: x / Nitrite: x   Leuk Esterase: x / RBC: x / WBC x   Sq Epi: x / Non Sq Epi: x / Bacteria: x      CAPILLARY BLOOD GLUCOSE

## 2025-04-12 NOTE — DISCHARGE NOTE PROVIDER - NSDCFUSCHEDAPPT_GEN_ALL_CORE_FT
Andrew Rivero  [facility]  [facility] [address] [facility]  Scheduled Appointment: 05/01/2025

## 2025-04-13 LAB
ANION GAP SERPL CALC-SCNC: 8 MMOL/L — SIGNIFICANT CHANGE UP (ref 5–17)
APTT BLD: 58.4 SEC — HIGH (ref 24.5–35.6)
APTT BLD: 63.8 SEC — HIGH (ref 24.5–35.6)
APTT BLD: 85.4 SEC — HIGH (ref 24.5–35.6)
BUN SERPL-MCNC: 19 MG/DL — SIGNIFICANT CHANGE UP (ref 7–23)
CALCIUM SERPL-MCNC: 8.5 MG/DL — SIGNIFICANT CHANGE UP (ref 8.4–10.5)
CHLORIDE SERPL-SCNC: 106 MMOL/L — SIGNIFICANT CHANGE UP (ref 96–108)
CO2 SERPL-SCNC: 28 MMOL/L — SIGNIFICANT CHANGE UP (ref 22–31)
CREAT SERPL-MCNC: 0.74 MG/DL — SIGNIFICANT CHANGE UP (ref 0.5–1.3)
EGFR: 83 ML/MIN/1.73M2 — SIGNIFICANT CHANGE UP
EGFR: 83 ML/MIN/1.73M2 — SIGNIFICANT CHANGE UP
GLUCOSE SERPL-MCNC: 90 MG/DL — SIGNIFICANT CHANGE UP (ref 70–99)
HCT VFR BLD CALC: 38.7 % — SIGNIFICANT CHANGE UP (ref 34.5–45)
HCT VFR BLD CALC: 38.8 % — SIGNIFICANT CHANGE UP (ref 34.5–45)
HGB BLD-MCNC: 12.4 G/DL — SIGNIFICANT CHANGE UP (ref 11.5–15.5)
HGB BLD-MCNC: 12.7 G/DL — SIGNIFICANT CHANGE UP (ref 11.5–15.5)
MAGNESIUM SERPL-MCNC: 2 MG/DL — SIGNIFICANT CHANGE UP (ref 1.6–2.6)
MCHC RBC-ENTMCNC: 29.7 PG — SIGNIFICANT CHANGE UP (ref 27–34)
MCHC RBC-ENTMCNC: 30.3 PG — SIGNIFICANT CHANGE UP (ref 27–34)
MCHC RBC-ENTMCNC: 32 G/DL — SIGNIFICANT CHANGE UP (ref 32–36)
MCHC RBC-ENTMCNC: 32.7 G/DL — SIGNIFICANT CHANGE UP (ref 32–36)
MCV RBC AUTO: 92.6 FL — SIGNIFICANT CHANGE UP (ref 80–100)
MCV RBC AUTO: 92.8 FL — SIGNIFICANT CHANGE UP (ref 80–100)
NRBC BLD AUTO-RTO: 0 /100 WBCS — SIGNIFICANT CHANGE UP (ref 0–0)
NRBC BLD AUTO-RTO: 0 /100 WBCS — SIGNIFICANT CHANGE UP (ref 0–0)
PLATELET # BLD AUTO: 196 K/UL — SIGNIFICANT CHANGE UP (ref 150–400)
PLATELET # BLD AUTO: 239 K/UL — SIGNIFICANT CHANGE UP (ref 150–400)
POTASSIUM SERPL-MCNC: 3.7 MMOL/L — SIGNIFICANT CHANGE UP (ref 3.5–5.3)
POTASSIUM SERPL-SCNC: 3.7 MMOL/L — SIGNIFICANT CHANGE UP (ref 3.5–5.3)
RBC # BLD: 4.17 M/UL — SIGNIFICANT CHANGE UP (ref 3.8–5.2)
RBC # BLD: 4.19 M/UL — SIGNIFICANT CHANGE UP (ref 3.8–5.2)
RBC # FLD: 12.9 % — SIGNIFICANT CHANGE UP (ref 10.3–14.5)
RBC # FLD: 13.1 % — SIGNIFICANT CHANGE UP (ref 10.3–14.5)
SODIUM SERPL-SCNC: 142 MMOL/L — SIGNIFICANT CHANGE UP (ref 135–145)
TROPONIN I, HIGH SENSITIVITY RESULT: 2009.2 NG/L — HIGH
WBC # BLD: 5.42 K/UL — SIGNIFICANT CHANGE UP (ref 3.8–10.5)
WBC # BLD: 6.15 K/UL — SIGNIFICANT CHANGE UP (ref 3.8–10.5)
WBC # FLD AUTO: 5.42 K/UL — SIGNIFICANT CHANGE UP (ref 3.8–10.5)
WBC # FLD AUTO: 6.15 K/UL — SIGNIFICANT CHANGE UP (ref 3.8–10.5)

## 2025-04-13 PROCEDURE — 99239 HOSP IP/OBS DSCHRG MGMT >30: CPT

## 2025-04-13 RX ORDER — HEPARIN SODIUM 1000 [USP'U]/ML
3800 INJECTION INTRAVENOUS; SUBCUTANEOUS EVERY 6 HOURS
Refills: 0 | Status: DISCONTINUED | OUTPATIENT
Start: 2025-04-13 | End: 2025-04-14

## 2025-04-13 RX ORDER — HEPARIN SODIUM 1000 [USP'U]/ML
600 INJECTION INTRAVENOUS; SUBCUTANEOUS
Qty: 25000 | Refills: 0 | Status: DISCONTINUED | OUTPATIENT
Start: 2025-04-13 | End: 2025-04-14

## 2025-04-13 RX ORDER — HEPARIN SODIUM 1000 [USP'U]/ML
3800 INJECTION INTRAVENOUS; SUBCUTANEOUS ONCE
Refills: 0 | Status: DISCONTINUED | OUTPATIENT
Start: 2025-04-13 | End: 2025-04-13

## 2025-04-13 RX ADMIN — METOPROLOL SUCCINATE 50 MILLIGRAM(S): 50 TABLET, EXTENDED RELEASE ORAL at 17:16

## 2025-04-13 RX ADMIN — HEPARIN SODIUM 600 UNIT(S)/HR: 1000 INJECTION INTRAVENOUS; SUBCUTANEOUS at 08:00

## 2025-04-13 RX ADMIN — HEPARIN SODIUM 600 UNIT(S)/HR: 1000 INJECTION INTRAVENOUS; SUBCUTANEOUS at 16:49

## 2025-04-13 RX ADMIN — HEPARIN SODIUM 0 UNIT(S)/HR: 1000 INJECTION INTRAVENOUS; SUBCUTANEOUS at 01:02

## 2025-04-13 RX ADMIN — Medication 3 MILLIGRAM(S): at 21:21

## 2025-04-13 RX ADMIN — METOPROLOL SUCCINATE 50 MILLIGRAM(S): 50 TABLET, EXTENDED RELEASE ORAL at 06:29

## 2025-04-13 RX ADMIN — Medication 81 MILLIGRAM(S): at 11:20

## 2025-04-13 RX ADMIN — HEPARIN SODIUM 600 UNIT(S)/HR: 1000 INJECTION INTRAVENOUS; SUBCUTANEOUS at 19:08

## 2025-04-13 RX ADMIN — HEPARIN SODIUM 600 UNIT(S)/HR: 1000 INJECTION INTRAVENOUS; SUBCUTANEOUS at 14:14

## 2025-04-13 RX ADMIN — Medication 40 MILLIGRAM(S): at 06:29

## 2025-04-13 RX ADMIN — ATORVASTATIN CALCIUM 40 MILLIGRAM(S): 80 TABLET, FILM COATED ORAL at 21:21

## 2025-04-13 RX ADMIN — Medication 50 MICROGRAM(S): at 06:29

## 2025-04-13 NOTE — CAREGIVER ENGAGEMENT NOTE - CAREGIVER EDUCATION NOTES - FREE TEXT
RN/CM met with pt, explained role of CM. Provided pt with CM contact info. Initiated DC planning discussion- pt is NOT anticipating any skilled needs @ this time. Pt stated that she is VERY independent, still drives. Pt resides in a private house, split-level, with approx 3 steps coming in, another 6 steps to main level then another 6 steps to lower level of house. Pt declined any difficulty with navigating steps. Pt's community MD is DR Melany Goodman [phone]; pharmacy is [facility] on [address] [phone] for short term meds; utilizes [facility] for routine meds. Pt currently awaiting transfer to [facility] under service of DR Badillo for further cardiac work-up. CM remains available.  RN/CM met with pt, explained role of CM. Provided pt with CM contact info. Initiated DC planning discussion- pt is NOT anticipating any skilled needs @ this time. Pt stated that she is VERY independent, still drives, does NOT utilize any assistive device. Pt is very alert, self-directing, gave permission that spouse, Alexis [phone] @ bedside be privy to all info. Pt provided this CM with her cell [phone] as her best contact info Pt resides in a private house, split-level, with approx 3 steps coming in, another 6 steps to main level then another 6 steps to lower level of house. Pt declined any difficulty with navigating steps. Pt's community MD is DR Melany Goodman [phone]; pharmacy is [facility] on [address] [phone] for short term meds; utilizes [facility] for routine meds. Pt currently awaiting transfer to [facility] under service of DR Badillo for further cardiac work-up. CM remains available.

## 2025-04-13 NOTE — CARE COORDINATION ASSESSMENT. - NSDCPLANSERVICES_GEN_ALL_CORE
RN/CM met with pt, explained role of CM. Provided pt with CM contact info. Initiated DC planning discussion- pt is NOT anticipating any skilled needs @ this time. Pt stated that she is VERY independent, still drives, does NOT utilize any assistive device. Pt is very alert, self-directing, gave permission that spouse, Alexis [phone] @ bedside be privy to all info. Pt provided this CM with her cell [phone] as her best contact info Pt resides in a private house, split-level, with approx 3 steps coming in, another 6 steps to main level then another 6 steps to lower level of house. Pt declined any difficulty with navigating steps. Pt's community MD is DR Melany Goodman [phone]; pharmacy is [facility] on [address] [phone] for short term meds; utilizes [facility] for routine meds. Pt currently awaiting transfer to [facility] under service of DR Badillo for further cardiac work-up. CM remains available.

## 2025-04-13 NOTE — PROGRESS NOTE ADULT - SUBJECTIVE AND OBJECTIVE BOX
Patient is a 79y old  Female who presents with a chief complaint of palpitation (12 Apr 2025 15:08)      INTERVAL HPI/OVERNIGHT EVENTS:  Patient seen awake and sitting up in bed. She reports some chest pain earlier which she described as a pressure on her chest. No reported overnight events.       REVIEW OF SYSTEMS:  CONSTITUTIONAL: No fever, weight loss, or fatigue  EYES: No eye pain, visual disturbances, or discharge  ENMT:  No difficulty hearing, tinnitus, vertigo; No sinus or throat pain  NECK: No pain or stiffness  RESPIRATORY: No cough, wheezing, chills or hemoptysis; No shortness of breath  CARDIOVASCULAR: Chest pain, no palpitations, lightheadedness, or leg swelling  GASTROINTESTINAL: No abdominal or epigastric pain. No nausea, vomiting, or hematemesis; No diarrhea or constipation. No melena or hematochezia.  GENITOURINARY: No dysuria, frequency, hematuria, or incontinence  NEUROLOGICAL: No headaches, memory loss, vertigo, loss of strength, numbness, or tremors  SKIN: No itching, burning, rashes, or lesions   MUSCULOSKELETAL: No joint pain or swelling; No muscle, back, or extremity pain      PHYSICAL EXAM:  GENERAL: Adult Female, NAD, well-groomed, well-developed  HEAD:  Atraumatic, Normocephalic  EYES: EOMI, PERRLA, conjunctiva and sclera clear  ENMT: Moist mucous membranes, Good dentition, No lesions  NECK: Supple, No JVD appreciated  NERVOUS SYSTEM:  Alert & Oriented X3, Good concentration; All 4 extremities mobile, no gross sensory deficits.   CHEST/LUNG: Clear to auscultation bilaterally; No rales, rhonchi, wheezing, or rubs appreciated  HEART: Regular rate and rhythm; No murmurs, rubs, or gallops appreciated  ABDOMEN: Soft, Nontender, Nondistended  EXTREMITIES:  No clubbing, cyanosis, or edema appreciated  LYMPH: No lymphadenopathy noted  SKIN: No rashes or lesions appreciated      MEDICATIONS  (STANDING):  aspirin enteric coated 81 milliGRAM(s) Oral daily  atorvastatin 40 milliGRAM(s) Oral at bedtime  heparin  Infusion. 600 Unit(s)/Hr (6 mL/Hr) IV Continuous <Continuous>  levothyroxine 50 MICROGram(s) Oral daily  metoprolol tartrate 50 milliGRAM(s) Oral two times a day  pantoprazole    Tablet 40 milliGRAM(s) Oral before breakfast    MEDICATIONS  (PRN):  acetaminophen     Tablet .. 650 milliGRAM(s) Oral every 6 hours PRN Temp greater or equal to 38C (100.4F), Mild Pain (1 - 3)  aluminum hydroxide/magnesium hydroxide/simethicone Suspension 30 milliLiter(s) Oral every 4 hours PRN Dyspepsia  heparin   Injectable 3800 Unit(s) IV Push every 6 hours PRN For aPTT less than 40  melatonin 3 milliGRAM(s) Oral at bedtime PRN Insomnia  ondansetron Injectable 4 milliGRAM(s) IV Push every 8 hours PRN Nausea and/or Vomiting      Allergies    Macrodantin (Unknown)  sulfonylureas (Unknown)  sulfa drugs (Rash)    Intolerances        Vital Signs Last 24 Hrs  T(C): 36.8 (13 Apr 2025 11:30), Max: 36.8 (13 Apr 2025 11:30)  T(F): 98.2 (13 Apr 2025 11:30), Max: 98.2 (13 Apr 2025 11:30)  HR: 56 (13 Apr 2025 11:30) (56 - 87)  BP: 130/81 (13 Apr 2025 11:30) (128/82 - 151/78)  BP(mean): --  RR: 18 (13 Apr 2025 11:30) (16 - 18)  SpO2: 96% (13 Apr 2025 11:30) (96% - 98%)    Parameters below as of 13 Apr 2025 11:30  Patient On (Oxygen Delivery Method): room air        LABS:                        12.4   5.42  )-----------( 196      ( 13 Apr 2025 06:00 )             38.7     13 Apr 2025 07:35    142    |  106    |  19     ----------------------------<  90     3.7     |  28     |  0.74     Ca    8.5        13 Apr 2025 07:35  Mg     2.0       13 Apr 2025 07:35      PT/INR - ( 11 Apr 2025 16:28 )   PT: 15.9 sec;   INR: 1.38 ratio         PTT - ( 13 Apr 2025 07:50 )  PTT:63.8 sec  Urinalysis Basic - ( 13 Apr 2025 07:35 )    Color: x / Appearance: x / SG: x / pH: x  Gluc: 90 mg/dL / Ketone: x  / Bili: x / Urobili: x   Blood: x / Protein: x / Nitrite: x   Leuk Esterase: x / RBC: x / WBC x   Sq Epi: x / Non Sq Epi: x / Bacteria: x      CAPILLARY BLOOD GLUCOSE

## 2025-04-13 NOTE — CARE COORDINATION ASSESSMENT. - LIVING ARRANGEMENTS, PROFILE
pt resides with spouse in a split-level style home, approx 3 steps coming in, 6 steps to main level of house, another 6 steps to lower level. Pt declined any difficulty navigating steps/house
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
## 2025-04-13 NOTE — CARE COORDINATION ASSESSMENT. - NSCAREPROVIDERS_GEN_ALL_CORE_FT
CARE PROVIDERS:  Accepting Physician: FAISAL Miguel  Administration: Andrés Chamberlain  Administration: Maximino Jo  Admitting: FAISAL Miguel  Attending: FAISAL Miguel  Case Management: Santaromana, Anna  Case Management: Rosenda Joshua  Consultant: Andry Sales  Consultant: Matteo Flores  Consultant: So Mcnally  Covering Team: FAISAL Miguel  ED Attending: Dominick Bhardwaj  ED Nurse: Johanna Stockton  Nurse: Afshan Monzon  Outpatient Provider: Justino Butler  PCA/Nursing Assistant: Jaylene Montes  PCA/Nursing Assistant: Mary Marley  Primary Team: Kolton Don  Registered Dietitian: Deja Coker  Team: [facility], Team

## 2025-04-13 NOTE — PROVIDER CONTACT NOTE (OTHER) - ACTION/TREATMENT ORDERED:
Heparin infusion reordered at 6ml/hr  aPTT ordered & drawn, level 63.8. Dr. Don made aware of therapeutic level, to continue following nomogram, rate remains at 6ml/hr  next aPTT draw at 1330
repeat troponine @ 6am fine as per say

## 2025-04-13 NOTE — PROVIDER CONTACT NOTE (OTHER) - RECOMMENDATIONS
Reorder heparin nomogram at 6mL/hr where patient infusion running as per previous nomogram based on aPTT drawn at 2335  STAT aPTT order- no aPTT drawn since 2335
no order obtained

## 2025-04-13 NOTE — PROVIDER CONTACT NOTE (OTHER) - BACKGROUND
Patient admitted for afib and possible NSTEMI.   Transitioned from Eliquis to heparin infusion pending transfer for cardiac cath
Hx a-fib ,admitted with rapid a-fib
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
## 2025-04-13 NOTE — PROGRESS NOTE ADULT - ASSESSMENT
80 y/o female with PMH of Atrial and mitral valve replacement, Afib on AC, HLD who presented to ED to be evaluated for palpitation    Afib w RVR:  -S/p 10 mg IV cardizem in ED and metoprolol tartrate 25 mg   -Last cardioversion 2 yrs ago with return to NSR  -home metoprolol succinate switched to tartrate by cardiology, will continue  -HR still elevated in ED, added cardizem 30 mg qid   -maintain HR <120  -TTE unremarkable  -monitor on tele  -F/u as outpatient with Dr Butler  -Keep mg>2 and K>4  -F/u TSH  -BNP 2713    NSTEMI  -troponin 78, 212, 3234, 4064, 2009  -likely demand from afib RVR, but given chest pain cardiology is concerned about MI  -continue heparin drip  -Continue statin, BB, Eliquis and aspirin 81 mg qd   -plan for transfer to [facility], expected cardiac cath Monday. Pending bed availability     HLD  -continue home atorvastatin 40 mg qd     Hypothyroidism:   -TSH 1.7  -continue home synthroid 50 mcg  QD    Full code    45 min spent for this encounter   
 78 y/o female with PMH of Atrial and mitral valve replacement, Afib on AC, HLD who presented to ED to be evaluated for palpitation    Afib w RVR:  -S/p 10 mg IV cardizem in ED and metoprolol tartrate 25 mg   -Lat cardioversion 2 yrs ago with return to NSR  -home metoprolol succinate switched to tartrate by cardiology, will continue  -HR still elevated in ED, added cardizem 30 mg qid   -maintain HR <120  -TTE unremarkable  -monitor on tele  -F/u as outpatient with Dr Butler  -Keep mg>2 and K>4  -F/u TSH  -BNP 2713    NSTEMI  -troponin 78, 212, 3234, 4064  -likely demand from afib RVR, but given chest pain cardiology is concerned about MI  -started on heparin drip  -Continue statin, BB, Eliquis and aspirin 81 mg qd   -plan for transfer to [facility], expected cardiac cath Monday. Dr. Barbara Torres made aware by cardiologist. Family requested transfer to [facility] but no beds available    HLD  -continue home atorvastatin 40 mg qd     Hypothyroidism:   -TSH 1.7  -continue home synthroid 50 mcg  QD    Full code    45 min spent for this encounter

## 2025-04-14 ENCOUNTER — INPATIENT (INPATIENT)
Facility: HOSPITAL | Age: 80
LOS: 0 days | Discharge: HOME | End: 2025-04-15
Attending: INTERNAL MEDICINE | Admitting: INTERNAL MEDICINE
Payer: MEDICARE

## 2025-04-14 VITALS
TEMPERATURE: 98 F | OXYGEN SATURATION: 98 % | SYSTOLIC BLOOD PRESSURE: 182 MMHG | RESPIRATION RATE: 16 BRPM | WEIGHT: 136.03 LBS | HEART RATE: 68 BPM | DIASTOLIC BLOOD PRESSURE: 77 MMHG | HEIGHT: 64.02 IN

## 2025-04-14 VITALS
TEMPERATURE: 97 F | HEART RATE: 55 BPM | SYSTOLIC BLOOD PRESSURE: 156 MMHG | DIASTOLIC BLOOD PRESSURE: 72 MMHG | OXYGEN SATURATION: 98 % | RESPIRATION RATE: 18 BRPM

## 2025-04-14 DIAGNOSIS — Z98.89 OTHER SPECIFIED POSTPROCEDURAL STATES: Chronic | ICD-10-CM

## 2025-04-14 DIAGNOSIS — I21.3 ST ELEVATION (STEMI) MYOCARDIAL INFARCTION OF UNSPECIFIED SITE: ICD-10-CM

## 2025-04-14 DIAGNOSIS — Z96.7 PRESENCE OF OTHER BONE AND TENDON IMPLANTS: Chronic | ICD-10-CM

## 2025-04-14 DIAGNOSIS — Z96.659 PRESENCE OF UNSPECIFIED ARTIFICIAL KNEE JOINT: Chronic | ICD-10-CM

## 2025-04-14 DIAGNOSIS — Z95.2 PRESENCE OF PROSTHETIC HEART VALVE: Chronic | ICD-10-CM

## 2025-04-14 LAB
APTT BLD: 63.3 SEC — HIGH (ref 24.5–35.6)
HCT VFR BLD CALC: 36.1 % — SIGNIFICANT CHANGE UP (ref 34.5–45)
HGB BLD-MCNC: 11.9 G/DL — SIGNIFICANT CHANGE UP (ref 11.5–15.5)
MCHC RBC-ENTMCNC: 30.2 PG — SIGNIFICANT CHANGE UP (ref 27–34)
MCHC RBC-ENTMCNC: 33 G/DL — SIGNIFICANT CHANGE UP (ref 32–36)
MCV RBC AUTO: 91.6 FL — SIGNIFICANT CHANGE UP (ref 80–100)
NRBC BLD AUTO-RTO: 0 /100 WBCS — SIGNIFICANT CHANGE UP (ref 0–0)
PLATELET # BLD AUTO: 189 K/UL — SIGNIFICANT CHANGE UP (ref 150–400)
RBC # BLD: 3.94 M/UL — SIGNIFICANT CHANGE UP (ref 3.8–5.2)
RBC # FLD: 12.9 % — SIGNIFICANT CHANGE UP (ref 10.3–14.5)
WBC # BLD: 4.68 K/UL — SIGNIFICANT CHANGE UP (ref 3.8–10.5)
WBC # FLD AUTO: 4.68 K/UL — SIGNIFICANT CHANGE UP (ref 3.8–10.5)

## 2025-04-14 PROCEDURE — 85610 PROTHROMBIN TIME: CPT

## 2025-04-14 PROCEDURE — 83735 ASSAY OF MAGNESIUM: CPT

## 2025-04-14 PROCEDURE — 99285 EMERGENCY DEPT VISIT HI MDM: CPT | Mod: 25

## 2025-04-14 PROCEDURE — 85730 THROMBOPLASTIN TIME PARTIAL: CPT

## 2025-04-14 PROCEDURE — 99233 SBSQ HOSP IP/OBS HIGH 50: CPT | Mod: FS

## 2025-04-14 PROCEDURE — 80048 BASIC METABOLIC PNL TOTAL CA: CPT

## 2025-04-14 PROCEDURE — 84443 ASSAY THYROID STIM HORMONE: CPT

## 2025-04-14 PROCEDURE — 71045 X-RAY EXAM CHEST 1 VIEW: CPT

## 2025-04-14 PROCEDURE — 93005 ELECTROCARDIOGRAM TRACING: CPT

## 2025-04-14 PROCEDURE — 96374 THER/PROPH/DIAG INJ IV PUSH: CPT

## 2025-04-14 PROCEDURE — 85027 COMPLETE CBC AUTOMATED: CPT

## 2025-04-14 PROCEDURE — 85025 COMPLETE CBC W/AUTO DIFF WBC: CPT

## 2025-04-14 PROCEDURE — 93454 CORONARY ARTERY ANGIO S&I: CPT | Mod: 26

## 2025-04-14 PROCEDURE — 83880 ASSAY OF NATRIURETIC PEPTIDE: CPT

## 2025-04-14 PROCEDURE — 93010 ELECTROCARDIOGRAM REPORT: CPT

## 2025-04-14 PROCEDURE — 0523T NTRAPX C FFR W/3D FUNCJL MAP: CPT

## 2025-04-14 PROCEDURE — 93306 TTE W/DOPPLER COMPLETE: CPT

## 2025-04-14 PROCEDURE — 36415 COLL VENOUS BLD VENIPUNCTURE: CPT

## 2025-04-14 PROCEDURE — 84484 ASSAY OF TROPONIN QUANT: CPT

## 2025-04-14 PROCEDURE — 80053 COMPREHEN METABOLIC PANEL: CPT

## 2025-04-14 RX ORDER — APIXABAN 2.5 MG/1
5 TABLET, FILM COATED ORAL EVERY 12 HOURS
Refills: 0 | Status: DISCONTINUED | OUTPATIENT
Start: 2025-04-15 | End: 2025-04-15

## 2025-04-14 RX ORDER — ATORVASTATIN CALCIUM 80 MG/1
40 TABLET, FILM COATED ORAL AT BEDTIME
Refills: 0 | Status: DISCONTINUED | OUTPATIENT
Start: 2025-04-14 | End: 2025-04-15

## 2025-04-14 RX ORDER — APIXABAN 2.5 MG/1
1 TABLET, FILM COATED ORAL
Refills: 0 | DISCHARGE

## 2025-04-14 RX ORDER — METOPROLOL SUCCINATE 50 MG/1
1 TABLET, EXTENDED RELEASE ORAL
Refills: 0 | DISCHARGE

## 2025-04-14 RX ORDER — LOSARTAN POTASSIUM 100 MG/1
25 TABLET, FILM COATED ORAL DAILY
Refills: 0 | Status: DISCONTINUED | OUTPATIENT
Start: 2025-04-14 | End: 2025-04-15

## 2025-04-14 RX ORDER — FUROSEMIDE 10 MG/ML
20 INJECTION INTRAMUSCULAR; INTRAVENOUS DAILY
Refills: 0 | Status: DISCONTINUED | OUTPATIENT
Start: 2025-04-14 | End: 2025-04-15

## 2025-04-14 RX ORDER — ASPIRIN 325 MG
81 TABLET ORAL DAILY
Refills: 0 | Status: DISCONTINUED | OUTPATIENT
Start: 2025-04-15 | End: 2025-04-15

## 2025-04-14 RX ORDER — LEVOTHYROXINE SODIUM 300 MCG
50 TABLET ORAL DAILY
Refills: 0 | Status: DISCONTINUED | OUTPATIENT
Start: 2025-04-15 | End: 2025-04-15

## 2025-04-14 RX ORDER — METOPROLOL SUCCINATE 50 MG/1
50 TABLET, EXTENDED RELEASE ORAL DAILY
Refills: 0 | Status: DISCONTINUED | OUTPATIENT
Start: 2025-04-14 | End: 2025-04-15

## 2025-04-14 RX ORDER — FUROSEMIDE 10 MG/ML
1 INJECTION INTRAMUSCULAR; INTRAVENOUS
Refills: 0 | DISCHARGE

## 2025-04-14 RX ADMIN — METOPROLOL SUCCINATE 50 MILLIGRAM(S): 50 TABLET, EXTENDED RELEASE ORAL at 22:42

## 2025-04-14 RX ADMIN — LOSARTAN POTASSIUM 25 MILLIGRAM(S): 100 TABLET, FILM COATED ORAL at 22:43

## 2025-04-14 RX ADMIN — Medication 3 MILLILITER(S): at 14:06

## 2025-04-14 RX ADMIN — Medication 40 MILLIGRAM(S): at 06:42

## 2025-04-14 RX ADMIN — ATORVASTATIN CALCIUM 40 MILLIGRAM(S): 80 TABLET, FILM COATED ORAL at 22:42

## 2025-04-14 RX ADMIN — HEPARIN SODIUM 600 UNIT(S)/HR: 1000 INJECTION INTRAVENOUS; SUBCUTANEOUS at 07:20

## 2025-04-14 RX ADMIN — Medication 3 MILLILITER(S): at 21:57

## 2025-04-14 RX ADMIN — Medication 50 MICROGRAM(S): at 06:41

## 2025-04-14 RX ADMIN — HEPARIN SODIUM 600 UNIT(S)/HR: 1000 INJECTION INTRAVENOUS; SUBCUTANEOUS at 08:53

## 2025-04-14 RX ADMIN — Medication 40 MILLIGRAM(S): at 22:43

## 2025-04-14 RX ADMIN — FUROSEMIDE 20 MILLIGRAM(S): 10 INJECTION INTRAMUSCULAR; INTRAVENOUS at 22:42

## 2025-04-14 NOTE — H&P CARDIOLOGY - IN ACCORDANCE WITH [place] STATE LAW, WE OFFER EVERY PATIENT A HEPATITIS C TEST. WOULD YOU LIKE TO BE TESTED TODAY?
Brought in by family c.o increased weakness, not taking her medications  Daughter In with pt. Intermittent constipation/diarrhea    Opt out

## 2025-04-14 NOTE — ASU PATIENT PROFILE, ADULT - FALL HARM RISK - UNIVERSAL INTERVENTIONS
Bed in lowest position, wheels locked, appropriate side rails in place/Call bell, personal items and telephone in reach/Instruct patient to call for assistance before getting out of bed or chair/Non-slip footwear when patient is out of bed/[place] to call system/Physically safe environment - no spills, clutter or unnecessary equipment/Purposeful Proactive Rounding/Room/bathroom lighting operational, light cord in reach

## 2025-04-14 NOTE — H&P CARDIOLOGY - CARDIOVASCULAR DETAILS
----- Message from Garett Bhakta Jr., MD sent at 6/5/2018  8:17 AM CDT -----  I have reviewed your results.  They demonstrate no abnormal findings.  If you have any additional concerns regarding these tests, please contact me at your convenience.     positive S1/positive S2

## 2025-04-14 NOTE — CONSULT NOTE ADULT - ASSESSMENT
79yoF w/ PMHx HTN, pAF on Eliquis, hypothyroidism, OA, DVT, MVR bioprosthetic (Liam 2012), transvenous mitral JUNIOR (Layo, 2023) initially presented to [place] ER c/o palpitations, chest tightness and dizziness found to be in rapid afib -160s. Was treated with AV miguel a blockers and self converted to NSR. Patient has history of afib post cardiac surgery in 2023 for which she was cardioverted.  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  In [place], noted to have elevated troponins and was transferred to [facility] for LHC.  LHC showed nonobstructive CAD, no interventions were preformed.  Currently states she is feeling well, denies any chest pain, palpitations, lightheadedness, dizziness, syncope or near syncope.      PAF  CHADsVASC: 4 - continue Eliquis 5mg PO BID   Continue to monitor on telemetry   TSH 1.7  Continue Toprol 50m PO daily   TTE: EF 45-50%; severely dilated LA  May benefit from ablation, patient to follow up with Dr Maxwell Anderson regarding an outpatient ablation - appointment to follow  Amiodarone load (500mg PO BIDx14 doses, 500mg PO dailyx7 doses, then 200mg PO daily thereafter)     79yoF w/ PMHx HTN, pAF on Eliquis, hypothyroidism, OA, DVT, MVR bioprosthetic (Liam 2012), transvenous mitral JUNIOR (Layo, 2023) initially presented to [place] ER c/o palpitations, chest tightness and dizziness found to be in rapid afib -160s. Was treated with AV miguel a blockers and self converted to NSR. Patient has history of afib post cardiac surgery in 2023 for which she was cardioverted.  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  In [place], noted to have elevated troponins and was transferred to [facility] for LHC.  LHC showed nonobstructive CAD, no interventions were preformed.  Currently states she is feeling well, denies any chest pain, palpitations, lightheadedness, dizziness, syncope or near syncope.      PAF  CHADsVASC: 4 - continue Eliquis 5mg PO BID   Continue to monitor on telemetry   TSH 1.7  Continue Toprol 50m PO daily   TTE: EF 45-50%; severely dilated LA  May benefit from ablation, patient to follow up with Dr Maxwell Anderson regarding an outpatient ablation - appointment to follow  Amiodarone load (400mg PO BIDx14 days,  then 200mg PO daily thereafter)

## 2025-04-14 NOTE — H&P CARDIOLOGY - COMMENTS
Pre Procedural Sedation Evaluation    Urine pregnancy: N/A  Dentures: None  Last PO intake: 4/14/25 at 8am  Obstructive sleep apnea: No  Aspiration risk: No  Mallampati score: 2  ASA Classification: 2  Prior Sedative or Anesthesia Experience: No complications  Informed consent by responsible adult: Yes  Responsible adult escort: Yes  Based on today's assessment, anesthesia consult requested: No

## 2025-04-14 NOTE — PATIENT PROFILE ADULT - IS PATIENT POST-MENOPAUSAL?
[Patient/caregiver able to verbalize understanding of medications, including indications and side effects] : Patient/caregiver able to verbalize understanding of medications, including indications and side effects yes

## 2025-04-14 NOTE — H&P CARDIOLOGY - HISTORY OF PRESENT ILLNESS
78 yo female with a PMH of HTN, pAF on Eliquis,  hypothyroidism, OA, DVT,  s/p MVR 2012 w/ Dr. Wheeler, s/p transcatheter mitral JUNIOR w/ Dr. Vasques 2/3/23 for bioprosthetic MV stenosis initially presented to [facility] with palpitation and was found to be in rapid A Fib. Echo with grossly normal LV systolic function, normal TMVR function  Troponin  up to 3234. Rate is controlled now. Due to elevated troponin the patient was recommended coronary angiogram. Eliquis was stopped and start heparin drip.  The process of the procedures along with the risk and benefits for the procedure were explained in detail which included but not limited to bleeding, infection, stroke, pericardial effusion,cardiac tamponade, vessels rupture, renal failure, intubation, and death.Patient expressed understanding an all questions were answered.  The patient denies dyspnea, palpitations, dizziness, presyncope, syncope,  headache, visual disturbances, CVA, PE, DVT, abdominal pain, N/V/D/C, hematochezia, melena, dysuria, hematuria, fever, chills, ulcers, b/l lower extremities edema. 78 yo female with a PMH of HTN, pAF on Eliquis,  hypothyroidism, OA, DVT,  s/p MVR 2012 w/ Dr. Wheeler, s/p transcatheter mitral JUNIOR w/ Dr. Vasques 2/3/23 for bioprosthetic MV stenosis initially presented to [facility] with palpitation and was found to be in rapid A Fib. Echo with grossly normal LV systolic function, normal TMVR function. Troponin  up to 3234. Rate is controlled now. Due to elevated troponin the patient was recommended coronary angiogram. Eliquis was stopped and start heparin drip.  The process of the procedures along with the risk and benefits for the procedure were explained in detail which included but not limited to bleeding, infection, stroke, pericardial effusion, cardiac tamponade, vessels rupture, renal failure, intubation, and death. Patient expressed understanding an all questions were answered.  The patient denies dyspnea, palpitations, dizziness, presyncope, syncope,  headache, visual disturbances, CVA, PE, DVT, abdominal pain, N/V/D/C, hematochezia, melena, dysuria, hematuria, fever, chills, ulcers, b/l lower extremities edema.

## 2025-04-14 NOTE — H&P CARDIOLOGY - NSICDXPASTMEDICALHX_GEN_ALL_CORE_FT
PAST MEDICAL HISTORY:  DVT, lower extremity     HLD (hyperlipidemia)     Hypertension     Hypothyroidism     Osteoarthrosis     Paroxysmal atrial fibrillation     Seasonal allergies     Vitamin B12 deficiency

## 2025-04-14 NOTE — PATIENT PROFILE ADULT - FUNCTIONAL ASSESSMENT - BASIC MOBILITY SCORE.
Pharmacy requesting new prescription with new directions.   Pt only taking active pills, skip placebo.   
24

## 2025-04-14 NOTE — CHART NOTE - NSCHARTNOTEFT_GEN_A_CORE
SYEDA LOPEZ 79yis s/p cardiac cath via right femoral access.  Site is stable with no hematoma, active bleed or swelling.  Dressing is clean/dry/intact.  DP pulse is palpable.  Patient denies pain, numbness, tingling, CP, SOB. VSS. Will continue to monitor.     T(C): 36.7 (04-14-25 @ 19:49), Max: 36.7 (04-13-25 @ 23:00)  HR: 56 (04-14-25 @ 19:49) (50 - 68)  BP: 158/77 (04-14-25 @ 19:49) (90/64 - 182/77)  RR: 16 (04-14-25 @ 19:49) (16 - 20)  SpO2: 98% (04-14-25 @ 19:49) (96% - 98%)

## 2025-04-14 NOTE — H&P CARDIOLOGY - REVIEW OF SYSTEMS
The patient denies dyspnea, palpitations, dizziness, presyncope, syncope,  headache, visual disturbances, CVA, PE, DVT, abdominal pain, N/V/D/C, hematochezia, melena, dysuria, hematuria, fever, chills, ulcers, b/l lower extremities edema.
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
## 2025-04-15 ENCOUNTER — TRANSCRIPTION ENCOUNTER (OUTPATIENT)
Age: 80
End: 2025-04-15

## 2025-04-15 ENCOUNTER — NON-APPOINTMENT (OUTPATIENT)
Age: 80
End: 2025-04-15

## 2025-04-15 VITALS
HEART RATE: 58 BPM | OXYGEN SATURATION: 100 % | SYSTOLIC BLOOD PRESSURE: 163 MMHG | DIASTOLIC BLOOD PRESSURE: 76 MMHG | RESPIRATION RATE: 18 BRPM | TEMPERATURE: 98 F

## 2025-04-15 PROBLEM — I48.0 PAROXYSMAL ATRIAL FIBRILLATION: Chronic | Status: ACTIVE | Noted: 2025-04-14

## 2025-04-15 PROBLEM — I82.409 ACUTE EMBOLISM AND THROMBOSIS OF UNSPECIFIED DEEP VEINS OF UNSPECIFIED LOWER EXTREMITY: Chronic | Status: ACTIVE | Noted: 2025-04-14

## 2025-04-15 LAB
ALBUMIN SERPL ELPH-MCNC: 3.4 G/DL — SIGNIFICANT CHANGE UP (ref 3.3–5)
ALP SERPL-CCNC: 66 U/L — SIGNIFICANT CHANGE UP (ref 40–120)
ALT FLD-CCNC: 17 U/L — SIGNIFICANT CHANGE UP (ref 4–33)
ANION GAP SERPL CALC-SCNC: 10 MMOL/L — SIGNIFICANT CHANGE UP (ref 7–14)
AST SERPL-CCNC: 29 U/L — SIGNIFICANT CHANGE UP (ref 4–32)
BASOPHILS # BLD AUTO: 0.05 K/UL — SIGNIFICANT CHANGE UP (ref 0–0.2)
BASOPHILS NFR BLD AUTO: 0.9 % — SIGNIFICANT CHANGE UP (ref 0–2)
BILIRUB SERPL-MCNC: 0.6 MG/DL — SIGNIFICANT CHANGE UP (ref 0.2–1.2)
BUN SERPL-MCNC: 14 MG/DL — SIGNIFICANT CHANGE UP (ref 7–23)
CALCIUM SERPL-MCNC: 9 MG/DL — SIGNIFICANT CHANGE UP (ref 8.4–10.5)
CHLORIDE SERPL-SCNC: 104 MMOL/L — SIGNIFICANT CHANGE UP (ref 98–107)
CO2 SERPL-SCNC: 26 MMOL/L — SIGNIFICANT CHANGE UP (ref 22–31)
CREAT SERPL-MCNC: 0.91 MG/DL — SIGNIFICANT CHANGE UP (ref 0.5–1.3)
EGFR: 65 ML/MIN/1.73M2 — SIGNIFICANT CHANGE UP
EGFR: 65 ML/MIN/1.73M2 — SIGNIFICANT CHANGE UP
EOSINOPHIL # BLD AUTO: 0.18 K/UL — SIGNIFICANT CHANGE UP (ref 0–0.5)
EOSINOPHIL NFR BLD AUTO: 3.3 % — SIGNIFICANT CHANGE UP (ref 0–6)
GLUCOSE SERPL-MCNC: 90 MG/DL — SIGNIFICANT CHANGE UP (ref 70–99)
HCT VFR BLD CALC: 35.9 % — SIGNIFICANT CHANGE UP (ref 34.5–45)
HGB BLD-MCNC: 12.1 G/DL — SIGNIFICANT CHANGE UP (ref 11.5–15.5)
IANC: 4.09 K/UL — SIGNIFICANT CHANGE UP (ref 1.8–7.4)
IMM GRANULOCYTES NFR BLD AUTO: 0.2 % — SIGNIFICANT CHANGE UP (ref 0–0.9)
LYMPHOCYTES # BLD AUTO: 0.71 K/UL — LOW (ref 1–3.3)
LYMPHOCYTES # BLD AUTO: 13.1 % — SIGNIFICANT CHANGE UP (ref 13–44)
MAGNESIUM SERPL-MCNC: 2 MG/DL — SIGNIFICANT CHANGE UP (ref 1.6–2.6)
MCHC RBC-ENTMCNC: 30.4 PG — SIGNIFICANT CHANGE UP (ref 27–34)
MCHC RBC-ENTMCNC: 33.7 G/DL — SIGNIFICANT CHANGE UP (ref 32–36)
MCV RBC AUTO: 90.2 FL — SIGNIFICANT CHANGE UP (ref 80–100)
MONOCYTES # BLD AUTO: 0.4 K/UL — SIGNIFICANT CHANGE UP (ref 0–0.9)
MONOCYTES NFR BLD AUTO: 7.4 % — SIGNIFICANT CHANGE UP (ref 2–14)
NEUTROPHILS # BLD AUTO: 4.09 K/UL — SIGNIFICANT CHANGE UP (ref 1.8–7.4)
NEUTROPHILS NFR BLD AUTO: 75.1 % — SIGNIFICANT CHANGE UP (ref 43–77)
NRBC # BLD AUTO: 0 K/UL — SIGNIFICANT CHANGE UP (ref 0–0)
NRBC # FLD: 0 K/UL — SIGNIFICANT CHANGE UP (ref 0–0)
NRBC BLD AUTO-RTO: 0 /100 WBCS — SIGNIFICANT CHANGE UP (ref 0–0)
PHOSPHATE SERPL-MCNC: 3.8 MG/DL — SIGNIFICANT CHANGE UP (ref 2.5–4.5)
PLATELET # BLD AUTO: 211 K/UL — SIGNIFICANT CHANGE UP (ref 150–400)
POTASSIUM SERPL-MCNC: 3.8 MMOL/L — SIGNIFICANT CHANGE UP (ref 3.5–5.3)
POTASSIUM SERPL-SCNC: 3.8 MMOL/L — SIGNIFICANT CHANGE UP (ref 3.5–5.3)
PROT SERPL-MCNC: 5.6 G/DL — LOW (ref 6–8.3)
RBC # BLD: 3.98 M/UL — SIGNIFICANT CHANGE UP (ref 3.8–5.2)
RBC # FLD: 12.9 % — SIGNIFICANT CHANGE UP (ref 10.3–14.5)
SODIUM SERPL-SCNC: 140 MMOL/L — SIGNIFICANT CHANGE UP (ref 135–145)
WBC # BLD: 5.44 K/UL — SIGNIFICANT CHANGE UP (ref 3.8–10.5)
WBC # FLD AUTO: 5.44 K/UL — SIGNIFICANT CHANGE UP (ref 3.8–10.5)

## 2025-04-15 PROCEDURE — 99232 SBSQ HOSP IP/OBS MODERATE 35: CPT | Mod: FS

## 2025-04-15 PROCEDURE — 99239 HOSP IP/OBS DSCHRG MGMT >30: CPT

## 2025-04-15 RX ORDER — AMIODARONE HYDROCHLORIDE 50 MG/ML
2 INJECTION, SOLUTION INTRAVENOUS
Qty: 76 | Refills: 0
Start: 2025-04-15 | End: 2025-05-14

## 2025-04-15 RX ORDER — AMIODARONE HYDROCHLORIDE 50 MG/ML
400 INJECTION, SOLUTION INTRAVENOUS EVERY 12 HOURS
Refills: 0 | Status: DISCONTINUED | OUTPATIENT
Start: 2025-04-15 | End: 2025-04-15

## 2025-04-15 RX ORDER — LOSARTAN POTASSIUM 100 MG/1
1 TABLET, FILM COATED ORAL
Qty: 30 | Refills: 0
Start: 2025-04-15 | End: 2025-05-14

## 2025-04-15 RX ORDER — AMIODARONE HYDROCHLORIDE 50 MG/ML
500 INJECTION, SOLUTION INTRAVENOUS EVERY 12 HOURS
Refills: 0 | Status: DISCONTINUED | OUTPATIENT
Start: 2025-04-15 | End: 2025-04-15

## 2025-04-15 RX ADMIN — FUROSEMIDE 20 MILLIGRAM(S): 10 INJECTION INTRAMUSCULAR; INTRAVENOUS at 06:01

## 2025-04-15 RX ADMIN — APIXABAN 5 MILLIGRAM(S): 2.5 TABLET, FILM COATED ORAL at 06:01

## 2025-04-15 RX ADMIN — Medication 40 MILLIGRAM(S): at 06:47

## 2025-04-15 RX ADMIN — Medication 81 MILLIGRAM(S): at 11:31

## 2025-04-15 RX ADMIN — METOPROLOL SUCCINATE 50 MILLIGRAM(S): 50 TABLET, EXTENDED RELEASE ORAL at 06:00

## 2025-04-15 RX ADMIN — Medication 50 MICROGRAM(S): at 06:00

## 2025-04-15 RX ADMIN — Medication 3 MILLILITER(S): at 13:07

## 2025-04-15 RX ADMIN — Medication 3 MILLILITER(S): at 06:34

## 2025-04-15 RX ADMIN — LOSARTAN POTASSIUM 25 MILLIGRAM(S): 100 TABLET, FILM COATED ORAL at 06:00

## 2025-04-15 NOTE — PROGRESS NOTE ADULT - SUBJECTIVE AND OBJECTIVE BOX
Subjective: Denies HA, lightheadedness, dizziness, CP, palpitation, SOB, abdominal pain, and N/V.      Interval Events:  - Initially presented to [place] ER c/o palpitations, chest tightness and dizziness found to be in rapid afib -160s and f converted to NSR. She was noted to have elevated troponins and was transferred to [facility] for LHC.   - LHC showed nonobstructive CAD, no interventions were preformed.   - EP called for Afib management and discussed Ablation. The alternatives, benefit and risk were discussed in detail which including but not limited to bleeding requiring transfusion, infection, vascular injury, arrhthymias, pericardial effusion/tamponade, injury to the surrounding organs, heart block requiring pacemaker, stroke, blood clots, and death. Patient was also given printed information about ablation. Patient had a f/u appointment with Dr. Arreola      MEDICATIONS  (STANDING):  aMIOdarone    Tablet 400 milliGRAM(s) Oral every 12 hours  apixaban 5 milliGRAM(s) Oral every 12 hours  aspirin enteric coated 81 milliGRAM(s) Oral daily  atorvastatin 40 milliGRAM(s) Oral at bedtime  furosemide    Tablet 20 milliGRAM(s) Oral daily  levothyroxine 50 MICROGram(s) Oral daily  losartan 25 milliGRAM(s) Oral daily  metoprolol succinate ER 50 milliGRAM(s) Oral daily  pantoprazole    Tablet 40 milliGRAM(s) Oral before breakfast  sodium chloride 0.9% lock flush 3 milliLiter(s) IV Push every 8 hours    MEDICATIONS  (PRN):      Vital Signs Last 24 Hrs  T(C): 36.3 (15 Apr 2025 05:58), Max: 36.7 (14 Apr 2025 19:49)  T(F): 97.4 (15 Apr 2025 05:58), Max: 98 (14 Apr 2025 19:49)  HR: 54 (15 Apr 2025 05:58) (52 - 68)  BP: 149/77 (15 Apr 2025 05:58) (90/64 - 182/77)  BP(mean): --  RR: 17 (15 Apr 2025 05:58) (16 - 20)  SpO2: 100% (15 Apr 2025 05:58) (96% - 100%)    Parameters below as of 15 Apr 2025 05:58  Patient On (Oxygen Delivery Method): room air      I&O's Detail      Physical Exam:  GENERAL: Sitting up in bed, well appearing, speaking in full sentence, in NAD  HEART: S1S2 RRR  PULMONARY: CTABL, normal respiratory effort  ABDOMEN: + Bowel sounds present, soft, NDNT  EXTREMITIES:  Warm, well -perfused, no pedal edema, distal pulses present  NEUROLOGICAL:AOx3     TELEMETERIC: SR with PAC and PVC HR 50-60s bpm                            11.9   4.68  )-----------( 189      ( 14 Apr 2025 07:15 )             36.1     PTT - ( 14 Apr 2025 07:15 )  PTT:63.3 sec    < from: TTE W or WO Ultrasound Enhancing Agent (04.11.25 @ 21:40) >  CONCLUSIONS:      1. Left ventricular cavity is normal in size. Septal motion is abnormal consistent with previous cardiac surgery. Left ventricular systolic function is mildly decreased with an ejection fraction visually estimated at 45 to 50 %.   2. Analysis of left ventricular diastolic function and filling pressure is made challenging by the presence of prosthetic mitral valve.   3. Normal right ventricular cavity size and probably normal right ventricular systolic function.   4. Left atrium is severely dilated.   5. The right atrium is dilated.   6. Well seated prosthetic mitral valve with normal function. Transvalvular mitral gradients are normal.   7. Atrial septal defect. ( post ope)   8. There is left to right shunting through the interatrial septum.   9. Mild tricuspid regurgitation.  10. Estimated pulmonary artery systolic pressure is 38 mmHg, consistent with mild pulmonary hypertension.  11. Trileaflet aortic valve with normal systolic excursion. There is mild thickening of the aortic valve leaflets.  12. Mild aortic regurgitation.    < end of copied text >

## 2025-04-15 NOTE — DISCHARGE NOTE PROVIDER - NSDCMRMEDTOKEN_GEN_ALL_CORE_FT
amiodarone 200 mg oral tablet: 2 tab(s) orally every 12 hours take 400mg 2 tabs 2x daily for 14 days then take 1 tab 200mg oral daily  aspirin 81 mg oral delayed release tablet: 1 tab(s) orally once a day  atorvastatin 40 mg oral tablet: 1 tab(s) orally once a day (at bedtime)  Eliquis 5 mg oral tablet: 1 tab(s) orally every 12 hours  fluticasone 50 mcg/inh nasal spray: 1 spray(s) nasal once a day   Lasix 20 mg oral tablet: 1 tab(s) orally once a day  levothyroxine 50 mcg (0.05 mg) oral tablet: 1 tab(s) orally once a day  losartan 25 mg oral tablet: 1 tab(s) orally once a day  pantoprazole 40 mg oral delayed release tablet: 1 tab(s) orally once a day  Toprol-XL 50 mg oral tablet, extended release: 1 tab(s) orally once a day

## 2025-04-15 NOTE — DISCHARGE NOTE PROVIDER - PROVIDER TOKENS
PROVIDER:[TOKEN:[[id number]]],PROVIDER:[TOKEN:[[id number]]] PROVIDER:[TOKEN:[[id number]],SCHEDULEDAPPT:[04/24/2025]],PROVIDER:[TOKEN:[[id number]],SCHEDULEDAPPT:[05/01/2025]]

## 2025-04-15 NOTE — DISCHARGE NOTE PROVIDER - NSDCCPTREATMENT_GEN_ALL_CORE_FT
PRINCIPAL PROCEDURE  Procedure: Left heart cardiac cath  Findings and Treatment: Coronary Angiography   The coronary circulation is right dominant. Cardiac catheterization  was performed electively.  LM   Left main artery: Normal.    LAD   Proximal left anterior descending: There is a 50 % stenosis in the  middle third portion of the segment. ILDA Flow 3. FFR was  performed using a [employer] FFR Key Card with a calculated value of  0.94. Based on the results of this study, the lesion was  judged to be non-significant and no intervention was performed.    CX   Proximal circumflex: There is a 20 % stenosis in the middle third  portion of the segment.  RCA   Right coronary artery: Angiography shows minor irregularities.

## 2025-04-15 NOTE — DISCHARGE NOTE PROVIDER - HOSPITAL COURSE
78yo woman with a hx of HTN, pAF on Eliquis, hypothyroidism, OA, DVT, MVR bioprosthetic (Liam 2012), transvenous mitral JUNIOR (Layo, 2023) initially presented to [place] ER c/o palpitations, chest tightness and dizziness found to be in rapid afib -160s and self converted to NSR. Patient has history of afib post cardiac surgery in 2023 for which she was cardioverted.  She has remained in therapeutic Eliquis for her unprovoked DVT.  In [place], noted to have elevated troponins and was transferred to [facility] for LHC s/p LHC with nonobstructive CAD, no interventions were preformed.  EP consulted for Afib management. Patient May benefit from ablation and has a f/u appointment with Dr. Arreola. EP will sign off.    Elev Tropoinin -Seen by Card - s/p LHC via RFA: proxLAD 40-50% ProxLCx 10-20% --> cardiology recs medical management    AFib - EP consulted recs to monitor electrolytes and replete K to 4 and Mg to 2  - TTE: EF 45-50%; severely dilated LA  - Continue Eliquis 5mg po BID for thromboembolic ppx   - Continue amiodarone load (400mg PO BIDx14 days,  then 200mg PO daily thereafter)  - Patient needs routine evaluation of LFTs and TFTs; PFTs and ophthalmological if able  - Continue metoprolol xl 50 po BID   - Patient is schedule for an appointment on4/24/25 at 10:30AM with Dr. Arreola to discuss Afib ablation ( 4th floor Oncology building [facility] [address], [place], [place], [address] [phone] )    Dispo -home with outpt f/u 80 y/o F with PMHx of HTN, pAF on Eliquis, hypothyroidism, OA, unprovoked DVT, MVR bioprosthetic (Liam 2012), transvenous mitral JUNIOR (Layo, 2023) initially presented to [place] ER c/o palpitations, chest tightness and dizziness found to be in rapid afib -160s and self converted to NSR. Patient has history of afib post cardiac surgery in 2023 for which she was cardioverted.  She has remained on therapeutic Eliquis for her unprovoked DVT.  In [place], noted to have elevated troponins and was transferred to [facility] for [facility] s/p LH with nonobstructive CAD, no interventions were performed.  EP consulted for Afib management. Patient advised she may benefit from ablation and has a f/u appointment with Dr. Arreola.     NSTEMI  -Elev Troponin 78->4064->2009  -Seen by Cards, transferred to [facility] for [facility]  - s/p LH via RFA: proxLAD 40-50% ProxLCx 10-20% --> cardiology recs medical management  -c/w ASA, statin, BB    AFib   - EP consulted recs to monitor electrolytes and replete K to 4 and Mg to 2  - Continue Eliquis 5mg po BID   - Continue amiodarone load (400mg PO BIDx14 days,  then 200mg PO daily thereafter)  - Patient needs routine evaluation of LFTs and TFTs; PFTs and ophthalmological if able  - Continue metoprolol xl 50 po BID   - Patient is schedule for an appointment on 4/24/25 at 10:30AM with Dr. Arreola to discuss Afib ablation ( 4th floor Oncology building [facility] [address], [place], [address] [phone] )    HFmrEF  TTE: EF 45-50%; severely dilated LA    Dispo -home with outpt f/u

## 2025-04-15 NOTE — DISCHARGE NOTE PROVIDER - NSDCFUSCHEDAPPT_GEN_ALL_CORE_FT
Justin Arreola  [facility]  ELECTROPH [id number]  Scheduled Appointment: 04/24/2025    Andrew Rivero  [facility]  OTOLARYNG [address] [place] [address]  Scheduled Appointment: 05/01/2025

## 2025-04-15 NOTE — DISCHARGE NOTE PROVIDER - ATTENDING DISCHARGE PHYSICAL EXAMINATION:
GENERAL: No acute distress  HEAD:  Normocephalic  EYES: Conjunctiva and sclera clear  NECK: Supple  CHEST/LUNG: CTAB  HEART: Regular rate and rhythm  ABDOMEN: Soft, non-tender, non-distended  EXTREMITIES:  No clubbing, cyanosis, or edema. R groin site C/D/I, no hematoma  NEUROLOGY: A&O x 3  SKIN: No rashes or lesions to visible skin

## 2025-04-15 NOTE — DISCHARGE NOTE NURSING/CASE MANAGEMENT/SOCIAL WORK - PATIENT PORTAL LINK FT
You can access the FollowMyHealth Patient Portal offered by [facility] by registering at the following website: [email or web address]. By joining [facility]’s FollowMyHealth portal, you will also be able to view your health information using other applications (apps) compatible with our system.

## 2025-04-15 NOTE — DISCHARGE NOTE NURSING/CASE MANAGEMENT/SOCIAL WORK - FINANCIAL ASSISTANCE
[facility] provides services at a reduced cost to those who are determined to be eligible through [facility]’s financial assistance program. Information regarding [facility]’s financial assistance program can be found by going to [email or web address] or by calling [phone].

## 2025-04-15 NOTE — PROGRESS NOTE ADULT - ASSESSMENT
This is a 78yo woman with a hx of HTN, pAF on Eliquis, hypothyroidism, OA, DVT, MVR bioprosthetic (Liam 2012), transvenous mitral JUNIOR (Layo, 2023) initially presented to [place] ER c/o palpitations, chest tightness and dizziness found to be in rapid afib -160s and self converted to NSR. Patient has history of afib post cardiac surgery in 2023 for which she was cardioverted.  She has remain in therapeutic Eliquis for her unprovoked DVT.  In [place], noted to have elevated troponins and was transferred to [facility] for LHC s/p LHC with nonobstructive CAD, no interventions were preformed.  EP consulted for Afib management. Patient May benefit from ablation and has a f/u appointment with Dr. Arreola. EP will sign off.    Plan:  - Continuous telemetric monitoring  - Monitor electrolytes and replete K to 4 and Mg to 2  - TTE: EF 45-50%; severely dilated LA  - Continue Eliquis 5mg po BID for thromboembolic ppx   - Continue amiodarone load (400mg PO BIDx14 days,  then 200mg PO daily thereafter)  --Patient needs routine evaluation of LFTs and TFTs; PFTs and ophthalmological if able  - Continue metoprolol xl 50 po BID   - Patient is schedule for an appointment on4/24/25 at 10:30AM with Dr. Arreola to discuss Afib ablation ( 4th floor Oncology [facility] [address], [place], [place], [address] [phone] )  - Continue care per primary team    Patient to be staff with attending. Please await attending addendum

## 2025-04-15 NOTE — DISCHARGE NOTE PROVIDER - CARE PROVIDER_API CALL
Justin Arreola  Cardiac Electrophysiology  [address] [address] [place] [address]  Phone: [phone]  Fax: [phone]  Follow Up Time:     Andrew Rivero  Otolaryngology  [address] [place] [address]  Phone: [phone]  Fax: [phone]  Follow Up Time:    Justin Arreola  Cardiac Electrophysiology  [address] [address] [place] [address]  Phone: [phone]  Fax: [phone]  Scheduled Appointment: 04/24/2025    Andrew Rivero  Otolaryngology  [address] [place] [address]  Phone: [phone]  Fax: [phone]  Scheduled Appointment: 05/01/2025

## 2025-04-15 NOTE — DISCHARGE NOTE PROVIDER - NSDCCPCAREPLAN_GEN_ALL_CORE_FT
PRINCIPAL DISCHARGE DIAGNOSIS  Diagnosis: Elevated troponin level  Assessment and Plan of Treatment: You were found to have elevated troponins and was transferred to [facility] for Left Heart Cath which was done yesterday 4/14. The findings with nonobstructive CAD- proxLAD 40-50%, ProxLCx 10-20%, no interventions were preformed, cardiology recommends medical management and outpatient follow up      SECONDARY DISCHARGE DIAGNOSES  Diagnosis: Paroxysmal atrial fibrillation  Assessment and Plan of Treatment: Patient was seen by EP doctor for palpitations  - TTE showed EF 45-50%. Continue taking Eliquis 5mg oral 2x daily  - you were started on Amiodarone load (400mg oral 2x daily x14 days,  then 200mg oral daily thereafter)  - Please have routine evaluation of Liver function tests and Thyroid panel; Pulmonary Function Test and Ophthalmological   - Continue taking Metoprolol xl 50 oral 2xdaily  - Patient is schedule for an appointment on 4/24/25 at 10:30AM with Dr. Arreola to discuss Afib ablation (4th floor Oncology [facility] [address], [place], [address] [phone] )    Diagnosis: HTN (hypertension)  Assessment and Plan of Treatment: Continue current blood pressure medication regimen as directed. Monitor for any visual changes, headaches or dizziness.  Monitor blood pressure regularly.  Follow up with your PCP for further management for high blood pressure, please call to make appointment within 1 week of discharge

## 2025-04-15 NOTE — DISCHARGE NOTE NURSING/CASE MANAGEMENT/SOCIAL WORK - NSSCNAMETXT_GEN_ALL_CORE
[facility]- this agency will send a nurse to your home for further teaching.

## 2025-04-16 ENCOUNTER — APPOINTMENT (OUTPATIENT)
Dept: INTERNAL MEDICINE | Facility: CLINIC | Age: 80
End: 2025-04-16
Payer: MEDICARE

## 2025-04-16 ENCOUNTER — NON-APPOINTMENT (OUTPATIENT)
Age: 80
End: 2025-04-16

## 2025-04-16 VITALS
BODY MASS INDEX: 23.22 KG/M2 | OXYGEN SATURATION: 99 % | SYSTOLIC BLOOD PRESSURE: 140 MMHG | HEART RATE: 64 BPM | HEIGHT: 64 IN | WEIGHT: 136 LBS | DIASTOLIC BLOOD PRESSURE: 72 MMHG | RESPIRATION RATE: 12 BRPM

## 2025-04-16 DIAGNOSIS — I48.91 UNSPECIFIED ATRIAL FIBRILLATION: ICD-10-CM

## 2025-04-16 DIAGNOSIS — I21.4 NON-ST ELEVATION (NSTEMI) MYOCARDIAL INFARCTION: ICD-10-CM

## 2025-04-16 DIAGNOSIS — E78.5 HYPERLIPIDEMIA, UNSPECIFIED: ICD-10-CM

## 2025-04-16 DIAGNOSIS — E03.9 HYPOTHYROIDISM, UNSPECIFIED: ICD-10-CM

## 2025-04-16 PROCEDURE — 99214 OFFICE O/P EST MOD 30 MIN: CPT

## 2025-04-16 PROCEDURE — 93000 ELECTROCARDIOGRAM COMPLETE: CPT

## 2025-04-16 PROCEDURE — G2211 COMPLEX E/M VISIT ADD ON: CPT

## 2025-04-18 ENCOUNTER — NON-APPOINTMENT (OUTPATIENT)
Age: 80
End: 2025-04-18

## 2025-04-18 ENCOUNTER — APPOINTMENT (OUTPATIENT)
Dept: CARDIOLOGY | Facility: CLINIC | Age: 80
End: 2025-04-18
Payer: MEDICARE

## 2025-04-18 VITALS
HEIGHT: 64 IN | BODY MASS INDEX: 23.22 KG/M2 | HEART RATE: 50 BPM | WEIGHT: 136 LBS | TEMPERATURE: 98 F | OXYGEN SATURATION: 98 % | DIASTOLIC BLOOD PRESSURE: 74 MMHG | SYSTOLIC BLOOD PRESSURE: 186 MMHG

## 2025-04-18 DIAGNOSIS — I25.10 ATHEROSCLEROTIC HEART DISEASE OF NATIVE CORONARY ARTERY W/OUT ANGINA PECTORIS: ICD-10-CM

## 2025-04-18 DIAGNOSIS — I42.9 CARDIOMYOPATHY, UNSPECIFIED: ICD-10-CM

## 2025-04-18 PROCEDURE — G2211 COMPLEX E/M VISIT ADD ON: CPT

## 2025-04-18 PROCEDURE — 99214 OFFICE O/P EST MOD 30 MIN: CPT

## 2025-04-18 PROCEDURE — 93000 ELECTROCARDIOGRAM COMPLETE: CPT

## 2025-04-18 RX ORDER — LOSARTAN POTASSIUM 25 MG/1
25 TABLET, FILM COATED ORAL DAILY
Qty: 90 | Refills: 3 | Status: ACTIVE | COMMUNITY
Start: 2025-04-16 | End: 1900-01-01

## 2025-04-18 RX ORDER — AMIODARONE HYDROCHLORIDE 200 MG/1
200 TABLET ORAL DAILY
Qty: 90 | Refills: 3 | Status: ACTIVE | COMMUNITY
Start: 2025-04-16 | End: 1900-01-01

## 2025-04-24 ENCOUNTER — APPOINTMENT (OUTPATIENT)
Dept: ELECTROPHYSIOLOGY | Facility: CLINIC | Age: 80
End: 2025-04-24

## 2025-04-24 ENCOUNTER — NON-APPOINTMENT (OUTPATIENT)
Age: 80
End: 2025-04-24

## 2025-04-24 VITALS
BODY MASS INDEX: 23.56 KG/M2 | OXYGEN SATURATION: 98 % | RESPIRATION RATE: 14 BRPM | DIASTOLIC BLOOD PRESSURE: 72 MMHG | SYSTOLIC BLOOD PRESSURE: 162 MMHG | HEIGHT: 64 IN | HEART RATE: 44 BPM | WEIGHT: 138 LBS | TEMPERATURE: 98.4 F

## 2025-04-24 VITALS — SYSTOLIC BLOOD PRESSURE: 150 MMHG | DIASTOLIC BLOOD PRESSURE: 70 MMHG

## 2025-04-24 DIAGNOSIS — R53.81 OTHER MALAISE: ICD-10-CM

## 2025-04-24 DIAGNOSIS — R53.83 OTHER MALAISE: ICD-10-CM

## 2025-04-24 DIAGNOSIS — I10 ESSENTIAL (PRIMARY) HYPERTENSION: ICD-10-CM

## 2025-04-24 DIAGNOSIS — E78.5 HYPERLIPIDEMIA, UNSPECIFIED: ICD-10-CM

## 2025-04-24 DIAGNOSIS — Z79.899 OTHER LONG TERM (CURRENT) DRUG THERAPY: ICD-10-CM

## 2025-04-24 DIAGNOSIS — I48.0 PAROXYSMAL ATRIAL FIBRILLATION: ICD-10-CM

## 2025-04-24 PROCEDURE — 99215 OFFICE O/P EST HI 40 MIN: CPT

## 2025-04-24 PROCEDURE — 93000 ELECTROCARDIOGRAM COMPLETE: CPT

## 2025-04-24 PROCEDURE — G2211 COMPLEX E/M VISIT ADD ON: CPT

## 2025-05-01 ENCOUNTER — APPOINTMENT (OUTPATIENT)
Dept: OTOLARYNGOLOGY | Facility: CLINIC | Age: 80
End: 2025-05-01
Payer: MEDICARE

## 2025-05-01 VITALS — DIASTOLIC BLOOD PRESSURE: 91 MMHG | HEART RATE: 71 BPM | SYSTOLIC BLOOD PRESSURE: 172 MMHG

## 2025-05-01 DIAGNOSIS — J32.9 CHRONIC SINUSITIS, UNSPECIFIED: ICD-10-CM

## 2025-05-01 PROCEDURE — 31231 NASAL ENDOSCOPY DX: CPT

## 2025-05-01 PROCEDURE — 99214 OFFICE O/P EST MOD 30 MIN: CPT | Mod: 25

## 2025-05-02 ENCOUNTER — NON-APPOINTMENT (OUTPATIENT)
Age: 80
End: 2025-05-02

## 2025-05-08 ENCOUNTER — NON-APPOINTMENT (OUTPATIENT)
Age: 80
End: 2025-05-08

## 2025-05-09 NOTE — DIETITIAN INITIAL EVALUATION ADULT - REASON INDICATOR FOR ASSESSMENT
DISPLAY PLAN FREE TEXT
seen for length of stay. information obtained from pt, electronic medical record.

## 2025-05-21 ENCOUNTER — APPOINTMENT (OUTPATIENT)
Age: 80
End: 2025-05-21

## 2025-05-21 PROCEDURE — D9310: CPT

## 2025-05-22 ENCOUNTER — EMERGENCY (EMERGENCY)
Facility: HOSPITAL | Age: 80
LOS: 1 days | End: 2025-05-22
Attending: EMERGENCY MEDICINE | Admitting: EMERGENCY MEDICINE
Payer: MEDICARE

## 2025-05-22 ENCOUNTER — INPATIENT (INPATIENT)
Facility: HOSPITAL | Age: 80
LOS: 2 days | Discharge: HOME | DRG: 694 | End: 2025-05-25
Attending: FAMILY MEDICINE | Admitting: STUDENT IN AN ORGANIZED HEALTH CARE EDUCATION/TRAINING PROGRAM
Payer: MEDICARE

## 2025-05-22 VITALS
HEART RATE: 65 BPM | TEMPERATURE: 98 F | SYSTOLIC BLOOD PRESSURE: 192 MMHG | HEIGHT: 64 IN | RESPIRATION RATE: 15 BRPM | OXYGEN SATURATION: 99 % | DIASTOLIC BLOOD PRESSURE: 83 MMHG | WEIGHT: 136.91 LBS

## 2025-05-22 VITALS
HEART RATE: 66 BPM | RESPIRATION RATE: 16 BRPM | OXYGEN SATURATION: 96 % | DIASTOLIC BLOOD PRESSURE: 80 MMHG | TEMPERATURE: 98 F | SYSTOLIC BLOOD PRESSURE: 201 MMHG

## 2025-05-22 VITALS
DIASTOLIC BLOOD PRESSURE: 72 MMHG | RESPIRATION RATE: 18 BRPM | OXYGEN SATURATION: 97 % | HEART RATE: 67 BPM | SYSTOLIC BLOOD PRESSURE: 209 MMHG | WEIGHT: 130.07 LBS | HEIGHT: 64 IN | TEMPERATURE: 98 F

## 2025-05-22 DIAGNOSIS — I16.0 HYPERTENSIVE URGENCY: ICD-10-CM

## 2025-05-22 DIAGNOSIS — E78.5 HYPERLIPIDEMIA, UNSPECIFIED: ICD-10-CM

## 2025-05-22 DIAGNOSIS — E03.9 HYPOTHYROIDISM, UNSPECIFIED: ICD-10-CM

## 2025-05-22 DIAGNOSIS — I48.20 CHRONIC ATRIAL FIBRILLATION, UNSPECIFIED: ICD-10-CM

## 2025-05-22 DIAGNOSIS — Z95.2 PRESENCE OF PROSTHETIC HEART VALVE: Chronic | ICD-10-CM

## 2025-05-22 DIAGNOSIS — N17.9 ACUTE KIDNEY FAILURE, UNSPECIFIED: ICD-10-CM

## 2025-05-22 DIAGNOSIS — Z29.9 ENCOUNTER FOR PROPHYLACTIC MEASURES, UNSPECIFIED: ICD-10-CM

## 2025-05-22 DIAGNOSIS — Z98.89 OTHER SPECIFIED POSTPROCEDURAL STATES: Chronic | ICD-10-CM

## 2025-05-22 DIAGNOSIS — Z96.7 PRESENCE OF OTHER BONE AND TENDON IMPLANTS: Chronic | ICD-10-CM

## 2025-05-22 DIAGNOSIS — Z96.659 PRESENCE OF UNSPECIFIED ARTIFICIAL KNEE JOINT: Chronic | ICD-10-CM

## 2025-05-22 DIAGNOSIS — N39.0 URINARY TRACT INFECTION, SITE NOT SPECIFIED: ICD-10-CM

## 2025-05-22 DIAGNOSIS — N20.1 CALCULUS OF URETER: ICD-10-CM

## 2025-05-22 DIAGNOSIS — N20.0 CALCULUS OF KIDNEY: ICD-10-CM

## 2025-05-22 LAB
ALBUMIN SERPL ELPH-MCNC: 3.7 G/DL — SIGNIFICANT CHANGE UP (ref 3.3–5)
ALP SERPL-CCNC: 83 U/L — SIGNIFICANT CHANGE UP (ref 30–120)
ALT FLD-CCNC: 20 U/L — SIGNIFICANT CHANGE UP (ref 10–60)
ANION GAP SERPL CALC-SCNC: 11 MMOL/L — SIGNIFICANT CHANGE UP (ref 5–17)
APPEARANCE UR: ABNORMAL
APTT BLD: 33.2 SEC — SIGNIFICANT CHANGE UP (ref 26.1–36.8)
AST SERPL-CCNC: 30 U/L — SIGNIFICANT CHANGE UP (ref 10–40)
BACTERIA # UR AUTO: ABNORMAL /HPF
BASOPHILS # BLD AUTO: 0.04 K/UL — SIGNIFICANT CHANGE UP (ref 0–0.2)
BASOPHILS NFR BLD AUTO: 0.4 % — SIGNIFICANT CHANGE UP (ref 0–2)
BILIRUB SERPL-MCNC: 0.8 MG/DL — SIGNIFICANT CHANGE UP (ref 0.2–1.2)
BILIRUB UR-MCNC: NEGATIVE — SIGNIFICANT CHANGE UP
BUN SERPL-MCNC: 20 MG/DL — SIGNIFICANT CHANGE UP (ref 7–23)
CALCIUM SERPL-MCNC: 9.3 MG/DL — SIGNIFICANT CHANGE UP (ref 8.4–10.5)
CHLORIDE SERPL-SCNC: 104 MMOL/L — SIGNIFICANT CHANGE UP (ref 96–108)
CO2 SERPL-SCNC: 27 MMOL/L — SIGNIFICANT CHANGE UP (ref 22–31)
COD CRY URNS QL: PRESENT
COLOR SPEC: ABNORMAL
COMMENT - URINE: SIGNIFICANT CHANGE UP
CREAT SERPL-MCNC: 1.09 MG/DL — SIGNIFICANT CHANGE UP (ref 0.5–1.3)
DIFF PNL FLD: ABNORMAL
EGFR: 52 ML/MIN/1.73M2 — LOW
EGFR: 52 ML/MIN/1.73M2 — LOW
EOSINOPHIL # BLD AUTO: 0.12 K/UL — SIGNIFICANT CHANGE UP (ref 0–0.5)
EOSINOPHIL NFR BLD AUTO: 1.3 % — SIGNIFICANT CHANGE UP (ref 0–6)
EPI CELLS # UR: PRESENT
GLUCOSE SERPL-MCNC: 91 MG/DL — SIGNIFICANT CHANGE UP (ref 70–99)
GLUCOSE UR QL: NEGATIVE MG/DL — SIGNIFICANT CHANGE UP
HCT VFR BLD CALC: 39.3 % — SIGNIFICANT CHANGE UP (ref 34.5–45)
HGB BLD-MCNC: 12.7 G/DL — SIGNIFICANT CHANGE UP (ref 11.5–15.5)
IMM GRANULOCYTES NFR BLD AUTO: 0.2 % — SIGNIFICANT CHANGE UP (ref 0–0.9)
INR BLD: 1.4 RATIO — HIGH (ref 0.85–1.16)
KETONES UR QL: NEGATIVE MG/DL — SIGNIFICANT CHANGE UP
LACTATE SERPL-SCNC: 0.8 MMOL/L — SIGNIFICANT CHANGE UP (ref 0.7–2)
LEUKOCYTE ESTERASE UR-ACNC: ABNORMAL
LYMPHOCYTES # BLD AUTO: 0.81 K/UL — LOW (ref 1–3.3)
LYMPHOCYTES # BLD AUTO: 9.1 % — LOW (ref 13–44)
MCHC RBC-ENTMCNC: 30.3 PG — SIGNIFICANT CHANGE UP (ref 27–34)
MCHC RBC-ENTMCNC: 32.3 G/DL — SIGNIFICANT CHANGE UP (ref 32–36)
MCV RBC AUTO: 93.8 FL — SIGNIFICANT CHANGE UP (ref 80–100)
MONOCYTES # BLD AUTO: 0.44 K/UL — SIGNIFICANT CHANGE UP (ref 0–0.9)
MONOCYTES NFR BLD AUTO: 4.9 % — SIGNIFICANT CHANGE UP (ref 2–14)
NEUTROPHILS # BLD AUTO: 7.47 K/UL — HIGH (ref 1.8–7.4)
NEUTROPHILS NFR BLD AUTO: 84.1 % — HIGH (ref 43–77)
NITRITE UR-MCNC: NEGATIVE — SIGNIFICANT CHANGE UP
NRBC BLD AUTO-RTO: 0 /100 WBCS — SIGNIFICANT CHANGE UP (ref 0–0)
PH UR: 5.5 — SIGNIFICANT CHANGE UP (ref 5–8)
PLATELET # BLD AUTO: 247 K/UL — SIGNIFICANT CHANGE UP (ref 150–400)
POTASSIUM SERPL-MCNC: 3.8 MMOL/L — SIGNIFICANT CHANGE UP (ref 3.5–5.3)
POTASSIUM SERPL-SCNC: 3.8 MMOL/L — SIGNIFICANT CHANGE UP (ref 3.5–5.3)
PROT SERPL-MCNC: 7.1 G/DL — SIGNIFICANT CHANGE UP (ref 6–8.3)
PROT UR-MCNC: 100 MG/DL
PROTHROM AB SERPL-ACNC: 16.2 SEC — HIGH (ref 9.9–13.4)
RBC # BLD: 4.19 M/UL — SIGNIFICANT CHANGE UP (ref 3.8–5.2)
RBC # FLD: 13.8 % — SIGNIFICANT CHANGE UP (ref 10.3–14.5)
RBC CASTS # UR COMP ASSIST: >50 /HPF — HIGH (ref 0–4)
SODIUM SERPL-SCNC: 142 MMOL/L — SIGNIFICANT CHANGE UP (ref 135–145)
SP GR SPEC: 1.02 — SIGNIFICANT CHANGE UP (ref 1–1.03)
UROBILINOGEN FLD QL: 0.2 MG/DL — SIGNIFICANT CHANGE UP (ref 0.2–1)
WBC # BLD: 8.9 K/UL — SIGNIFICANT CHANGE UP (ref 3.8–10.5)
WBC # FLD AUTO: 8.9 K/UL — SIGNIFICANT CHANGE UP (ref 3.8–10.5)
WBC UR QL: 45 /HPF — HIGH (ref 0–5)

## 2025-05-22 PROCEDURE — 81001 URINALYSIS AUTO W/SCOPE: CPT

## 2025-05-22 PROCEDURE — 85025 COMPLETE CBC W/AUTO DIFF WBC: CPT

## 2025-05-22 PROCEDURE — 80053 COMPREHEN METABOLIC PANEL: CPT

## 2025-05-22 PROCEDURE — 99284 EMERGENCY DEPT VISIT MOD MDM: CPT

## 2025-05-22 PROCEDURE — 83605 ASSAY OF LACTIC ACID: CPT

## 2025-05-22 PROCEDURE — 85730 THROMBOPLASTIN TIME PARTIAL: CPT

## 2025-05-22 PROCEDURE — 99285 EMERGENCY DEPT VISIT HI MDM: CPT | Mod: 25

## 2025-05-22 PROCEDURE — 36415 COLL VENOUS BLD VENIPUNCTURE: CPT

## 2025-05-22 PROCEDURE — 99223 1ST HOSP IP/OBS HIGH 75: CPT | Mod: GC

## 2025-05-22 PROCEDURE — 93010 ELECTROCARDIOGRAM REPORT: CPT

## 2025-05-22 PROCEDURE — 87086 URINE CULTURE/COLONY COUNT: CPT

## 2025-05-22 PROCEDURE — 74176 CT ABD & PELVIS W/O CONTRAST: CPT | Mod: 26

## 2025-05-22 PROCEDURE — 99285 EMERGENCY DEPT VISIT HI MDM: CPT

## 2025-05-22 PROCEDURE — 74176 CT ABD & PELVIS W/O CONTRAST: CPT

## 2025-05-22 PROCEDURE — 87040 BLOOD CULTURE FOR BACTERIA: CPT

## 2025-05-22 PROCEDURE — 85610 PROTHROMBIN TIME: CPT

## 2025-05-22 PROCEDURE — 96365 THER/PROPH/DIAG IV INF INIT: CPT

## 2025-05-22 PROCEDURE — 93005 ELECTROCARDIOGRAM TRACING: CPT

## 2025-05-22 RX ORDER — TAMSULOSIN HYDROCHLORIDE 0.4 MG/1
0.4 CAPSULE ORAL AT BEDTIME
Refills: 0 | Status: DISCONTINUED | OUTPATIENT
Start: 2025-05-22 | End: 2025-05-25

## 2025-05-22 RX ORDER — BUTYROSPERMUM PARKII(SHEA BUTTER), SIMMONDSIA CHINENSIS (JOJOBA) SEED OIL, ALOE BARBADENSIS LEAF EXTRACT .01; 1; 3.5 G/100G; G/100G; G/100G
250 LIQUID TOPICAL
Refills: 0 | Status: DISCONTINUED | OUTPATIENT
Start: 2025-05-22 | End: 2025-05-25

## 2025-05-22 RX ORDER — METOPROLOL SUCCINATE 50 MG/1
0.5 TABLET, EXTENDED RELEASE ORAL
Refills: 0 | DISCHARGE

## 2025-05-22 RX ORDER — FLUTICASONE PROPIONATE 50 UG/1
1 SPRAY, METERED NASAL
Refills: 0 | Status: DISCONTINUED | OUTPATIENT
Start: 2025-05-22 | End: 2025-05-25

## 2025-05-22 RX ORDER — SODIUM CHLORIDE 9 G/1000ML
1000 INJECTION, SOLUTION INTRAVENOUS
Refills: 0 | Status: DISCONTINUED | OUTPATIENT
Start: 2025-05-23 | End: 2025-05-24

## 2025-05-22 RX ORDER — AMIODARONE HYDROCHLORIDE 50 MG/ML
200 INJECTION, SOLUTION INTRAVENOUS DAILY
Refills: 0 | Status: DISCONTINUED | OUTPATIENT
Start: 2025-05-22 | End: 2025-05-25

## 2025-05-22 RX ORDER — CEFTRIAXONE 500 MG/1
1000 INJECTION, POWDER, FOR SOLUTION INTRAMUSCULAR; INTRAVENOUS ONCE
Refills: 0 | Status: COMPLETED | OUTPATIENT
Start: 2025-05-22 | End: 2025-05-22

## 2025-05-22 RX ORDER — MELATONIN 5 MG
3 TABLET ORAL AT BEDTIME
Refills: 0 | Status: DISCONTINUED | OUTPATIENT
Start: 2025-05-22 | End: 2025-05-25

## 2025-05-22 RX ORDER — FUROSEMIDE 10 MG/ML
20 INJECTION INTRAMUSCULAR; INTRAVENOUS DAILY
Refills: 0 | Status: DISCONTINUED | OUTPATIENT
Start: 2025-05-22 | End: 2025-05-25

## 2025-05-22 RX ORDER — METOPROLOL SUCCINATE 50 MG/1
25 TABLET, EXTENDED RELEASE ORAL ONCE
Refills: 0 | Status: COMPLETED | OUTPATIENT
Start: 2025-05-22 | End: 2025-05-22

## 2025-05-22 RX ORDER — LEVOTHYROXINE SODIUM 300 MCG
50 TABLET ORAL DAILY
Refills: 0 | Status: DISCONTINUED | OUTPATIENT
Start: 2025-05-22 | End: 2025-05-25

## 2025-05-22 RX ORDER — METOPROLOL SUCCINATE 50 MG/1
25 TABLET, EXTENDED RELEASE ORAL AT BEDTIME
Refills: 0 | Status: DISCONTINUED | OUTPATIENT
Start: 2025-05-22 | End: 2025-05-24

## 2025-05-22 RX ORDER — LOSARTAN POTASSIUM 100 MG/1
25 TABLET, FILM COATED ORAL DAILY
Refills: 0 | Status: DISCONTINUED | OUTPATIENT
Start: 2025-05-22 | End: 2025-05-23

## 2025-05-22 RX ORDER — METOPROLOL SUCCINATE 50 MG/1
0 TABLET, EXTENDED RELEASE ORAL
Refills: 0 | DISCHARGE

## 2025-05-22 RX ORDER — AMIODARONE HYDROCHLORIDE 50 MG/ML
1 INJECTION, SOLUTION INTRAVENOUS
Refills: 0 | DISCHARGE

## 2025-05-22 RX ORDER — CEFTRIAXONE 500 MG/1
1000 INJECTION, POWDER, FOR SOLUTION INTRAMUSCULAR; INTRAVENOUS EVERY 24 HOURS
Refills: 0 | Status: DISCONTINUED | OUTPATIENT
Start: 2025-05-22 | End: 2025-05-25

## 2025-05-22 RX ORDER — ACETAMINOPHEN 500 MG/5ML
650 LIQUID (ML) ORAL ONCE
Refills: 0 | Status: COMPLETED | OUTPATIENT
Start: 2025-05-22 | End: 2025-05-22

## 2025-05-22 RX ORDER — AMIODARONE HYDROCHLORIDE 50 MG/ML
0 INJECTION, SOLUTION INTRAVENOUS
Refills: 0 | DISCHARGE

## 2025-05-22 RX ORDER — ATORVASTATIN CALCIUM 80 MG/1
40 TABLET, FILM COATED ORAL AT BEDTIME
Refills: 0 | Status: DISCONTINUED | OUTPATIENT
Start: 2025-05-22 | End: 2025-05-25

## 2025-05-22 RX ORDER — ACETAMINOPHEN 500 MG/5ML
650 LIQUID (ML) ORAL EVERY 6 HOURS
Refills: 0 | Status: DISCONTINUED | OUTPATIENT
Start: 2025-05-22 | End: 2025-05-25

## 2025-05-22 RX ADMIN — Medication 1950 MILLILITER(S): at 20:31

## 2025-05-22 RX ADMIN — CEFTRIAXONE 100 MILLIGRAM(S): 500 INJECTION, POWDER, FOR SOLUTION INTRAMUSCULAR; INTRAVENOUS at 19:40

## 2025-05-22 RX ADMIN — CEFTRIAXONE 1000 MILLIGRAM(S): 500 INJECTION, POWDER, FOR SOLUTION INTRAMUSCULAR; INTRAVENOUS at 20:31

## 2025-05-22 RX ADMIN — Medication 1950 MILLILITER(S): at 19:40

## 2025-05-22 RX ADMIN — Medication 650 MILLIGRAM(S): at 20:35

## 2025-05-22 RX ADMIN — Medication 650 MILLIGRAM(S): at 20:45

## 2025-05-22 RX ADMIN — METOPROLOL SUCCINATE 25 MILLIGRAM(S): 50 TABLET, EXTENDED RELEASE ORAL at 20:21

## 2025-05-22 NOTE — ED ADULT NURSE REASSESSMENT NOTE - NS ED NURSE REASSESS COMMENT FT1
received report and assumed care of pt at change of shift. pt awake and alert.  at bedside at that time. MD aware of all results. pt informed of same. pt for transfer to [facility] for Urology. consent for transport obtained and charted. pt c/o headache; bp elevated. MD informed. pt medicated as ordered. picked up at this time for transport to [facility] by [facility]

## 2025-05-22 NOTE — H&P ADULT - NSVTERISKREFERASSESS_GEN_ALL_CORE
HISTORY OF PRESENT ILLNESS  eMlany Castellon is a 71 y.o. female. HPI  HTN, stable, bp is controlled on meds daily  HLD, stable, on crestor, tolerating well so far  DM, diet controlled, recent a1c was 6.5, glucose 123  Vit D def, stable, last level was 37  Review of Systems   Constitutional: Negative for chills and fever. Respiratory: Negative for cough and shortness of breath. Cardiovascular: Negative for chest pain, palpitations and leg swelling. Gastrointestinal: Negative for abdominal pain and nausea. Musculoskeletal: Negative for falls and myalgias. Neurological: Negative for dizziness and headaches. Physical Exam   Constitutional: She is oriented to person, place, and time. She appears well-developed and well-nourished. Neck: Neck supple. Cardiovascular: Normal rate, regular rhythm and normal heart sounds. No murmur heard. Pulmonary/Chest: Effort normal and breath sounds normal. She has no rales. Abdominal: Soft. There is no hepatosplenomegaly. There is no tenderness. Musculoskeletal: She exhibits no edema. Neurological: She is alert and oriented to person, place, and time. Vitals reviewed. ASSESSMENT and PLAN  Diagnoses and all orders for this visit:    1. HTN (hypertension), benign, stable  -     telmisartan-hydroCHLOROthiazide (MICARDIS HCT) 80-12.5 mg per tablet; Take 1 Tab by mouth daily. -     metoprolol succinate (TOPROL-XL) 50 mg XL tablet; Take 1 Tab by mouth daily.  -     CBC WITH AUTOMATED DIFF; Future  -     LIPID PANEL; Future  -     METABOLIC PANEL, COMPREHENSIVE; Future    2. Mixed hyperlipidemia, stable  -     rosuvastatin (CRESTOR) 5 mg tablet; Take 1 Tab by mouth nightly. -     CBC WITH AUTOMATED DIFF; Future  -     LIPID PANEL; Future  -     METABOLIC PANEL, COMPREHENSIVE; Future    3.  Encounter for immunization  -     PNEUMOCOCCAL POLYSACCHARIDE VACCINE, 23-VALENT, ADULT OR IMMUNOSUPPRESSED PT DOSE,  -     ADMIN PNEUMOCOCCAL VACCINE  (MEDICARE ONLY)    4. Diabetes mellitus without complication ([facility] [address] stable  -     HEMOGLOBIN A1C WITH EAG; Future    5. Vitamin D deficiency  -     VITAMIN D, 25 HYDROXY; Future    Continue meds/diet    rtc 4 mos  Lab Results   Component Value Date/Time    Hemoglobin A1c 6.5 11/06/2017 09:16 AM    Hemoglobin A1c (POC) 6.2 07/24/2013 03:14 PM     Lab Results   Component Value Date/Time    Sodium 139 11/06/2017 09:16 AM    Potassium 3.9 11/06/2017 09:16 AM    Chloride 104 11/06/2017 09:16 AM    CO2 28 11/06/2017 09:16 AM    Anion gap 7 11/06/2017 09:16 AM    Glucose 123 11/06/2017 09:16 AM    BUN 16 11/06/2017 09:16 AM    Creatinine 0.68 11/06/2017 09:16 AM    BUN/Creatinine ratio 24 11/06/2017 09:16 AM    GFR est AA >60 11/06/2017 09:16 AM    GFR est non-AA >60 11/06/2017 09:16 AM    Calcium 8.9 11/06/2017 09:16 AM    Bilirubin, total 0.6 11/06/2017 09:16 AM    AST (SGOT) 18 11/06/2017 09:16 AM    Alk.  phosphatase 61 11/06/2017 09:16 AM    Protein, total 6.9 11/06/2017 09:16 AM    Albumin 3.6 11/06/2017 09:16 AM    Globulin 3.3 11/06/2017 09:16 AM    A-G Ratio 1.1 11/06/2017 09:16 AM    ALT (SGPT) 25 11/06/2017 09:16 AM     Lab Results   Component Value Date/Time    Cholesterol, total 181 11/06/2017 09:16 AM    HDL Cholesterol 54 11/06/2017 09:16 AM    LDL, calculated 102 11/06/2017 09:16 AM    VLDL, calculated 25 11/06/2017 09:16 AM    Triglyceride 125 11/06/2017 09:16 AM    CHOL/HDL Ratio 3.4 11/06/2017 09:16 AM     Lab Results   Component Value Date/Time    Vitamin D 25-Hydroxy 36.6 07/27/2017 11:05 AM Refer to the Assessment tab to view/cancel completed assessment.

## 2025-05-22 NOTE — ED PROVIDER NOTE - SHIFT CHANGE
Procedure Note      PREOPERATIVE DIAGNOSIS(ES):  Cervicalgia, disk herniation, radiculopathy.    POSTOPERATIVE DIAGNOSIS(ES):  Cervicalgia, disk herniation, radiculopathy.    PROCEDURE(S):    Cervical epidural steroid injection, interlaminar approach, at the level of C6-7.  Fluoroscopic guidance.  Intravenous sedation.    INFORMED CONSENT:    I went over the procedure with the patient, potential benefits and complications, including bleeding, infection, nerve injury, worsening of the patient pain.  I went over the procedure and the complications in very simple terms.  I answered all the patient questions.  The patient understood, agrees and wants to proceed. The risks and benefits as well as alternative options were explained to the patient in detail.    As it pertains to any injection there is an inherent risks of bleeding and infection.   If the patient experiences paresthesia during the procedure, it is expected to be transient.   If the patient experiences numbness or heaviness of the extremity blocked from the procedure, it is expected to be temporary, lasting only for the duration of the local anesthetic.  And as with any pain procedure, there is a possibility that the procedure may not completely relieve the pain to the satisfaction of the patient.   If the steroid is utilized, there is a small chance of immune suppression, acid reflux, heightened sense of awareness, elevation of blood glucose level.   The patient expresses understanding and wishes to proceed.     SURGEON:  Anil Yeh MD    TECHNIQUE:  After informed consent was obtained, the patient was brought to the same-day surgery, positioned in the sitting position on the x-ray table.  Continuous monitoring was obtained using noninvasive blood pressure cuff, EKG, pulse oximetry, as well as end-tidal carbon dioxide monitor.  Supplemental oxygen was provided for the patient through a nasal cannula at 3 L/min.  Total intravenous sedation consisted of  5 mg of midazolam and 100 mcg  of fentanyl.  The patient has been n.p.o.  Airway class is II.  Vital signs were stable during the whole procedure.  The sedation was performed by a nurse as an independent and trained observer.  Time sedation started    11:06    , Time sedation end    11:14    . Total fluoroscopy time is : 5   seconds.     The patient's back of the neck was sterilely prepped and draped in the usual sterile fashion.  The fluoroscope was placed in lateral view, and the spinal processes were identified and marked over the skin.  Then, after local infiltration of the skin with lidocaine 1%, a 22-gauge Tuohy needle was introduced into the interlaminar space at the level of C6-7 using loss of resistance technique.  Once loss of resistance was obtained, 1 mL of nonionic Isovue dye was injected showing good epidural pattern without intrathecal or intravascular uptake.  After negative aspiration, 10 mg of dexamethasone was injected.  The needle was withdrawn.  The patient was turned supine onto the gurney and taken to the recovery room in good condition.  No immediate complications were noted.  The patient will follow up in the Pain Management clinic in 2 weeks.    Thank you very much.       Anil Yeh MD  11:34 AM  12/06/24    Yes...

## 2025-05-22 NOTE — H&P ADULT - ASSESSMENT
79F PMH HTN, HLD, AF on eliquis transferred from  ED for infected L ureteral stone. Admitted for ureteral stone.       UA had blood and some WBC's. Dr chappell recommended transfer; pt given CTX prior to transfer. Pt denies pain at this time    IN THE ED:  Temp  97.7 F , HR 67  ,  /72  ,RR 18 , SpO2 97% RA   S/P Rocephin VI x1, NS IV 1950cc Bolus IV x1, Tylenol 650mg PO x1, Lopressor 25mg PO x1   EKG:  Labs significant for: WBC 8.9, BUN/Cr 20/1.09, lactate .8, UA mod le, wbc 45, >50 blood, few bacteria, blood cultures x2 s pecimen recieved    79F PMH HTN, HLD, AF on eliquis, hypothyroidism transferred from  ED for infected L ureteral stone. Admitted for ureteral stone.       UA had blood and some WBC's. Dr chappell recommended transfer; pt given CTX prior to transfer. Pt denies pain at this time    IN THE ED:  Temp  97.7 F , HR 67  ,  /72  ,RR 18 , SpO2 97% RA   S/P Rocephin VI x1, NS IV 1950cc Bolus IV x1, Tylenol 650mg PO x1, Lopressor 25mg PO x1   EKG:  Labs significant for: WBC 8.9, BUN/Cr 20/1.09, lactate .8, UA mod le, wbc 45, >50 blood, few bacteria, blood cultures x2 s pecimen recieved    79F PMH HTN, HLD, AF on eliquis, hypothyroidism transferred from  ED for infected L ureteral stone. Admitted for ureteral stone.        79F PMH HTN, HLD, AF on eliquis, hypothyroidism transferred from  ED for infected L ureteral stone. Admitted for L. ureteral stone.

## 2025-05-22 NOTE — H&P ADULT - PROBLEM SELECTOR PLAN 6
Chronic  - EKG: Sinus bradycardia, VR 55bpm, QTC 507ms  - On eliquis and aspirin at home; hold pending possible procedure  - Continue home statin  - Continue home lopressor w/ hold parameters Chronic  - EKG: Sinus bradycardia, VR 55bpm, QTC 507ms  - On eliquis and aspirin at home; hold pending possible procedure and hematuria   - Continue home statin  - Continue home lopressor and losartan w/ hold parameters  - Continue home amiodarone Chronic  - EKG: Sinus bradycardia, VR 55bpm, QTC 507ms  - On eliquis and aspirin at home; hold pending possible procedure and hematuria   - Continue home statin  - Continue home lopressor, losartan, amiodarone w/ hold parameters

## 2025-05-22 NOTE — ED ADULT TRIAGE NOTE - CHIEF COMPLAINT QUOTE
pt is a transfer from [facility] for L hydronephrosis , + kidney stone. AOX4, BP elevated. pt received lopressor 5mg 830pm and tylenol 650 PO at . Hx Afib

## 2025-05-22 NOTE — H&P ADULT - PROBLEM SELECTOR PLAN 5
Patient presented to [place] ED w/ L. ear pain ; found to have likely acute otitis media on exam  -S/P Rocephin VI x1, NS IV 1950cc Bolus IV x1, Tylenol 650mg PO x1 in ED  -Continue rocephin for coverage  -Continue pain control as above ^  - ID (Dr. Greenwood) consulted, will appreciate recs Patient presented to [place] ED w/ L. ear pain ; found to have likely acute otitis media on exam  -S/P Rocephin VI x1, NS IV 1950cc Bolus IV x1, Tylenol 650mg PO x1 in ED  -Continue rocephin for coverage  - Flonase PRN for congestion   -Continue pain control as above ^  - ID (Dr. Greenwood) consulted, will appreciate recs

## 2025-05-22 NOTE — ED ADULT NURSE NOTE - BREATH SOUNDS, MLM
Chief Complaints and History of Present Illnesses   Patient presents with    Macular Degeneration Follow Up     5 week follow up both eyes     Chief Complaint(s) and History of Present Illness(es)       Macular Degeneration Follow Up              Comments: 5 week follow up both eyes              Comments    Pt states vision is the same as last visit. No eye pain today. No new flashes or floaters.  No redness. Dryness in each eye, relief with drops.    CAROLE Powell March 13, 2024 1:08 PM                         
Clear

## 2025-05-22 NOTE — ED ADULT NURSE NOTE - CHIEF COMPLAINT QUOTE
pt is a transfer from [facility] for L hydronephrosis , + kidney stone. AOX4, BP elevated. pt received lopressor 5mg 830pm and tylenol 650 PO at . Hx Afib

## 2025-05-22 NOTE — ED PROVIDER NOTE - PROGRESS NOTE DETAILS
Discussed with Dr. Chin ([facility] attending urologist) he will accept transfer to [place] ER for admission IV antibiotics  [facility] will [facility] at [place]  D[facility] Dr[facility] he will accept transfer er->er

## 2025-05-22 NOTE — H&P ADULT - PROBLEM SELECTOR PLAN 2
Patient found to have acute UTI on admission   - Afebrile, no leukocytosis, lactate wnl   - S/P Rocephin VI x1, NS IV 1950cc Bolus IV x1, Tylenol 650mg PO x1 in ED   - UA mod le, wbc 45, >50 blood, few bacteria, blood cultures x2 specimen received   - Continue IV rocephin w/ florastor  - Plan as above ^  - ID (Dr. Greenwood) consulted, will appreciate recs Patient found to have acute UTI on admission   - Afebrile, no leukocytosis, lactate wnl   - S/P Rocephin VI x1, NS IV 1950cc Bolus IV x1, Tylenol 650mg PO x1 in ED   - UA mod le, wbc 45, >50 blood, few bacteria, blood cultures x2 specimen received   - Continue IV rocephin w/ florastor  - F/u blood and urine cultures   - Plan as above ^  - ID (Dr. Greenwood) consulted, will appreciate recs

## 2025-05-22 NOTE — ED ADULT NURSE NOTE - OBJECTIVE STATEMENT
pt is AOX4, came to the ED from [facility]. pt was brought in via ambulance for hydronephrosis and L kidney stone confirmed at hosp. pt denies pain at this time. pain rating 5 out of 10. pt BP elevated . 5mg lopressor given at [facility]. allergies noted. pt is able to ambulate. can voice needs. skin intact, no b/l leg swelling noted. care ongoing. call bell placed within reach. NPO at this time as per MD.

## 2025-05-22 NOTE — H&P ADULT - PROBLEM SELECTOR PLAN 1
Patient presents w/ hematuria since 5/19 to [place] ED; transferred for L. ureteral stone   - Afebrile, no leukocytosis, lactate wnl   - S/P Rocephin VI x1, NS IV 1950cc Bolus IV x1, Tylenol 650mg PO x1 in ED   - UA mod le, wbc 45, >50 blood, few bacteria, blood cultures x2 specimen received   - CT renal stone hunt: Left urolithiasis with a 6 mm left UPJ stone causing mild left hydronephrosis.  - Patient currently has no pain   - Continue IV rocephin w/ florastor  - Start flomax   - NPO for now for possible procedure pending urology recs  -Trend WBC and monitor for fever  - Tylenol for mild, morphine 2mg IV for moderate, 4mg morphine IV for severe pain   - Continue to monitor for passage of fragments in urine  - Urology Dr. Chin consulted in ED, will appreciate recs Patient presents w/ hematuria since 5/19 to [place] ED; transferred for L. ureteral stone   - Afebrile, no leukocytosis, lactate wnl   - S/P Rocephin VI x1, NS IV 1950cc Bolus IV x1, Tylenol 650mg PO x1 in ED   - UA mod le, wbc 45, >50 blood, few bacteria, blood cultures x2 specimen received   - CT renal stone hunt: Left urolithiasis with a 6 mm left UPJ stone causing mild left hydronephrosis.  - Patient currently has no pain   - Continue IV rocephin w/ florastor  - Start flomax   - NPO after midnight   -Trend WBC and monitor for fever  - Tylenol for mild pain   - Continue to monitor for passage of fragments in urine  - Urology Dr. Chin consulted in ED, will appreciate recs Patient presents w/ hematuria since 5/19 to [place] ED; transferred for L. ureteral stone   - Afebrile, no leukocytosis, lactate wnl   - S/P Rocephin VI x1, NS IV 1950cc Bolus IV x1, Tylenol 650mg PO x1 in ED   - UA mod le, wbc 45, >50 blood, few bacteria, blood cultures x2 specimen received   - CT renal stone hunt: Left urolithiasis with a 6 mm left UPJ stone causing mild left hydronephrosis.  - Patient currently has no pain   - Continue IV rocephin w/ florastor  - Start flomax   - F/u blood and urine cultures   - NPO after midnight   -Trend WBC and monitor for fever  - Tylenol for mild pain   - Continue to monitor for passage of fragments in urine  - Urology Dr. Chin consulted in ED, will appreciate recs Patient presents w/ hematuria since 5/19 to [place] ED; transferred for L. ureteral stone   - Afebrile, no leukocytosis, lactate wnl   - S/P Rocephin VI x1, NS IV 1950cc Bolus IV x1, Tylenol 650mg PO x1 in ED   - UA mod le, wbc 45, >50 blood, few bacteria, blood cultures x2 specimen received   - CT renal stone hunt: Left urolithiasis with a 6 mm left UPJ stone causing mild left hydronephrosis.  - Patient currently has no pain   - Continue IV rocephin w/ florastor  - Start flomax   - F/u blood and urine cultures   - NPO after midnight w/ NS + D5 @ 50cc/hr (TTE 4/25: EF 45-50%)   -Trend WBC and monitor for fever  - Tylenol for mild pain   - Continue to monitor for passage of fragments in urine  - Urology Dr. Chin consulted in ED, will appreciate recs

## 2025-05-22 NOTE — ED PROVIDER NOTE - CLINICAL SUMMARY MEDICAL DECISION MAKING FREE TEXT BOX
79F PMH HTN, HLD, AF on eliquis transferred from  ED for infected L ureteral stone. CT showed 6mm stone at [facility] w/ mild hydro. UA had blood and some WBC's. Dr chin recommended transfer; pt given CTX prior to transfer. Pt denies pain at this time    Gen: NAD, non-toxic appearing, awake alert   HEENT: normal conjunctiva, oral mucosa moist  Lung: CTAB, no respiratory distress, no wheezes/rhonchi/rales B/L, speaking in full sentences  CV: RRR  Abd: soft, NT, ND, no guarding, no rigidity, no rebound tenderness  MSK: no visible deformities  Skin: Warm, well perfused    I spoke to uro PA who states she will touch base with Dr. Chin to figure out the OR plan; keep NPO for now. I endorsed to Dr. Eaton for nontele admission. Pt updated about plan and all quests answered

## 2025-05-22 NOTE — H&P ADULT - HISTORY OF PRESENT ILLNESS
79F PMH HTN, HLD, AF on eliquis transferred from  ED for infected L ureteral stone. CT showed 6mm stone at [facility] w/ mild hydro.       UA had blood and some WBC's. Dr chappell recommended transfer; pt given CTX prior to transfer. Pt denies pain at this time    IN THE ED:  Temp  97.7 F , HR 67  ,  /72  ,RR 18 , SpO2 97% RA   S/P Rocephin VI x1, NS IV 1950cc Bolus IV x1, Tylenol 650mg PO x1, Lopressor 25mg PO x1   EKG: Sinus bradycardia, VR 55bpm, QTC 507ms   Labs significant for: WBC 8.9, BUN/Cr 20/1.09, lactate .8, UA mod le, wbc 45, >50 blood, few bacteria, blood cultures x2 specimen received   Imaging: CT renal stone hunt: Left urolithiasis with a 6 mm left UPJ stone causing mild left hydronephrosis. 79F PMH HTN, HLD, AF on eliquis, hypothyroidism transferred from  ED for infected L ureteral stone. CT showed 6mm stone at [facility] w/ mild hydro.       UA had blood and some WBC's. Dr chappell recommended transfer; pt given CTX prior to transfer. Pt denies pain at this time    IN THE ED:  Temp  97.7 F , HR 67  ,  /72  ,RR 18 , SpO2 97% RA   S/P Rocephin VI x1, NS IV 1950cc Bolus IV x1, Tylenol 650mg PO x1, Lopressor 25mg PO x1   EKG: Sinus bradycardia, VR 55bpm, QTC 507ms   Labs significant for: WBC 8.9, BUN/Cr 20/1.09, lactate .8, UA mod le, wbc 45, >50 blood, few bacteria, blood cultures x2 specimen received   Imaging: CT renal stone hunt: Left urolithiasis with a 6 mm left UPJ stone causing mild left hydronephrosis. 79F PMH HTN, HLD, AF on eliquis, hypothyroidism transferred from  ED for infected L ureteral stone. CT showed 6mm stone at [facility] w/ mild hydro. Patient initially had presented to [place] ED with hematuria since 5/19. Patient also had L. ear pain along with sinus congestion. Patient currently has no abdominal pain, back pain, or dysuria. Does admit to mild headache and L.ear pain. No sick contacts. Of note, patient was supposed to go for ablation w/ Dr. Arreola at [facility]. Patient's last dose of eliquis was this morning. Patient took all her scheduled blood pressure medications. States her BP at home is usually in the 110s but feels stressed currently. Repeat BP at bedside 161/89.     IN THE ED:  Temp  97.7 F , HR 67  ,  /72  ,RR 18 , SpO2 97% RA   S/P Rocephin VI x1, NS IV 1950cc Bolus IV x1, Tylenol 650mg PO x1, Lopressor 25mg PO x1   EKG: Sinus bradycardia, VR 55bpm, QTC 507ms   Labs significant for: WBC 8.9, BUN/Cr 20/1.09, lactate .8, UA mod le, wbc 45, >50 blood, few bacteria, blood cultures x2 specimen received   Imaging: CT renal stone hunt: Left urolithiasis with a 6 mm left UPJ stone causing mild left hydronephrosis.

## 2025-05-22 NOTE — H&P ADULT - PROBLEM SELECTOR PLAN 9
SCD's for now pending possible procedure w/ urology SCD's for now pending possible procedure w/ urology ( last dose of eliquis 5/22 AM)

## 2025-05-22 NOTE — ED PROVIDER NOTE - CARE PLAN
Principal Discharge DX:	Ureteral stone  Secondary Diagnosis:	Acute UTI  Secondary Diagnosis:	Otitis media   1

## 2025-05-22 NOTE — H&P ADULT - ATTENDING COMMENTS
79F PMH HTN, HLD, AF on eliquis, hypothyroidism transferred from  ED for infected L ureteral stone. Admitted for L. ureteral stone.     #L ureteral stone   Patient presents w/ hematuria since 5/19 to [place] ED; transferred for L. ureteral stone   - Afebrile, no leukocytosis, lactate wnl   - S/P Rocephin VI x1, NS IV 1950cc Bolus IV x1, Tylenol 650mg PO x1 in ED   - UA mod le, wbc 45, >50 blood, few bacteria, blood cultures x2 specimen received   - CT renal stone hunt: Left urolithiasis with a 6 mm left UPJ stone causing mild left hydronephrosis.  - Patient currently has no pain   - Continue IV rocephin w/ florastor  - Start flomax   - F/u blood and urine cultures   - NPO after midnight w/ NS + D5 @ 50cc/hr (TTE 4/25: EF 45-50%)   -Trend WBC and monitor for fever  - Tylenol for mild pain   - Continue to monitor for passage of fragments in urine  - Urology Dr. Chin consulted in ED, will appreciate recs.    #Acute UTI   Patient found to have acute UTI on admission   - Afebrile, no leukocytosis, lactate wnl   - S/P Rocephin VI x1, NS IV 1950cc Bolus IV x1, Tylenol 650mg PO x1 in ED   - UA mod le, wbc 45, >50 blood, few bacteria, blood cultures x2 specimen received   - Continue IV rocephin w/ florastor  - F/u blood and urine cultures   - Plan as above ^  - ID (Dr. Greenwood) consulted, will appreciate recs.    #Hypertensive Urgency  : Patient found to have elevated BP at [facility]   - /72 in [place]  -S/p Lopressor 25mg PO x1 in [facility] w/ improvement to /79  - BP currently 161/89  - Continue home lopressor, losartan, lasix w/ hold parameters  - Continue to monitor hemodynamics.    #BALDEMAR  BALDEMAR likely 2/2 obstructed ureteral stone   - Baseline creatinine .74   - BUN/Cr 20/1.09 on admission   - S/p NS IV 1950cc Bolus IV x1 in ED   - Hold off on further IVF at this time given hydroureteronephrosis on imaging   - Avoid nephrotoxic medications as able  - Monitor on BMP.    #Acute Otitis Media  : Patient presented to [place] ED w/ L. ear pain ; found to have likely acute otitis media on exam  -S/P Rocephin VI x1, NS IV 1950cc Bolus IV x1, Tylenol 650mg PO x1 in ED  -Continue rocephin for coverage  - Flonase PRN for congestion   -Continue pain control as above ^  - ID (Dr. Greenwood) consulted, will appreciate recs.      Agree with Resident's Note. Refer to resident's note for chronic comorbidities  76 minutes spent on total encounter excluding teaching and separately reported services. The necessity of the time spent during the encounter on this date of service was due to:   activities including direct patient care, counseling and/or coordinating care, and reviewing notes/lab data/imaging.

## 2025-05-22 NOTE — H&P ADULT - NSHPREVIEWOFSYSTEMS_GEN_ALL_CORE
REVIEW OF SYSTEMS:  CONSTITUTIONAL: No fever/chills or appetite changes.  HENMT: + L. ear pain, No HA, lightheadedness/dizziness  RESPIRATORY: No cough, wheezing, hemoptysis; No shortness of breath.  CARDIOVASCULAR: No chest pain, palpitations.  GASTROINTESTINAL: No abdominal or epigastric pain. No nausea or vomiting; No diarrhea or constipation.  GENITOURINARY: + Hematuria, No dysuria  NEUROLOGICAL: Baseline strength. Sensation intact bilaterally.  SKIN: No itching, rashes  MUSCULOSKELETAL: No joint pain or swelling; No muscle, back, or extremity pain REVIEW OF SYSTEMS:  CONSTITUTIONAL: No fever/chills or appetite changes.  HENMT: + L. ear pain, + Sinus congestion, No HA, lightheadedness/dizziness  RESPIRATORY: No cough, wheezing, hemoptysis; No shortness of breath.  CARDIOVASCULAR: No chest pain, palpitations.  GASTROINTESTINAL: No abdominal or epigastric pain. No nausea or vomiting; No diarrhea or constipation.  GENITOURINARY: + Hematuria, No dysuria  NEUROLOGICAL: Baseline strength. Sensation intact bilaterally.  SKIN: No itching, rashes  MUSCULOSKELETAL: No joint pain or swelling; No muscle, back, or extremity pain

## 2025-05-22 NOTE — ED ADULT NURSE NOTE - CHIEF COMPLAINT QUOTE
I have multiple complaints but I am here because I see blood in my urine. hx of kidney stone but I have no pain this time. patient also has ear pain and oral pain, has schedule for cardiac ablation in june

## 2025-05-22 NOTE — ED PROVIDER NOTE - OBJECTIVE STATEMENT
Patient complaining of hematuria since May 19.  Patient reports she is on Eliquis for A-fib.  Patient also complaining of left ear discomfort and feeling clogged for the past few hours.  Patient relates remote history of kidney stones.  Patient has chronic cough and nasal congestion for years unchanged at this time.  Patient denies fevers chills chest pain short of breath abdominal pain nausea vomiting back or flank pain dysuria frequency.  PMD Rex

## 2025-05-22 NOTE — ED PROVIDER NOTE - DIFFERENTIAL DIAGNOSIS
Differential Diagnosis Differential including but not limited to otitis externa otitis media cerumen impaction UTI kidney stone

## 2025-05-22 NOTE — H&P ADULT - NSHPPHYSICALEXAM_GEN_ALL_CORE
Vital Signs Last 24 Hrs  T(C): 36.5 (22 May 2025 21:03), Max: 36.5 (22 May 2025 20:35)  T(F): 97.7 (22 May 2025 21:03), Max: 97.7 (22 May 2025 20:35)  HR: 72 (22 May 2025 21:37) (65 - 72)  BP: 183/79 (22 May 2025 21:37) (183/79 - 209/72)  BP(mean): --  RR: 17 (22 May 2025 21:37) (15 - 18)  SpO2: 97% (22 May 2025 21:37) (96% - 99%)    Parameters below as of 22 May 2025 21:37  Patient On (Oxygen Delivery Method): room air    CONSTITUTIONAL: Well groomed, no apparent distress  EYES: No conjunctival or scleral injection, non-icteric  ENMT: Oral mucosa with moist membranes,  Left ear + cerumen, visualized TM with erythema and bulging  NECK: Supple, symmetric and without tracheal deviation   RESP: No respiratory distress, no use of accessory muscles; CTA b/l, no WRR  CV: RRR, +S1S2, no peripheral edema  GI: Soft, NT, ND  MSK: Normal ROM without pain, no joint pain   SKIN: No rashes or ulcers noted  NEURO: Sensation intact in upper and lower extremities b/l to light touch   PSYCH: Appropriate insight/judgment; A+O x 3, mood and affect appropriate Vital Signs Last 24 Hrs  T(C): 36.5 (22 May 2025 21:03), Max: 36.5 (22 May 2025 20:35)  T(F): 97.7 (22 May 2025 21:03), Max: 97.7 (22 May 2025 20:35)  HR: 72 (22 May 2025 21:37) (65 - 72)  BP: 183/79 (22 May 2025 21:37) (183/79 - 209/72)  BP(mean): --  RR: 17 (22 May 2025 21:37) (15 - 18)  SpO2: 97% (22 May 2025 21:37) (96% - 99%)    Parameters below as of 22 May 2025 21:37  Patient On (Oxygen Delivery Method): room air    CONSTITUTIONAL: Well groomed, no apparent distress  EYES: No conjunctival or scleral injection, non-icteric  ENMT: Oral mucosa with moist membranes,  + frontal sinus TTP, Left ear + cerumen, visualized TM with erythema and bulging  NECK: Supple, symmetric and without tracheal deviation   RESP: No respiratory distress, no use of accessory muscles; CTA b/l, no WRR  CV: RRR, +S1S2, no peripheral edema  GI: Soft, NT, ND  MSK: Normal ROM without pain, no joint pain   SKIN: No rashes or ulcers noted  NEURO: Sensation intact in upper and lower extremities b/l to light touch   PSYCH: Appropriate insight/judgment; A+O x 3, mood and affect appropriate

## 2025-05-22 NOTE — H&P ADULT - PROBLEM SELECTOR PLAN 3
Patient found to have elevated BP at [facility]   - /72 in [place]  -S/p Lopressor 25mg PO x1 in [facility] w/ improvement to BP ___  - Continue home lopressor w/ hold parameters  - Continue to monitor hemodynamics Patient found to have elevated BP at [facility]   - /72 in [place]  -S/p Lopressor 25mg PO x1 in [facility] w/ improvement to /79  - Continue home lopressor w/ hold parameters  - Continue to monitor hemodynamics Patient found to have elevated BP at [facility]   - /72 in [place]  -S/p Lopressor 25mg PO x1 in [facility] w/ improvement to /79  - BP currently 161/89  - Continue home lopressor, losartan w/ hold parameters  - Continue to monitor hemodynamics Patient found to have elevated BP at [facility]   - /72 in [place]  -S/p Lopressor 25mg PO x1 in [facility] w/ improvement to /79  - BP currently 161/89  - Continue home lopressor, losartan, lasix w/ hold parameters  - Continue to monitor hemodynamics

## 2025-05-22 NOTE — H&P ADULT - PROBLEM SELECTOR PLAN 4
BALDEMAR likely 2/2 obstructed ureteral stone   - Baseline creatinine .74   - BUN/Cr 20/1.09 on admission   - S/p NS IV 1950cc Bolus IV x1 in ED   - Hold off on further IVF at this time given hydroureteronephrosis on imaging   - Avoid nephrotoxic medications as able  - Monitor on BMP BALDEMAR likely 2/2 obstructed ureteral stone   - Baseline creatinine .74   - BUN/Cr 20/1.09 on admission   - S/p NS IV 1950cc Bolus IV x1 in ED   - Avoid nephrotoxic medications as able  - Monitor on BMP

## 2025-05-22 NOTE — ED PROVIDER NOTE - IN ACCORDANCE WITH [place] STATE LAW, WE OFFER EVERY PATIENT A HEPATITIS C TEST. WOULD YOU LIKE TO BE TESTED TODAY?
ATTENDING STATEMENT   I discussed the case with the Resident. I agree with the Resident's findings and plan, as documented in today's note.   Myranda Guevara M.D.  Family Medicine Attending     Opt out

## 2025-05-22 NOTE — ED PROVIDER NOTE - CLINICAL SUMMARY MEDICAL DECISION MAKING FREE TEXT BOX
Patient complaining of hematuria since May 19.  Patient reports she is on Eliquis for A-fib.  Patient also complaining of left ear discomfort and feeling clogged for the past few hours.  Patient relates remote history of kidney stones.  Patient has chronic cough and nasal congestion for years unchanged at this time.  Patient denies fevers chills chest pain short of breath abdominal pain nausea vomiting back or flank pain dysuria frequency.  PMD Huysman    Plan labs CT stone hunt    Differential including but not limited to otitis externa otitis media cerumen impaction UTI kidney stone

## 2025-05-22 NOTE — ED PROVIDER NOTE - ENMT, MLM
Airway patent, Left ear + cerumen, visualized TM with erythema and bulging. Mouth with normal mucosa. Throat has no vesicles, no oropharyngeal exudates and uvula is midline.

## 2025-05-22 NOTE — H&P ADULT - PROBLEM SELECTOR PLAN 7
Chronic  - Continue home synthroid Chronic  - Continue home synthroid    #Prolonged QTC  -EKG: Sinus bradycardia, VR 55bpm, QTC 507ms  -Avoid QTC prolonging medications

## 2025-05-22 NOTE — ED ADULT NURSE NOTE - NSFALLHARMRISKINTERV_ED_ALL_ED

## 2025-05-22 NOTE — H&P ADULT - PROBLEM SELECTOR PROBLEM 5
The pt is calling because yesterday you ericacarolann spoke about him doing physical therapy.  He is calling to see if an order can be placed at [facility] for PT to get his muscles back because he was in bed for 3 weeks and needs them built back up Acute otitis media

## 2025-05-23 ENCOUNTER — TRANSCRIPTION ENCOUNTER (OUTPATIENT)
Age: 80
End: 2025-05-23

## 2025-05-23 LAB
A1C WITH ESTIMATED AVERAGE GLUCOSE RESULT: 5.4 % — SIGNIFICANT CHANGE UP (ref 4–5.6)
ALBUMIN SERPL ELPH-MCNC: 3.1 G/DL — LOW (ref 3.3–5)
ALP SERPL-CCNC: 72 U/L — SIGNIFICANT CHANGE UP (ref 40–120)
ALT FLD-CCNC: 17 U/L — SIGNIFICANT CHANGE UP (ref 12–78)
ANION GAP SERPL CALC-SCNC: 6 MMOL/L — SIGNIFICANT CHANGE UP (ref 5–17)
AST SERPL-CCNC: 20 U/L — SIGNIFICANT CHANGE UP (ref 15–37)
BASOPHILS # BLD AUTO: 0.02 K/UL — SIGNIFICANT CHANGE UP (ref 0–0.2)
BASOPHILS NFR BLD AUTO: 0.4 % — SIGNIFICANT CHANGE UP (ref 0–2)
BILIRUB SERPL-MCNC: 0.8 MG/DL — SIGNIFICANT CHANGE UP (ref 0.2–1.2)
BUN SERPL-MCNC: 14 MG/DL — SIGNIFICANT CHANGE UP (ref 7–23)
CALCIUM SERPL-MCNC: 8 MG/DL — LOW (ref 8.5–10.1)
CHLORIDE SERPL-SCNC: 109 MMOL/L — HIGH (ref 96–108)
CHOLEST SERPL-MCNC: 123 MG/DL — SIGNIFICANT CHANGE UP
CO2 SERPL-SCNC: 28 MMOL/L — SIGNIFICANT CHANGE UP (ref 22–31)
CREAT SERPL-MCNC: 0.85 MG/DL — SIGNIFICANT CHANGE UP (ref 0.5–1.3)
EGFR: 70 ML/MIN/1.73M2 — SIGNIFICANT CHANGE UP
EGFR: 70 ML/MIN/1.73M2 — SIGNIFICANT CHANGE UP
EOSINOPHIL # BLD AUTO: 0.12 K/UL — SIGNIFICANT CHANGE UP (ref 0–0.5)
EOSINOPHIL NFR BLD AUTO: 2.3 % — SIGNIFICANT CHANGE UP (ref 0–6)
ESTIMATED AVERAGE GLUCOSE: 108 MG/DL — SIGNIFICANT CHANGE UP (ref 68–114)
GLUCOSE SERPL-MCNC: 93 MG/DL — SIGNIFICANT CHANGE UP (ref 70–99)
HCT VFR BLD CALC: 35.8 % — SIGNIFICANT CHANGE UP (ref 34.5–45)
HDLC SERPL-MCNC: 46 MG/DL — LOW
HGB BLD-MCNC: 11.8 G/DL — SIGNIFICANT CHANGE UP (ref 11.5–15.5)
IMM GRANULOCYTES NFR BLD AUTO: 0.4 % — SIGNIFICANT CHANGE UP (ref 0–0.9)
LDLC SERPL-MCNC: 64 MG/DL — SIGNIFICANT CHANGE UP
LIPID PNL WITH DIRECT LDL SERPL: 64 MG/DL — SIGNIFICANT CHANGE UP
LYMPHOCYTES # BLD AUTO: 0.65 K/UL — LOW (ref 1–3.3)
LYMPHOCYTES # BLD AUTO: 12.5 % — LOW (ref 13–44)
MCHC RBC-ENTMCNC: 30.2 PG — SIGNIFICANT CHANGE UP (ref 27–34)
MCHC RBC-ENTMCNC: 33 G/DL — SIGNIFICANT CHANGE UP (ref 32–36)
MCV RBC AUTO: 91.6 FL — SIGNIFICANT CHANGE UP (ref 80–100)
MONOCYTES # BLD AUTO: 0.36 K/UL — SIGNIFICANT CHANGE UP (ref 0–0.9)
MONOCYTES NFR BLD AUTO: 6.9 % — SIGNIFICANT CHANGE UP (ref 2–14)
NEUTROPHILS # BLD AUTO: 4.05 K/UL — SIGNIFICANT CHANGE UP (ref 1.8–7.4)
NEUTROPHILS NFR BLD AUTO: 77.5 % — HIGH (ref 43–77)
NONHDLC SERPL-MCNC: 77 MG/DL — SIGNIFICANT CHANGE UP
NRBC BLD AUTO-RTO: 0 /100 WBCS — SIGNIFICANT CHANGE UP (ref 0–0)
PLATELET # BLD AUTO: 236 K/UL — SIGNIFICANT CHANGE UP (ref 150–400)
POTASSIUM SERPL-MCNC: 3.4 MMOL/L — LOW (ref 3.5–5.3)
POTASSIUM SERPL-SCNC: 3.4 MMOL/L — LOW (ref 3.5–5.3)
PROT SERPL-MCNC: 6.2 G/DL — SIGNIFICANT CHANGE UP (ref 6–8.3)
RAPID RVP RESULT: DETECTED
RBC # BLD: 3.91 M/UL — SIGNIFICANT CHANGE UP (ref 3.8–5.2)
RBC # FLD: 13.9 % — SIGNIFICANT CHANGE UP (ref 10.3–14.5)
SARS-COV-2 RNA SPEC QL NAA+PROBE: DETECTED
SODIUM SERPL-SCNC: 143 MMOL/L — SIGNIFICANT CHANGE UP (ref 135–145)
TRIGL SERPL-MCNC: 63 MG/DL — SIGNIFICANT CHANGE UP
TSH SERPL-MCNC: 2.5 UIU/ML — SIGNIFICANT CHANGE UP (ref 0.36–3.74)
WBC # BLD: 5.22 K/UL — SIGNIFICANT CHANGE UP (ref 3.8–10.5)
WBC # FLD AUTO: 5.22 K/UL — SIGNIFICANT CHANGE UP (ref 3.8–10.5)

## 2025-05-23 PROCEDURE — 99233 SBSQ HOSP IP/OBS HIGH 50: CPT | Mod: GC

## 2025-05-23 PROCEDURE — 99222 1ST HOSP IP/OBS MODERATE 55: CPT

## 2025-05-23 PROCEDURE — G0545: CPT

## 2025-05-23 RX ORDER — TAMSULOSIN HYDROCHLORIDE 0.4 MG/1
1 CAPSULE ORAL
Qty: 14 | Refills: 0
Start: 2025-05-23 | End: 2025-06-05

## 2025-05-23 RX ORDER — LOSARTAN POTASSIUM 100 MG/1
25 TABLET, FILM COATED ORAL DAILY
Refills: 0 | Status: DISCONTINUED | OUTPATIENT
Start: 2025-05-23 | End: 2025-05-25

## 2025-05-23 RX ORDER — TAMSULOSIN HYDROCHLORIDE 0.4 MG/1
1 CAPSULE ORAL
Qty: 0 | Refills: 0 | DISCHARGE
Start: 2025-05-23

## 2025-05-23 RX ADMIN — Medication 50 MICROGRAM(S): at 05:18

## 2025-05-23 RX ADMIN — Medication 40 MILLIEQUIVALENT(S): at 18:07

## 2025-05-23 RX ADMIN — Medication 650 MILLIGRAM(S): at 09:40

## 2025-05-23 RX ADMIN — Medication 650 MILLIGRAM(S): at 10:02

## 2025-05-23 RX ADMIN — CEFTRIAXONE 100 MILLIGRAM(S): 500 INJECTION, POWDER, FOR SOLUTION INTRAMUSCULAR; INTRAVENOUS at 00:45

## 2025-05-23 RX ADMIN — ATORVASTATIN CALCIUM 40 MILLIGRAM(S): 80 TABLET, FILM COATED ORAL at 21:09

## 2025-05-23 RX ADMIN — LOSARTAN POTASSIUM 25 MILLIGRAM(S): 100 TABLET, FILM COATED ORAL at 05:18

## 2025-05-23 RX ADMIN — Medication 100 MILLIEQUIVALENT(S): at 11:53

## 2025-05-23 RX ADMIN — BUTYROSPERMUM PARKII(SHEA BUTTER), SIMMONDSIA CHINENSIS (JOJOBA) SEED OIL, ALOE BARBADENSIS LEAF EXTRACT 250 MILLIGRAM(S): .01; 1; 3.5 LIQUID TOPICAL at 05:17

## 2025-05-23 RX ADMIN — FUROSEMIDE 20 MILLIGRAM(S): 10 INJECTION INTRAMUSCULAR; INTRAVENOUS at 05:18

## 2025-05-23 RX ADMIN — Medication 40 MILLIEQUIVALENT(S): at 13:26

## 2025-05-23 RX ADMIN — BUTYROSPERMUM PARKII(SHEA BUTTER), SIMMONDSIA CHINENSIS (JOJOBA) SEED OIL, ALOE BARBADENSIS LEAF EXTRACT 250 MILLIGRAM(S): .01; 1; 3.5 LIQUID TOPICAL at 18:07

## 2025-05-23 RX ADMIN — TAMSULOSIN HYDROCHLORIDE 0.4 MILLIGRAM(S): 0.4 CAPSULE ORAL at 21:09

## 2025-05-23 RX ADMIN — SODIUM CHLORIDE 50 MILLILITER(S): 9 INJECTION, SOLUTION INTRAVENOUS at 00:08

## 2025-05-23 NOTE — PATIENT PROFILE ADULT - FUNCTIONAL ASSESSMENT - BASIC MOBILITY 6.
4-calculated by average/Not able to assess (calculate score using [facility] averaging method)

## 2025-05-23 NOTE — DISCHARGE NOTE PROVIDER - PROVIDER TOKENS
PROVIDER:[TOKEN:[[id number]],FOLLOWUP:[2 weeks],ESTABLISHEDPATIENT:[T]],PROVIDER:[TOKEN:[[id number]]]

## 2025-05-23 NOTE — PROGRESS NOTE ADULT - PROBLEM SELECTOR PLAN 1
Patient presents w/ hematuria since 5/19 to [place] ED; transferred for L. ureteral stone   - Afebrile, no leukocytosis, lactate wnl   - S/P Rocephin VI x1, NS IV 1950cc Bolus IV x1, Tylenol 650mg PO x1 in ED   - UA mod le, wbc 45, >50 blood, few bacteria, blood cultures x2 specimen received   - CT renal stone hunt: Left urolithiasis with a 6 mm left UPJ stone causing mild left hydronephrosis.  - Patient currently has no pain   - Continue IV rocephin w/ florastor  - Cont flomax expulsion therapy   - Result pending blood and urine cultures   - NPO after midnight w/ NS + D5 @ 50cc/hr (TTE 4/25: EF 45-50%)   - Trend WBC and monitor for fever  - Tylenol for mild pain   - Continue to monitor for passage of fragments in urine  - Urology Dr. Chin consulted --> no urological intervention at this time Patient presents w/ hematuria since 5/19 to [place] ED; transferred for L. ureteral stone   - Afebrile, no leukocytosis, lactate wnl   - S/P Rocephin VI x1, NS IV 1950cc Bolus IV x1, Tylenol 650mg PO x1 in ED   - UA mod le, wbc 45, >50 blood, few bacteria, blood cultures x2 specimen received   - CT renal stone hunt: Left urolithiasis with a 6 mm left UPJ stone causing mild left hydronephrosis.  - Patient currently has no pain   - Continue IV Rocephin w/ florastor  - Cont Flomax expulsion therapy   - Result pending blood and urine cultures   - NPO after midnight w/ NS + D5 @ 50cc/hr (TTE 4/25: EF 45-50%)   - Trend WBC and monitor for fever  - Tylenol for mild pain   - Continue to monitor for passage of fragments in urine  - Urology Dr. Chin consulted --> no urological intervention at this time

## 2025-05-23 NOTE — PROGRESS NOTE ADULT - PROBLEM SELECTOR PLAN 5
Patient presented to [place] ED w/ L. ear pain ; found to have likely acute otitis media on exam  -S/P Rocephin VI x1, NS IV 1950cc Bolus IV x1, Tylenol 650mg PO x1 in ED  -Continue rocephin for coverage  - Flonase PRN for congestion   -Continue pain control as above ^  - ID (Dr. Greenwood) consulted, will appreciate recs Patient presented to [place] ED w/ L. ear pain ; found to have likely acute otitis media on exam  -S/P Rocephin VI x1, NS IV 1950cc Bolus IV x1, Tylenol 650mg PO x1 in ED  -Continue rocephin for coverage  - Flonase PRN for congestion   -Continue pain control as above ^  - ID (Dr. Greenwood) consulted

## 2025-05-23 NOTE — PATIENT PROFILE ADULT - FALL HARM RISK - ATTEMPT OOB
"  History   Chief Complaint:  Generalized Weakness    The history is provided by the patient and a relative (daughter). The history is limited by the condition of the patient.      Radha Cunningham is a 89 year old female anticoagulated on Coumadin with a history of asthma, hypertension, hyperlipidemia, paroxysmal atrial fibrillation, TIA, non-rheumatic mitral regurgitation, stroke, and type II diabetes mellitus currently living with daughter who presents to the ED with one of her daughters for an evaluation of increasing generalized weakness/fatigue and decreased oral intake for the past 3 days. Per triage notes, the patient would state that she is hungry but then not proceed to eat. Here, her daughter states that everyone in the house has been sick recently with fatigue and cough. She notes that the patient eats \"barely 50 calories\" daily. The patient herself endorses generalized body aches and a cough along with her weakness/fatigue but otherwise denies nausea/emesis, abdominal pain, or pain elsewhere. She has received her first dose of the COVID-19 vaccine and is lined up for assisted living next week.  She denied any urinary symptoms or other medical concerns at the time of the exam.    Review of Systems   Unable to perform ROS: Other   Constitutional: Positive for fatigue.   Respiratory: Positive for cough.    Gastrointestinal: Negative for abdominal pain, nausea and vomiting.   Musculoskeletal: Positive for myalgias (generalized body aches).   Neurological: Positive for weakness.   All other systems reviewed and are negative.    Allergies:  Contrast dye  X-ray materials    Medications:    Proventil  Lipitor  Oscal  Norco  DuoNeb   Keppra  Coumadin  Lisinopril  Prilosec  Vimpat    Past Medical History:    Anemia  Asthma  Cholelithiasis  Diverticulosis  Hiatal hernia  Hypertension  Paroxysmal atrial fibrillation  Peptic ulcer disease  Skin cancer   Stroke  Hyperlipidemia  Acute encephalopathy  Acute respiratory " failure with hypoxia  Bronchitis  Reactive airway disease  Type II diabetes mellitus  Non-rheumatic mitral regurgitation  Osteoarthritis  Cardiomegaly  Malignant neoplasm of thyroid gland  Hiatal hernia  Umbilical hernia   Gangrenous cholecystitis  Pelvic abscess   Venous stasis  Osteoporosis  Scoliosis  TIA  DJD    Past Surgical History:    Hiatal hernia repair  Umbilical hernia repair  Skin cancer forehead removal  Tonsillectomy  Left ankle fracture repair  Right cataract removal  Cholecystectomy  Left pacemaker placement  Transcutaneous pelvic abscess drainage  Gastrostomy tube placement    Family History:    CAD  Hypertension  Stroke  MI  Type II diabetes  Anxiety  Asthma  Aneurysm  Dementia    Social History:  The patient presented with daughter.  The patient currently lives with her daughter.    Physical Exam     Patient Vitals for the past 24 hrs:   BP Temp Pulse Resp SpO2   05/28/21 2015 (!) 121/95 -- 84 -- 92 %   05/28/21 1945 114/84 -- 99 -- 92 %   05/28/21 1900 (!) 141/109 -- -- -- 92 %   05/28/21 1732 124/87 99.3  F (37.4  C) 98 16 93 %       Physical Exam  Vitals signs and nursing note reviewed.   HENT:      Nose: Nose normal. No congestion or rhinorrhea.      Mouth/Throat:      Mouth: Mucous membranes are moist.  Intermittent coughing at the time of the exam.   Eyes:      General: No scleral icterus.     Extraocular Movements: Extraocular movements intact.      Conjunctiva/sclera: Conjunctivae normal.   Cardiovascular:      Rate and Rhythm: Regular rhythm. Normal Rate.     Pulses: Normal pulses.   Pulmonary:      Effort: Pulmonary effort is normal.      Breath sounds: Normal breath sounds.  No respiratory distress.  No wheezing noted.  Abdominal:      General: Abdomen is flat. Bowel sounds are normal.      Palpations: Abdomen is soft.      Tenderness: There is no abdominal tenderness.   Musculoskeletal: Moves all 4 extremities.  Skin:     General: Skin is warm and dry.   Neurological:      Mental  Status: Alert.   Psychiatric:         Mood and Affect: Mood normal.         Behavior: Behavior normal.     Emergency Department Course   Imaging:  CT Head without contrast:  1.  No acute intracranial process. No significant change since 12/04/2020.   2.  Stable moderate chronic small vessel ischemic disease and mild to moderate generalized brain parenchymal volume loss.   3.  Stable chronic left posterior cerebral artery territory infarct.    Imaging independently reviewed and agree with radiologist interpretation.     XR Chest 1 View:  Left-sided dual chamber cardiac pacemaker. Heart size upper limits of normal. Lungs clear.    Imaging independently reviewed and agree with radiologist interpretation.     Laboratory:  CBC: WBC 4.5, HGB 12.6,  (L)     BMP:  (H), o/w WNL (Creatinine 0.83)     Lactic acid (result time 1936) 1.4     INR: 1.58 (H)    UA with microscopic reflex to culture: pending    Symptomatic Influenza A/B & SARS-CoV2 (COVID19) Virus PCR Multiplex: positive     Emergency Department Course:    Reviewed:  1842 I reviewed nursing notes, vitals, and past history.    Assessments:  1847 I obtained history and examined the patient as noted above.   1945 I reassessed the patient in conjunction with ROSALIND Arriaga CNP.  2125 I rechecked the patient and discussed her work-up thus far/plan of care.     Consults:   21201 I spoke with Carla Irwin PA-C who is accepting the patient on behalf of Dr. Avila of the hospitalist service regarding the patient's presentation, findings, and plan of care.    Interventions:  1905: NS 0.5L IV Bolus     Disposition:  The patient was admitted to the hospital under the care of Dr. Avila.    Impression & Plan    Medical Decision Making:  Radha Cunningham is a 89-year-old female who presents to the emergency department accompanied by her daughter for the evaluation of generalized weakness and intermittent cough beginning several days prior.  Vitals were reviewed.   Patient afebrile and hemodynamically stable.  On physical exam, the patient had a completely benign abdominal exam without rebound, guarding, distention, or marked tenderness to palpation.  She was noted to have an intermittent nonproductive cough.  No evidence of respiratory distress or wheezing noted at the time of the exam.  Given history, labs were obtained and reviewed.  CBC demonstrated no evidence of leukocytosis.  BMP unremarkable.  INR subtherapeutic.  Lactic 1.4.   UA pending.  Head CT negative for acute intracranial process.  Her Covid test was positive which I suspect is contributing to her symptoms.  Chest x-ray negative for acute findings.  At this time, the patient is hemodynamically stable and has normal oxygen saturations on room air.  I did discuss with the patient and the accepting hospitalist that a CT PE study may be warranted in the event that the patient becomes hypoxic given subtherapeutic INR.  While in the emergency department, the patient remained hemodynamically stable.  She was ultimately admitted to observation for further care and evaluation.  As noted above, the patient is scheduled to go to a skilled nursing community next week however, placement may need to be expedited.  Above discussed with both the patient and her daughter.  Both verbalized understanding and agreed with admission.  All questions and concerns were addressed prior to discharge.    Critical Care time:  none    Covid-19  June P Marisa was evaluated during a global COVID-19 pandemic, which necessitated consideration that the patient might be at risk for infection with the SARS-CoV-2 virus that causes COVID-19.   Applicable protocols for evaluation were followed during the patient's care.   COVID-19 was considered as part of the patient's evaluation. The plan for testing is:  a test was obtained during this visit.    Diagnosis:    ICD-10-CM    1. Generalized muscle weakness  M62.81    2. Lab test positive for  detection of COVID-19 virus  U07.1    3. Subtherapeutic international normalized ratio (INR)  R79.1        Scribe Disclosure:  I, Kristy Thurman, am serving as a scribe at 6:47 PM on 5/28/2021 to document services personally performed by Allison Mcmullen PA-C based on my observations and the provider's statements to me.      Allison Mcmullen PA-C  05/28/21 7185     No

## 2025-05-23 NOTE — DISCHARGE NOTE PROVIDER - NS AS DC PROVIDER CONTACT Y/N MULTI
[facility]  Laboratory [address] [address]  [facility]# [id number]  Mony Cruz M.D.  Medical Director [facility] -___ ___ - ___ ___ ___ ___  Histology Only   HISTOLOGY REQUISITION     Patient Name: Mora Javed  Gender: female     YOB: 1961    Medical Record Number: [id number]    (Affix barcode label in box, if available)       Collection Site: [place]   Physician(s): Arvind Smith MD   Collection Date: 3/16/2021   Anatomic Site: left forearm         Collection Time: 1500  Preoperative Diagnosis: scca  ICD-10 Code: C44.629    Clinical History: see derm report    List each Sample in corresponding container:      A. Left forearm     B.      C.      D.      E.      F.      G.      H.        Services Needed: Routine Processing of Specimen  Is this a Breast specimen: No  Requesting Department: [address]  Call Back Number of Requesting Department: Dept: [phone]  Requisition Completed by: Ty Langley RN  If ASC or Endo Verification by:     Notes:      1.3.2018   Yes

## 2025-05-23 NOTE — PATIENT PROFILE ADULT - NSPROIMPLANTSMEDDEV_GEN_A_NUR
Pt had L knee and R hip replacements. Pt had mitro valve replaced in 12/2012 then in 2/2023./Heart valve

## 2025-05-23 NOTE — CONSULT NOTE ADULT - ASSESSMENT
79F PMH HTN, HLD, AF on eliquis, hypothyroidism, who was transferred from  ED for infected L ureteral stone. Patient says she developed hematuria a few days ago. She denies any fevers, chills, flank pain or dysuria. CT showed 6mm stone at UPJ w/ mild hydro. Patient initially had presented to [place] ED with hematuria and also had L. ear pain along with sinus congestion. She said she has had chronic sinus and ear issues, requiring a tube on her L ear. Saw her ENT recently.    Found to have 6mm L UPJ stone causing mild L hydronephrosis. Per Urology no plan for urological intervention at this time. No fever and no leukocytosis.    #L ureteral stone  #L hydronephrosis  #? UTI    -continue ceftriaxone  -blood and urine culture pending  -follow up with ENT as outpatient    Thank you for courtesy of this consult.     Will follow.  Discussed with the primary team.     Isidra Mayo MD  Division of Infectious Diseases   Cell [phone] between 8am and 6pm   After 6pm and weekends please call ID service at [phone].     55 minutes spent on total encounter assessing patient, examination, chart review, counseling and coordinating care by the attending physician/nurse/care manager.  79F PMH HTN, HLD, AF on eliquis, hypothyroidism, who was transferred from  ED for infected L ureteral stone. Patient says she developed hematuria a few days ago. She denies any fevers, chills, flank pain or dysuria. CT showed 6mm stone at UPJ w/ mild hydro. Patient initially had presented to [place] ED with hematuria and also had L. ear pain along with sinus congestion. She said she has had chronic sinus and ear issues, requiring a tube on her L ear. Saw her ENT recently.    Found to have 6mm L UPJ stone causing mild L hydronephrosis. Per Urology no plan for urological intervention at this time. No fever and no leukocytosis.    #L ureteral stone  #L hydronephrosis  #? UTI    -continue ceftriaxone  -blood and urine culture pending  -suggest RVP  -follow up with ENT as outpatient    Thank you for courtesy of this consult.     Will follow.  Discussed with the primary team.     Isidra Mayo MD  Division of Infectious Diseases   Cell [phone] between 8am and 6pm   After 6pm and weekends please call ID service at [phone].     55 minutes spent on total encounter assessing patient, examination, chart review, counseling and coordinating care by the attending physician/nurse/care manager.

## 2025-05-23 NOTE — CASE MANAGEMENT PROGRESS NOTE - NSCMPROGRESSNOTE_GEN_ALL_CORE
Cm try to do assessment but patient has been sleeping.  Possible dc tomorrow pending urine cultures.  No needs as per MD. CM to follow up tomorrow. CM remains available throughout hospital stay.

## 2025-05-23 NOTE — DISCHARGE NOTE PROVIDER - HOSPITAL COURSE
ADMISSION DATE:  05-22-25    ---  FROM ADMISSION H+P:   HPI:  79F PMH HTN, HLD, AF on eliquis, hypothyroidism transferred from  ED for infected L ureteral stone. CT showed 6mm stone at [facility] w/ mild hydro. Patient initially had presented to [place] [facility] ED with hematuria since 5/19. Patient also had L. ear pain along with sinus congestion. Patient currently has no abdominal pain, back pain, or dysuria. Does admit to mild headache and L.ear pain. No sick contacts. Of note, patient was supposed to go for ablation w/ Dr. Arreola at [facility]. Patient's last dose of eliquis was this morning. Patient took all her scheduled blood pressure medications. States her BP at home is usually in the 110s but feels stressed currently. Repeat BP at bedside 161/89.     IN THE ED:  Temp  97.7 F , HR 67  ,  /72  ,RR 18 , SpO2 97% RA   S/P Rocephin VI x1, NS IV 1950cc Bolus IV x1, Tylenol 650mg PO x1, Lopressor 25mg PO x1   EKG: Sinus bradycardia, VR 55bpm, QTC 507ms   Labs significant for: WBC 8.9, BUN/Cr 20/1.09, lactate .8, UA mod le, wbc 45, >50 blood, few bacteria, blood cultures x2 specimen received   Imaging: CT renal stone hunt: Left urolithiasis with a 6 mm left [facility] stone causing mild left hydronephrosis. (22 May 2025 21:26)      ---  HOSPITAL COURSE/PERTINENT LABS/PROCEDURES PERFORMED/PENDING TESTS:   Pt was admitted for ureteral stone. CT Renal stone hunt revealed 6mm stone at L [facility] causing mild L hydronephrosis. Patient was afebrile, normal WBC, however positive UA indicating UTI. Urology was consulted for recommendations for possible stent vs expulsion therapy. Urology observed overnight with no intervention, preferring medical expulsion therapy with flomax and treating UTI. Patient observed overnight with no issues.     Patient is stable for discharge as per primary medical team and consultants.      Patient showed improvement throughout hospitalization. Patient was seen and examined on day of discharge. Patient was medically optimized for discharge with close outpatient follow up.    ---  PATIENT CONDITION:  - stable    --  VITALS:   T(C): 36.7 (05-23-25 @ 11:40), Max: 37.2 (05-22-25 @ 23:14)  HR: 52 (05-23-25 @ 11:40) (47 - 75)  BP: 159/66 (05-23-25 @ 11:40) (159/66 - 209/72)  RR: 18 (05-23-25 @ 11:40) (15 - 18)  SpO2: 96% (05-23-25 @ 11:40) (95% - 99%)    PHYSICAL EXAM ON DAY OF DISCHARGE:  T(C): 36.7 (05-23-25 @ 11:40), Max: 37.2 (05-22-25 @ 23:14)  HR: 52 (05-23-25 @ 11:40) (47 - 75)  BP: 159/66 (05-23-25 @ 11:40) (159/66 - 209/72)  RR: 18 (05-23-25 @ 11:40) (15 - 18)  SpO2: 96% (05-23-25 @ 11:40) (95% - 99%)    CONSTITUTIONAL: Well groomed, no apparent distress  EYES: PERRLA and symmetric, EOMI, No conjunctival or scleral injection, non-icteric  ENMT: Oral mucosa with moist membranes.   NECK: Supple, symmetric and without tracheal deviation   RESP: No respiratory distress, no use of accessory muscles; CTA b/l, no WRR  CV: RRR, +S1S2, no MRG; no JVD; no peripheral edema  GI: Soft, NT, ND, no rebound, no guarding; no palpable masses; no hepatosplenomegaly; no hernia palpated  MSK: Normal ROM without pain, no spinal tenderness, normal muscle strength/tone  SKIN: No rashes or ulcers noted; no subcutaneous nodules or induration palpable  NEURO: CN II-XII intact; normal reflexes in upper and lower extremities, sensation intact in upper and lower extremities b/l to light touch   PSYCH: Appropriate insight/judgment; A+O x 3, mood and affect appropriate, recent/remote memory intact    ---  CONSULTANTS: [facility]  -    ---  ADVANCED CARE PLANNING:  - Code status:      - MOLST completed:      [  ] NO     [  ] YES    ---  TIME SPENT:  I, the attending physician, was physically present for the key portions of the evaluation and management (E/M) service provided. The total amount of time spent reviewing the hospital notes, laboratory values, imaging findings, assessing/counseling the patient, discussing with consultant physicians, social work, nursing staff was -- minutes       ADMISSION DATE:  05-22-25    ---  FROM ADMISSION H+P:   HPI:  79F PMH HTN, HLD, AF on eliquis, hypothyroidism transferred from  ED for infected L ureteral stone. CT showed 6mm stone at [facility] w/ mild hydro. Patient initially had presented to [facility] ED with hematuria since 5/19. Patient also had L. ear pain along with sinus congestion. Patient currently has no abdominal pain, back pain, or dysuria. Does admit to mild headache and L.ear pain. No sick contacts. Of note, patient was supposed to go for ablation w/ Dr. Arreola at [facility]. Patient's last dose of eliquis was this morning. Patient took all her scheduled blood pressure medications. States her BP at home is usually in the 110s but feels stressed currently. Repeat BP at bedside 161/89.     IN THE ED:  Temp  97.7 F , HR 67  ,  /72  ,RR 18 , SpO2 97% RA   S/P Rocephin VI x1, NS IV 1950cc Bolus IV x1, Tylenol 650mg PO x1, Lopressor 25mg PO x1   EKG: Sinus bradycardia, VR 55bpm, QTC 507ms   Labs significant for: WBC 8.9, BUN/Cr 20/1.09, lactate .8, UA mod le, wbc 45, >50 blood, few bacteria, blood cultures x2 specimen received   Imaging: CT renal stone hunt: Left urolithiasis with a 6 mm left UPJ stone causing mild left hydronephrosis. (22 May 2025 21:26)      ---  HOSPITAL COURSE/PERTINENT LABS/PROCEDURES PERFORMED/PENDING TESTS:   Pt was admitted for ureteral stone. CT Renal stone hunt revealed 6mm stone at L [facility] causing mild L hydronephrosis. Patient was afebrile, normal WBC, however positive UA indicating UTI. Urology was consulted for recommendations for possible stent vs expulsion therapy. Urology observed overnight with no intervention, preferring medical expulsion therapy with flomax and treating UTI. Patient observed overnight with no issues.   Per flowsheets, patient had been consistently taryn and had missed some of her HTN medications. Monitored on tele. Cardiology consulted , rec ______.     Patient is stable for discharge as per primary medical team and consultants.      Patient showed improvement throughout hospitalization. Patient was seen and examined on day of discharge. Patient was medically optimized for discharge with close outpatient follow up.    ---  PATIENT CONDITION:  - stable    --  VITALS:   T(C): 36.7 (05-23-25 @ 11:40), Max: 37.2 (05-22-25 @ 23:14)  HR: 52 (05-23-25 @ 11:40) (47 - 75)  BP: 159/66 (05-23-25 @ 11:40) (159/66 - 209/72)  RR: 18 (05-23-25 @ 11:40) (15 - 18)  SpO2: 96% (05-23-25 @ 11:40) (95% - 99%)    PHYSICAL EXAM ON DAY OF DISCHARGE:  T(C): 36.7 (05-23-25 @ 11:40), Max: 37.2 (05-22-25 @ 23:14)  HR: 52 (05-23-25 @ 11:40) (47 - 75)  BP: 159/66 (05-23-25 @ 11:40) (159/66 - 209/72)  RR: 18 (05-23-25 @ 11:40) (15 - 18)  SpO2: 96% (05-23-25 @ 11:40) (95% - 99%)    CONSTITUTIONAL: Well groomed, no apparent distress  EYES: PERRLA and symmetric, EOMI, No conjunctival or scleral injection, non-icteric  ENMT: Oral mucosa with moist membranes.   NECK: Supple, symmetric and without tracheal deviation   RESP: No respiratory distress, no use of accessory muscles; CTA b/l, no WRR  CV: RRR, +S1S2, no MRG; no JVD; no peripheral edema  GI: Soft, NT, ND, no rebound, no guarding; no palpable masses; no hepatosplenomegaly; no hernia palpated  MSK: Normal ROM without pain, no spinal tenderness, normal muscle strength/tone  SKIN: No rashes or ulcers noted; no subcutaneous nodules or induration palpable  NEURO: CN II-XII intact; normal reflexes in upper and lower extremities, sensation intact in upper and lower extremities b/l to light touch   PSYCH: Appropriate insight/judgment; A+O x 3, mood and affect appropriate, recent/remote memory intact    ---  CONSULTANTS: [facility]  -    ---  ADVANCED CARE PLANNING:  - Code status:      - MOLST completed:      [  ] NO     [  ] YES    ---  TIME SPENT:  I, the attending physician, was physically present for the key portions of the evaluation and management (E/M) service provided. The total amount of time spent reviewing the hospital notes, laboratory values, imaging findings, assessing/counseling the patient, discussing with consultant physicians, social work, nursing staff was -- minutes       ADMISSION DATE:  05-22-25    ---  FROM ADMISSION H+P:   HPI:  79F PMH HTN, HLD, AF on eliquis, hypothyroidism transferred from  ED for infected L ureteral stone. CT showed 6mm stone at [facility] w/ mild hydro. Patient initially had presented to [place] ED with hematuria since 5/19. Patient also had L. ear pain along with sinus congestion. Patient currently has no abdominal pain, back pain, or dysuria. Does admit to mild headache and L.ear pain. No sick contacts. Of note, patient was supposed to go for ablation w/ Dr. Arreola at [facility]. Patient's last dose of eliquis was this morning. Patient took all her scheduled blood pressure medications. States her BP at home is usually in the 110s but feels stressed currently. Repeat BP at bedside 161/89.     IN THE ED:  Temp  97.7 F , HR 67  ,  /72  ,RR 18 , SpO2 97% RA   S/P Rocephin VI x1, NS IV 1950cc Bolus IV x1, Tylenol 650mg PO x1, Lopressor 25mg PO x1   EKG: Sinus bradycardia, VR 55bpm, QTC 507ms   Labs significant for: WBC 8.9, BUN/Cr 20/1.09, lactate .8, UA mod le, wbc 45, >50 blood, few bacteria, blood cultures x2 specimen received   Imaging: CT renal stone hunt: Left urolithiasis with a 6 mm left [facility] stone causing mild left hydronephrosis. (22 May 2025 21:26)      ---  HOSPITAL COURSE/PERTINENT LABS/PROCEDURES PERFORMED/PENDING TESTS:   Pt was admitted for ureteral stone. CT Renal stone hunt revealed 6mm stone at L [facility] causing mild L hydronephrosis. Patient was afebrile, normal WBC, however positive UA indicating UTI. Urology was consulted for recommendations for possible stent vs expulsion therapy. Urology observed overnight with no intervention, preferring medical expulsion therapy with flomax and treating UTI. Patient observed overnight with no issues.   Per flowsheets, patient had been consistently taryn and had missed some of her HTN medications. Monitored on tele. Cardiology consulted , bradycardia is not new and is dated back to at least 2023. Metoprolol held inpatient. Amio continued.         Patient is stable for discharge as per primary medical team and consultants.      Patient showed improvement throughout hospitalization. Patient was seen and examined on day of discharge. Patient was medically optimized for discharge with close outpatient follow up.    ---  PATIENT CONDITION:  - stable    --  VITALS:   T(C): 36.7 (05-23-25 @ 11:40), Max: 37.2 (05-22-25 @ 23:14)  HR: 52 (05-23-25 @ 11:40) (47 - 75)  BP: 159/66 (05-23-25 @ 11:40) (159/66 - 209/72)  RR: 18 (05-23-25 @ 11:40) (15 - 18)  SpO2: 96% (05-23-25 @ 11:40) (95% - 99%)    PHYSICAL EXAM ON DAY OF DISCHARGE:  T(C): 36.7 (05-23-25 @ 11:40), Max: 37.2 (05-22-25 @ 23:14)  HR: 52 (05-23-25 @ 11:40) (47 - 75)  BP: 159/66 (05-23-25 @ 11:40) (159/66 - 209/72)  RR: 18 (05-23-25 @ 11:40) (15 - 18)  SpO2: 96% (05-23-25 @ 11:40) (95% - 99%)    CONSTITUTIONAL: Well groomed, no apparent distress  EYES: PERRLA and symmetric, EOMI, No conjunctival or scleral injection, non-icteric  ENMT: Oral mucosa with moist membranes.   NECK: Supple, symmetric and without tracheal deviation   RESP: No respiratory distress, no use of accessory muscles; CTA b/l, no WRR  CV: RRR, +S1S2, no MRG; no JVD; no peripheral edema  GI: Soft, NT, ND, no rebound, no guarding; no palpable masses; no hepatosplenomegaly; no hernia palpated  MSK: Normal ROM without pain, no spinal tenderness, normal muscle strength/tone  SKIN: No rashes or ulcers noted; no subcutaneous nodules or induration palpable  NEURO: CN II-XII intact; normal reflexes in upper and lower extremities, sensation intact in upper and lower extremities b/l to light touch   PSYCH: Appropriate insight/judgment; A+O x 3, mood and affect appropriate, recent/remote memory intact    ---  CONSULTANTS: [facility]  -    ---  ADVANCED CARE PLANNING:  - Code status:      - MOLST completed:      [  ] NO     [  ] YES    ---  TIME SPENT:  I, the attending physician, was physically present for the key portions of the evaluation and management (E/M) service provided. The total amount of time spent reviewing the hospital notes, laboratory values, imaging findings, assessing/counseling the patient, discussing with consultant physicians, social work, nursing staff was -- minutes       ADMISSION DATE:  05-22-25    ---  FROM ADMISSION H+P:   HPI:  79F PMH HTN, HLD, AF on eliquis, hypothyroidism transferred from  ED for infected L ureteral stone. CT showed 6mm stone at [facility] w/ mild hydro. Patient initially had presented to [facility] ED with hematuria since 5/19. Patient also had L. ear pain along with sinus congestion. Patient currently has no abdominal pain, back pain, or dysuria. Does admit to mild headache and L.ear pain. No sick contacts. Of note, patient was supposed to go for ablation w/ Dr. Arreola at [facility]. Patient's last dose of eliquis was this morning. Patient took all her scheduled blood pressure medications. States her BP at home is usually in the 110s but feels stressed currently. Repeat BP at bedside 161/89.     IN THE ED:  Temp  97.7 F , HR 67  ,  /72  ,RR 18 , SpO2 97% RA   S/P Rocephin VI x1, NS IV 1950cc Bolus IV x1, Tylenol 650mg PO x1, Lopressor 25mg PO x1   EKG: Sinus bradycardia, VR 55bpm, QTC 507ms   Labs significant for: WBC 8.9, BUN/Cr 20/1.09, lactate .8, UA mod le, wbc 45, >50 blood, few bacteria, blood cultures x2 specimen received   Imaging: CT renal stone hunt: Left urolithiasis with a 6 mm left UPJ stone causing mild left hydronephrosis. (22 May 2025 21:26)      ---  HOSPITAL COURSE/PERTINENT LABS/PROCEDURES PERFORMED/PENDING TESTS:   Pt was admitted for ureteral stone. CT Renal stone hunt revealed 6mm stone at L [facility] causing mild L hydronephrosis. Patient was afebrile, normal WBC, however positive UA indicating UTI. Urology was consulted for recommendations for possible stent vs expulsion therapy. Urology observed overnight with no intervention, preferring medical expulsion therapy with flomax and treating UTI. Patient observed overnight with no issues.   Per flowsheets, patient had been consistently taryn and had missed some of her HTN medications. Monitored on tele. Cardiology consulted , bradycardia is not new and is dated back to at least 2023. Metoprolol held inpatient. Amio continued.         Patient is stable for discharge as per primary medical team and consultants.      Patient showed improvement throughout hospitalization. Patient was seen and examined on day of discharge. Patient was medically optimized for discharge with close outpatient follow up.    ---  PATIENT CONDITION:  - stable    --  VITALS:   T(C): 36.5 (25 May 2025 05:14), Max: 36.9 (24 May 2025 20:05)  T(F): 97.7 (25 May 2025 05:14), Max: 98.5 (24 May 2025 20:05)  HR: 69 (25 May 2025 05:14) (57 - 69)  BP: 154/68 (25 May 2025 05:14) (127/58 - 169/65)  RR: 18 (25 May 2025 05:14) (18 - 18)  SpO2: 95% (25 May 2025 05:14) (91% - 95%)  O2 Parameters below as of 25 May 2025 05:14  Patient On (Oxygen Delivery Method): room air    PHYSICAL EXAM ON DAY OF DISCHARGE:  T(C): 36.5 (25 May 2025 05:14), Max: 36.9 (24 May 2025 20:05)  T(F): 97.7 (25 May 2025 05:14), Max: 98.5 (24 May 2025 20:05)  HR: 69 (25 May 2025 05:14) (57 - 69)  BP: 154/68 (25 May 2025 05:14) (127/58 - 169/65)  RR: 18 (25 May 2025 05:14) (18 - 18)  SpO2: 95% (25 May 2025 05:14) (91% - 95%)    O2 Parameters below as of 25 May 2025 05:14  Patient On (Oxygen Delivery Method): room air    CONSTITUTIONAL: Well groomed, no apparent distress  EYES: PERRLA and symmetric, EOMI, No conjunctival or scleral injection, non-icteric  ENMT: Oral mucosa with moist membranes.   NECK: Supple, symmetric and without tracheal deviation   RESP: No respiratory distress, no use of accessory muscles; CTA b/l, no WRR  CV: RRR, +S1S2, no MRG; no JVD; no peripheral edema  GI: Soft, NT, ND, no rebound, no guarding; no palpable masses; no hepatosplenomegaly; no hernia palpated  MSK: Normal ROM without pain, no spinal tenderness, normal muscle strength/tone  SKIN: No rashes or ulcers noted; no subcutaneous nodules or induration palpable  NEURO: CN II-XII intact; normal reflexes in upper and lower extremities, sensation intact in upper and lower extremities b/l to light touch   PSYCH: Appropriate insight/judgment; A+O x 3, mood and affect appropriate, recent/remote memory intact    ---  CONSULTANTS: [facility]  -    ---  ADVANCED CARE PLANNING:  - Code status:      - MOLST completed:      [  ] NO     [  ] YES    ---  TIME SPENT:  I, the attending physician, was physically present for the key portions of the evaluation and management (E/M) service provided. The total amount of time spent reviewing the hospital notes, laboratory values, imaging findings, assessing/counseling the patient, discussing with consultant physicians, social work, nursing staff was -- minutes       ADMISSION DATE:  05-22-25    ---  FROM ADMISSION H+P:   HPI:  79F PMH HTN, HLD, AF on eliquis, hypothyroidism transferred from  ED for infected L ureteral stone. CT showed 6mm stone at [facility] w/ mild hydro. Patient initially had presented to [place] ED with hematuria since 5/19. Patient also had L. ear pain along with sinus congestion. Patient currently has no abdominal pain, back pain, or dysuria. Does admit to mild headache and L.ear pain. No sick contacts. Of note, patient was supposed to go for ablation w/ Dr. Arreola at [facility]. Patient's last dose of eliquis was this morning. Patient took all her scheduled blood pressure medications. States her BP at home is usually in the 110s but feels stressed currently. Repeat BP at bedside 161/89.     IN THE ED:  Temp  97.7 F , HR 67  ,  /72  ,RR 18 , SpO2 97% RA   S/P Rocephin VI x1, NS IV 1950cc Bolus IV x1, Tylenol 650mg PO x1, Lopressor 25mg PO x1   EKG: Sinus bradycardia, VR 55bpm, QTC 507ms   Labs significant for: WBC 8.9, BUN/Cr 20/1.09, lactate .8, UA mod le, wbc 45, >50 blood, few bacteria, blood cultures x2 specimen received   Imaging: CT renal stone hunt: Left urolithiasis with a 6 mm left UPJ stone causing mild left hydronephrosis. (22 May 2025 21:26)      ---  HOSPITAL COURSE/PERTINENT LABS/PROCEDURES PERFORMED/PENDING TESTS:   Pt was admitted for ureteral stone. CT Renal stone hunt revealed 6mm stone at L [facility] causing mild L hydronephrosis. Patient was afebrile, normal WBC, however positive UA indicating UTI. Urology was consulted for recommendations for possible stent vs expulsion therapy. Urology observed overnight with no surg   intervention, preferring medical expulsion therapy with flomax and treating UTI    cult remain neg to date   . Patient observed overnight with no issues.   Per flowsheets, patient had been consistently taryn and had missed some of her HTN medications. Monitored on tele. Cardiology consulted  for asymptomatic   sinus bradycardia is not new [ noticed as per record cardio  dr paige wade  office   run 50 to 60     and is dated back to at least 2023 ] .   so cardio dr osw recommonded Metoprolol held inpatient and hold at discharge until see dr wade thursday    pt also book for cardiac ablation   out pt  ,  Amio continued    at discharge . pt found to be covid 19 pcr postive   xary chest - no pna only rt side small  effusion  , remain symptomatic  .        Patient is stable for discharge as per primary medical team and consultants.      Patient showed improvement throughout hospitalization. Patient was seen and examined on day of discharge. Patient was medically optimized for discharge with close outpatient follow up.    ---  PATIENT CONDITION:  - stable  medically stable 65/25/25   --  VITALS:   T(C): 36.5 (25 May 2025 05:14), Max: 36.9 (24 May 2025 20:05)  T(F): 97.7 (25 May 2025 05:14), Max: 98.5 (24 May 2025 20:05)  HR: 69 (25 May 2025 05:14) (57 - 69)  BP: 154/68 (25 May 2025 05:14) (127/58 - 169/65)  RR: 18 (25 May 2025 05:14) (18 - 18)  SpO2: 95% (25 May 2025 05:14) (91% - 95%)  O2 Parameters below as of 25 May 2025 05:14  Patient On (Oxygen Delivery Method): room air    PHYSICAL EXAM ON DAY OF DISCHARGE:  T(C): 36.5 (25 May 2025 05:14), Max: 36.9 (24 May 2025 20:05)  T(F): 97.7 (25 May 2025 05:14), Max: 98.5 (24 May 2025 20:05)  HR: 69 (25 May 2025 05:14) (57 - 69)  BP: 154/68 (25 May 2025 05:14) (127/58 - 169/65)  RR: 18 (25 May 2025 05:14) (18 - 18)  SpO2: 95% (25 May 2025 05:14) (91% - 95%)    O2 Parameters below as of 25 May 2025 05:14  Patient On (Oxygen Delivery Method): room air    CONSTITUTIONAL: no apparent distress  EYES: PERRLA and symmetric, EOMI,  ENMT: Oral mucosa with moist membranes.   NECK: Supple, symmetric   RESP: No respiratory distress, ; CTA b/l, no WRR  CV: RRR, +S1S2,   GI: Soft, NT, ND,   MSK: Normal ROM without pain, no spinal tenderness,   SKIN: No rashes or ulcers   NEURO: CN II-XII intact; normal reflexes in upper and lower extremities, sensation intact in upper and lower extremities b/l to light touch     ---  CONSULTANTS: [facility]rology [facility]  -    ---  TIME SPENT:  I, the attending physician, was physically present for the key portions of the evaluation and management (E/M) service provided. The total amount of time spent reviewing the hospital notes, laboratory values, imaging findings, assessing/counseling the patient, discussing with consultant physicians, social work, nursing staff was -45- minutes

## 2025-05-23 NOTE — DISCHARGE NOTE PROVIDER - CARE PROVIDERS DIRECT ADDRESSES
[email or web address]

## 2025-05-23 NOTE — PROGRESS NOTE ADULT - SUBJECTIVE AND OBJECTIVE BOX
UROLOGY DAILY PROGRESS NOTE:     Subjective: Patient seen and examined at bedside.   Reports hematuria "went away."  Denies dysuria, fever, chills, N/V.  Reports L low back pain X "months"      Objective:  Vital signs  T(F): , Max: 98.9 (05-22-25 @ 23:14)  HR: 47 (05-23-25 @ 04:39)  BP: 169/64 (05-23-25 @ 04:39)  SpO2: 97% (05-23-25 @ 04:39)  Wt(kg): --    I&O's Summary    22 May 2025 07:01  -  23 May 2025 07:00  --------------------------------------------------------  IN: 380 mL / OUT: 900 mL / NET: -520 mL      Exam:  Gen: NAD  Pulm: No respiratory distress, no subcostal retractions  CV: RRR, no JVD  Abd: Soft, no suprapubic tenderness, no bladder fullness, +L CVAT      Labs:  05-22  8.90  / 39.3  /1.09                           12.7   8.90  )-----------( 247      ( 22 May 2025 18:52 )             39.3     05-22    142  |  104  |  20  ----------------------------<  91  3.8   |  27  |  1.09    Ca    9.3      22 May 2025 18:52    TPro  7.1  /  Alb  3.7  /  TBili  0.8  /  DBili  x   /  AST  30  /  ALT  20  /  AlkPhos  83  05-22    PT/INR - ( 22 May 2025 18:52 )   PT: 16.2 sec;   INR: 1.40 ratio         PTT - ( 22 May 2025 18:52 )  PTT:33.2 sec    Urine Cx: Pending    Urine Microscopic-Add On (NC) (05.22.25 @ 18:06)    White Blood Cell - Urine: 45 /HPF   Red Blood Cell - Urine: >50 /HPF   Bacteria: Few /HPF   Squamous Epithelial Cells: Present   Calcium Oxalate Crystals: Present   Comment - Urine: Few Amorphous crystals.  Occasional mucus strands    Radiology:    < from: CT Renal Stone Hunt (05.22.25 @ 18:58) >  ACC: [id number] EXAM:  CT RENAL STONE BELTRAN   ORDERED BY: VELVET BALDERAS     PROCEDURE DATE:  05/22/2025      INTERPRETATION:  CLINICAL INFORMATION: Hematuria    COMPARISON: CT abdomen/pelvis 3/6/2024    CONTRAST/COMPLICATIONS:  IV Contrast: None  Oral Contrast: None      PROCEDURE:  CT of the Abdomen and Pelvis was performed.  Sagittal and coronal reformats were performed.    FINDINGS:  LOWER CHEST: Bibasilar atelectasis.    LIVER: Subcentimeter hypodensities too small to characterize.  BILE DUCTS: Normal caliber.  GALLBLADDER: Cholelithiasis.  SPLEEN: Within normal limits.  PANCREAS: Within normal limits.  ADRENALS: Within normal limits.  KIDNEYS/URETERS: 6 mm stone in the left UPJ with mild left   hydronephrosis. Additional nonobstructive left renal stones measuring up   to 4 mm. No right urolithiasis or right hydronephrosis.    BLADDER: Minimally distended.  REPRODUCTIVE ORGANS: Air within the endometrial canal, which is   nonspecific. No suspicious adnexal mass.    BOWEL: No bowel obstruction. Multiple duodenal diverticula. Sigmoid   diverticulosis without evidence of acute diverticulitis. Normal appendix.  PERITONEUM/RETROPERITONEUM: Within normal limits.  VESSELS: Atherosclerotic changes.  LYMPH NODES: No lymphadenopathy.  ABDOMINAL WALL: Small midline ventral hernia in the upper abdomen   containing fluid and fat.  BONES: Degenerative changes. Right hip prosthesis.    IMPRESSION:  Left urolithiasis with a 6 mm left UPJ stone causing mild left   hydronephrosis.    --- End of Report ---     SHAYY BAEZA MD; Attending Radiologist  This document has been electronically signed. May 22 2025  7:10PM    < end of copied text >

## 2025-05-23 NOTE — PROGRESS NOTE ADULT - ASSESSMENT
A/P: 80 y/o F pmh Afib (Eliquis, A81), hypothyroidism, DVT, left nephrolithiasis (15 yrs ago) a/w left urolithiasis w 6mm left UPJ stone causing mild left hydronephrosi/UTI reports resolved hematuria and has been afebrile      Continue Rocephin  Please collect AM labs pending collection   NPO  IVF  FU Pending UCx  FU Pending AM labs      ***FINAL PLAN PENDING DISCUSSION WITH ATTENDING CONTINGENT ON AM LAB RESULTS TODAY    ANAHI Hardin  Division of Urology  Available on Teams   A/P: 80 y/o F pmh Afib (Eliquis, A81), hypothyroidism, DVT, left nephrolithiasis (15 yrs ago) a/w left urolithiasis w 6mm left UPJ stone causing mild left hydronephrosi/UTI reports resolved hematuria and has been afebrile      Continue abx as per ID  Please collect AM labs pending collection   NPO  IVF  FU Pending UCx  FU Pending AM labs    ***FINAL PLAN PENDING DISCUSSION WITH ATTENDING      ANAHI Hardin  Division of Urology  Available on Teams    Addendum:  Complete Blood Count + Automated Diff (05.23.25 @ 09:25)    Auto NRBC: 0 /100 WBCs   WBC Count: 5.22 K/uL   RBC Count: 3.91 M/uL   Hemoglobin: 11.8 g/dL   Hematocrit: 35.8 %   Mean Cell Volume: 91.6 fl   Mean Cell Hemoglobin: 30.2 pg   Mean Cell Hemoglobin Conc: 33.0 g/dL   Red Cell Distrib Width: 13.9 %   Platelet Count - Automated: 236 K/uL   Neutrophil #: 4.05 K/uL   Lymphocyte #: 0.65 K/uL   Monocyte #: 0.36 K/uL   Eosinophil #: 0.12 K/uL   Basophil #: 0.02 K/uL   Neutrophil %: 77.5: Differential percentages must be correlated with absolute numbers for  clinical significance. %   Lymphocyte %: 12.5 %   Monocyte %: 6.9 %   Eosinophil %: 2.3 %   Basophil %: 0.4 %   Auto Immature Granulocyte %: 0.4: (Includes meta, myelo and promyelocytes). Mild elevations in immature  granulocytes may be seen with many inflammatory processes and pregnancy;  clinical correlation suggested. %      BUN/Cr: 14/0.85      A/P: 80 y/o F a/w L ureter stone with resolved hematuria, afebrile with improved WBC and renal function    No urological intervention at this time  Continue Flomax   Continue antibiotics   May followup with urology outpatient  Above d/w ANAHI Yarbrough  Division of Urology  Available on Teams

## 2025-05-23 NOTE — DISCHARGE NOTE PROVIDER - NSDCFUSCHEDAPPT_GEN_ALL_CORE_FT
Justino Butler  [facility]  CARDIOLOGY [address]  Scheduled Appointment: 05/29/2025    Justin Arreola  [facility]  ELECTROPH [id number]  Scheduled Appointment: 07/10/2025

## 2025-05-23 NOTE — PATIENT PROFILE ADULT - FALL HARM RISK - HARM RISK INTERVENTIONS

## 2025-05-23 NOTE — DISCHARGE NOTE PROVIDER - CARE PROVIDER_API CALL
Melany Goodman  Internal Medicine  [address] [place] [id number]  Phone: [phone]  Fax: [phone]  Established Patient  Follow Up Time: 2 weeks    Bennie Ojeda  Urology  [address] [place] [id number]  Phone: [phone]  Fax: [phone]  Follow Up Time:

## 2025-05-23 NOTE — PROGRESS NOTE ADULT - PROBLEM SELECTOR PLAN 2
Patient found to have acute UTI on admission   - Afebrile, no leukocytosis, lactate wnl   - S/P Rocephin VI x1, NS IV 1950cc Bolus IV x1, Tylenol 650mg PO x1 in ED   - UA mod le, wbc 45, >50 blood, few bacteria, blood cultures x2 specimen received   - Continue IV rocephin w/ florastor  - F/u blood and urine cultures   - ID (Dr. Greenwood) consulted, will appreciate recs Patient found to have acute UTI on admission   - Afebrile, no leukocytosis, lactate wnl   - S/P Rocephin VI x1, NS IV 1950cc Bolus IV x1, Tylenol 650mg PO x1 in ED   - UA mod le, wbc 45, >50 blood, few bacteria, blood cultures x2 specimen received   - Continue IV Rocephin w/ florastor  - F/u blood and urine cultures   - ID (Dr. Greenwood) consulted,

## 2025-05-23 NOTE — PROGRESS NOTE ADULT - PROBLEM SELECTOR PLAN 9
SCD's for now pending possible procedure w/ urology ( last dose of eliquis 5/22 AM) SCD's for now pending possible procedure w/ urology ( last dose of Eliquis 5/22 AM)  hypokalemia - replaced k wnl.

## 2025-05-23 NOTE — PATIENT PROFILE ADULT - OVER THE PAST TWO WEEKS HAVE YOU FELT DOWN, DEPRESSED OR HOPELESS?
Which medication is being requested?: LORazepam (ATIVAN) 1 MG tablet    Which provider ordered the medication?: Dr. Ruano    Call Center Account # for ordering provider:     Has patient contacted the pharmacy?  No    Is the patient completely out of Medication?  No    If patient is out of medication, verify if they are currently experiencing symptoms.  If patient is symptomatic, proceed with front end triage for the patient instead of medication refill.      (CALL HANDLING:  Advise Caller that this call does not guarantee an immediate refill and they should allow up to 72 hours for processing.  Refill request will be reviewed by a qualified clinician within 72 hours.  Please call back with any questions, concerns, or new symptoms.    Patient’s preferred pharmacy has been noted and populated.          no

## 2025-05-23 NOTE — PROGRESS NOTE ADULT - TIME BILLING
pt seen and examine today see above plan -Patient presents w/ hematuria since 5/19 to [place] ED; transferred for mild  Lt  ureteral stone -obstructive uropathy- iv fluid d5 ns 50 cc/hr , iv abx Rocephin 1 gm daily     uro no surgical intervention     . pt seen and examine today see above plan -Patient presents w/ hematuria since 5/19 to [place] ED; transferred for mild  Lt  ureteral stone -obstructive uropathy- iv fluid d5 ns 50 cc/hr , iv abx Rocephin 1 gm daily     uro no surgical intervention ok dc plan on po abx  and flomax     . pt seen and examine today see above plan -Patient [facility] w/ hematuria [facility] 5/[facility] to [facility] ED; transferred for mild  Lt  ureteral stone -obstructive uropathy- iv fluid d5 ns 50 cc/hr , iv abx Rocephin 1 gm daily     uro no surgical intervention ok dc plan on po abx  and Flomax   but will wait  for  uculture   before dc  as per id dr larsen   .

## 2025-05-23 NOTE — DISCHARGE NOTE PROVIDER - NSDCCPCAREPLAN_GEN_ALL_CORE_FT
PRINCIPAL DISCHARGE DIAGNOSIS  Diagnosis: Kidney stone  Assessment and Plan of Treatment: You were found to have a L sided ureteral stone measuring 6mm on CT scan of your kidneys. You were afebrile, no lab markers indicating infection and sepsis was ruled out. A urinalysis was performed and revealed UTI and antibiotics were started.   Urology was consulted for possible intervention, however it was recommended to have expulsion therapy of the stone with medication which is called flomax  Please START TAKING Flomax .4mg at bedtime  Please START TAKING Vantin      SECONDARY DISCHARGE DIAGNOSES  Diagnosis: Acute UTI  Assessment and Plan of Treatment:      PRINCIPAL DISCHARGE DIAGNOSIS  Diagnosis: Kidney stone  Assessment and Plan of Treatment: You were found to have a L sided ureteral stone measuring 6mm on CT scan of your kidneys. You were afebrile, no lab markers indicating infection and sepsis was ruled out. A urinalysis was performed and revealed UTI and antibiotics were started.   Urology was consulted for possible intervention, however it was recommended to have expulsion therapy of the stone with medication which is called flomax  Please START TAKING Flomax .4mg at bedtime  Please START TAKING Vantin      SECONDARY DISCHARGE DIAGNOSES  Diagnosis: Acute UTI  Assessment and Plan of Treatment: You were found to have an acute urinary infection on admission.   You were started on IV antibiotics and seen by infectious disease.   Please continue ___  Drink plenty of fluids, especially water, to help flush out bacteria. Finish all prescribed antibiotics, even if you start to feel better. If your symptoms worsen or don't improve within a few days, contact your doctor.    Diagnosis: 2019 novel coronavirus disease (COVID-19)  Assessment and Plan of Treatment: Your respiratory viral panel was positive for COVID.   Your respiratory status was monitored in the hospital and remained stable.   Rest at home and isolate yourself from others until your symptoms improve and you're fever-free for 24 hours without medication.  Drink plenty of fluids, take over-the-counter medications like acetaminophen or ibuprofen for fever and aches, and monitor your symptoms for any worsening.  Contact your doctor if your symptoms worsen or you develop difficulty breathing.       PRINCIPAL DISCHARGE DIAGNOSIS  Diagnosis: Kidney stone  Assessment and Plan of Treatment: You were found to have a L sided ureteral stone measuring 6mm on CT scan of your kidneys. You were afebrile, no lab markers indicating infection and sepsis was ruled out. A urinalysis was performed and revealed UTI and antibiotics were started.   Urology was consulted for possible intervention, however it was recommended to have expulsion therapy of the stone with medication which is called flomax  Please START TAKING Flomax .4mg at bedtime  Please START TAKING Vantin 200mg twice a day until 5/29/25      SECONDARY DISCHARGE DIAGNOSES  Diagnosis: Acute UTI  Assessment and Plan of Treatment: You were found to have an acute urinary infection on admission.   You were started on IV antibiotics and seen by infectious disease.   Please continue Vantin 200mg 2x a day until 5/29/25  Drink plenty of fluids, especially water, to help flush out bacteria. Finish all prescribed antibiotics, even if you start to feel better. If your symptoms worsen or don't improve within a few days, contact your doctor.    Diagnosis: Chronic atrial fibrillation  Assessment and Plan of Treatment: You were found to have afib and bradycardia which is known to you and your cardiologist. Your beta blocker home medication was held due to persistently low Heart rates.   Please FOLLOW UP with Dr Butler cardiology as scheduled for ablation next week    Diagnosis: 2019 novel coronavirus disease (COVID-19)  Assessment and Plan of Treatment: Your respiratory viral panel was positive for COVID.   Your respiratory status was monitored in the hospital and remained stable.   Rest at home and isolate yourself from others until your symptoms improve and you're fever-free for 24 hours without medication.  Drink plenty of fluids, take over-the-counter medications like acetaminophen or ibuprofen for fever and aches, and monitor your symptoms for any worsening.  Contact your doctor if your symptoms worsen or you develop difficulty breathing.       PRINCIPAL DISCHARGE DIAGNOSIS  Diagnosis: Kidney stone  Assessment and Plan of Treatment: You were found to have a L sided ureteral stone measuring 6mm on CT scan of your kidneys. You were afebrile, no lab markers indicating infection and sepsis was ruled out. A urinalysis was performed and revealed UTI and antibiotics were started.   Urology was consulted for possible intervention, however it was recommended to have expulsion therapy of the stone with medication which is called flomax  Please START TAKING Flomax .4mg at bedtime  Please START TAKING Vantin 200mg twice a day until 5/29/25      SECONDARY DISCHARGE DIAGNOSES  Diagnosis: Acute UTI  Assessment and Plan of Treatment: You were found to have an acute urinary infection on admission.   You were started on IV antibiotics and seen by infectious disease.   Please continue Vantin 200mg 2x a day until 5/29/25  Drink plenty of fluids, especially water, to help flush out bacteria. Finish all prescribed antibiotics, even if you start to feel better. If your symptoms worsen or don't improve within a few days, contact your doctor.    Diagnosis: Chronic atrial fibrillation  Assessment and Plan of Treatment: You were found to have afib and bradycardia which is known to you and your cardiologist. Your beta blocker home medication was held due to persistently low Heart rates.   PLEASE HOLD your BETA BLOCKER until follow up with cardiology  Please FOLLOW UP with Dr Butler cardiology as scheduled for ablation next week    Diagnosis: 2019 novel coronavirus disease (COVID-19)  Assessment and Plan of Treatment: Your respiratory viral panel was positive for COVID.   Your respiratory status was monitored in the hospital and remained stable.   Rest at home and isolate yourself from others until your symptoms improve and you're fever-free for 24 hours without medication.  Drink plenty of fluids, take over-the-counter medications like acetaminophen or ibuprofen for fever and aches, and monitor your symptoms for any worsening.  Contact your doctor if your symptoms worsen or you develop difficulty breathing.       PRINCIPAL DISCHARGE DIAGNOSIS  Diagnosis: Kidney stone  Assessment and Plan of Treatment: You were found to have a L sided ureteral stone measuring 6mm on CT scan of your kidneys. You were afebrile, no lab markers indicating infection and sepsis was ruled out. A urinalysis was performed and revealed UTI and antibiotics were started.   Urology was consulted for possible intervention, however it was recommended to have expulsion therapy of the stone with medication which is called flomax  Please START TAKING Flomax .4mg at bedtime  Please START TAKING Vantin 200mg twice a day until 5/29/25 , fu up dr moreira office urologist in 1 to 2wk .      SECONDARY DISCHARGE DIAGNOSES  Diagnosis: Acute UTI  Assessment and Plan of Treatment: You were found to have an acute urinary infection on admission.   You were started on IV antibiotics and seen by infectious disease.   Please continue Vantin 200mg 2x a day until 5/29/25  Drink plenty of fluids, especially water, to help flush out bacteria. Finish all prescribed antibiotics, even if you start to feel better. If your symptoms worsen or don't improve within a few days, contact your doctor.    Diagnosis: 2019 novel coronavirus disease (COVID-19)  Assessment and Plan of Treatment: Your respiratory viral panel was positive for COVID.   Your respiratory status was monitored in the hospital and remained stable.   Rest at home and isolate yourself from others until your symptoms improve and you're fever-free for 24 hours without medication.  Drink plenty of fluids, take over-the-counter medications like acetaminophen or ibuprofen for fever and aches, and monitor your symptoms for any worsening.  Contact your doctor if your symptoms worsen or you develop difficulty breathing.      Diagnosis: Chronic atrial fibrillation  Assessment and Plan of Treatment: You were found to have afib and bradycardia which is known to you and your cardiologist. Your beta blocker home medication was held due to persistently low Heart rates.   PLEASE HOLD your BETA BLOCKER until follow up with cardiology  Please FOLLOW UP with Dr Butler cardiology as scheduled for ablation next week

## 2025-05-23 NOTE — PROGRESS NOTE ADULT - PROBLEM SELECTOR PLAN 6
Chronic  - EKG: Sinus bradycardia, VR 55bpm, QTC 507ms  - On eliquis and aspirin at home; hold pending possible procedure and hematuria   - Continue home statin  - Continue home lopressor, losartan, amiodarone w/ hold parameters Chronic  - EKG: Sinus bradycardia, VR 55bpm, QTC 507ms  - On Eliquis and aspirin at home; hold pending possible procedure and hematuria   - Continue home statin  - Continue home lopressor, losartan, amiodarone w/ hold parameters

## 2025-05-23 NOTE — DISCHARGE NOTE PROVIDER - NSDCMRMEDTOKEN_GEN_ALL_CORE_FT
amiodarone 200 mg oral tablet: 1 tab(s) orally once a day  aspirin 81 mg oral delayed release tablet: 1 tab(s) orally once a day  atorvastatin 40 mg oral tablet: 1 tab(s) orally once a day (at bedtime)  Eliquis 5 mg oral tablet: 1 tab(s) orally every 12 hours  fluticasone 50 mcg/inh nasal spray: 1 spray(s) nasal once a day   Lasix 20 mg oral tablet: 1 tab(s) orally once a day  levothyroxine 50 mcg (0.05 mg) oral tablet: 1 tab(s) orally once a day  Lopressor 50 mg oral tablet: 0.5 tab(s) orally once a day (at bedtime)  losartan 25 mg oral tablet: 1 tab(s) orally once a day  tamsulosin 0.4 mg oral capsule: 1 cap(s) orally once a day (at bedtime)   amiodarone 200 mg oral tablet: 1 tab(s) orally once a day  aspirin 81 mg oral delayed release tablet: 1 tab(s) orally once a day  atorvastatin 40 mg oral tablet: 1 tab(s) orally once a day (at bedtime)  Eliquis 5 mg oral tablet: 1 tab(s) orally every 12 hours  fluticasone 50 mcg/inh nasal spray: 1 spray(s) nasal once a day   Lasix 20 mg oral tablet: 1 tab(s) orally once a day  levothyroxine 50 mcg (0.05 mg) oral tablet: 1 tab(s) orally once a day  Lopressor 50 mg oral tablet: 0.5 tab(s) orally once a day (at bedtime)  losartan 25 mg oral tablet: 1 tab(s) orally once a day  tamsulosin 0.4 mg oral capsule: 1 cap(s) orally once a day (at bedtime)  Vantin 200 mg oral tablet: 1 tab(s) orally 2 times a day   amiodarone 200 mg oral tablet: 1 tab(s) orally once a day  aspirin 81 mg oral delayed release tablet: 1 tab(s) orally once a day  atorvastatin 40 mg oral tablet: 1 tab(s) orally once a day (at bedtime)  cefpodoxime 200 mg oral tablet: 1 tab(s) orally 2 times a day LAST DAY 5/29/25  Eliquis 5 mg oral tablet: 1 tab(s) orally every 12 hours  fluticasone 50 mcg/inh nasal spray: 1 spray(s) nasal once a day   Lasix 20 mg oral tablet: 1 tab(s) orally once a day  levothyroxine 50 mcg (0.05 mg) oral tablet: 1 tab(s) orally once a day  Lopressor 50 mg oral tablet: 0.5 tab(s) orally once a day (at bedtime)  losartan 25 mg oral tablet: 1 tab(s) orally once a day  tamsulosin 0.4 mg oral capsule: 1 cap(s) orally once a day (at bedtime)  Vantin 200 mg oral tablet: 1 tab(s) orally 2 times a day

## 2025-05-23 NOTE — PROVIDER CONTACT NOTE (OTHER) - ACTION/TREATMENT ORDERED:
Dr. Mcbride notified. MD stated to hold Amiodarone for low HR. No other interventions ordered at this time.
Dr. Mcbride notified. MD stated to start abx now.

## 2025-05-23 NOTE — PROGRESS NOTE ADULT - SUBJECTIVE AND OBJECTIVE BOX
Patient is a 79y old  Female who presents with a chief complaint of Ureteral stone (22 May 2025 23:00)      INTERVAL HPI/OVERNIGHT EVENTS: No acute overnight events. Pt seen and examined at the bedside this AM. Reports she currently has headache and cramping like pain in lower abdomen/bladder area. Pt also reports L ear and sinus congestion. Patient denies any other concerns or complaints at this time, no fevers, headache, chest pain, shortness of breath, nausea/vomiting/diarrhea.      MEDICATIONS  (STANDING):  aMIOdarone    Tablet 200 milliGRAM(s) Oral daily  atorvastatin 40 milliGRAM(s) Oral at bedtime  cefTRIAXone   IVPB 1000 milliGRAM(s) IV Intermittent every 24 hours  dextrose 5% + sodium chloride 0.9%. 1000 milliLiter(s) (50 mL/Hr) IV Continuous <Continuous>  furosemide    Tablet 20 milliGRAM(s) Oral daily  levothyroxine 50 MICROGram(s) Oral daily  losartan 25 milliGRAM(s) Oral daily  metoprolol tartrate 25 milliGRAM(s) Oral at bedtime  saccharomyces boulardii 250 milliGRAM(s) Oral two times a day  tamsulosin 0.4 milliGRAM(s) Oral at bedtime    MEDICATIONS  (PRN):  acetaminophen     Tablet .. 650 milliGRAM(s) Oral every 6 hours PRN Temp greater or equal to 38C (100.4F), Mild Pain (1 - 3)  fluticasone propionate 50 MICROgram(s)/spray Nasal Spray 1 Spray(s) Both Nostrils two times a day PRN nasal congestion  melatonin 3 milliGRAM(s) Oral at bedtime PRN Insomnia      Allergies    Macrodantin (Unknown)  sulfa drugs (Rash)  sulfonylureas (Unknown)    Intolerances        REVIEW OF SYSTEMS:  CONSTITUTIONAL: No fever or chills  HEENT:  +L ear pain, + sinus congestion  RESPIRATORY: No cough, wheezing, or shortness of breath  CARDIOVASCULAR: No chest pain, palpitations  GASTROINTESTINAL: No abd pain, nausea, vomiting, or diarrhea  GENITOURINARY: +hematuria  NEUROLOGICAL: no focal weakness or dizziness  MUSCULOSKELETAL: no myalgias     Vital Signs Last 24 Hrs  T(C): 36.7 (23 May 2025 04:39), Max: 37.2 (22 May 2025 23:14)  T(F): 98.1 (23 May 2025 04:39), Max: 98.9 (22 May 2025 23:14)  HR: 47 (23 May 2025 04:39) (47 - 75)  BP: 169/64 (23 May 2025 04:39) (161/80 - 209/72)  RR: 18 (23 May 2025 04:39) (15 - 18)  SpO2: 97% (23 May 2025 04:39) (95% - 99%)    Parameters below as of 23 May 2025 04:39  Patient On (Oxygen Delivery Method): room air        PHYSICAL EXAM:  GENERAL: NAD  HEENT:  anicteric, moist mucous membranes,  + frontal sinus TTP  CHEST/LUNG:  CTA b/l, no rales, wheezes, or rhonchi  HEART:  RRR, S1, S2  ABDOMEN:  BS+, soft, nontender, nondistended  EXTREMITIES: no edema, cyanosis, or calf tenderness  NERVOUS SYSTEM: answers questions and follows commands appropriately    LABS:                        12.7   8.90  )-----------( 247      ( 22 May 2025 18:52 )             39.3     CBC Full  -  ( 22 May 2025 18:52 )  WBC Count : 8.90 K/uL  Hemoglobin : 12.7 g/dL  Hematocrit : 39.3 %  Platelet Count - Automated : 247 K/uL  Mean Cell Volume : 93.8 fL  Mean Cell Hemoglobin : 30.3 pg  Mean Cell Hemoglobin Concentration : 32.3 g/dL  Auto Neutrophil # : x  Auto Lymphocyte # : x  Auto Monocyte # : x  Auto Eosinophil # : x  Auto Basophil # : x  Auto Neutrophil % : x  Auto Lymphocyte % : x  Auto Monocyte % : x  Auto Eosinophil % : x  Auto Basophil % : x    22 May 2025 18:52    142    |  104    |  20     ----------------------------<  91     3.8     |  27     |  1.09     Ca    9.3        22 May 2025 18:52    TPro  7.1    /  Alb  3.7    /  TBili  0.8    /  DBili  x      /  AST  30     /  ALT  20     /  AlkPhos  83     22 May 2025 18:52    PT/INR - ( 22 May 2025 18:52 )   PT: 16.2 sec;   INR: 1.40 ratio         PTT - ( 22 May 2025 18:52 )  PTT:33.2 sec  Urinalysis Basic - ( 22 May 2025 18:52 )    Color: x / Appearance: x / SG: x / pH: x  Gluc: 91 mg/dL / Ketone: x  / Bili: x / Urobili: x   Blood: x / Protein: x / Nitrite: x   Leuk Esterase: x / RBC: x / WBC x   Sq Epi: x / Non Sq Epi: x / Bacteria: x      CAPILLARY BLOOD GLUCOSE            Urinalysis with Rflx Culture (collected 05-22-25 @ 18:06)        RADIOLOGY & ADDITIONAL TESTS:  < from: CT Renal Stone Hunt (05.22.25 @ 18:58) >    ACC: [id number] EXAM:  CT RENAL STONE BELTRAN   ORDERED BY: VELVET BALDERAS     PROCEDURE DATE:  05/22/2025          INTERPRETATION:  CLINICAL INFORMATION: Hematuria    COMPARISON: CT abdomen/pelvis 3/6/2024    CONTRAST/COMPLICATIONS:  IV Contrast: None  Oral Contrast: None      PROCEDURE:  CT of the Abdomen and Pelvis was performed.  Sagittal and coronal reformats were performed.    FINDINGS:  LOWER CHEST: Bibasilar atelectasis.    LIVER: Subcentimeter hypodensities too small to characterize.  BILE DUCTS: Normal caliber.  GALLBLADDER: Cholelithiasis.  SPLEEN: Within normal limits.  PANCREAS: Within normal limits.  ADRENALS: Within normal limits.  KIDNEYS/URETERS: 6 mm stone in the left UPJ with mild left   hydronephrosis. Additional nonobstructive left renal stones measuring up   to 4 mm. No right urolithiasis or right hydronephrosis.    BLADDER: Minimally distended.  REPRODUCTIVE ORGANS: Air within the endometrial canal, which is   nonspecific. No suspicious adnexal mass.    BOWEL: No bowel obstruction. Multiple duodenal diverticula. Sigmoid   diverticulosis without evidence of acute diverticulitis. Normal appendix.  PERITONEUM/RETROPERITONEUM: Within normal limits.  VESSELS: Atherosclerotic changes.  LYMPH NODES: No lymphadenopathy.  ABDOMINAL WALL: Small midline ventral hernia in the upper abdomen   containing fluid and fat.  BONES: Degenerative changes. Right hip prosthesis.    IMPRESSION:  Left urolithiasis with a 6 mm left UPJ stone causing mild left   hydronephrosis.            --- End of Report ---             SHAYY BAEZA MD; Attending Radiologist  This document has been electronically signed. May 22 2025  7:10PM    < end of copied text >      Consultant(s) Notes Reviewed:  [x] YES  [ ] NO Patient is a 79y old  Female who presents with a chief complaint of Ureteral stone (22 May 2025 23:00)      INTERVAL HPI/OVERNIGHT EVENTS: No acute overnight events. Pt seen and examined at the bedside this AM. Reports she currently has headache and cramping like pain in lower abdomen/bladder area. Pt also reports L ear and sinus congestion. Patient denies any other concerns or complaints at this time, no fevers, headache, chest pain, shortness of breath, nausea/vomiting/diarrhea.      MEDICATIONS  (STANDING):  aMIOdarone    Tablet 200 milliGRAM(s) Oral daily  atorvastatin 40 milliGRAM(s) Oral at bedtime  cefTRIAXone   IVPB 1000 milliGRAM(s) IV Intermittent every 24 hours  dextrose 5% + sodium chloride 0.9%. 1000 milliLiter(s) (50 mL/Hr) IV Continuous <Continuous>  furosemide    Tablet 20 milliGRAM(s) Oral daily  levothyroxine 50 MICROGram(s) Oral daily  losartan 25 milliGRAM(s) Oral daily  metoprolol tartrate 25 milliGRAM(s) Oral at bedtime  saccharomyces boulardii 250 milliGRAM(s) Oral two times a day  tamsulosin 0.4 milliGRAM(s) Oral at bedtime    MEDICATIONS  (PRN):  acetaminophen     Tablet .. 650 milliGRAM(s) Oral every 6 hours PRN Temp greater or equal to 38C (100.4F), Mild Pain (1 - 3)  fluticasone propionate 50 MICROgram(s)/spray Nasal Spray 1 Spray(s) Both Nostrils two times a day PRN nasal congestion  melatonin 3 milliGRAM(s) Oral at bedtime PRN Insomnia      Allergies    Macrodantin (Unknown)  sulfa drugs (Rash)  sulfonylureas (Unknown)    Intolerances        REVIEW OF SYSTEMS:  CONSTITUTIONAL: No fever or chills  HEENT:  +L ear pain, + sinus congestion  RESPIRATORY: No cough, wheezing, or shortness of breath  CARDIOVASCULAR: No chest pain, palpitations  GASTROINTESTINAL: No abd pain, nausea, vomiting, or diarrhea  GENITOURINARY: +hematuria  NEUROLOGICAL: no focal weakness or dizziness  MUSCULOSKELETAL: no myalgias     Vital Signs Last 24 Hrs  T(C): 36.7 (23 May 2025 04:39), Max: 37.2 (22 May 2025 23:14)  T(F): 98.1 (23 May 2025 04:39), Max: 98.9 (22 May 2025 23:14)  HR: 47 (23 May 2025 04:39) (47 - 75)  BP: 169/64 (23 May 2025 04:39) (161/80 - 209/72)  RR: 18 (23 May 2025 04:39) (15 - 18)  SpO2: 97% (23 May 2025 04:39) (95% - 99%)    Parameters below as of 23 May 2025 04:39  Patient On (Oxygen Delivery Method): room air        PHYSICAL EXAM:  GENERAL: NAD  HEENT:  anicteric, moist mucous membranes,  + frontal sinus TTP  CHEST/LUNG:  CTA b/l, no rales, wheezes, or rhonchi  HEART:  RRR, S1, S2 ,no tachy  ABDOMEN:  BS+, soft, nontender, nondistended  EXTREMITIES: no edema, cyanosis, or calf tenderness  NERVOUS SYSTEM: answers questions and follows commands appropriately  gu intact   LABS:                        12.7   8.90  )-----------( 247      ( 22 May 2025 18:52 )             39.3     CBC Full  -  ( 22 May 2025 18:52 )  WBC Count : 8.90 K/uL  Hemoglobin : 12.7 g/dL  Hematocrit : 39.3 %  Platelet Count - Automated : 247 K/uL  Mean Cell Volume : 93.8 fL  Mean Cell Hemoglobin : 30.3 pg  Mean Cell Hemoglobin Concentration : 32.3 g/dL  Auto Neutrophil # : x  Auto Lymphocyte # : x  Auto Monocyte # : x  Auto Eosinophil # : x  Auto Basophil # : x  Auto Neutrophil % : x  Auto Lymphocyte % : x  Auto Monocyte % : x  Auto Eosinophil % : x  Auto Basophil % : x    22 May 2025 18:52    142    |  104    |  20     ----------------------------<  91     3.8     |  27     |  1.09     Ca    9.3        22 May 2025 18:52    TPro  7.1    /  Alb  3.7    /  TBili  0.8    /  DBili  x      /  AST  30     /  ALT  20     /  AlkPhos  83     22 May 2025 18:52    PT/INR - ( 22 May 2025 18:52 )   PT: 16.2 sec;   INR: 1.40 ratio         PTT - ( 22 May 2025 18:52 )  PTT:33.2 sec  Urinalysis Basic - ( 22 May 2025 18:52 )    Color: x / Appearance: x / SG: x / pH: x  Gluc: 91 mg/dL / Ketone: x  / Bili: x / Urobili: x   Blood: x / Protein: x / Nitrite: x   Leuk Esterase: x / RBC: x / WBC x   Sq Epi: x / Non Sq Epi: x / Bacteria: x                Urinalysis with Rflx Culture (collected 05-22-25 @ 18:06)        RADIOLOGY & ADDITIONAL TESTS:  < from: CT Renal Stone Hunt (05.22.25 @ 18:58) >    ACC: [id number] EXAM:  CT RENAL STONE BELTRAN   ORDERED BY: VELVET BALDERAS     PROCEDURE DATE:  05/22/2025          INTERPRETATION:  CLINICAL INFORMATION: Hematuria    COMPARISON: CT abdomen/pelvis 3/6/2024    CONTRAST/COMPLICATIONS:  IV Contrast: None  Oral Contrast: None      PROCEDURE:  CT of the Abdomen and Pelvis was performed.  Sagittal and coronal reformats were performed.    FINDINGS:  LOWER CHEST: Bibasilar atelectasis.    LIVER: Subcentimeter hypodensities too small to characterize.  BILE DUCTS: Normal caliber.  GALLBLADDER: Cholelithiasis.  SPLEEN: Within normal limits.  PANCREAS: Within normal limits.  ADRENALS: Within normal limits.  KIDNEYS/URETERS: 6 mm stone in the left UPJ with mild left   hydronephrosis. Additional nonobstructive left renal stones measuring up   to 4 mm. No right urolithiasis or right hydronephrosis.    BLADDER: Minimally distended.  REPRODUCTIVE ORGANS: Air within the endometrial canal, which is   nonspecific. No suspicious adnexal mass.    BOWEL: No bowel obstruction. Multiple duodenal diverticula. Sigmoid   diverticulosis without evidence of acute diverticulitis. Normal appendix.  PERITONEUM/RETROPERITONEUM: Within normal limits.  VESSELS: Atherosclerotic changes.  LYMPH NODES: No lymphadenopathy.  ABDOMINAL WALL: Small midline ventral hernia in the upper abdomen   containing fluid and fat.  BONES: Degenerative changes. Right hip prosthesis.    IMPRESSION:  Left urolithiasis with a 6 mm left UPJ stone causing mild left   hydronephrosis.            --- End of Report ---             SHAYY BAEZA MD; Attending Radiologist  This document has been electronically signed. May 22 2025  7:10PM    < end of copied text >      Consultant(s) Notes Reviewed:  [x] YES  [ ] NO

## 2025-05-23 NOTE — DISCHARGE NOTE PROVIDER - NSDCFUADDAPPT_GEN_ALL_CORE_FT
follow up Thursday cardio dr wade office as per your schedule appointment . follow up dr chappell office in 1 to 2 wk  / for renal stone .

## 2025-05-23 NOTE — PROGRESS NOTE ADULT - ASSESSMENT
79F PMH HTN, HLD, AF on eliquis, hypothyroidism transferred from  ED for infected L ureteral stone. Admitted for L. ureteral stone.        79F PMH HTN, HLD, AF on eliquis book for cardiac ablation manoj 3 , hypothyroidism transferred from  ED for infected L ureteral stone. Admitted for L. ureteral stone.

## 2025-05-23 NOTE — PROVIDER CONTACT NOTE (OTHER) - REASON
Pts HR 47-48. Amiodarone parameter to hold if HR <60.
Verifying when to start rocephin that is ordered.

## 2025-05-24 LAB
ALBUMIN SERPL ELPH-MCNC: 2.9 G/DL — LOW (ref 3.3–5)
ALP SERPL-CCNC: 69 U/L — SIGNIFICANT CHANGE UP (ref 40–120)
ALT FLD-CCNC: 15 U/L — SIGNIFICANT CHANGE UP (ref 12–78)
ANION GAP SERPL CALC-SCNC: 5 MMOL/L — SIGNIFICANT CHANGE UP (ref 5–17)
AST SERPL-CCNC: 17 U/L — SIGNIFICANT CHANGE UP (ref 15–37)
BILIRUB SERPL-MCNC: 0.6 MG/DL — SIGNIFICANT CHANGE UP (ref 0.2–1.2)
BUN SERPL-MCNC: 14 MG/DL — SIGNIFICANT CHANGE UP (ref 7–23)
CALCIUM SERPL-MCNC: 8.3 MG/DL — LOW (ref 8.5–10.1)
CHLORIDE SERPL-SCNC: 111 MMOL/L — HIGH (ref 96–108)
CO2 SERPL-SCNC: 25 MMOL/L — SIGNIFICANT CHANGE UP (ref 22–31)
CREAT SERPL-MCNC: 0.86 MG/DL — SIGNIFICANT CHANGE UP (ref 0.5–1.3)
CULTURE RESULTS: SIGNIFICANT CHANGE UP
EGFR: 69 ML/MIN/1.73M2 — SIGNIFICANT CHANGE UP
EGFR: 69 ML/MIN/1.73M2 — SIGNIFICANT CHANGE UP
GLUCOSE SERPL-MCNC: 93 MG/DL — SIGNIFICANT CHANGE UP (ref 70–99)
HCT VFR BLD CALC: 34.4 % — LOW (ref 34.5–45)
HGB BLD-MCNC: 11.5 G/DL — SIGNIFICANT CHANGE UP (ref 11.5–15.5)
MAGNESIUM SERPL-MCNC: 2 MG/DL — SIGNIFICANT CHANGE UP (ref 1.6–2.6)
MCHC RBC-ENTMCNC: 30.6 PG — SIGNIFICANT CHANGE UP (ref 27–34)
MCHC RBC-ENTMCNC: 33.4 G/DL — SIGNIFICANT CHANGE UP (ref 32–36)
MCV RBC AUTO: 91.5 FL — SIGNIFICANT CHANGE UP (ref 80–100)
NRBC BLD AUTO-RTO: 0 /100 WBCS — SIGNIFICANT CHANGE UP (ref 0–0)
PHOSPHATE SERPL-MCNC: 2.4 MG/DL — LOW (ref 2.5–4.5)
PLATELET # BLD AUTO: 236 K/UL — SIGNIFICANT CHANGE UP (ref 150–400)
POTASSIUM SERPL-MCNC: 4.3 MMOL/L — SIGNIFICANT CHANGE UP (ref 3.5–5.3)
POTASSIUM SERPL-SCNC: 4.3 MMOL/L — SIGNIFICANT CHANGE UP (ref 3.5–5.3)
PROT SERPL-MCNC: 5.8 G/DL — LOW (ref 6–8.3)
RBC # BLD: 3.76 M/UL — LOW (ref 3.8–5.2)
RBC # FLD: 13.5 % — SIGNIFICANT CHANGE UP (ref 10.3–14.5)
SODIUM SERPL-SCNC: 141 MMOL/L — SIGNIFICANT CHANGE UP (ref 135–145)
SPECIMEN SOURCE: SIGNIFICANT CHANGE UP
WBC # BLD: 5.61 K/UL — SIGNIFICANT CHANGE UP (ref 3.8–10.5)
WBC # FLD AUTO: 5.61 K/UL — SIGNIFICANT CHANGE UP (ref 3.8–10.5)

## 2025-05-24 PROCEDURE — 99233 SBSQ HOSP IP/OBS HIGH 50: CPT | Mod: GC

## 2025-05-24 PROCEDURE — 99284 EMERGENCY DEPT VISIT MOD MDM: CPT

## 2025-05-24 PROCEDURE — 71045 X-RAY EXAM CHEST 1 VIEW: CPT | Mod: 26

## 2025-05-24 PROCEDURE — G0545: CPT

## 2025-05-24 PROCEDURE — 99233 SBSQ HOSP IP/OBS HIGH 50: CPT

## 2025-05-24 RX ORDER — SOD PHOS DI, MONO/K PHOS MONO 250 MG
1 TABLET ORAL ONCE
Refills: 0 | Status: COMPLETED | OUTPATIENT
Start: 2025-05-24 | End: 2025-05-24

## 2025-05-24 RX ORDER — ASPIRIN 325 MG
81 TABLET ORAL DAILY
Refills: 0 | Status: DISCONTINUED | OUTPATIENT
Start: 2025-05-24 | End: 2025-05-25

## 2025-05-24 RX ORDER — APIXABAN 2.5 MG/1
5 TABLET, FILM COATED ORAL EVERY 12 HOURS
Refills: 0 | Status: DISCONTINUED | OUTPATIENT
Start: 2025-05-24 | End: 2025-05-25

## 2025-05-24 RX ADMIN — BUTYROSPERMUM PARKII(SHEA BUTTER), SIMMONDSIA CHINENSIS (JOJOBA) SEED OIL, ALOE BARBADENSIS LEAF EXTRACT 250 MILLIGRAM(S): .01; 1; 3.5 LIQUID TOPICAL at 17:48

## 2025-05-24 RX ADMIN — LOSARTAN POTASSIUM 25 MILLIGRAM(S): 100 TABLET, FILM COATED ORAL at 06:27

## 2025-05-24 RX ADMIN — FUROSEMIDE 20 MILLIGRAM(S): 10 INJECTION INTRAMUSCULAR; INTRAVENOUS at 06:27

## 2025-05-24 RX ADMIN — CEFTRIAXONE 100 MILLIGRAM(S): 500 INJECTION, POWDER, FOR SOLUTION INTRAMUSCULAR; INTRAVENOUS at 01:56

## 2025-05-24 RX ADMIN — Medication 50 MICROGRAM(S): at 06:27

## 2025-05-24 RX ADMIN — BUTYROSPERMUM PARKII(SHEA BUTTER), SIMMONDSIA CHINENSIS (JOJOBA) SEED OIL, ALOE BARBADENSIS LEAF EXTRACT 250 MILLIGRAM(S): .01; 1; 3.5 LIQUID TOPICAL at 06:27

## 2025-05-24 RX ADMIN — Medication 81 MILLIGRAM(S): at 11:00

## 2025-05-24 RX ADMIN — Medication 1 PACKET(S): at 10:38

## 2025-05-24 RX ADMIN — APIXABAN 5 MILLIGRAM(S): 2.5 TABLET, FILM COATED ORAL at 17:48

## 2025-05-24 NOTE — CONSULT NOTE ADULT - SUBJECTIVE AND OBJECTIVE BOX
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 right ventricle is not well visualized. The right ventricular cavity is normal in size and right ventricular systolic function is probably normal. Tricuspid annular tissue Doppler S' is 13.7 cm/s (normal >10 cm/s).     Left Atrium:  The left atriumis severely dilated.     Right Atrium:  The right atrium is dilated with an indexed volume of 29.63 ml/m² and an indexed area of 11.14 cm²/m².     Interatrial Septum:  There is an atrial septal defect. The defect measures 5 mm. There is an atrial septal defect with left to right shunting.     Aortic Valve:  The aortic valve appears trileaflet with normal systolic excursion. There is mild thickening of the aortic valve leaflets. The peak transaortic velocity is 1.49 m/s, peak transaortic gradient is 8.9 mmHg and mean transaortic gradient is 5.0 mmHg with an LVOT/aortic valve VTI ratio of 0.41. The aortic valve area is estimated at 1.16 cm² by the continuity equation. There is mild aortic regurgitation.     Mitral Valve:  The mitral valve prosthesis is well seated with normal function. Transvalvular mitral gradients are normal. The transmitral peak gradient is 21.3 mmHg and mean gradient is 7.00 mmHg.     Tricuspid Valve:  The tricuspid valve was not well visualized. There is mild tricuspidregurgitation. Estimated pulmonary artery systolic pressure is 38 mmHg, consistent with mild pulmonary hypertension.     Pulmonic Valve:  The pulmonic valve was not well visualized. There is trace pulmonic regurgitation.     Aorta:  The aortic root at the sinuses of Valsalva is normal in size, measuring 2.50 cm (indexed 1.51 cm/m²). The ascending aorta is normal in size, measuring 2.90 cm (indexed 1.75 cm/m²).     Systemic Veins:  The inferior vena cava is normal in size measuring 1.80 cm in diameter, (normal <2.1cm) with normal inspiratory collapse (normal >50%) consistent with normal right atrial pressure (~3, range 0-5mmHg).  ____________________________________________________________________  QUANTITATIVE DATA:  Left Ventricle Measurements: (Indexed to BSA)     IVSd (2D):   1.0 cm  LVPWd (2D):  1.0 cm  LVIDd (2D):  4.6 cm  LVIDs (2D):  3.0 cm  LV Mass:     159 g  95.7 g/m²  Visualized LV EF%: 45 to 50%     MV E Vmax:    2.29 m/s  e' lateral:   9.79 cm/s  e' medial:    9.03 cm/s  E/e'lateral: 23.39  E/e' medial:  25.36  E/e' Average: 24.34  MV DT:        236 msec    Aorta Measurements: (Normal range) (Indexed to BSA)     Ao Root d     2.50 cm (2.7 - 3.3 cm) 1.51 cm/m²  Ao Asc d, 2D: 2.90  Ao Asc prox:  2.90 cm                1.75cm/m²            Left Atrium Measurements: (Indexed to BSA)  LA Diam 2D: 5.50 cm         Right Ventricle Measurements: Right Atrial Measurements:     RV S' Vmax:       13.70 cm/s  RA Vol s, MOD A4C         49.2 ml  RV Base (RVID1):  2.9 cm      RA Vol s, MOD A4C i BSA   29.63 ml/m²  RV Mid (RVID2):   2.3 cm      RA Area s, MOD A4C        18.5 cm²  RV Major (RVID3): 7.0 cm      RA Area s, MOD A4C, i BSA 11.14 cm²/m²       LVOT / RVOT/ Qp/Qs Data: (Indexed to BSA)  LVOT Diameter,s: 1.90 cm  LVOT Area:       2.84 cm²  LVOT Vmax:       0.80 m/s  LVOT Vmn:        0.487 m/s  LVOT VTI:        12.10 cm  LVOT peak grad:  3 mmHg  LVOT mean grad:  1.0 mmHg  LVOT SV:         34.3 ml   20.66 ml/m²    Aortic Valve Measurements:  AV Vmax:                1.5m/s  AV Peak Gradient:       8.9 mmHg  AV Mean Gradient:       5.0 mmHg  AV VTI:                 29.5 cm  AV VTI Ratio:           0.41  AoV EOA, Contin:        1.16 cm²  AoV EOA, Contin i:      0.70 cm²/m²  AoV Dimensionless Index 0.41    Mitral Valve Measurements:     MV Vmax:      2.31 m/s  MV VTI, michael   0.476 m  MV Mean Grad: 7.0 mmHg  MV Peak Grad: 21.3 mmHg  MV E Vmax:    2.3 m/s       Tricuspid Valve Measurements:     TV S'             13.7 cm/s  TR Vmax:          2.9 m/s  TR Peak Gradient:34.6 mmHg  RA Pressure:      3 mmHg  PASP:             38 mmHg    ________________________________________________________________________________________  Electronically signed on 4/12/2025 at 12:55:25 PM by Venugopal Palla MD         *** Final ***

## 2025-05-24 NOTE — PROGRESS NOTE ADULT - SUBJECTIVE AND OBJECTIVE BOX
Patient is a 79y old  Female who presents with a chief complaint of Ureteral stone (23 May 2025 12:21)      INTERVAL HPI/OVERNIGHT EVENTS: Pt was seen and examined at bedside. Found to be covid positive.  Pt states that she feels well, no acute complaints.       MEDICATIONS  (STANDING):  aMIOdarone    Tablet 200 milliGRAM(s) Oral daily  atorvastatin 40 milliGRAM(s) Oral at bedtime  cefTRIAXone   IVPB 1000 milliGRAM(s) IV Intermittent every 24 hours  dextrose 5% + sodium chloride 0.9%. 1000 milliLiter(s) (50 mL/Hr) IV Continuous <Continuous>  furosemide    Tablet 20 milliGRAM(s) Oral daily  levothyroxine 50 MICROGram(s) Oral daily  losartan 25 milliGRAM(s) Oral daily  metoprolol tartrate 25 milliGRAM(s) Oral at bedtime  saccharomyces boulardii 250 milliGRAM(s) Oral two times a day  tamsulosin 0.4 milliGRAM(s) Oral at bedtime    MEDICATIONS  (PRN):  acetaminophen     Tablet .. 650 milliGRAM(s) Oral every 6 hours PRN Temp greater or equal to 38C (100.4F), Mild Pain (1 - 3)  fluticasone propionate 50 MICROgram(s)/spray Nasal Spray 1 Spray(s) Both Nostrils two times a day PRN nasal congestion  melatonin 3 milliGRAM(s) Oral at bedtime PRN Insomnia      Allergies    Macrodantin (Unknown)  sulfa drugs (Rash)  sulfonylureas (Unknown)    Intolerances        REVIEW OF SYSTEMS:  CONSTITUTIONAL: No fever or chills  HEENT:  +chronic L ear fullness , No headache, no sore throat  RESPIRATORY: +chronic cough No wheezing, or shortness of breath  CARDIOVASCULAR: No chest pain, palpitations  GASTROINTESTINAL: No abd pain, nausea, vomiting, or diarrhea  GENITOURINARY: No dysuria, frequency, or hematuria  NEUROLOGICAL: no focal weakness or dizziness  MUSCULOSKELETAL: no myalgias     Vital Signs Last 24 Hrs  T(C): 36.6 (24 May 2025 05:50), Max: 36.8 (23 May 2025 21:15)  T(F): 97.8 (24 May 2025 05:50), Max: 98.2 (23 May 2025 21:15)  HR: 58 (24 May 2025 05:50) (45 - 58)  BP: 158/76 (24 May 2025 05:50) (158/76 - 160/65)  BP(mean): --  RR: 18 (24 May 2025 05:50) (18 - 18)  SpO2: 97% (24 May 2025 05:50) (96% - 97%)    Parameters below as of 24 May 2025 05:50  Patient On (Oxygen Delivery Method): room air        PHYSICAL EXAM:  GENERAL: NAD  HEENT:  anicteric, moist mucous membranes,  + frontal sinus TTP  CHEST/LUNG:  CTA b/l, no rales, wheezes, or rhonchi  HEART:  RRR, S1, S2 ,no tachy  ABDOMEN:  BS+, soft, nontender, nondistended  EXTREMITIES: no edema, cyanosis, or calf tenderness  NERVOUS SYSTEM: answers questions and follows commands appropriately  gu intact     LABS:                        11.5   5.61  )-----------( 236      ( 24 May 2025 06:37 )             34.4     CBC Full  -  ( 24 May 2025 06:37 )  WBC Count : 5.61 K/uL  Hemoglobin : 11.5 g/dL  Hematocrit : 34.4 %  Platelet Count - Automated : 236 K/uL  Mean Cell Volume : 91.5 fl  Mean Cell Hemoglobin : 30.6 pg  Mean Cell Hemoglobin Concentration : 33.4 g/dL  Auto Neutrophil # : x  Auto Lymphocyte # : x  Auto Monocyte # : x  Auto Eosinophil # : x  Auto Basophil # : x  Auto Neutrophil % : x  Auto Lymphocyte % : x  Auto Monocyte % : x  Auto Eosinophil % : x  Auto Basophil % : x    24 May 2025 06:37    141    |  111    |  14     ----------------------------<  93     4.3     |  25     |  0.86     Ca    8.3        24 May 2025 06:37  Phos  2.4       24 May 2025 06:37  Mg     2.0       24 May 2025 06:37    TPro  5.8    /  Alb  2.9    /  TBili  0.6    /  DBili  x      /  AST  17     /  ALT  15     /  AlkPhos  69     24 May 2025 06:37    PT/INR - ( 22 May 2025 18:52 )   PT: 16.2 sec;   INR: 1.40 ratio         PTT - ( 22 May 2025 18:52 )  PTT:33.2 sec  Urinalysis Basic - ( 24 May 2025 06:37 )    Color: x / Appearance: x / SG: x / pH: x  Gluc: 93 mg/dL / Ketone: x  / Bili: x / Urobili: x   Blood: x / Protein: x / Nitrite: x   Leuk Esterase: x / RBC: x / WBC x   Sq Epi: x / Non Sq Epi: x / Bacteria: x      CAPILLARY BLOOD GLUCOSE            Culture - Blood (collected 05-22-25 @ 19:50)  Source: Blood Blood-Peripheral  Preliminary Report (05-24-25 @ 01:02):    No growth at 24 hours    Culture - Blood (collected 05-22-25 @ 19:48)  Source: Blood Blood-Peripheral  Preliminary Report (05-24-25 @ 01:02):    No growth at 24 hours    Urinalysis with Rflx Culture (collected 05-22-25 @ 18:06)    Culture - Urine (collected 05-22-25 @ 18:06)  Source: Clean Catch  Final Report (05-24-25 @ 00:06):    <10,000 CFU/mL Normal Urogenital Agueda        RADIOLOGY & ADDITIONAL TESTS: ____    Personally reviewed.     Consultant(s) Notes Reviewed:  [x] YES  [ ] NO     Patient is a 79y old  Female who presents with a chief complaint of Ureteral stone (23 May 2025 12:21)      INTERVAL HPI/OVERNIGHT EVENTS: Pt was seen and examined at bedside. Found to be covid positive.  Pt states that she feels well, no acute complaints. hr 45 -58 taryn .       MEDICATIONS  (STANDING):  aMIOdarone    Tablet 200 milliGRAM(s) Oral daily  atorvastatin 40 milliGRAM(s) Oral at bedtime  cefTRIAXone   IVPB 1000 milliGRAM(s) IV Intermittent every 24 hours  dextrose 5% + sodium chloride 0.9%. 1000 milliLiter(s) (50 mL/Hr) IV Continuous <Continuous>  furosemide    Tablet 20 milliGRAM(s) Oral daily  levothyroxine 50 MICROGram(s) Oral daily  losartan 25 milliGRAM(s) Oral daily  metoprolol tartrate 25 milliGRAM(s) Oral at bedtime  saccharomyces boulardii 250 milliGRAM(s) Oral two times a day  tamsulosin 0.4 milliGRAM(s) Oral at bedtime    MEDICATIONS  (PRN):  acetaminophen     Tablet .. 650 milliGRAM(s) Oral every 6 hours PRN Temp greater or equal to 38C (100.4F), Mild Pain (1 - 3)  fluticasone propionate 50 MICROgram(s)/spray Nasal Spray 1 Spray(s) Both Nostrils two times a day PRN nasal congestion  melatonin 3 milliGRAM(s) Oral at bedtime PRN Insomnia      Allergies    Macrodantin (Unknown)  sulfa drugs (Rash)  sulfonylureas (Unknown)    Intolerances        REVIEW OF SYSTEMS:  CONSTITUTIONAL: No fever or chills  HEENT:  +chronic L ear fullness , No headache, no sore throat  RESPIRATORY: +chronic cough No wheezing, or shortness of breath  CARDIOVASCULAR: No chest pain, palpitations  GASTROINTESTINAL: No abd pain, nausea, vomiting, or diarrhea  GENITOURINARY: No dysuria, frequency, or hematuria  NEUROLOGICAL: no focal weakness or dizziness  MUSCULOSKELETAL: no myalgias     Vital Signs Last 24 Hrs  T(C): 36.6 (24 May 2025 05:50), Max: 36.8 (23 May 2025 21:15)  T(F): 97.8 (24 May 2025 05:50), Max: 98.2 (23 May 2025 21:15)  HR: 58 (24 May 2025 05:50) (45 - 58)  BP: 158/76 (24 May 2025 05:50) (158/76 - 160/65)  BP(mean): --  RR: 18 (24 May 2025 05:50) (18 - 18)  SpO2: 97% (24 May 2025 05:50) (96% - 97%)    Parameters below as of 24 May 2025 05:50  Patient On (Oxygen Delivery Method): room air        PHYSICAL EXAM:  GENERAL: NAD  HEENT:  anicteric, moist mucous membranes,  + frontal sinus TTP  CHEST/LUNG:  CTA b/l, no rales, wheezes, or rhonchi  HEART:  RRR, S1, S2 ,no tachy  ABDOMEN:  BS+, soft, nontender, nondistended  EXTREMITIES: no edema, cyanosis, or calf tenderness  NERVOUS SYSTEM: answers questions and follows commands appropriately  gu intact     LABS:                        11.5   5.61  )-----------( 236      ( 24 May 2025 06:37 )             34.4     CBC Full  -  ( 24 May 2025 06:37 )  WBC Count : 5.61 K/uL  Hemoglobin : 11.5 g/dL  Hematocrit : 34.4 %  Platelet Count - Automated : 236 K/uL  Mean Cell Volume : 91.5 fl  Mean Cell Hemoglobin : 30.6 pg  Mean Cell Hemoglobin Concentration : 33.4 g/dL  Auto Neutrophil # : x  Auto Lymphocyte # : x  Auto Monocyte # : x  Auto Eosinophil # : x  Auto Basophil # : x  Auto Neutrophil % : x  Auto Lymphocyte % : x  Auto Monocyte % : x  Auto Eosinophil % : x  Auto Basophil % : x    24 May 2025 06:37    141    |  111    |  14     ----------------------------<  93     4.3     |  25     |  0.86     Ca    8.3        24 May 2025 06:37  Phos  2.4       24 May 2025 06:37  Mg     2.0       24 May 2025 06:37    TPro  5.8    /  Alb  2.9    /  TBili  0.6    /  DBili  x      /  AST  17     /  ALT  15     /  AlkPhos  69     24 May 2025 06:37    PT/INR - ( 22 May 2025 18:52 )   PT: 16.2 sec;   INR: 1.40 ratio         PTT - ( 22 May 2025 18:52 )  PTT:33.2 sec  Urinalysis Basic - ( 24 May 2025 06:37 )    Color: x / Appearance: x / SG: x / pH: x  Gluc: 93 mg/dL / Ketone: x  / Bili: x / Urobili: x   Blood: x / Protein: x / Nitrite: x   Leuk Esterase: x / RBC: x / WBC x   Sq Epi: x / Non Sq Epi: x / Bacteria: x      CAPILLARY BLOOD GLUCOSE            Culture - Blood (collected 05-22-25 @ 19:50)  Source: Blood Blood-Peripheral  Preliminary Report (05-24-25 @ 01:02):    No growth at 24 hours    Culture - Blood (collected 05-22-25 @ 19:48)  Source: Blood Blood-Peripheral  Preliminary Report (05-24-25 @ 01:02):    No growth at 24 hours    Urinalysis with Rflx Culture (collected 05-22-25 @ 18:06)    Culture - Urine (collected 05-22-25 @ 18:06)  Source: Clean Catch  Final Report (05-24-25 @ 00:06):    <10,000 CFU/mL Normal Urogenital Agueda        RADIOLOGY & ADDITIONAL TESTS: ____    Personally reviewed.     Consultant(s) Notes Reviewed:  [x] YES  [ ] NO

## 2025-05-24 NOTE — PROGRESS NOTE ADULT - PROBLEM SELECTOR PLAN 6
Chronic  - EKG: Sinus bradycardia, VR 55bpm, QTC 507ms  - On Eliquis and aspirin at home; initially held  for possible procedure and hematuria   - restart asa and eliquis  - Continue home statin  - Continue home lopressor, losartan, amiodarone w/ hold parameters Chronic  - EKG: Sinus bradycardia, VR 55bpm, QTC 507ms  - On Eliquis and aspirin at home; initially held  for possible procedure and hematuria   - restart asa and Eliquis  - Continue home statin  - Continue home lopressor, losartan, amiodarone w/ hold parameters

## 2025-05-24 NOTE — PROGRESS NOTE ADULT - PROBLEM SELECTOR PLAN 2
Patient found to have acute UTI on admission   - Afebrile, no leukocytosis, lactate wnl   - S/P Rocephin VI x1, NS IV 1950cc Bolus IV x1, Tylenol 650mg PO x1 in ED   - UA mod le, wbc 45, >50 blood, few bacteria, blood cultures x2 specimen received   - Continue IV Rocephin w/ florastor  - urine cx neg   - blood cx ngtd 24h  - ID (Dr. Greenwood) following

## 2025-05-24 NOTE — PROGRESS NOTE ADULT - PROBLEM SELECTOR PLAN 7
Chronic  - Continue home synthroid    #Prolonged QTC  -EKG: Sinus bradycardia, VR 55bpm, QTC 507ms  -Avoid QTC prolonging medications
Chronic  - Continue home synthroid    #Prolonged QTC  -EKG: Sinus bradycardia, VR 55bpm, QTC 507ms  -Avoid QTC prolonging medications

## 2025-05-24 NOTE — PROGRESS NOTE ADULT - PROBLEM SELECTOR PLAN 3
Patient found to have elevated BP at [facility]   - /72 in [place]  -S/p Lopressor 25mg PO x1 in [facility] w/ improvement to /79  - BP elevated this AM , missed metoprolol and amio dose overnight due to bradycardia  - Continue home lopressor, losartan, lasix w/ hold parameters  - Continue to monitor hemodynamics
Patient found to have elevated BP at [facility]   - /72 in [place]  -S/p Lopressor 25mg PO x1 in [facility] w/ improvement to /79  - BP currently 169/64 this AM  - Continue home lopressor, losartan, lasix w/ hold parameters  - Continue to monitor hemodynamics

## 2025-05-24 NOTE — PROGRESS NOTE ADULT - PROBLEM SELECTOR PLAN 4
BALDEMAR likely 2/2 obstructed ureteral stone   - Baseline creatinine .74   - BUN/Cr 20/1.09 on admission   - S/p NS IV 1950cc Bolus IV x1 in ED   - Avoid nephrotoxic medications as able  - Monitor on BMP
BALDEMAR likely 2/2 obstructed ureteral stone   - Baseline creatinine .74   - BUN/Cr 20/1.09 on admission   - S/p NS IV 1950cc Bolus IV x1 in ED   - Avoid nephrotoxic medications as able  - Monitor on BMP

## 2025-05-24 NOTE — PROGRESS NOTE ADULT - PROBLEM SELECTOR PLAN 1
Patient presents w/ hematuria since 5/19 to [place] ED; transferred for L. ureteral stone   - Afebrile, no leukocytosis, lactate wnl   - CT renal stone hunt: Left urolithiasis with a 6 mm left [facility]J stone causing mild left hydronephrosis.  - Patient currently has no pain   - Continue IV Rocephin w/ florastor per ID  - Cont Flomax expulsion therapy   - Blood cx ngtd 24hr , UCx neg   - Trend WBC and monitor for fever  - Tylenol for mild pain   - Continue to monitor for passage of fragments in urine  - Urology Dr. Chin consulted --> no urological intervention at this time; placed on PO DASH diet

## 2025-05-24 NOTE — PROGRESS NOTE ADULT - SUBJECTIVE AND OBJECTIVE BOX
[facility]  INFECTIOUS DISEASES - Shena Greenwood, [address], [place] [address]  Tel: [phone]     Fax: [phone]  =======================================================    SYEDA LOPEZ [id number]    Follow up: No fevers. On room air. Reports feeling better. Denies any SOB, pain, dysuria or hematuria. Has some L ear fullness but denies any headache. Has been ambulating in the room.    Allergies:  Macrodantin (Unknown)  sulfa drugs (Rash)  sulfonylureas (Unknown)      Antibiotics:  acetaminophen     Tablet .. 650 milliGRAM(s) Oral every 6 hours PRN  aMIOdarone    Tablet 200 milliGRAM(s) Oral daily  apixaban 5 milliGRAM(s) Oral every 12 hours  aspirin  chewable 81 milliGRAM(s) Oral daily  atorvastatin 40 milliGRAM(s) Oral at bedtime  cefTRIAXone   IVPB 1000 milliGRAM(s) IV Intermittent every 24 hours  fluticasone propionate 50 MICROgram(s)/spray Nasal Spray 1 Spray(s) Both Nostrils two times a day PRN  furosemide    Tablet 20 milliGRAM(s) Oral daily  levothyroxine 50 MICROGram(s) Oral daily  losartan 25 milliGRAM(s) Oral daily  melatonin 3 milliGRAM(s) Oral at bedtime PRN  saccharomyces boulardii 250 milliGRAM(s) Oral two times a day  tamsulosin 0.4 milliGRAM(s) Oral at bedtime       REVIEW OF SYSTEMS:  CONSTITUTIONAL:  No Fever or chills  HEENT:  + L ear pain  CARDIOVASCULAR:  No chest pain or SOB.  RESPIRATORY:  + chronic cough, no shortness of breath  GASTROINTESTINAL:  No nausea, vomiting or diarrhea.  GENITOURINARY:  + hematuria  MUSCULOSKELETAL:  no joint aches, no muscle pain  NEUROLOGIC:  No headache or dizziness  PSYCHIATRIC:  No disorder of thought or mood.     Physical Exam:  ICU Vital Signs Last 24 Hrs  T(C): 36.5 (24 May 2025 13:49), Max: 36.8 (23 May 2025 21:15)  T(F): 97.7 (24 May 2025 13:49), Max: 98.2 (23 May 2025 21:15)  HR: 61 (24 May 2025 13:49) (45 - 61)  BP: 127/58 (24 May 2025 13:49) (127/58 - 160/65)  BP(mean): --  ABP: --  ABP(mean): --  RR: 18 (24 May 2025 13:49) (18 - 18)  SpO2: 94% (24 May 2025 13:49) (94% - 97%)    O2 Parameters below as of 24 May 2025 13:49  Patient On (Oxygen Delivery Method): room air           GEN: NAD  HEENT: normocephalic and atraumatic.   NECK: Supple.   LUNGS: Normal respiratory effort  HEART: Regular rate and rhythm   ABDOMEN: Soft, nontender, and nondistended.    EXTREMITIES: No leg edema. s/p L knee surgery  NEUROLOGIC: grossly intact.  PSYCHIATRIC: Appropriate affect .     Labs:  05-24    141  |  111[H]  |  14  ----------------------------<  93  4.3   |  25  |  0.86    Ca    8.3[L]      24 May 2025 06:37  Phos  2.4     05-24  Mg     2.0     05-24    TPro  5.8[L]  /  Alb  2.9[L]  /  TBili  0.6  /  DBili  x   /  AST  17  /  ALT  15  /  AlkPhos  69  05-24                          11.5   5.61  )-----------( 236      ( 24 May 2025 06:37 )             34.4     PT/INR - ( 22 May 2025 18:52 )   PT: 16.2 sec;   INR: 1.40 ratio         PTT - ( 22 May 2025 18:52 )  PTT:33.2 sec  Urinalysis Basic - ( 24 May 2025 06:37 )    Color: x / Appearance: x / SG: x / pH: x  Gluc: 93 mg/dL / Ketone: x  / Bili: x / Urobili: x   Blood: x / Protein: x / Nitrite: x   Leuk Esterase: x / RBC: x / WBC x   Sq Epi: x / Non Sq Epi: x / Bacteria: x      LIVER FUNCTIONS - ( 24 May 2025 06:37 )  Alb: 2.9 g/dL / Pro: 5.8 g/dL / ALK PHOS: 69 U/L / ALT: 15 U/L / AST: 17 U/L / GGT: x             RECENT CULTURES:  05-23 @ 14:35          Detected  05-22 @ 19:50 Blood Blood-Peripheral     No growth at 24 hours        05-22 @ 19:48 Blood Blood-Peripheral     No growth at 24 hours        05-22 @ 18:06 Clean Catch     <10,000 CFU/mL Normal Urogenital Agueda              All imaging and data are reviewed.

## 2025-05-24 NOTE — PROGRESS NOTE ADULT - PROBLEM SELECTOR PLAN 5
Patient presented to [place] ED w/ L. ear pain ; found to have likely acute otitis media on exam  -S/P Rocephin VI x1, NS IV 1950cc Bolus IV x1, Tylenol 650mg PO x1 in ED  -Continue rocephin for coverage  - Flonase PRN for congestion   -Continue pain control as above ^  - ID (Dr. Greenwood) following, rec outpatient ENT follow up

## 2025-05-24 NOTE — CONSULT NOTE ADULT - ASSESSMENT
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 CT showed 6mm stone at [facility] w/ mild hydro. Patient initially had presented to [place] ED with hematuria since 5/19. Patient also had L. ear pain along with sinus congestion. Patient currently has no abdominal pain, back pain, or dysuria. Does admit to mild headache and L.ear pain. No sick contacts. Of note, patient was supposed to go for ablation w/ Dr. Arreola at [facility].  Cardiology is being called for bradycardia.  She is feeling well - she denies CP/SOB/dizziness    bradycardia:  sinus taryn, HR 40-60's  pt asymptomatic  in reviewing outpt records, bradycardia is not new and is dated back to at least 2023  can hold metoprolol for now  c/w amio    AF:  pt is scheduled for ablation next month  c/w amio  AC with Eliquis    CAD:  non-obstructive on recent cath 04/2025  c/w statin   c/w ASA   no s/s acute ischemia    bio MVR:  2012 and 2023  pt appears euvolemic  c/w ASA    - Monitor and replete lytes, keep K>4 and Mg >2  - can consider d/c if pt remains stable  - Thank you for this consult!

## 2025-05-24 NOTE — PROGRESS NOTE ADULT - TIME BILLING
pt seen and examine today see above plan -Patient presents w/ hematuria since 5/19 to [place] ED; transferred for mild  Lt  ureteral stone -obstructive uropathy- s/p   iv fluid  , iv abx Rocephin 1 gm daily  -day 3     uro no surgical intervention ok dc plan on po abx  and Flomax    blood  and ucult neg  .   incidental +  covid 19 [ pt cough chronic on /off   from 2 y ] remain asymptomatic , no fever , hypoxia  , xray chest pending. pt have hx sinus bradycardia / chr PAFIB  running 47 - 60   on  home  amio and lopressor  -  on rem ort tele hr 60- 67  , cardio  dr sow  hold bb , watch 24 hr observation .

## 2025-05-24 NOTE — PROGRESS NOTE ADULT - ASSESSMENT
79F PMH HTN, HLD, AF on eliquis book for cardiac ablation manoj 3 , hypothyroidism transferred from  ED for infected L ureteral stone. Admitted for L. ureteral stone.

## 2025-05-24 NOTE — PROGRESS NOTE ADULT - ASSESSMENT
79F PMH HTN, HLD, AF on eliquis, hypothyroidism, who was transferred from  ED for infected L ureteral stone. Patient says she developed hematuria a few days ago. She denies any fevers, chills, flank pain or dysuria. CT showed 6mm stone at [facility] w/ mild hydro. Patient initially had presented to [place] ED with hematuria and also had L. ear pain along with sinus congestion. She said she has had chronic sinus and ear issues, requiring a tube on her L ear. Saw her ENT recently.    Found to have 6mm L UPJ stone causing mild L hydronephrosis. Per Urology no plan for urological intervention at this time. No fever and no leukocytosis. Cultures unremarkable. Found to be COVID positive, although on room air and reports feeling better, no acute respiratory symptoms.    #COVID  #L ureteral stone  #L hydronephrosis  #? UTI    -does not meet criteria for remdesivir per [facility] system guidelines, Paxlovid contraindicated as an apixaban  -AC per primary team  -continue ceftriaxone until tomorrow, then can switch to cefpodoxime 200mg PO BID until 5/29  -suggest CXR  -follow cultures to completion  -follow up with Urology and ENT as outpatient  -discussed with Dr. Donato Mayo MD  Division of Infectious Diseases   Cell [phone] between 8am and 6pm   After 6pm and weekends please call ID service at [phone].     55 minutes spent on total encounter assessing patient, examination, chart review, counseling and coordinating care by the attending physician/nurse/care manager.

## 2025-05-25 VITALS
HEART RATE: 58 BPM | RESPIRATION RATE: 16 BRPM | OXYGEN SATURATION: 95 % | DIASTOLIC BLOOD PRESSURE: 72 MMHG | TEMPERATURE: 98 F | SYSTOLIC BLOOD PRESSURE: 149 MMHG

## 2025-05-25 LAB
ALBUMIN SERPL ELPH-MCNC: 3 G/DL — LOW (ref 3.3–5)
ALP SERPL-CCNC: 71 U/L — SIGNIFICANT CHANGE UP (ref 40–120)
ALT FLD-CCNC: 17 U/L — SIGNIFICANT CHANGE UP (ref 12–78)
ANION GAP SERPL CALC-SCNC: 7 MMOL/L — SIGNIFICANT CHANGE UP (ref 5–17)
AST SERPL-CCNC: 20 U/L — SIGNIFICANT CHANGE UP (ref 15–37)
BILIRUB SERPL-MCNC: 0.5 MG/DL — SIGNIFICANT CHANGE UP (ref 0.2–1.2)
BUN SERPL-MCNC: 16 MG/DL — SIGNIFICANT CHANGE UP (ref 7–23)
CALCIUM SERPL-MCNC: 8.6 MG/DL — SIGNIFICANT CHANGE UP (ref 8.5–10.1)
CHLORIDE SERPL-SCNC: 108 MMOL/L — SIGNIFICANT CHANGE UP (ref 96–108)
CO2 SERPL-SCNC: 25 MMOL/L — SIGNIFICANT CHANGE UP (ref 22–31)
CREAT SERPL-MCNC: 0.96 MG/DL — SIGNIFICANT CHANGE UP (ref 0.5–1.3)
EGFR: 61 ML/MIN/1.73M2 — SIGNIFICANT CHANGE UP
EGFR: 61 ML/MIN/1.73M2 — SIGNIFICANT CHANGE UP
GLUCOSE SERPL-MCNC: 94 MG/DL — SIGNIFICANT CHANGE UP (ref 70–99)
HCT VFR BLD CALC: 36.9 % — SIGNIFICANT CHANGE UP (ref 34.5–45)
HGB BLD-MCNC: 12.4 G/DL — SIGNIFICANT CHANGE UP (ref 11.5–15.5)
MAGNESIUM SERPL-MCNC: 2.1 MG/DL — SIGNIFICANT CHANGE UP (ref 1.6–2.6)
MCHC RBC-ENTMCNC: 30.8 PG — SIGNIFICANT CHANGE UP (ref 27–34)
MCHC RBC-ENTMCNC: 33.6 G/DL — SIGNIFICANT CHANGE UP (ref 32–36)
MCV RBC AUTO: 91.8 FL — SIGNIFICANT CHANGE UP (ref 80–100)
NRBC BLD AUTO-RTO: 0 /100 WBCS — SIGNIFICANT CHANGE UP (ref 0–0)
PHOSPHATE SERPL-MCNC: 3.3 MG/DL — SIGNIFICANT CHANGE UP (ref 2.5–4.5)
PLATELET # BLD AUTO: 268 K/UL — SIGNIFICANT CHANGE UP (ref 150–400)
POTASSIUM SERPL-MCNC: 4.2 MMOL/L — SIGNIFICANT CHANGE UP (ref 3.5–5.3)
POTASSIUM SERPL-SCNC: 4.2 MMOL/L — SIGNIFICANT CHANGE UP (ref 3.5–5.3)
PROT SERPL-MCNC: 6.1 G/DL — SIGNIFICANT CHANGE UP (ref 6–8.3)
RBC # BLD: 4.02 M/UL — SIGNIFICANT CHANGE UP (ref 3.8–5.2)
RBC # FLD: 13.8 % — SIGNIFICANT CHANGE UP (ref 10.3–14.5)
SODIUM SERPL-SCNC: 140 MMOL/L — SIGNIFICANT CHANGE UP (ref 135–145)
WBC # BLD: 5.76 K/UL — SIGNIFICANT CHANGE UP (ref 3.8–10.5)
WBC # FLD AUTO: 5.76 K/UL — SIGNIFICANT CHANGE UP (ref 3.8–10.5)

## 2025-05-25 PROCEDURE — 99239 HOSP IP/OBS DSCHRG MGMT >30: CPT

## 2025-05-25 RX ORDER — CEFPODOXIME PROXETIL 200 MG/1
1 TABLET, FILM COATED ORAL
Qty: 8 | Refills: 0
Start: 2025-05-25 | End: 2025-05-28

## 2025-05-25 RX ORDER — TAMSULOSIN HYDROCHLORIDE 0.4 MG/1
1 CAPSULE ORAL
Qty: 14 | Refills: 0
Start: 2025-05-25 | End: 2025-06-07

## 2025-05-25 RX ORDER — CEFPODOXIME PROXETIL 200 MG/1
1 TABLET, FILM COATED ORAL
Qty: 0 | Refills: 0 | DISCHARGE

## 2025-05-25 RX ADMIN — CEFTRIAXONE 100 MILLIGRAM(S): 500 INJECTION, POWDER, FOR SOLUTION INTRAMUSCULAR; INTRAVENOUS at 00:08

## 2025-05-25 RX ADMIN — ATORVASTATIN CALCIUM 40 MILLIGRAM(S): 80 TABLET, FILM COATED ORAL at 00:03

## 2025-05-25 RX ADMIN — Medication 81 MILLIGRAM(S): at 11:14

## 2025-05-25 RX ADMIN — LOSARTAN POTASSIUM 25 MILLIGRAM(S): 100 TABLET, FILM COATED ORAL at 07:47

## 2025-05-25 RX ADMIN — BUTYROSPERMUM PARKII(SHEA BUTTER), SIMMONDSIA CHINENSIS (JOJOBA) SEED OIL, ALOE BARBADENSIS LEAF EXTRACT 250 MILLIGRAM(S): .01; 1; 3.5 LIQUID TOPICAL at 07:46

## 2025-05-25 RX ADMIN — FUROSEMIDE 20 MILLIGRAM(S): 10 INJECTION INTRAMUSCULAR; INTRAVENOUS at 07:46

## 2025-05-25 RX ADMIN — Medication 50 MICROGRAM(S): at 07:46

## 2025-05-25 RX ADMIN — APIXABAN 5 MILLIGRAM(S): 2.5 TABLET, FILM COATED ORAL at 07:46

## 2025-05-25 RX ADMIN — TAMSULOSIN HYDROCHLORIDE 0.4 MILLIGRAM(S): 0.4 CAPSULE ORAL at 00:04
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
## 2025-05-25 NOTE — DISCHARGE NOTE NURSING/CASE MANAGEMENT/SOCIAL WORK - FINANCIAL ASSISTANCE
[facility] provides services at a reduced cost to those who are determined to be eligible through [facility]’s financial assistance program. Information regarding [facility]’s financial assistance program can be found by going to [email or web address] or by calling [phone].

## 2025-05-25 NOTE — CARE COORDINATION ASSESSMENT. - OTHER PERTINENT DISCHARGE PLANNING INFORMATION:
Met patient at bedside.  Explained role of CM, verbalized understanding. Pt was made aware a CM will remain available through hospitalization.  Contact information given in discharge/ transitions resource folder. Patient lives in a private home with spouse. There are 3 [place] and 1 flight to main level. Patient has a RW and cane from previous surgery but is independent with ambulation/ADLs and does not use them. No homecare/aide services in place. Patient's spouse will transport her home. No discharge needs identified.

## 2025-05-25 NOTE — DISCHARGE NOTE NURSING/CASE MANAGEMENT/SOCIAL WORK - NSDCPEFALRISK_GEN_ALL_CORE
For information on Fall & Injury Prevention, visit: [email or web address] OR  [email or web address] OR  https://www.cdc.gov/steadi/patient.html

## 2025-05-25 NOTE — CARE COORDINATION ASSESSMENT. - NSCAREPROVIDERS_GEN_ALL_CORE_FT
CARE PROVIDERS:  Accepting Physician: Reynold Eaton  Access Services: Jessica Nieves  Administration: Jose Castorena  Administration: Preet Cazares  Admitting: Reynold Eaton  Attending: Tash Sewell  Consultant: Radhika Marley  Consultant: Isidra Ghosh  Consultant: Jennifer Nelson  Consultant: Isidra Mayo  Consultant: Joellen Caldera  Consultant: Caroline Perkins  Consultant: Jame Alarcon  Covering Team: Katelyn Mcbride  ED Attending: Geo Vickers  ED Nurse: Monica Trevino  Nurse: Jacinta Bryant  Nurse: Marce Harris  Nurse: Rebeca Hernandez  Nurse: Behringer, Megan  Outpatient Provider: Justino Butler  PCA/Nursing Assistant: Mae Aguilar  Primary Team: Nelson Marks  Primary Team: Tash Sewell  Primary Team: Oz Wu  Primary Team: Bryan Vital  Primary Team: Lisa Dash  Primary Team: Reynold Eaton  Registered Dietitian: Cheri Truong  Research: Natividad Colon  Team: [facility], Team

## 2025-05-25 NOTE — DISCHARGE NOTE NURSING/CASE MANAGEMENT/SOCIAL WORK - PATIENT PORTAL LINK FT
You can access the FollowMyHealth Patient Portal offered by [facility] by registering at the following website: [email or web address]. By joining [employer]’s FollowMyHealth portal, you will also be able to view your health information using other applications (apps) compatible with our system.

## 2025-05-27 ENCOUNTER — NON-APPOINTMENT (OUTPATIENT)
Age: 80
End: 2025-05-27

## 2025-05-28 LAB
CULTURE RESULTS: SIGNIFICANT CHANGE UP
CULTURE RESULTS: SIGNIFICANT CHANGE UP
SPECIMEN SOURCE: SIGNIFICANT CHANGE UP
SPECIMEN SOURCE: SIGNIFICANT CHANGE UP

## 2025-05-28 RX ORDER — TAMSULOSIN HYDROCHLORIDE 0.4 MG/1
0.4 CAPSULE ORAL DAILY
Refills: 0 | Status: ACTIVE | COMMUNITY

## 2025-05-28 RX ORDER — CEFPODOXIME PROXETIL 200 MG/1
200 TABLET, FILM COATED ORAL TWICE DAILY
Refills: 0 | Status: ACTIVE | COMMUNITY

## 2025-05-29 ENCOUNTER — APPOINTMENT (OUTPATIENT)
Dept: CARDIOLOGY | Facility: CLINIC | Age: 80
End: 2025-05-29
Payer: MEDICARE

## 2025-05-29 ENCOUNTER — NON-APPOINTMENT (OUTPATIENT)
Age: 80
End: 2025-05-29

## 2025-05-29 VITALS
HEIGHT: 64 IN | OXYGEN SATURATION: 96 % | WEIGHT: 136 LBS | SYSTOLIC BLOOD PRESSURE: 192 MMHG | DIASTOLIC BLOOD PRESSURE: 83 MMHG | BODY MASS INDEX: 23.22 KG/M2 | HEART RATE: 50 BPM

## 2025-05-29 VITALS — DIASTOLIC BLOOD PRESSURE: 63 MMHG | SYSTOLIC BLOOD PRESSURE: 168 MMHG

## 2025-05-29 DIAGNOSIS — I48.0 PAROXYSMAL ATRIAL FIBRILLATION: ICD-10-CM

## 2025-05-29 DIAGNOSIS — I42.9 CARDIOMYOPATHY, UNSPECIFIED: ICD-10-CM

## 2025-05-29 DIAGNOSIS — I25.10 ATHEROSCLEROTIC HEART DISEASE OF NATIVE CORONARY ARTERY W/OUT ANGINA PECTORIS: ICD-10-CM

## 2025-05-29 DIAGNOSIS — I10 ESSENTIAL (PRIMARY) HYPERTENSION: ICD-10-CM

## 2025-05-29 PROCEDURE — 93000 ELECTROCARDIOGRAM COMPLETE: CPT

## 2025-05-29 PROCEDURE — 99214 OFFICE O/P EST MOD 30 MIN: CPT

## 2025-05-29 PROCEDURE — G2211 COMPLEX E/M VISIT ADD ON: CPT

## 2025-06-02 ENCOUNTER — NON-APPOINTMENT (OUTPATIENT)
Age: 80
End: 2025-06-02

## 2025-06-02 PROCEDURE — 36415 COLL VENOUS BLD VENIPUNCTURE: CPT

## 2025-06-02 PROCEDURE — 80061 LIPID PANEL: CPT

## 2025-06-02 PROCEDURE — 85027 COMPLETE CBC AUTOMATED: CPT

## 2025-06-02 PROCEDURE — 85025 COMPLETE CBC W/AUTO DIFF WBC: CPT

## 2025-06-02 PROCEDURE — 83735 ASSAY OF MAGNESIUM: CPT

## 2025-06-02 PROCEDURE — 83036 HEMOGLOBIN GLYCOSYLATED A1C: CPT

## 2025-06-02 PROCEDURE — 99285 EMERGENCY DEPT VISIT HI MDM: CPT | Mod: 25

## 2025-06-02 PROCEDURE — 84100 ASSAY OF PHOSPHORUS: CPT

## 2025-06-02 PROCEDURE — 80053 COMPREHEN METABOLIC PANEL: CPT

## 2025-06-02 PROCEDURE — 71045 X-RAY EXAM CHEST 1 VIEW: CPT

## 2025-06-02 PROCEDURE — 84443 ASSAY THYROID STIM HORMONE: CPT

## 2025-06-02 PROCEDURE — 0225U NFCT DS DNA&RNA 21 SARSCOV2: CPT

## 2025-06-05 ENCOUNTER — NON-APPOINTMENT (OUTPATIENT)
Age: 80
End: 2025-06-05

## 2025-06-09 ENCOUNTER — TRANSCRIPTION ENCOUNTER (OUTPATIENT)
Age: 80
End: 2025-06-09

## 2025-06-23 ENCOUNTER — APPOINTMENT (OUTPATIENT)
Age: 80
End: 2025-06-23
Payer: MEDICARE

## 2025-06-23 ENCOUNTER — LABORATORY RESULT (OUTPATIENT)
Age: 80
End: 2025-06-23

## 2025-06-23 VITALS — SYSTOLIC BLOOD PRESSURE: 187 MMHG | HEART RATE: 50 BPM | DIASTOLIC BLOOD PRESSURE: 79 MMHG

## 2025-06-23 PROBLEM — N13.30 HYDRONEPHROSIS OF LEFT KIDNEY: Status: ACTIVE | Noted: 2025-06-23

## 2025-06-23 PROCEDURE — 99214 OFFICE O/P EST MOD 30 MIN: CPT

## 2025-06-24 LAB
APPEARANCE: CLEAR
BILIRUBIN URINE: NEGATIVE
BLOOD URINE: ABNORMAL
COLOR: YELLOW
GLUCOSE QUALITATIVE U: NEGATIVE MG/DL
KETONES URINE: NEGATIVE MG/DL
LEUKOCYTE ESTERASE URINE: NEGATIVE
NITRITE URINE: NEGATIVE
PH URINE: 6
PROTEIN URINE: NEGATIVE MG/DL
SPECIFIC GRAVITY URINE: 1.01
UROBILINOGEN URINE: 0.2 MG/DL

## 2025-06-25 ENCOUNTER — OUTPATIENT (OUTPATIENT)
Dept: OUTPATIENT SERVICES | Facility: HOSPITAL | Age: 80
LOS: 1 days | End: 2025-06-25

## 2025-06-25 ENCOUNTER — APPOINTMENT (OUTPATIENT)
Dept: CT IMAGING | Facility: CLINIC | Age: 80
End: 2025-06-25

## 2025-06-25 DIAGNOSIS — N20.1 CALCULUS OF URETER: ICD-10-CM

## 2025-06-25 DIAGNOSIS — Z96.659 PRESENCE OF UNSPECIFIED ARTIFICIAL KNEE JOINT: Chronic | ICD-10-CM

## 2025-06-25 DIAGNOSIS — Z95.2 PRESENCE OF PROSTHETIC HEART VALVE: Chronic | ICD-10-CM

## 2025-06-25 DIAGNOSIS — Z98.89 OTHER SPECIFIED POSTPROCEDURAL STATES: Chronic | ICD-10-CM

## 2025-06-25 DIAGNOSIS — Z96.7 PRESENCE OF OTHER BONE AND TENDON IMPLANTS: Chronic | ICD-10-CM

## 2025-06-25 DIAGNOSIS — N13.30 UNSPECIFIED HYDRONEPHROSIS: ICD-10-CM

## 2025-06-25 PROCEDURE — 74176 CT ABD & PELVIS W/O CONTRAST: CPT | Mod: 26

## 2025-06-25 PROCEDURE — 74176 CT ABD & PELVIS W/O CONTRAST: CPT

## 2025-07-03 ENCOUNTER — OUTPATIENT (OUTPATIENT)
Dept: OUTPATIENT SERVICES | Facility: HOSPITAL | Age: 80
LOS: 1 days | End: 2025-07-03

## 2025-07-03 VITALS
HEART RATE: 57 BPM | DIASTOLIC BLOOD PRESSURE: 79 MMHG | RESPIRATION RATE: 16 BRPM | HEIGHT: 64 IN | OXYGEN SATURATION: 97 % | WEIGHT: 136.03 LBS | SYSTOLIC BLOOD PRESSURE: 173 MMHG | TEMPERATURE: 98 F

## 2025-07-03 DIAGNOSIS — Z96.659 PRESENCE OF UNSPECIFIED ARTIFICIAL KNEE JOINT: Chronic | ICD-10-CM

## 2025-07-03 DIAGNOSIS — Z96.7 PRESENCE OF OTHER BONE AND TENDON IMPLANTS: Chronic | ICD-10-CM

## 2025-07-03 DIAGNOSIS — I48.91 UNSPECIFIED ATRIAL FIBRILLATION: ICD-10-CM

## 2025-07-03 DIAGNOSIS — I48.0 PAROXYSMAL ATRIAL FIBRILLATION: ICD-10-CM

## 2025-07-03 DIAGNOSIS — Z98.89 OTHER SPECIFIED POSTPROCEDURAL STATES: Chronic | ICD-10-CM

## 2025-07-03 DIAGNOSIS — Z95.2 PRESENCE OF PROSTHETIC HEART VALVE: Chronic | ICD-10-CM

## 2025-07-03 LAB
ANION GAP SERPL CALC-SCNC: 9 MMOL/L — SIGNIFICANT CHANGE UP (ref 7–14)
BASOPHILS # BLD AUTO: 0.06 K/UL — SIGNIFICANT CHANGE UP (ref 0–0.2)
BASOPHILS NFR BLD AUTO: 1.5 % — SIGNIFICANT CHANGE UP (ref 0–2)
BLD GP AB SCN SERPL QL: NEGATIVE — SIGNIFICANT CHANGE UP
BUN SERPL-MCNC: 19 MG/DL — SIGNIFICANT CHANGE UP (ref 7–23)
CALCIUM SERPL-MCNC: 9.1 MG/DL — SIGNIFICANT CHANGE UP (ref 8.4–10.5)
CHLORIDE SERPL-SCNC: 104 MMOL/L — SIGNIFICANT CHANGE UP (ref 98–107)
CO2 SERPL-SCNC: 26 MMOL/L — SIGNIFICANT CHANGE UP (ref 22–31)
CREAT SERPL-MCNC: 1.05 MG/DL — SIGNIFICANT CHANGE UP (ref 0.5–1.3)
EGFR: 54 ML/MIN/1.73M2 — LOW
EGFR: 54 ML/MIN/1.73M2 — LOW
EOSINOPHIL # BLD AUTO: 0.28 K/UL — SIGNIFICANT CHANGE UP (ref 0–0.5)
EOSINOPHIL NFR BLD AUTO: 7 % — HIGH (ref 0–6)
GLUCOSE SERPL-MCNC: 87 MG/DL — SIGNIFICANT CHANGE UP (ref 70–99)
HCT VFR BLD CALC: 36.3 % — SIGNIFICANT CHANGE UP (ref 34.5–45)
HGB BLD-MCNC: 11.7 G/DL — SIGNIFICANT CHANGE UP (ref 11.5–15.5)
IMM GRANULOCYTES NFR BLD AUTO: 1 % — HIGH (ref 0–0.9)
LYMPHOCYTES # BLD AUTO: 0.6 K/UL — LOW (ref 1–3.3)
LYMPHOCYTES # BLD AUTO: 15 % — SIGNIFICANT CHANGE UP (ref 13–44)
MCHC RBC-ENTMCNC: 30.8 PG — SIGNIFICANT CHANGE UP (ref 27–34)
MCHC RBC-ENTMCNC: 32.2 G/DL — SIGNIFICANT CHANGE UP (ref 32–36)
MCV RBC AUTO: 95.5 FL — SIGNIFICANT CHANGE UP (ref 80–100)
MONOCYTES # BLD AUTO: 0.44 K/UL — SIGNIFICANT CHANGE UP (ref 0–0.9)
MONOCYTES NFR BLD AUTO: 11 % — SIGNIFICANT CHANGE UP (ref 2–14)
NEUTROPHILS # BLD AUTO: 2.58 K/UL — SIGNIFICANT CHANGE UP (ref 1.8–7.4)
NEUTROPHILS NFR BLD AUTO: 64.5 % — SIGNIFICANT CHANGE UP (ref 43–77)
PLATELET # BLD AUTO: 224 K/UL — SIGNIFICANT CHANGE UP (ref 150–400)
POTASSIUM SERPL-MCNC: 3.9 MMOL/L — SIGNIFICANT CHANGE UP (ref 3.5–5.3)
POTASSIUM SERPL-SCNC: 3.9 MMOL/L — SIGNIFICANT CHANGE UP (ref 3.5–5.3)
RBC # BLD: 3.8 M/UL — SIGNIFICANT CHANGE UP (ref 3.8–5.2)
RBC # FLD: 14.8 % — HIGH (ref 10.3–14.5)
RH IG SCN BLD-IMP: POSITIVE — SIGNIFICANT CHANGE UP
RH IG SCN BLD-IMP: POSITIVE — SIGNIFICANT CHANGE UP
SODIUM SERPL-SCNC: 139 MMOL/L — SIGNIFICANT CHANGE UP (ref 135–145)
WBC # BLD: 4 K/UL — SIGNIFICANT CHANGE UP (ref 3.8–10.5)
WBC # FLD AUTO: 4 K/UL — SIGNIFICANT CHANGE UP (ref 3.8–10.5)

## 2025-07-03 NOTE — H&P PST ADULT - CARDIOVASCULAR COMMENTS
Hx a-fib  Dx Hx a-fib  with worsening SOB with exertion - hx cardiac surgery  2012 - #31 bovine pericardial valve and 2023 TMVR #29 Calix mitral valve

## 2025-07-03 NOTE — H&P PST ADULT - PROBLEM SELECTOR PLAN 1
Pt scheduled for surgery and preop instructions including instructions  for  the day of surgery, given verbally and with use of  written materials, and patient confirming understanding of such instructions using  teach back method.  Reviewed EP preop instructions with pt - confirmed understanding

## 2025-07-03 NOTE — H&P PST ADULT - LAST ECHOCARDIOGRAM
2/24 -Transcath MV replacement - mean MV gradient 3mmHg, severe LAE, mildly decreased LV systolic function , normal RV size and function - LVEF 55-60%

## 2025-07-03 NOTE — H&P PST ADULT - COMMENTS
Pt denies loose teeth - permanent bridges - to have root canal upper right 8/25 - on abx now Pt denies loose teeth - permanent bridges - to have root canal upper right 8/25 - on abx now  Mallampati 4

## 2025-07-03 NOTE — H&P PST ADULT - GENITOURINARY COMMENTS
Hx recent finding of kidney stone left - procedure postponed so pt could be evaluated by Uro - procedure to remove stone to be done 8/25 - pt reports occasional left flank pain and hematuria  - not examined

## 2025-07-03 NOTE — H&P PST ADULT - HEMATOLOGY/LYMPHATICS
Opt out Detail Level: Detailed Quality 111:Pneumonia Vaccination Status For Older Adults: Pneumococcal Vaccination Previously Received Quality 110: Preventive Care And Screening: Influenza Immunization: Influenza Immunization previously received during influenza season Quality 226: Preventive Care And Screening: Tobacco Use: Screening And Cessation Intervention: Patient screened for tobacco use and is an ex/non-smoker details…

## 2025-07-03 NOTE — H&P PST ADULT - PATIENT'S SEXUAL ORIENTATION
See Flowsheets for intravenous information.     Definity per MD order.  Definity: Lot: [id number]; Exp 01/01/2023; NDC 04123-817-92, Amount given: 1.5mL perflutren (Definity) mixed in 8.5mL Normal Saline per  direction. A total of 2ml of Definity solution was infused.    Unknown

## 2025-07-03 NOTE — H&P PST ADULT - NSICDXPASTMEDICALHX_GEN_ALL_CORE_FT
PAST MEDICAL HISTORY:  Dental caries     DVT, lower extremity     HLD (hyperlipidemia)     Hypertension     Hypothyroidism     Kidney stone     Osteoarthrosis     Paroxysmal atrial fibrillation     Seasonal allergies     Vitamin B12 deficiency

## 2025-07-03 NOTE — H&P PST ADULT - HISTORY OF PRESENT ILLNESS
79F PMH HTN, HLD, AF on eliquis, hypothyroidism transferred from  ED for infected L ureteral stone. CT showed 6mm stone at [facility] w/ mild hydro. Patient initially had presented to [place] ED with hematuria since 5/19. Patient also had L. ear pain along with sinus congestion. Patient currently has no abdominal pain, back pain, or dysuria. Does admit to mild headache and L.ear pain. No sick contacts. Of note, patient was supposed to go for ablation w/ Dr. Arreola at [facility]. Patient's last dose of eliquis was this morning. Patient took all her scheduled blood pressure medications. States her BP at home is usually in the 110s but feels stressed currently. Repeat BP at bedside 161/89.     IN THE ED:  Temp  97.7 F , HR 67  ,  /72  ,RR 18 , SpO2 97% RA   S/P Rocephin VI x1, NS IV 1950cc Bolus IV x1, Tylenol 650mg PO x1, Lopressor 25mg PO x1   EKG: Sinus bradycardia, VR 55bpm, QTC 507ms   Labs significant for: WBC 8.9, BUN/Cr 20/1.09, lactate .8, UA mod le, wbc 45, >50 blood, few bacteria, blood cultures x2 specimen received   Imaging: CT renal stone hunt: Left urolithiasis with a 6 mm left UPJ stone causing mild left hydronephrosis. Pt is a 79 yr old female scheduled for A-fib Ablation with Dr Arreola 7/10/25 - Pt with hx paroxysmal a-fib s/p DCCV 2023 and recent recurrent episode 4/25 - pt hx MVR 2012, revision 2023 , HTN, HLD - Pt dx with Afib 2023 post surgery / on Eliquis and seen in ER for dizziness and chest discomfort with findings of A-fib with RVR - pt scheduled for ablation and was cancelled 6/2/25 for new findings of kidney stone with mild hydronephrosis and hematuria - pt seen by Uro and will have stone removed 8/25 - presently denies symptoms at this time - pt also scheduled for root canal 8/25 and is on preventative abx at this time   New EP preop instructions given to pt and reviewed      Pt is a 79 yr old female scheduled for A-fib Ablation with Dr Arreola 7/10/25 - Pt with hx paroxysmal a-fib s/p DCCV 2023 and recent recurrent episode 4/25 - pt hx MVR 2012, revision 2023 , HTN, HLD - Pt dx with Afib 2023 post surgery / on Eliquis and seen in ER for dizziness and chest discomfort with findings of A-fib with RVR - pt scheduled for ablation and was cancelled 6/2/25 for new findings of kidney stone with mild hydronephrosis and hematuria - pt seen by Uro and will have stone removed 8/25 - presently denies symptoms at this time - pt also scheduled for root canal 8/25 and is on preventative abx at this time   New EP preop instructions given to pt by EP and reviewed in PST

## 2025-07-03 NOTE — H&P PST ADULT - ANESTHESIA, PREVIOUS REACTION, PROFILE
Pt states anesthesia has needed small endo tube for intubation  in past surgeries Pt states she has been told that anesthesia has needed "small endo tube for intubation"  in past surgeries - mallampati 4

## 2025-07-09 NOTE — ASU PATIENT PROFILE, ADULT - FALL HARM RISK - HARM RISK INTERVENTIONS

## 2025-07-10 ENCOUNTER — INPATIENT (INPATIENT)
Facility: HOSPITAL | Age: 80
LOS: 0 days | Discharge: HOME | End: 2025-07-11
Attending: STUDENT IN AN ORGANIZED HEALTH CARE EDUCATION/TRAINING PROGRAM | Admitting: STUDENT IN AN ORGANIZED HEALTH CARE EDUCATION/TRAINING PROGRAM
Payer: MEDICARE

## 2025-07-10 ENCOUNTER — APPOINTMENT (OUTPATIENT)
Dept: ELECTROPHYSIOLOGY | Facility: CLINIC | Age: 80
End: 2025-07-10

## 2025-07-10 VITALS
DIASTOLIC BLOOD PRESSURE: 70 MMHG | HEART RATE: 57 BPM | RESPIRATION RATE: 14 BRPM | HEIGHT: 64 IN | WEIGHT: 136.03 LBS | OXYGEN SATURATION: 98 % | TEMPERATURE: 97 F | SYSTOLIC BLOOD PRESSURE: 160 MMHG

## 2025-07-10 DIAGNOSIS — I48.0 PAROXYSMAL ATRIAL FIBRILLATION: ICD-10-CM

## 2025-07-10 DIAGNOSIS — Z96.7 PRESENCE OF OTHER BONE AND TENDON IMPLANTS: Chronic | ICD-10-CM

## 2025-07-10 DIAGNOSIS — Z95.2 PRESENCE OF PROSTHETIC HEART VALVE: Chronic | ICD-10-CM

## 2025-07-10 DIAGNOSIS — Z96.659 PRESENCE OF UNSPECIFIED ARTIFICIAL KNEE JOINT: Chronic | ICD-10-CM

## 2025-07-10 DIAGNOSIS — Z98.89 OTHER SPECIFIED POSTPROCEDURAL STATES: Chronic | ICD-10-CM

## 2025-07-10 PROCEDURE — 93655 ICAR CATH ABLTJ DSCRT ARRHYT: CPT

## 2025-07-10 PROCEDURE — 93656 COMPRE EP EVAL ABLTJ ATR FIB: CPT

## 2025-07-10 PROCEDURE — 93010 ELECTROCARDIOGRAM REPORT: CPT

## 2025-07-10 PROCEDURE — 93010 ELECTROCARDIOGRAM REPORT: CPT | Mod: 77

## 2025-07-10 PROCEDURE — 93657 TX L/R ATRIAL FIB ADDL: CPT

## 2025-07-10 RX ORDER — LEVOTHYROXINE SODIUM 300 MCG
50 TABLET ORAL DAILY
Refills: 0 | Status: DISCONTINUED | OUTPATIENT
Start: 2025-07-10 | End: 2025-07-11

## 2025-07-10 RX ORDER — METOPROLOL SUCCINATE 50 MG/1
25 TABLET, EXTENDED RELEASE ORAL AT BEDTIME
Refills: 0 | Status: DISCONTINUED | OUTPATIENT
Start: 2025-07-10 | End: 2025-07-11

## 2025-07-10 RX ORDER — FUROSEMIDE 10 MG/ML
20 INJECTION INTRAMUSCULAR; INTRAVENOUS DAILY
Refills: 0 | Status: DISCONTINUED | OUTPATIENT
Start: 2025-07-11 | End: 2025-07-11

## 2025-07-10 RX ORDER — LOSARTAN POTASSIUM 100 MG/1
25 TABLET, FILM COATED ORAL DAILY
Refills: 0 | Status: DISCONTINUED | OUTPATIENT
Start: 2025-07-10 | End: 2025-07-11

## 2025-07-10 RX ORDER — TAMSULOSIN HYDROCHLORIDE 0.4 MG/1
0.4 CAPSULE ORAL AT BEDTIME
Refills: 0 | Status: DISCONTINUED | OUTPATIENT
Start: 2025-07-10 | End: 2025-07-11

## 2025-07-10 RX ORDER — ASPIRIN 325 MG
81 TABLET ORAL DAILY
Refills: 0 | Status: DISCONTINUED | OUTPATIENT
Start: 2025-07-10 | End: 2025-07-11

## 2025-07-10 RX ORDER — ATORVASTATIN CALCIUM 80 MG/1
40 TABLET, FILM COATED ORAL AT BEDTIME
Refills: 0 | Status: DISCONTINUED | OUTPATIENT
Start: 2025-07-10 | End: 2025-07-11

## 2025-07-10 RX ORDER — AMIODARONE HYDROCHLORIDE 50 MG/ML
200 INJECTION, SOLUTION INTRAVENOUS DAILY
Refills: 0 | Status: DISCONTINUED | OUTPATIENT
Start: 2025-07-10 | End: 2025-07-11

## 2025-07-10 RX ORDER — AMOXICILLIN 500 MG/1
500 CAPSULE ORAL THREE TIMES A DAY
Refills: 0 | Status: DISCONTINUED | OUTPATIENT
Start: 2025-07-10 | End: 2025-07-11

## 2025-07-10 RX ORDER — APIXABAN 5 MG/1
5 TABLET, FILM COATED ORAL
Refills: 0 | Status: DISCONTINUED | OUTPATIENT
Start: 2025-07-10 | End: 2025-07-11

## 2025-07-10 RX ADMIN — Medication 3 MILLILITER(S): at 21:47

## 2025-07-10 RX ADMIN — AMOXICILLIN 500 MILLIGRAM(S): 500 CAPSULE ORAL at 22:46

## 2025-07-10 RX ADMIN — APIXABAN 5 MILLIGRAM(S): 5 TABLET, FILM COATED ORAL at 22:46

## 2025-07-10 RX ADMIN — TAMSULOSIN HYDROCHLORIDE 0.4 MILLIGRAM(S): 0.4 CAPSULE ORAL at 21:55

## 2025-07-10 RX ADMIN — ATORVASTATIN CALCIUM 40 MILLIGRAM(S): 80 TABLET, FILM COATED ORAL at 21:55

## 2025-07-10 NOTE — CHART NOTE - NSCHARTNOTEFT_GEN_A_CORE
ACP MEDICINE NIGHT COVERAGE    Patient seen at bedside status post cath via R femoral artery. Site clean, dry and intact. No bleeding, underlying hematoma, or erythema. Patient denies chest pain, SOB, numbness/weakness/tingling. Pulses present, capillary refill appropriate. Patient educated and understands if any of the above symptoms were to arise, to notify RN. Will continue to monitor closely.    T(C): 36.5 (07-10-25 @ 20:00), Max: 36.9 (07-10-25 @ 18:43)  HR: 58 (07-10-25 @ 20:00) (55 - 64)  BP: 142/54 (07-10-25 @ 20:00) (123/61 - 160/70)  RR: 17 (07-10-25 @ 20:00) (13 - 17)  SpO2: 97% (07-10-25 @ 20:00) (97% - 100%)    Rogelio Dominguez [facility]  Med[facility]   [id number]

## 2025-07-10 NOTE — ASU DISCHARGE PLAN (ADULT/PEDIATRIC) - ASU DC SPECIAL INSTRUCTIONSFT
Patient is for ablation  with Dr. Arreola.  Teaching provided to patient regarding right groin site care.  Right groin without hematoma, ecchymosis, drainage, pain, or bleeding.    - may shower after 24 hrs, otherwise keep groin incision sites dry and clean.    - Avoid activities such as jogging/excessive stair climbing/weight lifting for the next 7 days    - Take Acetaminophen (Tylenol) 500mg, one to two tablets every 8 hours as needed for pain relief.    - Pt was instructed to call [phone] if the following occurs:      - fever with temperature > 101      - swelling or bleeding at the groin incision site(s)  - Outpatient F/U is scheduled with Dr. Arreola on 8/7/25 @ 10:15 am    All questions answered to patient's satisfaction.  Follow up letter and instructions left in the care of the patient.

## 2025-07-10 NOTE — CHART NOTE - NSCHARTNOTEFT_GEN_A_CORE
In brief this is a 78 y/o F with PMH of Afib(on Eliquis and s/p DCCV in 2023), MVR(in 2012 and then revision in 2023), RLE DVT in 2023(on Eliquis), HTN, HLD, Hypothyroidism presented to [facility] for Afib ablation. Patient was scheduled for Afib ablation and was cancelled on 6/2/25 for new findings of kidney stone with mild hydronephrosis and hematuria. Patient followed up with Urology to have stone removed mid August. Patient also scheduled for root canal 8/25 and is on preventative abx at this time with amoxicillin. Patient still endorsed of DUNCAN and SOB when she would exert herself. Patient otherwise denied any CP or palpitations. Reviewed medication list with patient and updated on patient's chart. Explained about the procedure in detail to the patient and all risks/benefits of the procedure explained and the patient understood and signed all the consents. Reviewed all scripts note and pre-surgical testing H&P. Patient last took her Eliquis today morning.     EKG: In brief this is a 80 y/o F with PMH of Afib(on Eliquis and s/p DCCV in 2023), MVR(in 2012 and then revision in 2023), RLE DVT in 2023(on Eliquis), HTN, HLD, Hypothyroidism presented to [facility] for Afib ablation. Patient was scheduled for Afib ablation and was cancelled on 6/2/25 for new findings of kidney stone with mild hydronephrosis and hematuria. Patient followed up with Urology to have stone removed mid August. Patient also scheduled for root canal 8/25 and is on preventative abx at this time with amoxicillin. Patient still endorsed of DUNCAN and SOB when she would exert herself. Patient otherwise denied any CP or palpitations. Reviewed medication list with patient and updated on patient's chart. Explained about the procedure in detail to the patient and all risks/benefits of the procedure explained and the patient understood and signed all the consents. Reviewed all scripts note and pre-surgical testing H&P. Patient last took her Eliquis today morning.     EKG: Sinus bradycardia at a rate of 53 with occasional PVC at a rate of 506

## 2025-07-10 NOTE — ASU DISCHARGE PLAN (ADULT/PEDIATRIC) - FINANCIAL ASSISTANCE
[facility] provides services at a reduced cost to those who are determined to be eligible through [facility]’s financial assistance program. Information regarding [facility]’s financial assistance program can be found by going to [email or web address] or by calling [phone].

## 2025-07-10 NOTE — PATIENT PROFILE ADULT - FALL HARM RISK - HARM RISK INTERVENTIONS
Assistance with ambulation/Assistance OOB with selected safe patient handling equipment/Communicate Risk of Fall with Harm to all staff/Discuss with provider need for PT consult/Monitor gait and stability/Provide patient with walking aids - walker, cane, crutches/Reinforce activity limits and safety measures with patient and family/Sit up slowly, dangle for a short time, stand at bedside before walking/Tailored Fall Risk Interventions/Use of alarms - bed, chair and/or voice tab/Visual Cue: Yellow wristband and red socks/Bed in lowest position, wheels locked, appropriate side rails in place/Call bell, personal items and telephone in reach/Instruct patient to call for assistance before getting out of bed or chair/Non-slip footwear when patient is out of bed/[place] to call system/Physically safe environment - no spills, clutter or unnecessary equipment/Purposeful Proactive Rounding/Room/bathroom lighting operational, light cord in reach

## 2025-07-10 NOTE — CHART NOTE - NSCHARTNOTEFT_GEN_A_CORE
Spoke with Dr. Arreola who recommended that patient had a small hematoma on the right groin which was pressed out in EP lab and still recommended to restart Eliquis tonight if RFV access site is stable. This was relayed to covering ACP.

## 2025-07-11 ENCOUNTER — TRANSCRIPTION ENCOUNTER (OUTPATIENT)
Age: 80
End: 2025-07-11

## 2025-07-11 VITALS
HEART RATE: 60 BPM | DIASTOLIC BLOOD PRESSURE: 50 MMHG | OXYGEN SATURATION: 98 % | TEMPERATURE: 98 F | RESPIRATION RATE: 17 BRPM | SYSTOLIC BLOOD PRESSURE: 119 MMHG

## 2025-07-11 LAB
ANION GAP SERPL CALC-SCNC: 11 MMOL/L — SIGNIFICANT CHANGE UP (ref 7–14)
BUN SERPL-MCNC: 14 MG/DL — SIGNIFICANT CHANGE UP (ref 7–23)
CALCIUM SERPL-MCNC: 8.1 MG/DL — LOW (ref 8.4–10.5)
CHLORIDE SERPL-SCNC: 105 MMOL/L — SIGNIFICANT CHANGE UP (ref 98–107)
CO2 SERPL-SCNC: 24 MMOL/L — SIGNIFICANT CHANGE UP (ref 22–31)
CREAT SERPL-MCNC: 0.94 MG/DL — SIGNIFICANT CHANGE UP (ref 0.5–1.3)
EGFR: 62 ML/MIN/1.73M2 — SIGNIFICANT CHANGE UP
EGFR: 62 ML/MIN/1.73M2 — SIGNIFICANT CHANGE UP
GLUCOSE SERPL-MCNC: 89 MG/DL — SIGNIFICANT CHANGE UP (ref 70–99)
HCT VFR BLD CALC: 32.3 % — LOW (ref 34.5–45)
HGB BLD-MCNC: 10.4 G/DL — LOW (ref 11.5–15.5)
MAGNESIUM SERPL-MCNC: 1.8 MG/DL — SIGNIFICANT CHANGE UP (ref 1.6–2.6)
MCHC RBC-ENTMCNC: 30.1 PG — SIGNIFICANT CHANGE UP (ref 27–34)
MCHC RBC-ENTMCNC: 32.2 G/DL — SIGNIFICANT CHANGE UP (ref 32–36)
MCV RBC AUTO: 93.4 FL — SIGNIFICANT CHANGE UP (ref 80–100)
NRBC # BLD AUTO: 0 K/UL — SIGNIFICANT CHANGE UP (ref 0–0)
NRBC # FLD: 0 K/UL — SIGNIFICANT CHANGE UP (ref 0–0)
NRBC BLD AUTO-RTO: 0 /100 WBCS — SIGNIFICANT CHANGE UP (ref 0–0)
PLATELET # BLD AUTO: 184 K/UL — SIGNIFICANT CHANGE UP (ref 150–400)
PMV BLD: 10.2 FL — SIGNIFICANT CHANGE UP (ref 7–13)
POTASSIUM SERPL-MCNC: 3.3 MMOL/L — LOW (ref 3.5–5.3)
POTASSIUM SERPL-SCNC: 3.3 MMOL/L — LOW (ref 3.5–5.3)
RBC # BLD: 3.46 M/UL — LOW (ref 3.8–5.2)
RBC # FLD: 14.7 % — HIGH (ref 10.3–14.5)
SODIUM SERPL-SCNC: 140 MMOL/L — SIGNIFICANT CHANGE UP (ref 135–145)
WBC # BLD: 5.28 K/UL — SIGNIFICANT CHANGE UP (ref 3.8–10.5)
WBC # FLD AUTO: 5.28 K/UL — SIGNIFICANT CHANGE UP (ref 3.8–10.5)

## 2025-07-11 PROCEDURE — 99233 SBSQ HOSP IP/OBS HIGH 50: CPT | Mod: FS

## 2025-07-11 RX ORDER — FUROSEMIDE 10 MG/ML
1 INJECTION INTRAMUSCULAR; INTRAVENOUS
Qty: 3 | Refills: 0
Start: 2025-07-11 | End: 2025-07-13

## 2025-07-11 RX ADMIN — FUROSEMIDE 20 MILLIGRAM(S): 10 INJECTION INTRAMUSCULAR; INTRAVENOUS at 05:29

## 2025-07-11 RX ADMIN — LOSARTAN POTASSIUM 25 MILLIGRAM(S): 100 TABLET, FILM COATED ORAL at 05:30

## 2025-07-11 RX ADMIN — Medication 3 MILLILITER(S): at 05:27

## 2025-07-11 RX ADMIN — APIXABAN 5 MILLIGRAM(S): 5 TABLET, FILM COATED ORAL at 05:30

## 2025-07-11 RX ADMIN — AMOXICILLIN 500 MILLIGRAM(S): 500 CAPSULE ORAL at 05:29

## 2025-07-11 RX ADMIN — Medication 50 MICROGRAM(S): at 05:30

## 2025-07-11 RX ADMIN — Medication 40 MILLIEQUIVALENT(S): at 10:04

## 2025-07-11 RX ADMIN — Medication 81 MILLIGRAM(S): at 11:36

## 2025-07-11 NOTE — DISCHARGE NOTE PROVIDER - NSDCFUSCHEDAPPT_GEN_ALL_CORE_FT
[facility]  PULED [address]  Scheduled Appointment: 07/23/2025    Helder Vaughn  [facility] [address]  Scheduled Appointment: 07/23/2025    Joy Monreal  [facility] PST-[facility]  Scheduled Appointment: 07/23/2025    Justin Arreola  [facility]  ELECTROPH [id number]  Scheduled Appointment: 08/07/2025    Joy Monreal  [facility]  UROLOGY [address]  Scheduled Appointment: 08/13/2025    Joy Monreal  [facility] MOR-[facility]  Scheduled Appointment: 08/13/2025
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
## 2025-07-11 NOTE — DISCHARGE NOTE PROVIDER - NSDCMRMEDTOKEN_GEN_ALL_CORE_FT
amiodarone 200 mg oral tablet: 1 tab(s) orally once a day  amoxicillin 500 mg oral tablet: 1 tab(s) orally 3 times a day  aspirin 81 mg oral delayed release tablet: 1 tab(s) orally once a day  atorvastatin 40 mg oral tablet: 1 tab(s) orally once a day (at bedtime)  Eliquis 5 mg oral tablet: 1 tab(s) orally every 12 hours  furosemide 20 mg oral tablet: 1 tab(s) orally once a day  levothyroxine 50 mcg (0.05 mg) oral tablet: 1 tab(s) orally once a day  Lopressor 50 mg oral tablet: 0.5 tab(s) orally once a day (at bedtime)  losartan 25 mg oral tablet: 1 tab(s) orally once a day  Potassium Chloride (Eqv-Klor-Con M20) 20 mEq oral tablet, extended release: 1 tab(s) orally once a day  tamsulosin 0.4 mg oral capsule: 1 cap(s) orally once a day (at bedtime)

## 2025-07-11 NOTE — DISCHARGE NOTE PROVIDER - CARE PROVIDER_API CALL
Justin Arreola  Clinical Cardiac Electrophysiology  [address] [address] [place] [id number]  Phone: [phone]  Fax: [phone]  Follow Up Time:

## 2025-07-11 NOTE — PROGRESS NOTE ADULT - ASSESSMENT
Patient is a 79 yr old female with hx paroxysmal a-fib s/p DCCV 2023 and recent recurrent episode 4/25, MVR 2012, revision 2023 , HTN, HLD. Patient was diagnosed with Afib 2023 post surgery / on Eliquis and was seen in ER for dizziness and chest discomfort with findings of A-fib with RVR. She was scheduled for ablation and was cancelled 6/2/25 for new findings of kidney stone with mild hydronephrosis and hematuria presented for AFib ablation. S/P AFib ablation yesterday.    Teaching provided to patient regarding right groin site care.  Right groin without hematoma, ecchymosis, drainage, pain, or bleeding.    - may shower after 24 hrs, otherwise keep groin incision sites dry and clean.    - Avoid activities such as jogging/excessive stair climbing/weight lifting for the next 7 days    - Take Acetaminophen (Tylenol) 500mg, one to two tablets every 8 hours as needed for pain relief.    - Pt was instructed to call [phone] if the following occurs:      - fever with temperature > 101      - swelling or bleeding at the groin incision site(s)  - Outpatient F/U is scheduled with Dr. Arreola on 8/7/25 @ 8:15 am    All questions answered to patient's satisfaction.  Follow up letter and instructions left in the care of the patient.          Patient is a 79 yr old female with hx paroxysmal a-fib s/p DCCV 2023 and recent recurrent episode 4/25, MVR 2012, revision 2023 , HTN, HLD. Patient was diagnosed with Afib 2023 post surgery / on Eliquis and was seen in ER for dizziness and chest discomfort with findings of A-fib with RVR. She was scheduled for ablation and was cancelled 6/2/25 for new findings of kidney stone with mild hydronephrosis and hematuria presented for AFib ablation. S/P AFib ablation yesterday.    Teaching provided to patient regarding right groin site care.  Right groin without hematoma, ecchymosis, drainage, pain, or bleeding.    - may shower after 24 hrs, otherwise keep groin incision sites dry and clean.    - Avoid activities such as jogging/excessive stair climbing/weight lifting for the next 7 days    - Take Acetaminophen (Tylenol) 500mg, one to two tablets every 8 hours as needed for pain relief.    - Pt was instructed to call [phone] if the following occurs:      - fever with temperature > 101      - swelling or bleeding at the groin incision site(s)  - Outpatient F/U is scheduled with Dr. Arreola on 8/7/25 @ 8:15 am  All questions answered to patient's satisfaction.  Follow up letter and instructions left in the care of the patient  Continue home medications including AMiodarone for now  Continue AC with apixaban. Discussed with patient regarding importance of uninterrupted anticoagulation and the risk of stroke if she is not on AC. She demonstrates understanding of the instructions.    Lasix 20mg daily x 3days  Potassium chloride 20 meq daily x3 days

## 2025-07-11 NOTE — DISCHARGE NOTE PROVIDER - HOSPITAL COURSE
Patient is a 79 yr old female with hx paroxysmal a-fib s/p DCCV 2023 and recent recurrent episode 4/25, MVR 2012, revision 2023 , HTN, HLD. Patient was diagnosed with Afib 2023 post surgery / on Eliquis and was seen in ER for dizziness and chest discomfort with findings of A-fib with RVR. She was scheduled for ablation and was cancelled 6/2/25 for new findings of kidney stone with mild hydronephrosis and hematuria presented for AFib ablation. S/P AFib ablation yesterday.    Teaching provided by EP team to patient regarding right groin site care.  Right groin without hematoma, ecchymosis, drainage, pain, or bleeding.    - may shower after 24 hrs, otherwise keep groin incision sites dry and clean.    - Avoid activities such as jogging/excessive stair climbing/weight lifting for the next 7 days    - Take Acetaminophen (Tylenol) 500mg, one to two tablets every 8 hours as needed for pain relief.    - Pt was instructed to call [phone] if the following occurs:      - fever with temperature > 101      - swelling or bleeding at the groin incision site(s)  - Outpatient F/U is scheduled with Dr. Arreola on 8/7/25 @ 8:15 am    Continue home medications including AMiodarone for now  Continue AC with apixaban.   Lasix 20mg daily x 3days  Potassium chloride 20 meq daily x3 days      Discussed with EP team patient medically stable and cleared for discharge  on 7/11/25.

## 2025-07-11 NOTE — DISCHARGE NOTE PROVIDER - NSDCCPCAREPLAN_GEN_ALL_CORE_FT
PRINCIPAL DISCHARGE DIAGNOSIS  Diagnosis: Atrial fibrillation  Assessment and Plan of Treatment: You underwent a ablation for your atrial fibrillation, please follow instructions provided to you by EP team, continue your medication as prescribed. You have a follow up appointment with Dr. Arreola 8/7 8:15am

## 2025-07-11 NOTE — DISCHARGE NOTE PROVIDER - NSDCFUADDINST_GEN_ALL_CORE_FT
You may shower after 24 hrs, otherwise keep groin incision sites dry and clean.    - Avoid activities such as jogging/excessive stair climbing/weight lifting for the next 7 days    - Take Acetaminophen (Tylenol) 500mg, one to two tablets every 8 hours as needed for pain relief.    - Pt was instructed to call [phone] if the following occurs:      - fever with temperature > 101      - swelling or bleeding at the groin incision site(s)

## 2025-07-11 NOTE — DISCHARGE NOTE NURSING/CASE MANAGEMENT/SOCIAL WORK - NSDCPEFALRISK_GEN_ALL_CORE
For information on Fall & Injury Prevention, visit: [email or web address] OR  [email or web address] OR  https://www.cdc.gov/steadi/patient.html

## 2025-07-11 NOTE — DISCHARGE NOTE NURSING/CASE MANAGEMENT/SOCIAL WORK - PATIENT PORTAL LINK FT
You can access the FollowMyHealth Patient Portal offered by [facility] by registering at the following website: [email or web address]. By joining [employer]’s FollowMyHealth portal, you will also be able to view your health information using other applications (apps) compatible with our system.

## 2025-07-11 NOTE — DISCHARGE NOTE NURSING/CASE MANAGEMENT/SOCIAL WORK - FINANCIAL ASSISTANCE
[facility] provides services at a reduced cost to those who are determined to be eligible through [facility]’s financial assistance program. Information regarding [facility]’s financial assistance program can be found by going to [email or web address] or by calling [phone].

## 2025-07-23 ENCOUNTER — RESULT REVIEW (OUTPATIENT)
Age: 80
End: 2025-07-23

## 2025-07-23 ENCOUNTER — APPOINTMENT (OUTPATIENT)
Dept: PULMONOLOGY | Facility: CLINIC | Age: 80
End: 2025-07-23

## 2025-07-23 ENCOUNTER — OUTPATIENT (OUTPATIENT)
Dept: OUTPATIENT SERVICES | Facility: HOSPITAL | Age: 80
LOS: 1 days | End: 2025-07-23
Payer: MEDICARE

## 2025-07-23 VITALS
HEIGHT: 64 IN | WEIGHT: 134.92 LBS | SYSTOLIC BLOOD PRESSURE: 149 MMHG | HEART RATE: 96 BPM | OXYGEN SATURATION: 97 % | RESPIRATION RATE: 16 BRPM | TEMPERATURE: 98 F | DIASTOLIC BLOOD PRESSURE: 92 MMHG

## 2025-07-23 DIAGNOSIS — K02.9 DENTAL CARIES, UNSPECIFIED: ICD-10-CM

## 2025-07-23 DIAGNOSIS — I48.0 PAROXYSMAL ATRIAL FIBRILLATION: ICD-10-CM

## 2025-07-23 DIAGNOSIS — Z95.2 PRESENCE OF PROSTHETIC HEART VALVE: Chronic | ICD-10-CM

## 2025-07-23 DIAGNOSIS — Z98.890 OTHER SPECIFIED POSTPROCEDURAL STATES: Chronic | ICD-10-CM

## 2025-07-23 DIAGNOSIS — Z87.898 PERSONAL HISTORY OF OTHER SPECIFIED CONDITIONS: ICD-10-CM

## 2025-07-23 DIAGNOSIS — Z96.641 PRESENCE OF RIGHT ARTIFICIAL HIP JOINT: Chronic | ICD-10-CM

## 2025-07-23 DIAGNOSIS — N20.0 CALCULUS OF KIDNEY: ICD-10-CM

## 2025-07-23 DIAGNOSIS — Z96.659 PRESENCE OF UNSPECIFIED ARTIFICIAL KNEE JOINT: Chronic | ICD-10-CM

## 2025-07-23 DIAGNOSIS — N13.30 UNSPECIFIED HYDRONEPHROSIS: ICD-10-CM

## 2025-07-23 DIAGNOSIS — Z01.818 ENCOUNTER FOR OTHER PREPROCEDURAL EXAMINATION: ICD-10-CM

## 2025-07-23 DIAGNOSIS — N20.1 CALCULUS OF URETER: ICD-10-CM

## 2025-07-23 LAB
ANION GAP SERPL CALC-SCNC: 12 MMOL/L — SIGNIFICANT CHANGE UP (ref 5–17)
BUN SERPL-MCNC: 18 MG/DL — SIGNIFICANT CHANGE UP (ref 7–23)
CALCIUM SERPL-MCNC: 9 MG/DL — SIGNIFICANT CHANGE UP (ref 8.4–10.5)
CHLORIDE SERPL-SCNC: 104 MMOL/L — SIGNIFICANT CHANGE UP (ref 96–108)
CO2 SERPL-SCNC: 22 MMOL/L — SIGNIFICANT CHANGE UP (ref 22–31)
CREAT SERPL-MCNC: 1.13 MG/DL — SIGNIFICANT CHANGE UP (ref 0.5–1.3)
EGFR: 50 ML/MIN/1.73M2 — LOW
EGFR: 50 ML/MIN/1.73M2 — LOW
GLUCOSE SERPL-MCNC: 99 MG/DL — SIGNIFICANT CHANGE UP (ref 70–99)
HCT VFR BLD CALC: 37.5 % — SIGNIFICANT CHANGE UP (ref 34.5–45)
HGB BLD-MCNC: 11.9 G/DL — SIGNIFICANT CHANGE UP (ref 11.5–15.5)
MCHC RBC-ENTMCNC: 30.4 PG — SIGNIFICANT CHANGE UP (ref 27–34)
MCHC RBC-ENTMCNC: 31.7 G/DL — LOW (ref 32–36)
MCV RBC AUTO: 95.7 FL — SIGNIFICANT CHANGE UP (ref 80–100)
NRBC # BLD AUTO: 0 K/UL — SIGNIFICANT CHANGE UP (ref 0–0)
NRBC # FLD: 0 K/UL — SIGNIFICANT CHANGE UP (ref 0–0)
NRBC BLD AUTO-RTO: 0 /100 WBCS — SIGNIFICANT CHANGE UP (ref 0–0)
PLATELET # BLD AUTO: 252 K/UL — SIGNIFICANT CHANGE UP (ref 150–400)
PMV BLD: 10.5 FL — SIGNIFICANT CHANGE UP (ref 7–13)
POTASSIUM SERPL-MCNC: 3.9 MMOL/L — SIGNIFICANT CHANGE UP (ref 3.5–5.3)
POTASSIUM SERPL-SCNC: 3.9 MMOL/L — SIGNIFICANT CHANGE UP (ref 3.5–5.3)
RBC # BLD: 3.92 M/UL — SIGNIFICANT CHANGE UP (ref 3.8–5.2)
RBC # FLD: 14.1 % — SIGNIFICANT CHANGE UP (ref 10.3–14.5)
SODIUM SERPL-SCNC: 138 MMOL/L — SIGNIFICANT CHANGE UP (ref 135–145)
WBC # BLD: 6.66 K/UL — SIGNIFICANT CHANGE UP (ref 3.8–10.5)
WBC # FLD AUTO: 6.66 K/UL — SIGNIFICANT CHANGE UP (ref 3.8–10.5)

## 2025-07-23 PROCEDURE — 85027 COMPLETE CBC AUTOMATED: CPT

## 2025-07-23 PROCEDURE — G0463: CPT

## 2025-07-23 PROCEDURE — 80048 BASIC METABOLIC PNL TOTAL CA: CPT

## 2025-07-23 PROCEDURE — 71046 X-RAY EXAM CHEST 2 VIEWS: CPT | Mod: 26

## 2025-07-23 PROCEDURE — 87186 SC STD MICRODIL/AGAR DIL: CPT

## 2025-07-23 PROCEDURE — 71046 X-RAY EXAM CHEST 2 VIEWS: CPT

## 2025-07-23 PROCEDURE — 87077 CULTURE AEROBIC IDENTIFY: CPT

## 2025-07-23 PROCEDURE — 87086 URINE CULTURE/COLONY COUNT: CPT

## 2025-07-23 RX ORDER — AMOXICILLIN 500 MG/1
1 CAPSULE ORAL
Refills: 0 | DISCHARGE

## 2025-07-25 LAB
-  AMOXICILLIN/CLAVULANIC ACID: SIGNIFICANT CHANGE UP
-  AMPICILLIN/SULBACTAM: SIGNIFICANT CHANGE UP
-  AMPICILLIN: SIGNIFICANT CHANGE UP
-  AZTREONAM: SIGNIFICANT CHANGE UP
-  CEFAZOLIN: SIGNIFICANT CHANGE UP
-  CEFEPIME: SIGNIFICANT CHANGE UP
-  CEFOXITIN: SIGNIFICANT CHANGE UP
-  CEFTRIAXONE: SIGNIFICANT CHANGE UP
-  CEFUROXIME: SIGNIFICANT CHANGE UP
-  CIPROFLOXACIN: SIGNIFICANT CHANGE UP
-  ERTAPENEM: SIGNIFICANT CHANGE UP
-  GENTAMICIN: SIGNIFICANT CHANGE UP
-  IMIPENEM: SIGNIFICANT CHANGE UP
-  LEVOFLOXACIN: SIGNIFICANT CHANGE UP
-  MEROPENEM: SIGNIFICANT CHANGE UP
-  NITROFURANTOIN: SIGNIFICANT CHANGE UP
-  PIPERACILLIN/TAZOBACTAM: SIGNIFICANT CHANGE UP
-  TIGECYCLINE: SIGNIFICANT CHANGE UP
-  TOBRAMYCIN: SIGNIFICANT CHANGE UP
-  TRIMETHOPRIM/SULFAMETHOXAZOLE: SIGNIFICANT CHANGE UP
CULTURE RESULTS: ABNORMAL
METHOD TYPE: SIGNIFICANT CHANGE UP
ORGANISM # SPEC MICROSCOPIC CNT: ABNORMAL
ORGANISM # SPEC MICROSCOPIC CNT: ABNORMAL
SPECIMEN SOURCE: SIGNIFICANT CHANGE UP

## 2025-07-31 ENCOUNTER — NON-APPOINTMENT (OUTPATIENT)
Age: 80
End: 2025-07-31

## 2025-07-31 ENCOUNTER — APPOINTMENT (OUTPATIENT)
Dept: CARDIOLOGY | Facility: CLINIC | Age: 80
End: 2025-07-31
Payer: MEDICARE

## 2025-07-31 VITALS
OXYGEN SATURATION: 96 % | HEIGHT: 64 IN | DIASTOLIC BLOOD PRESSURE: 84 MMHG | WEIGHT: 136 LBS | TEMPERATURE: 97.1 F | BODY MASS INDEX: 23.22 KG/M2 | HEART RATE: 97 BPM | SYSTOLIC BLOOD PRESSURE: 135 MMHG

## 2025-07-31 DIAGNOSIS — I25.10 ATHEROSCLEROTIC HEART DISEASE OF NATIVE CORONARY ARTERY W/OUT ANGINA PECTORIS: ICD-10-CM

## 2025-07-31 DIAGNOSIS — I10 ESSENTIAL (PRIMARY) HYPERTENSION: ICD-10-CM

## 2025-07-31 PROBLEM — H91.92 UNSPECIFIED HEARING LOSS, LEFT EAR: Chronic | Status: ACTIVE | Noted: 2025-07-23

## 2025-07-31 PROBLEM — Z87.898 PERSONAL HISTORY OF OTHER SPECIFIED CONDITIONS: Chronic | Status: ACTIVE | Noted: 2025-07-23

## 2025-07-31 PROCEDURE — 93000 ELECTROCARDIOGRAM COMPLETE: CPT

## 2025-07-31 PROCEDURE — 99214 OFFICE O/P EST MOD 30 MIN: CPT

## 2025-08-05 ENCOUNTER — OUTPATIENT (OUTPATIENT)
Dept: OUTPATIENT SERVICES | Facility: HOSPITAL | Age: 80
LOS: 1 days | End: 2025-08-05
Payer: MEDICARE

## 2025-08-05 ENCOUNTER — APPOINTMENT (OUTPATIENT)
Dept: CV DIAGNOSITCS | Facility: HOSPITAL | Age: 80
End: 2025-08-05

## 2025-08-05 ENCOUNTER — RESULT REVIEW (OUTPATIENT)
Age: 80
End: 2025-08-05

## 2025-08-05 DIAGNOSIS — Z98.890 OTHER SPECIFIED POSTPROCEDURAL STATES: Chronic | ICD-10-CM

## 2025-08-05 DIAGNOSIS — Z98.89 OTHER SPECIFIED POSTPROCEDURAL STATES: Chronic | ICD-10-CM

## 2025-08-05 DIAGNOSIS — Z96.641 PRESENCE OF RIGHT ARTIFICIAL HIP JOINT: Chronic | ICD-10-CM

## 2025-08-05 DIAGNOSIS — I42.9 CARDIOMYOPATHY, UNSPECIFIED: ICD-10-CM

## 2025-08-05 DIAGNOSIS — Z95.2 PRESENCE OF PROSTHETIC HEART VALVE: Chronic | ICD-10-CM

## 2025-08-05 DIAGNOSIS — Z98.890 OTHER SPECIFIED POSTPROCEDURAL STATES: ICD-10-CM

## 2025-08-05 DIAGNOSIS — Z96.7 PRESENCE OF OTHER BONE AND TENDON IMPLANTS: Chronic | ICD-10-CM

## 2025-08-05 DIAGNOSIS — Z96.659 PRESENCE OF UNSPECIFIED ARTIFICIAL KNEE JOINT: Chronic | ICD-10-CM

## 2025-08-05 PROCEDURE — 93306 TTE W/DOPPLER COMPLETE: CPT | Mod: 26

## 2025-08-07 ENCOUNTER — APPOINTMENT (OUTPATIENT)
Dept: ELECTROPHYSIOLOGY | Facility: CLINIC | Age: 80
End: 2025-08-07
Payer: MEDICARE

## 2025-08-07 VITALS
TEMPERATURE: 97.8 F | DIASTOLIC BLOOD PRESSURE: 89 MMHG | SYSTOLIC BLOOD PRESSURE: 148 MMHG | HEART RATE: 92 BPM | OXYGEN SATURATION: 97 %

## 2025-08-07 DIAGNOSIS — I47.19 OTHER SUPRAVENTRICULAR TACHYCARDIA: ICD-10-CM

## 2025-08-07 DIAGNOSIS — Z98.890 OTHER SPECIFIED POSTPROCEDURAL STATES: ICD-10-CM

## 2025-08-07 DIAGNOSIS — I42.9 CARDIOMYOPATHY, UNSPECIFIED: ICD-10-CM

## 2025-08-07 DIAGNOSIS — Z86.79 OTHER SPECIFIED POSTPROCEDURAL STATES: ICD-10-CM

## 2025-08-07 DIAGNOSIS — I48.0 PAROXYSMAL ATRIAL FIBRILLATION: ICD-10-CM

## 2025-08-07 PROCEDURE — 99214 OFFICE O/P EST MOD 30 MIN: CPT

## 2025-08-07 PROCEDURE — 93000 ELECTROCARDIOGRAM COMPLETE: CPT

## 2025-08-08 ENCOUNTER — NON-APPOINTMENT (OUTPATIENT)
Age: 80
End: 2025-08-08

## 2025-08-08 ENCOUNTER — APPOINTMENT (OUTPATIENT)
Dept: INTERNAL MEDICINE | Facility: CLINIC | Age: 80
End: 2025-08-08

## 2025-08-11 ENCOUNTER — NON-APPOINTMENT (OUTPATIENT)
Age: 80
End: 2025-08-11

## 2025-08-14 ENCOUNTER — OUTPATIENT (OUTPATIENT)
Dept: OUTPATIENT SERVICES | Facility: HOSPITAL | Age: 80
LOS: 1 days | End: 2025-08-14
Payer: MEDICARE

## 2025-08-14 ENCOUNTER — TRANSCRIPTION ENCOUNTER (OUTPATIENT)
Age: 80
End: 2025-08-14

## 2025-08-14 VITALS
HEART RATE: 94 BPM | OXYGEN SATURATION: 97 % | SYSTOLIC BLOOD PRESSURE: 151 MMHG | WEIGHT: 136.91 LBS | HEIGHT: 64 IN | TEMPERATURE: 98 F | RESPIRATION RATE: 15 BRPM | DIASTOLIC BLOOD PRESSURE: 96 MMHG

## 2025-08-14 VITALS
RESPIRATION RATE: 18 BRPM | OXYGEN SATURATION: 99 % | HEART RATE: 60 BPM | DIASTOLIC BLOOD PRESSURE: 68 MMHG | SYSTOLIC BLOOD PRESSURE: 138 MMHG

## 2025-08-14 DIAGNOSIS — Z95.2 PRESENCE OF PROSTHETIC HEART VALVE: Chronic | ICD-10-CM

## 2025-08-14 DIAGNOSIS — Z98.89 OTHER SPECIFIED POSTPROCEDURAL STATES: Chronic | ICD-10-CM

## 2025-08-14 DIAGNOSIS — Z98.890 OTHER SPECIFIED POSTPROCEDURAL STATES: Chronic | ICD-10-CM

## 2025-08-14 DIAGNOSIS — I47.10 SUPRAVENTRICULAR TACHYCARDIA, UNSPECIFIED: ICD-10-CM

## 2025-08-14 DIAGNOSIS — Z96.7 PRESENCE OF OTHER BONE AND TENDON IMPLANTS: Chronic | ICD-10-CM

## 2025-08-14 DIAGNOSIS — Z96.641 PRESENCE OF RIGHT ARTIFICIAL HIP JOINT: Chronic | ICD-10-CM

## 2025-08-14 DIAGNOSIS — Z96.659 PRESENCE OF UNSPECIFIED ARTIFICIAL KNEE JOINT: Chronic | ICD-10-CM

## 2025-08-14 PROCEDURE — 92960 CARDIOVERSION ELECTRIC EXT: CPT

## 2025-08-14 PROCEDURE — 93010 ELECTROCARDIOGRAM REPORT: CPT

## 2025-08-14 RX ORDER — METOPROLOL SUCCINATE 50 MG/1
1 TABLET, EXTENDED RELEASE ORAL
Refills: 0 | DISCHARGE

## 2025-08-21 ENCOUNTER — NON-APPOINTMENT (OUTPATIENT)
Age: 80
End: 2025-08-21

## 2025-08-21 ENCOUNTER — APPOINTMENT (OUTPATIENT)
Dept: UROLOGY | Facility: CLINIC | Age: 80
End: 2025-08-21
Payer: MEDICARE

## 2025-08-21 ENCOUNTER — LABORATORY RESULT (OUTPATIENT)
Age: 80
End: 2025-08-21

## 2025-08-21 ENCOUNTER — APPOINTMENT (OUTPATIENT)
Dept: ELECTROPHYSIOLOGY | Facility: CLINIC | Age: 80
End: 2025-08-21
Payer: MEDICARE

## 2025-08-21 VITALS — SYSTOLIC BLOOD PRESSURE: 177 MMHG | DIASTOLIC BLOOD PRESSURE: 73 MMHG

## 2025-08-21 VITALS
HEIGHT: 64 IN | SYSTOLIC BLOOD PRESSURE: 175 MMHG | WEIGHT: 138 LBS | HEART RATE: 58 BPM | OXYGEN SATURATION: 95 % | DIASTOLIC BLOOD PRESSURE: 77 MMHG | BODY MASS INDEX: 23.56 KG/M2 | TEMPERATURE: 98.6 F

## 2025-08-21 VITALS — SYSTOLIC BLOOD PRESSURE: 156 MMHG | HEART RATE: 50 BPM | DIASTOLIC BLOOD PRESSURE: 79 MMHG

## 2025-08-21 DIAGNOSIS — I42.9 CARDIOMYOPATHY, UNSPECIFIED: ICD-10-CM

## 2025-08-21 DIAGNOSIS — J47.9 BRONCHIECTASIS, UNCOMPLICATED: ICD-10-CM

## 2025-08-21 DIAGNOSIS — N20.1 CALCULUS OF URETER: ICD-10-CM

## 2025-08-21 DIAGNOSIS — N13.30 UNSPECIFIED HYDRONEPHROSIS: ICD-10-CM

## 2025-08-21 DIAGNOSIS — I47.19 OTHER SUPRAVENTRICULAR TACHYCARDIA: ICD-10-CM

## 2025-08-21 DIAGNOSIS — Z86.79 OTHER SPECIFIED POSTPROCEDURAL STATES: ICD-10-CM

## 2025-08-21 DIAGNOSIS — Z98.890 OTHER SPECIFIED POSTPROCEDURAL STATES: ICD-10-CM

## 2025-08-21 LAB
ALBUMIN SERPL ELPH-MCNC: 3.8 G/DL
ALP BLD-CCNC: 81 U/L
ALT SERPL-CCNC: 24 U/L
ANION GAP SERPL CALC-SCNC: 15 MMOL/L
AST SERPL-CCNC: 37 U/L
BILIRUB SERPL-MCNC: 0.7 MG/DL
BUN SERPL-MCNC: 18 MG/DL
CALCIUM SERPL-MCNC: 9.1 MG/DL
CHLORIDE SERPL-SCNC: 104 MMOL/L
CO2 SERPL-SCNC: 24 MMOL/L
CREAT SERPL-MCNC: 1.07 MG/DL
EGFRCR SERPLBLD CKD-EPI 2021: 53 ML/MIN/1.73M2
HCT VFR BLD CALC: 37.8 %
HGB BLD-MCNC: 12.1 G/DL
MCHC RBC-ENTMCNC: 30.4 PG
MCHC RBC-ENTMCNC: 32 G/DL
MCV RBC AUTO: 95 FL
NT-PROBNP SERPL-MCNC: 1976 PG/ML
PLATELET # BLD AUTO: 235 K/UL
POTASSIUM SERPL-SCNC: 4.9 MMOL/L
PROT SERPL-MCNC: 6.2 G/DL
RBC # BLD: 3.98 M/UL
RBC # FLD: 13.9 %
SODIUM SERPL-SCNC: 143 MMOL/L
TSH SERPL-ACNC: 5.74 UIU/ML
WBC # FLD AUTO: 5.31 K/UL

## 2025-08-21 PROCEDURE — G2211 COMPLEX E/M VISIT ADD ON: CPT

## 2025-08-21 PROCEDURE — 93000 ELECTROCARDIOGRAM COMPLETE: CPT

## 2025-08-21 PROCEDURE — 99214 OFFICE O/P EST MOD 30 MIN: CPT

## 2025-08-21 PROCEDURE — 99213 OFFICE O/P EST LOW 20 MIN: CPT

## 2025-08-21 RX ORDER — TAMSULOSIN HYDROCHLORIDE 0.4 MG/1
0.4 CAPSULE ORAL
Qty: 90 | Refills: 1 | Status: ACTIVE | COMMUNITY
Start: 2025-08-21 | End: 1900-01-01

## 2025-08-22 LAB
APPEARANCE: CLEAR
BILIRUBIN URINE: NEGATIVE
BLOOD URINE: ABNORMAL
COLOR: YELLOW
GLUCOSE QUALITATIVE U: NEGATIVE MG/DL
KETONES URINE: NEGATIVE MG/DL
LEUKOCYTE ESTERASE URINE: NEGATIVE
NITRITE URINE: NEGATIVE
PH URINE: 6.5
PROTEIN URINE: NEGATIVE MG/DL
SPECIFIC GRAVITY URINE: 1.01
UROBILINOGEN URINE: 0.2 MG/DL

## 2025-08-25 DIAGNOSIS — A49.9 URINARY TRACT INFECTION, SITE NOT SPECIFIED: ICD-10-CM

## 2025-08-25 DIAGNOSIS — N39.0 URINARY TRACT INFECTION, SITE NOT SPECIFIED: ICD-10-CM

## 2025-08-25 RX ORDER — CEFPODOXIME PROXETIL 200 MG/1
200 TABLET, FILM COATED ORAL
Qty: 14 | Refills: 0 | Status: ACTIVE | COMMUNITY
Start: 2025-08-25 | End: 1900-01-01

## 2025-09-04 ENCOUNTER — APPOINTMENT (OUTPATIENT)
Dept: ELECTROPHYSIOLOGY | Facility: CLINIC | Age: 80
End: 2025-09-04
Payer: MEDICARE

## 2025-09-04 ENCOUNTER — NON-APPOINTMENT (OUTPATIENT)
Age: 80
End: 2025-09-04

## 2025-09-04 VITALS — DIASTOLIC BLOOD PRESSURE: 88 MMHG | SYSTOLIC BLOOD PRESSURE: 135 MMHG

## 2025-09-04 VITALS
DIASTOLIC BLOOD PRESSURE: 91 MMHG | SYSTOLIC BLOOD PRESSURE: 148 MMHG | BODY MASS INDEX: 23.13 KG/M2 | HEART RATE: 107 BPM | HEIGHT: 64 IN | OXYGEN SATURATION: 96 % | WEIGHT: 135.5 LBS | TEMPERATURE: 97.9 F

## 2025-09-04 DIAGNOSIS — I48.4 ATYPICAL ATRIAL FLUTTER: ICD-10-CM

## 2025-09-04 DIAGNOSIS — I48.0 PAROXYSMAL ATRIAL FIBRILLATION: ICD-10-CM

## 2025-09-04 DIAGNOSIS — N20.1 CALCULUS OF URETER: ICD-10-CM

## 2025-09-04 DIAGNOSIS — N13.30 UNSPECIFIED HYDRONEPHROSIS: ICD-10-CM

## 2025-09-04 PROCEDURE — 93000 ELECTROCARDIOGRAM COMPLETE: CPT

## 2025-09-04 PROCEDURE — G2211 COMPLEX E/M VISIT ADD ON: CPT

## 2025-09-04 PROCEDURE — 99214 OFFICE O/P EST MOD 30 MIN: CPT

## 2025-09-08 ENCOUNTER — APPOINTMENT (OUTPATIENT)
Dept: PULMONOLOGY | Facility: CLINIC | Age: 80
End: 2025-09-08
Payer: MEDICARE

## 2025-09-08 VITALS
BODY MASS INDEX: 23.22 KG/M2 | OXYGEN SATURATION: 97 % | DIASTOLIC BLOOD PRESSURE: 97 MMHG | SYSTOLIC BLOOD PRESSURE: 146 MMHG | HEART RATE: 105 BPM | WEIGHT: 136 LBS | HEIGHT: 64 IN

## 2025-09-08 DIAGNOSIS — R05.8 OTHER SPECIFIED COUGH: ICD-10-CM

## 2025-09-08 DIAGNOSIS — I42.9 CARDIOMYOPATHY, UNSPECIFIED: ICD-10-CM

## 2025-09-08 DIAGNOSIS — Z80.2 FAMILY HISTORY OF MALIGNANT NEOPLASM OF OTHER RESPIRATORY AND INTRATHORACIC ORGANS: ICD-10-CM

## 2025-09-08 DIAGNOSIS — Z95.2 PRESENCE OF PROSTHETIC HEART VALVE: ICD-10-CM

## 2025-09-08 DIAGNOSIS — J45.30 MILD PERSISTENT ASTHMA, UNCOMPLICATED: ICD-10-CM

## 2025-09-08 DIAGNOSIS — Z81.8 FAMILY HISTORY OF OTHER MENTAL AND BEHAVIORAL DISORDERS: ICD-10-CM

## 2025-09-08 PROCEDURE — ZZZZZ: CPT

## 2025-09-08 PROCEDURE — 94010 BREATHING CAPACITY TEST: CPT

## 2025-09-08 PROCEDURE — 94729 DIFFUSING CAPACITY: CPT

## 2025-09-08 PROCEDURE — 94726 PLETHYSMOGRAPHY LUNG VOLUMES: CPT

## 2025-09-08 PROCEDURE — 99215 OFFICE O/P EST HI 40 MIN: CPT | Mod: 25

## 2025-09-08 PROCEDURE — 95012 NITRIC OXIDE EXP GAS DETER: CPT

## 2025-09-08 RX ORDER — AZELASTINE HYDROCHLORIDE 137 UG/1
0.1 SPRAY, METERED NASAL
Qty: 90 | Refills: 3 | Status: ACTIVE | COMMUNITY
Start: 2025-09-08 | End: 1900-01-01

## 2025-09-08 RX ORDER — FLUTICASONE PROPIONATE 50 UG/1
50 SPRAY NASAL TWICE DAILY
Qty: 48 | Refills: 3 | Status: ACTIVE | COMMUNITY
Start: 2025-09-08 | End: 1900-01-01

## 2025-09-09 ENCOUNTER — NON-APPOINTMENT (OUTPATIENT)
Age: 80
End: 2025-09-09

## 2025-09-09 RX ORDER — BUDESONIDE AND FORMOTEROL FUMARATE 160; 4.5 UG/1; UG/1
160-4.5 AEROSOL, METERED RESPIRATORY (INHALATION) TWICE DAILY
Qty: 3 | Refills: 0 | Status: ACTIVE | COMMUNITY
Start: 2025-09-09 | End: 1900-01-01

## 2025-09-09 RX ORDER — FLUTICASONE PROPIONATE AND SALMETEROL 250; 50 UG/1; UG/1
250-50 POWDER RESPIRATORY (INHALATION)
Qty: 1 | Refills: 3 | Status: DISCONTINUED | COMMUNITY
Start: 2025-09-08 | End: 2025-09-09

## 2025-09-10 ENCOUNTER — APPOINTMENT (OUTPATIENT)
Dept: RADIOLOGY | Facility: CLINIC | Age: 80
End: 2025-09-10
Payer: MEDICARE

## 2025-09-10 ENCOUNTER — APPOINTMENT (OUTPATIENT)
Dept: ULTRASOUND IMAGING | Facility: CLINIC | Age: 80
End: 2025-09-10
Payer: MEDICARE

## 2025-09-10 PROCEDURE — 76775 US EXAM ABDO BACK WALL LIM: CPT | Mod: 26

## 2025-09-10 PROCEDURE — 74018 RADEX ABDOMEN 1 VIEW: CPT | Mod: 26

## (undated) DEVICE — PRIORITY PACK WITH COPILOT 20/30

## (undated) DEVICE — SUT POLYSORB 2-0 30" GS-21 UNDYED

## (undated) DEVICE — GOWN TRIMAX LG

## (undated) DEVICE — GLV 8.5 PROTEXIS (WHITE)

## (undated) DEVICE — MEDICATION LABELS W MARKER

## (undated) DEVICE — SAW BLADE MICROAIRE STERNUM 1X34X9.4MM

## (undated) DEVICE — PACK UNIVERSAL CARDIAC

## (undated) DEVICE — GLV 8 PROTEGRITY

## (undated) DEVICE — GLV 8 PROTEXIS (WHITE)

## (undated) DEVICE — SUT PROLENE 5-0 30" RB-2

## (undated) DEVICE — DRSG TEGADERM 6"X8"

## (undated) DEVICE — GLV 7.5 PROTEXIS (WHITE)

## (undated) DEVICE — DRAPE C ARM UNIVERSAL

## (undated) DEVICE — FOLEY TRAY 16FR 5CC LF LUBRISIL ADVANCE TEMP CLOSED

## (undated) DEVICE — GOWN TRIMAX XXL

## (undated) DEVICE — DRSG OPSITE 13.75 X 4"

## (undated) DEVICE — ELCTR BOVIE TIP BLADE VALLEYLAB 6.5"

## (undated) DEVICE — VESSEL LOOP MAXI-RED  0.120" X 16"

## (undated) DEVICE — LAP PAD 18 X 18"

## (undated) DEVICE — CONNECTOR STRAIGHT 3/8 X 3/8" W LUER LOCK

## (undated) DEVICE — GLV 7 PROTEXIS (WHITE)

## (undated) DEVICE — DRAPE TOWEL BLUE 17" X 24"

## (undated) DEVICE — ELCTR REM POLYHESIVE ADULT PT RETURN 15FT

## (undated) DEVICE — TOURNIQUET SET 12FR (1 RED, 1 BLUE, 1 SNARE) 7"

## (undated) DEVICE — WARMING BLANKET FULL UNDERBODY

## (undated) DEVICE — GLV 8 RADIATION

## (undated) DEVICE — BLADE SCALPEL SAFETYLOCK #15

## (undated) DEVICE — DRAPE 1/2 SHEET 40X57"

## (undated) DEVICE — POSITIONER FOAM EGG CRATE ULNAR 2PCS (PINK)

## (undated) DEVICE — DRSG OPSITE 2.5 X 2"

## (undated) DEVICE — SUCTION YANKAUER NO CONTROL VENT

## (undated) DEVICE — CONNECTOR STRAIGHT 3/8 X 1/2"

## (undated) DEVICE — ELCTR HEX BLADE

## (undated) DEVICE — SAW BLADE MICROAIRE STERNUM 1.1X50X42MM

## (undated) DEVICE — SOL IRR POUR NS 0.9% 500ML

## (undated) DEVICE — SYR ASEPTO

## (undated) DEVICE — SUT SILK 0 30" TIES

## (undated) DEVICE — SUT BIOSYN 4-0 18" P-12

## (undated) DEVICE — DRAPE 3/4 SHEET W REINFORCEMENT 56X77"

## (undated) DEVICE — BLADE SCALPEL SAFETYLOCK #11

## (undated) DEVICE — SUT SOFSILK 0 30" V-20

## (undated) DEVICE — SPECIMEN CONTAINER 100ML

## (undated) DEVICE — SUT TICRON 5 30" KV-40

## (undated) DEVICE — SUT PROLENE 5-0 36" RB-1

## (undated) DEVICE — SOL IRR POUR H2O 250ML

## (undated) DEVICE — DRAIN RESERVOIR FOR JACKSON PRATT 100CC CARDINAL

## (undated) DEVICE — BLADE SCALPEL SAFETYLOCK #10

## (undated) DEVICE — ELCTR BOVIE PENCIL HANDPIECE ROCKER SWITCH 15FT

## (undated) DEVICE — DRAPE INSTRUMENT POUCH 6.75" X 11"

## (undated) DEVICE — DRAPE FEMORAL ANGIOGRAPHY W TROUGH

## (undated) DEVICE — SOL NORMOSOL-R PH7.4 1000ML

## (undated) DEVICE — VENODYNE/SCD SLEEVE CALF MEDIUM

## (undated) DEVICE — PREP CHLORAPREP HI-LITE ORANGE 26ML

## (undated) DEVICE — SUT PROLENE 4-0 36" BB

## (undated) DEVICE — VISITEC 4X4